# Patient Record
Sex: FEMALE | Race: BLACK OR AFRICAN AMERICAN | NOT HISPANIC OR LATINO | URBAN - METROPOLITAN AREA
[De-identification: names, ages, dates, MRNs, and addresses within clinical notes are randomized per-mention and may not be internally consistent; named-entity substitution may affect disease eponyms.]

---

## 2015-01-01 RX ORDER — METOPROLOL TARTRATE 50 MG
1 TABLET ORAL
Qty: 0 | Refills: 0 | DISCHARGE
Start: 2015-01-01

## 2015-01-06 RX ORDER — ASCORBIC ACID 60 MG
2 TABLET,CHEWABLE ORAL
Qty: 0 | Refills: 0 | COMMUNITY
Start: 2015-01-06

## 2015-04-05 RX ORDER — GABAPENTIN 400 MG/1
1 CAPSULE ORAL
Qty: 0 | Refills: 0 | COMMUNITY
Start: 2015-04-05

## 2015-04-05 RX ORDER — GABAPENTIN 400 MG/1
1 CAPSULE ORAL
Qty: 0 | Refills: 0 | DISCHARGE
Start: 2015-04-05

## 2017-01-14 ENCOUNTER — EMERGENCY (EMERGENCY)
Facility: HOSPITAL | Age: 67
LOS: 1 days | Discharge: HOME | End: 2017-01-14
Attending: EMERGENCY MEDICINE | Admitting: EMERGENCY MEDICINE
Payer: MEDICARE

## 2017-01-14 VITALS
OXYGEN SATURATION: 100 % | HEART RATE: 90 BPM | DIASTOLIC BLOOD PRESSURE: 56 MMHG | RESPIRATION RATE: 20 BRPM | SYSTOLIC BLOOD PRESSURE: 126 MMHG | TEMPERATURE: 99 F

## 2017-01-14 VITALS
RESPIRATION RATE: 16 BRPM | OXYGEN SATURATION: 100 % | SYSTOLIC BLOOD PRESSURE: 112 MMHG | HEART RATE: 87 BPM | TEMPERATURE: 99 F | DIASTOLIC BLOOD PRESSURE: 79 MMHG

## 2017-01-14 LAB
ALBUMIN SERPL ELPH-MCNC: 4.1 G/DL — SIGNIFICANT CHANGE UP (ref 3.3–5)
ALP SERPL-CCNC: 85 U/L — SIGNIFICANT CHANGE UP (ref 40–120)
ALT FLD-CCNC: 24 U/L — SIGNIFICANT CHANGE UP (ref 4–33)
APPEARANCE UR: SIGNIFICANT CHANGE UP
AST SERPL-CCNC: 27 U/L — SIGNIFICANT CHANGE UP (ref 4–32)
BASOPHILS # BLD AUTO: 0.02 K/UL — SIGNIFICANT CHANGE UP (ref 0–0.2)
BASOPHILS NFR BLD AUTO: 0.2 % — SIGNIFICANT CHANGE UP (ref 0–2)
BILIRUB SERPL-MCNC: 0.4 MG/DL — SIGNIFICANT CHANGE UP (ref 0.2–1.2)
BILIRUB UR-MCNC: NEGATIVE — SIGNIFICANT CHANGE UP
BLOOD UR QL VISUAL: NEGATIVE — SIGNIFICANT CHANGE UP
BUN SERPL-MCNC: 12 MG/DL — SIGNIFICANT CHANGE UP (ref 7–23)
CALCIUM SERPL-MCNC: 9.4 MG/DL — SIGNIFICANT CHANGE UP (ref 8.4–10.5)
CHLORIDE SERPL-SCNC: 101 MMOL/L — SIGNIFICANT CHANGE UP (ref 98–107)
CK MB BLD-MCNC: 1.4 — SIGNIFICANT CHANGE UP (ref 0–2.5)
CK MB BLD-MCNC: 2.17 NG/ML — SIGNIFICANT CHANGE UP (ref 1–4.7)
CK SERPL-CCNC: 150 U/L — SIGNIFICANT CHANGE UP (ref 25–170)
CO2 SERPL-SCNC: 24 MMOL/L — SIGNIFICANT CHANGE UP (ref 22–31)
COLOR SPEC: YELLOW — SIGNIFICANT CHANGE UP
CREAT SERPL-MCNC: 0.71 MG/DL — SIGNIFICANT CHANGE UP (ref 0.5–1.3)
EOSINOPHIL # BLD AUTO: 0.11 K/UL — SIGNIFICANT CHANGE UP (ref 0–0.5)
EOSINOPHIL NFR BLD AUTO: 1.2 % — SIGNIFICANT CHANGE UP (ref 0–6)
GLUCOSE SERPL-MCNC: 108 MG/DL — HIGH (ref 70–99)
GLUCOSE UR-MCNC: NEGATIVE — SIGNIFICANT CHANGE UP
HCT VFR BLD CALC: 37 % — SIGNIFICANT CHANGE UP (ref 34.5–45)
HGB BLD-MCNC: 12.2 G/DL — SIGNIFICANT CHANGE UP (ref 11.5–15.5)
IMM GRANULOCYTES NFR BLD AUTO: 0.3 % — SIGNIFICANT CHANGE UP (ref 0–1.5)
KETONES UR-MCNC: NEGATIVE — SIGNIFICANT CHANGE UP
LEUKOCYTE ESTERASE UR-ACNC: NEGATIVE — SIGNIFICANT CHANGE UP
LYMPHOCYTES # BLD AUTO: 2.19 K/UL — SIGNIFICANT CHANGE UP (ref 1–3.3)
LYMPHOCYTES # BLD AUTO: 24.9 % — SIGNIFICANT CHANGE UP (ref 13–44)
MCHC RBC-ENTMCNC: 28.2 PG — SIGNIFICANT CHANGE UP (ref 27–34)
MCHC RBC-ENTMCNC: 33 % — SIGNIFICANT CHANGE UP (ref 32–36)
MCV RBC AUTO: 85.6 FL — SIGNIFICANT CHANGE UP (ref 80–100)
MONOCYTES # BLD AUTO: 0.77 K/UL — SIGNIFICANT CHANGE UP (ref 0–0.9)
MONOCYTES NFR BLD AUTO: 8.7 % — SIGNIFICANT CHANGE UP (ref 2–14)
MUCOUS THREADS # UR AUTO: SIGNIFICANT CHANGE UP
NEUTROPHILS # BLD AUTO: 5.69 K/UL — SIGNIFICANT CHANGE UP (ref 1.8–7.4)
NEUTROPHILS NFR BLD AUTO: 64.7 % — SIGNIFICANT CHANGE UP (ref 43–77)
NITRITE UR-MCNC: NEGATIVE — SIGNIFICANT CHANGE UP
PH UR: 7 — SIGNIFICANT CHANGE UP (ref 4.6–8)
PLATELET # BLD AUTO: 321 K/UL — SIGNIFICANT CHANGE UP (ref 150–400)
PMV BLD: 10.9 FL — SIGNIFICANT CHANGE UP (ref 7–13)
POTASSIUM SERPL-MCNC: 3.8 MMOL/L — SIGNIFICANT CHANGE UP (ref 3.5–5.3)
POTASSIUM SERPL-SCNC: 3.8 MMOL/L — SIGNIFICANT CHANGE UP (ref 3.5–5.3)
PROT SERPL-MCNC: 7.5 G/DL — SIGNIFICANT CHANGE UP (ref 6–8.3)
PROT UR-MCNC: 10 — SIGNIFICANT CHANGE UP
RBC # BLD: 4.32 M/UL — SIGNIFICANT CHANGE UP (ref 3.8–5.2)
RBC # FLD: 15 % — HIGH (ref 10.3–14.5)
RBC CASTS # UR COMP ASSIST: SIGNIFICANT CHANGE UP (ref 0–?)
SODIUM SERPL-SCNC: 140 MMOL/L — SIGNIFICANT CHANGE UP (ref 135–145)
SP GR SPEC: 1.02 — SIGNIFICANT CHANGE UP (ref 1–1.03)
SQUAMOUS # UR AUTO: SIGNIFICANT CHANGE UP
TROPONIN T SERPL-MCNC: < 0.06 NG/ML — SIGNIFICANT CHANGE UP (ref 0–0.06)
UROBILINOGEN FLD QL: NORMAL E.U. — SIGNIFICANT CHANGE UP (ref 0.1–0.2)
WBC # BLD: 8.81 K/UL — SIGNIFICANT CHANGE UP (ref 3.8–10.5)
WBC # FLD AUTO: 8.81 K/UL — SIGNIFICANT CHANGE UP (ref 3.8–10.5)
WBC UR QL: SIGNIFICANT CHANGE UP (ref 0–?)

## 2017-01-14 PROCEDURE — 71020: CPT | Mod: 26

## 2017-01-14 PROCEDURE — 99285 EMERGENCY DEPT VISIT HI MDM: CPT | Mod: 25,GC

## 2017-01-14 PROCEDURE — 93010 ELECTROCARDIOGRAM REPORT: CPT

## 2017-01-14 RX ORDER — HYDROMORPHONE HYDROCHLORIDE 2 MG/ML
1 INJECTION INTRAMUSCULAR; INTRAVENOUS; SUBCUTANEOUS
Qty: 10 | Refills: 0 | OUTPATIENT
Start: 2017-01-14

## 2017-01-14 RX ORDER — HYDROMORPHONE HYDROCHLORIDE 2 MG/ML
1 INJECTION INTRAMUSCULAR; INTRAVENOUS; SUBCUTANEOUS ONCE
Qty: 0 | Refills: 0 | Status: DISCONTINUED | OUTPATIENT
Start: 2017-01-14 | End: 2017-01-14

## 2017-01-14 RX ORDER — KETOROLAC TROMETHAMINE 30 MG/ML
15 SYRINGE (ML) INJECTION ONCE
Qty: 0 | Refills: 0 | Status: DISCONTINUED | OUTPATIENT
Start: 2017-01-14 | End: 2017-01-14

## 2017-01-14 RX ORDER — HYDROMORPHONE HYDROCHLORIDE 2 MG/ML
2 INJECTION INTRAMUSCULAR; INTRAVENOUS; SUBCUTANEOUS ONCE
Qty: 0 | Refills: 0 | Status: DISCONTINUED | OUTPATIENT
Start: 2017-01-14 | End: 2017-01-14

## 2017-01-14 RX ADMIN — Medication 15 MILLIGRAM(S): at 17:27

## 2017-01-14 RX ADMIN — HYDROMORPHONE HYDROCHLORIDE 2 MILLIGRAM(S): 2 INJECTION INTRAMUSCULAR; INTRAVENOUS; SUBCUTANEOUS at 18:59

## 2017-01-14 RX ADMIN — Medication 15 MILLIGRAM(S): at 17:41

## 2017-01-14 RX ADMIN — HYDROMORPHONE HYDROCHLORIDE 1 MILLIGRAM(S): 2 INJECTION INTRAMUSCULAR; INTRAVENOUS; SUBCUTANEOUS at 17:41

## 2017-01-14 RX ADMIN — HYDROMORPHONE HYDROCHLORIDE 1 MILLIGRAM(S): 2 INJECTION INTRAMUSCULAR; INTRAVENOUS; SUBCUTANEOUS at 17:26

## 2017-01-14 NOTE — ED ADULT NURSE NOTE - CHIEF COMPLAINT QUOTE
Patient with a history of spinal stenosis, c/o lower back pain radiating into b/l LE. States "my doctor mailed me a prescription but I haven't gotten it yet." Usually takes percocet for pain but ran out one week ago.    Addendum 1545: Patient now reports chest pain, SOB worsening upon exertion to [facility] MONSIVAIS since Wednesday. Has a history of angina. Charge nurse aware. Taken to RM 26.
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
## 2017-01-14 NOTE — ED PROVIDER NOTE - SHIFT CHANGE DETAILS
pt was transferred to [facility] for further work up of her cp and sob pt was transferred to [facility] for further work up of her cp and sob. pt is in no acute distress and also complaining of left back pain radiating to her left lower ext

## 2017-01-14 NOTE — ED ADULT TRIAGE NOTE - CHIEF COMPLAINT QUOTE
Patient with a history of spinal stenosis, c/o lower back pain radiating into b/l LE. States "my doctor mailed me a prescription but I havent gotten it yet." Usually takes percocet for pain but ran out one week ago. Patient with a history of spinal stenosis, c/o lower back pain radiating into b/l LE. States "my doctor mailed me a prescription but I haven't gotten it yet." Usually takes percocet for pain but ran out one week ago.    Addendum 1545: Patient now reports chest pain, SOB worsening upon exertion to [facility] MONSIVAIS since Wednesday. Has a history of angina. Charge nurse aware. Taken to RM 26.

## 2017-01-14 NOTE — ED PROVIDER NOTE - PROGRESS NOTE DETAILS
Diego: pt sent to main by Annabelle for CP.  on d/w w/ pt notes 2 min of chest pain on night PTA, typical, unchanged from prior "angina".  no events today.  Notes JOLLY to baseline.  No cough, fever, AP.  Pt in ED 2/2 to chronic back pain, ran out of rx for percocet this AM.  Unchanged back pain. Diego: pt sent to [facility] by Annabelle for CP.  on d/w w/ pt notes 2 min of chest pain on night PTA, typical, unchanged from prior "angina".  no events today.  Notes JOLLY to baseline.  No cough, fever, AP.  Pt in ED 2/2 to chronic back pain, ran out of rx for percocet this AM.  Unchanged back pain.  NML stress 2 months PTA (cards aAron Blackwell) Lorrie: sent to [facility] by Dr. Wynn due to chest pain. history retaken. pt presented for worsening chronic low back pain. normally takes percocet but hasn't been working for her. maggie had a recent stress by her cardiologist for surgery clearance and was cleared. was initially scheduled for surgery in 4 days but insurance has not approved it yet. she called her surgeon due to back pain and was sent to ED. sent to the main because she reported c/p and sob. last yesterday. she says she has chronic intermittent c/p and that episode was no different. she also has chronic soboe that was no different. she says she is not concerned over her chest pain or breathing and if ot for her back would not have come to ED. her back pain is chronic in nature and per pt due to spinal stenosis. always radiates down both legs and is otherwise unchanged. able to ambulate. exam, vs wnl, nad. hs and lungs normal, abdo benign, legs normal. good strength in legs. ecg neg. will get single trop, but given chronic unchanged symptoms and recent neg stress test, no need for further ED w/u. will give IV meds for her back pain. will give her f/u with pain mgmt for possible injections as she has not tried that. will change percs to dilaudid when she goes home. Pt pain improved, unable to access pharmacy tonight, will give 1 tab to go home and pain management phone number.  Pt stable for d/c.

## 2017-01-14 NOTE — ED PROVIDER NOTE - PMH
Angina    Asthma    Brain cyst    Dizziness  secondary to brain cyst  Fibroid, uterine    H/O sciatica    HTN (hypertension)    RA (rheumatoid arthritis)

## 2017-01-14 NOTE — ED PROVIDER NOTE - NS ED MD SCRIBE ATTENDING SCRIBE SECTIONS
PAST MEDICAL/SURGICAL/SOCIAL HISTORY/VITAL SIGNS( Pullset)/HIV/HISTORY OF PRESENT ILLNESS/REVIEW OF SYSTEMS/DISPOSITION/PHYSICAL EXAM

## 2017-01-18 LAB
BACTERIA UR CULT: SIGNIFICANT CHANGE UP
SPECIMEN SOURCE: SIGNIFICANT CHANGE UP

## 2017-01-19 NOTE — ED POST DISCHARGE NOTE - RESULT SUMMARY
UCX: Staph sp. coag neg 10,000 - 49,000.  UA negative.  Likely contaminant.  Appropriate care in ED.  No call back necessary.

## 2017-02-17 ENCOUNTER — EMERGENCY (EMERGENCY)
Facility: HOSPITAL | Age: 67
LOS: 1 days | Discharge: HOME | End: 2017-02-17
Attending: EMERGENCY MEDICINE | Admitting: EMERGENCY MEDICINE
Payer: MEDICARE

## 2017-02-17 VITALS
RESPIRATION RATE: 16 BRPM | TEMPERATURE: 99 F | SYSTOLIC BLOOD PRESSURE: 143 MMHG | DIASTOLIC BLOOD PRESSURE: 64 MMHG | OXYGEN SATURATION: 98 % | HEART RATE: 76 BPM

## 2017-02-17 VITALS
DIASTOLIC BLOOD PRESSURE: 88 MMHG | TEMPERATURE: 98 F | SYSTOLIC BLOOD PRESSURE: 149 MMHG | OXYGEN SATURATION: 98 % | RESPIRATION RATE: 18 BRPM | HEART RATE: 82 BPM

## 2017-02-17 DIAGNOSIS — Z98.890 OTHER SPECIFIED POSTPROCEDURAL STATES: Chronic | ICD-10-CM

## 2017-02-17 LAB
ALBUMIN SERPL ELPH-MCNC: 3.2 G/DL — LOW (ref 3.3–5)
ALP SERPL-CCNC: 84 U/L — SIGNIFICANT CHANGE UP (ref 40–120)
ALT FLD-CCNC: 11 U/L — SIGNIFICANT CHANGE UP (ref 4–33)
APPEARANCE UR: CLEAR — SIGNIFICANT CHANGE UP
AST SERPL-CCNC: 26 U/L — SIGNIFICANT CHANGE UP (ref 4–32)
BACTERIA # UR AUTO: SIGNIFICANT CHANGE UP
BASOPHILS # BLD AUTO: 0.02 K/UL — SIGNIFICANT CHANGE UP (ref 0–0.2)
BASOPHILS NFR BLD AUTO: 0.2 % — SIGNIFICANT CHANGE UP (ref 0–2)
BILIRUB SERPL-MCNC: 0.2 MG/DL — SIGNIFICANT CHANGE UP (ref 0.2–1.2)
BILIRUB UR-MCNC: NEGATIVE — SIGNIFICANT CHANGE UP
BLOOD UR QL VISUAL: NEGATIVE — SIGNIFICANT CHANGE UP
BUN SERPL-MCNC: 7 MG/DL — SIGNIFICANT CHANGE UP (ref 7–23)
CALCIUM SERPL-MCNC: 9 MG/DL — SIGNIFICANT CHANGE UP (ref 8.4–10.5)
CHLORIDE SERPL-SCNC: 96 MMOL/L — LOW (ref 98–107)
CO2 SERPL-SCNC: 25 MMOL/L — SIGNIFICANT CHANGE UP (ref 22–31)
COLOR SPEC: SIGNIFICANT CHANGE UP
CREAT SERPL-MCNC: 0.63 MG/DL — SIGNIFICANT CHANGE UP (ref 0.5–1.3)
EOSINOPHIL # BLD AUTO: 0.16 K/UL — SIGNIFICANT CHANGE UP (ref 0–0.5)
EOSINOPHIL NFR BLD AUTO: 1.9 % — SIGNIFICANT CHANGE UP (ref 0–6)
GLUCOSE SERPL-MCNC: 100 MG/DL — HIGH (ref 70–99)
GLUCOSE UR-MCNC: NEGATIVE — SIGNIFICANT CHANGE UP
HCT VFR BLD CALC: 30 % — LOW (ref 34.5–45)
HGB BLD-MCNC: 9.3 G/DL — LOW (ref 11.5–15.5)
IMM GRANULOCYTES NFR BLD AUTO: 0.4 % — SIGNIFICANT CHANGE UP (ref 0–1.5)
KETONES UR-MCNC: NEGATIVE — SIGNIFICANT CHANGE UP
LEUKOCYTE ESTERASE UR-ACNC: NEGATIVE — SIGNIFICANT CHANGE UP
LIDOCAIN IGE QN: 13.9 U/L — SIGNIFICANT CHANGE UP (ref 7–60)
LYMPHOCYTES # BLD AUTO: 1.6 K/UL — SIGNIFICANT CHANGE UP (ref 1–3.3)
LYMPHOCYTES # BLD AUTO: 18.8 % — SIGNIFICANT CHANGE UP (ref 13–44)
MCHC RBC-ENTMCNC: 26.6 PG — LOW (ref 27–34)
MCHC RBC-ENTMCNC: 31 % — LOW (ref 32–36)
MCV RBC AUTO: 85.7 FL — SIGNIFICANT CHANGE UP (ref 80–100)
MONOCYTES # BLD AUTO: 0.74 K/UL — SIGNIFICANT CHANGE UP (ref 0–0.9)
MONOCYTES NFR BLD AUTO: 8.7 % — SIGNIFICANT CHANGE UP (ref 2–14)
MUCOUS THREADS # UR AUTO: SIGNIFICANT CHANGE UP
NEUTROPHILS # BLD AUTO: 5.95 K/UL — SIGNIFICANT CHANGE UP (ref 1.8–7.4)
NEUTROPHILS NFR BLD AUTO: 70 % — SIGNIFICANT CHANGE UP (ref 43–77)
NITRITE UR-MCNC: NEGATIVE — SIGNIFICANT CHANGE UP
PH UR: 7 — SIGNIFICANT CHANGE UP (ref 4.6–8)
PLATELET # BLD AUTO: 541 K/UL — HIGH (ref 150–400)
PMV BLD: 8.7 FL — SIGNIFICANT CHANGE UP (ref 7–13)
POTASSIUM SERPL-MCNC: 4.2 MMOL/L — SIGNIFICANT CHANGE UP (ref 3.5–5.3)
POTASSIUM SERPL-SCNC: 4.2 MMOL/L — SIGNIFICANT CHANGE UP (ref 3.5–5.3)
PROT SERPL-MCNC: 7.2 G/DL — SIGNIFICANT CHANGE UP (ref 6–8.3)
PROT UR-MCNC: NEGATIVE — SIGNIFICANT CHANGE UP
RBC # BLD: 3.5 M/UL — LOW (ref 3.8–5.2)
RBC # FLD: 15.2 % — HIGH (ref 10.3–14.5)
RBC CASTS # UR COMP ASSIST: SIGNIFICANT CHANGE UP (ref 0–?)
SODIUM SERPL-SCNC: 136 MMOL/L — SIGNIFICANT CHANGE UP (ref 135–145)
SP GR SPEC: 1.02 — SIGNIFICANT CHANGE UP (ref 1–1.03)
SQUAMOUS # UR AUTO: SIGNIFICANT CHANGE UP
UROBILINOGEN FLD QL: NORMAL E.U. — SIGNIFICANT CHANGE UP (ref 0.1–0.2)
WBC # BLD: 8.5 K/UL — SIGNIFICANT CHANGE UP (ref 3.8–10.5)
WBC # FLD AUTO: 8.5 K/UL — SIGNIFICANT CHANGE UP (ref 3.8–10.5)
WBC UR QL: SIGNIFICANT CHANGE UP (ref 0–?)

## 2017-02-17 PROCEDURE — 76705 ECHO EXAM OF ABDOMEN: CPT | Mod: 26

## 2017-02-17 PROCEDURE — 74177 CT ABD & PELVIS W/CONTRAST: CPT | Mod: 26

## 2017-02-17 PROCEDURE — 71010: CPT | Mod: 26

## 2017-02-17 PROCEDURE — 99284 EMERGENCY DEPT VISIT MOD MDM: CPT

## 2017-02-17 RX ORDER — SODIUM CHLORIDE 9 MG/ML
3 INJECTION INTRAMUSCULAR; INTRAVENOUS; SUBCUTANEOUS ONCE
Qty: 0 | Refills: 0 | Status: COMPLETED | OUTPATIENT
Start: 2017-02-17 | End: 2017-02-17

## 2017-02-17 RX ADMIN — SODIUM CHLORIDE 3 MILLILITER(S): 9 INJECTION INTRAMUSCULAR; INTRAVENOUS; SUBCUTANEOUS at 13:15

## 2017-02-17 NOTE — ED PROVIDER NOTE - NS ED MD SCRIBE ATTENDING SCRIBE SECTIONS
REVIEW OF SYSTEMS/DISPOSITION/PHYSICAL EXAM/HISTORY OF PRESENT ILLNESS/PAST MEDICAL/SURGICAL/SOCIAL HISTORY/VITAL SIGNS( Pullset)

## 2017-02-17 NOTE — ED PROVIDER NOTE - PROGRESS NOTE DETAILS
still w pain, still doesn't want pain meds labs reviewed, US nad, still w rt sided abd tender, ct abd/pelvis abd soft, nd, minimal tender, pt tolerates po, nad, explained labs, ua ct/US w pt , competent, understands rba of abd pain, will return to ER if worse abd soft, nd, minimal tender, pt tolerates po, nad, explained labs, ua ct/US w pt , competent, understands rba of abd pain, will return to ER if worse  [facility] w social work [facility]

## 2017-02-17 NOTE — ED PROVIDER NOTE - DETAILS:
The scribe's documentation has been prepared under my direction and personally reviewed by me in its entirety. I confirm that the note above accurately reflects all work, treatment, procedures, and medical decision making performed by Turner Beasley MD

## 2017-02-17 NOTE — ED PROVIDER NOTE - ABDOMINAL EXAM
tender.../nondistended/soft/morbid obesity, mild guarding tender.../soft/nondistended/morbid obesity, mild guarding, no masses

## 2017-02-17 NOTE — ED PROVIDER NOTE - OBJECTIVE STATEMENT
67y F with PMHx of HTN presents to the ED with constant right abdominal pain x 1 week. Pt states she had back surgery and notes constipation since surgery. now improved Last BM was 3 days ago, states she has BMs every 3 days. Denies vomiting, diarrhea, blood in stool, fever, SOB, or any other complaints. 67y F with PMHx of HTN presents to the ED with constant right abdominal pain x 1 week. Pt states she had back surgery mid january at [facility] and notes when asked -constipation since surgery but now improved Last BM was 3 days ago, states she has BMs every 3 days. Denies feeling of constipation at this time vomiting, diarrhea, blood in stool, fever, SOB, or any other complaints.  no change in chronic chest back pain.  no incontinence, no change in LE strength /sensation
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
## 2017-02-17 NOTE — ED PROVIDER NOTE - MEDICAL DECISION MAKING DETAILS
67y F presents with abd pain x 1 week. Check labs, chemistry, amylase, lipase. Pt declining pain control at this time. Check US and re-evaluate.

## 2017-08-05 ENCOUNTER — EMERGENCY (EMERGENCY)
Facility: HOSPITAL | Age: 67
LOS: 1 days | Discharge: HOME | End: 2017-08-05
Attending: EMERGENCY MEDICINE | Admitting: EMERGENCY MEDICINE
Payer: MEDICARE

## 2017-08-05 VITALS
TEMPERATURE: 98 F | HEART RATE: 84 BPM | OXYGEN SATURATION: 100 % | DIASTOLIC BLOOD PRESSURE: 65 MMHG | SYSTOLIC BLOOD PRESSURE: 129 MMHG | RESPIRATION RATE: 16 BRPM

## 2017-08-05 DIAGNOSIS — Z98.890 OTHER SPECIFIED POSTPROCEDURAL STATES: Chronic | ICD-10-CM

## 2017-08-05 LAB
ALBUMIN SERPL ELPH-MCNC: 4.3 G/DL — SIGNIFICANT CHANGE UP (ref 3.3–5)
ALP SERPL-CCNC: 91 U/L — SIGNIFICANT CHANGE UP (ref 40–120)
ALT FLD-CCNC: 15 U/L — SIGNIFICANT CHANGE UP (ref 4–33)
AMYLASE P1 CFR SERPL: 57 U/L — SIGNIFICANT CHANGE UP (ref 25–125)
APPEARANCE UR: CLEAR — SIGNIFICANT CHANGE UP
APTT BLD: 31 SEC — SIGNIFICANT CHANGE UP (ref 27.5–37.4)
AST SERPL-CCNC: 25 U/L — SIGNIFICANT CHANGE UP (ref 4–32)
BASE EXCESS BLDV CALC-SCNC: 5.7 MMOL/L — SIGNIFICANT CHANGE UP
BASOPHILS # BLD AUTO: 0.04 K/UL — SIGNIFICANT CHANGE UP (ref 0–0.2)
BASOPHILS NFR BLD AUTO: 0.5 % — SIGNIFICANT CHANGE UP (ref 0–2)
BILIRUB SERPL-MCNC: 0.4 MG/DL — SIGNIFICANT CHANGE UP (ref 0.2–1.2)
BILIRUB UR-MCNC: NEGATIVE — SIGNIFICANT CHANGE UP
BLOOD GAS VENOUS - CREATININE: 0.67 MG/DL — SIGNIFICANT CHANGE UP (ref 0.5–1.3)
BLOOD UR QL VISUAL: NEGATIVE — SIGNIFICANT CHANGE UP
BUN SERPL-MCNC: 6 MG/DL — LOW (ref 7–23)
CALCIUM SERPL-MCNC: 9.6 MG/DL — SIGNIFICANT CHANGE UP (ref 8.4–10.5)
CHLORIDE BLDV-SCNC: 105 MMOL/L — SIGNIFICANT CHANGE UP (ref 96–108)
CHLORIDE SERPL-SCNC: 103 MMOL/L — SIGNIFICANT CHANGE UP (ref 98–107)
CO2 SERPL-SCNC: 26 MMOL/L — SIGNIFICANT CHANGE UP (ref 22–31)
COLOR SPEC: YELLOW — SIGNIFICANT CHANGE UP
CREAT SERPL-MCNC: 0.77 MG/DL — SIGNIFICANT CHANGE UP (ref 0.5–1.3)
EOSINOPHIL # BLD AUTO: 0.08 K/UL — SIGNIFICANT CHANGE UP (ref 0–0.5)
EOSINOPHIL NFR BLD AUTO: 1 % — SIGNIFICANT CHANGE UP (ref 0–6)
GAS PNL BLDV: 143 MMOL/L — SIGNIFICANT CHANGE UP (ref 136–146)
GLUCOSE BLDV-MCNC: 105 — HIGH (ref 70–99)
GLUCOSE SERPL-MCNC: 94 MG/DL — SIGNIFICANT CHANGE UP (ref 70–99)
GLUCOSE UR-MCNC: NEGATIVE — SIGNIFICANT CHANGE UP
HCO3 BLDV-SCNC: 28 MMOL/L — HIGH (ref 20–27)
HCT VFR BLD CALC: 40.1 % — SIGNIFICANT CHANGE UP (ref 34.5–45)
HCT VFR BLDV CALC: 40.4 % — SIGNIFICANT CHANGE UP (ref 34.5–45)
HGB BLD-MCNC: 13 G/DL — SIGNIFICANT CHANGE UP (ref 11.5–15.5)
HGB BLDV-MCNC: 13.1 G/DL — SIGNIFICANT CHANGE UP (ref 11.5–15.5)
IMM GRANULOCYTES # BLD AUTO: 0.02 # — SIGNIFICANT CHANGE UP
IMM GRANULOCYTES NFR BLD AUTO: 0.3 % — SIGNIFICANT CHANGE UP (ref 0–1.5)
INR BLD: 1.1 — SIGNIFICANT CHANGE UP (ref 0.88–1.17)
KETONES UR-MCNC: NEGATIVE — SIGNIFICANT CHANGE UP
LACTATE BLDV-MCNC: 1.3 MMOL/L — SIGNIFICANT CHANGE UP (ref 0.5–2)
LEUKOCYTE ESTERASE UR-ACNC: NEGATIVE — SIGNIFICANT CHANGE UP
LIDOCAIN IGE QN: 13.7 U/L — SIGNIFICANT CHANGE UP (ref 7–60)
LYMPHOCYTES # BLD AUTO: 2.22 K/UL — SIGNIFICANT CHANGE UP (ref 1–3.3)
LYMPHOCYTES # BLD AUTO: 28.8 % — SIGNIFICANT CHANGE UP (ref 13–44)
MCHC RBC-ENTMCNC: 27.3 PG — SIGNIFICANT CHANGE UP (ref 27–34)
MCHC RBC-ENTMCNC: 32.4 % — SIGNIFICANT CHANGE UP (ref 32–36)
MCV RBC AUTO: 84.2 FL — SIGNIFICANT CHANGE UP (ref 80–100)
MONOCYTES # BLD AUTO: 0.55 K/UL — SIGNIFICANT CHANGE UP (ref 0–0.9)
MONOCYTES NFR BLD AUTO: 7.1 % — SIGNIFICANT CHANGE UP (ref 2–14)
MUCOUS THREADS # UR AUTO: SIGNIFICANT CHANGE UP
NEUTROPHILS # BLD AUTO: 4.81 K/UL — SIGNIFICANT CHANGE UP (ref 1.8–7.4)
NEUTROPHILS NFR BLD AUTO: 62.3 % — SIGNIFICANT CHANGE UP (ref 43–77)
NITRITE UR-MCNC: NEGATIVE — SIGNIFICANT CHANGE UP
NRBC # FLD: 0 — SIGNIFICANT CHANGE UP
PCO2 BLDV: 53 MMHG — HIGH (ref 41–51)
PH BLDV: 7.38 PH — SIGNIFICANT CHANGE UP (ref 7.32–7.43)
PH UR: 7 — SIGNIFICANT CHANGE UP (ref 4.6–8)
PLATELET # BLD AUTO: 339 K/UL — SIGNIFICANT CHANGE UP (ref 150–400)
PMV BLD: 10.7 FL — SIGNIFICANT CHANGE UP (ref 7–13)
PO2 BLDV: 31 MMHG — LOW (ref 35–40)
POTASSIUM BLDV-SCNC: 3 MMOL/L — LOW (ref 3.4–4.5)
POTASSIUM SERPL-MCNC: 3.3 MMOL/L — LOW (ref 3.5–5.3)
POTASSIUM SERPL-SCNC: 3.3 MMOL/L — LOW (ref 3.5–5.3)
PROT SERPL-MCNC: 8.2 G/DL — SIGNIFICANT CHANGE UP (ref 6–8.3)
PROT UR-MCNC: 10 — SIGNIFICANT CHANGE UP
PROTHROM AB SERPL-ACNC: 12.3 SEC — SIGNIFICANT CHANGE UP (ref 9.8–13.1)
RBC # BLD: 4.76 M/UL — SIGNIFICANT CHANGE UP (ref 3.8–5.2)
RBC # FLD: 15.9 % — HIGH (ref 10.3–14.5)
RBC CASTS # UR COMP ASSIST: SIGNIFICANT CHANGE UP (ref 0–?)
SAO2 % BLDV: 52.5 % — LOW (ref 60–85)
SODIUM SERPL-SCNC: 144 MMOL/L — SIGNIFICANT CHANGE UP (ref 135–145)
SP GR SPEC: 1.02 — SIGNIFICANT CHANGE UP (ref 1–1.03)
SQUAMOUS # UR AUTO: SIGNIFICANT CHANGE UP
UROBILINOGEN FLD QL: NORMAL E.U. — SIGNIFICANT CHANGE UP (ref 0.1–0.2)
WBC # BLD: 7.72 K/UL — SIGNIFICANT CHANGE UP (ref 3.8–10.5)
WBC # FLD AUTO: 7.72 K/UL — SIGNIFICANT CHANGE UP (ref 3.8–10.5)
WBC UR QL: SIGNIFICANT CHANGE UP (ref 0–?)

## 2017-08-05 PROCEDURE — 99218: CPT | Mod: GC

## 2017-08-05 PROCEDURE — 74177 CT ABD & PELVIS W/CONTRAST: CPT | Mod: 26

## 2017-08-05 RX ORDER — LOPERAMIDE HCL 2 MG
2 TABLET ORAL ONCE
Qty: 0 | Refills: 0 | Status: COMPLETED | OUTPATIENT
Start: 2017-08-05 | End: 2017-08-05

## 2017-08-05 RX ORDER — SODIUM CHLORIDE 9 MG/ML
1000 INJECTION INTRAMUSCULAR; INTRAVENOUS; SUBCUTANEOUS ONCE
Qty: 0 | Refills: 0 | Status: COMPLETED | OUTPATIENT
Start: 2017-08-05 | End: 2017-08-05

## 2017-08-05 RX ORDER — ACETAMINOPHEN 500 MG
650 TABLET ORAL ONCE
Qty: 0 | Refills: 0 | Status: COMPLETED | OUTPATIENT
Start: 2017-08-05 | End: 2017-08-05

## 2017-08-05 RX ORDER — POTASSIUM CHLORIDE 20 MEQ
40 PACKET (EA) ORAL ONCE
Qty: 0 | Refills: 0 | Status: COMPLETED | OUTPATIENT
Start: 2017-08-05 | End: 2017-08-05

## 2017-08-05 RX ADMIN — Medication 2 MILLIGRAM(S): at 20:48

## 2017-08-05 RX ADMIN — Medication 40 MILLIEQUIVALENT(S): at 22:26

## 2017-08-05 RX ADMIN — SODIUM CHLORIDE 2000 MILLILITER(S): 9 INJECTION INTRAMUSCULAR; INTRAVENOUS; SUBCUTANEOUS at 15:55

## 2017-08-05 RX ADMIN — Medication 2 MILLIGRAM(S): at 23:46

## 2017-08-05 RX ADMIN — Medication 2 MILLIGRAM(S): at 22:20

## 2017-08-05 RX ADMIN — Medication 650 MILLIGRAM(S): at 23:46

## 2017-08-05 NOTE — ED ADULT NURSE NOTE - OBJECTIVE STATEMENT
Diarrea--whenever she eats or drinks Diarrea--whenever she eats or drinks x9 days--pt also c/o diffuse abdominal pain--pt skin warm and dry--pt skin intact--pt alert and orientated x3--  pt had back surgery in January and is still c/o pain in lower back

## 2017-08-05 NOTE — ED PROVIDER NOTE - SHIFT CHANGE DETAILS
I have signed over this patient to the above attending physician. Pertinent history, physical exam findings and workup thus far in the ED have been discussed. The pending tests and plan, including labs, ct were signed over.  All questions from the above attending physician have been answered.

## 2017-08-05 NOTE — ED PROVIDER NOTE - ATTENDING CONTRIBUTION TO CARE
67m, pmhx htn, RA. p/w diarrhea only when she eats a/w right sided abdominal pain x 9 days. if she doesn't eat she doesn't get diarrhea. no vomiting, urinary symptoms or f/c. exam, vs wnl, nad. hs and lungs normal, abdo +right sided tenderness, no g/r. unlikely emergent, but given tenderness, will give do CT, labs. if neg, can d/c with GI f/u.

## 2017-08-05 NOTE — ED PROVIDER NOTE - ENMT, MLM
Airway patent, Nasal mucosa clear. Mouth with normal mucosa. Throat has no vesicles, no oropharyngeal exudates and uvula is midline. Airway patent,

## 2017-08-05 NOTE — ED PROVIDER NOTE - PROGRESS NOTE DETAILS
pt feels better, tolerated po, will f/u with primary care; loperamide as needed for diarrhea control; stay hydrated; avoid fried/fatty/spicy foods if aggravates diarrhea; [facility] on call doc okay with cdu observation

## 2017-08-05 NOTE — ED ADULT NURSE REASSESSMENT NOTE - NS ED NURSE REASSESS COMMENT FT1
Received pt from NILESH Segundo, AO x3, ambulatory with walker, c/o abdominal pain with nausea for 9 days. Pending CT. VSS on her baseline. Will continue to monitor.

## 2017-08-05 NOTE — ED PROVIDER NOTE - OBJECTIVE STATEMENT
68 y/o female hx HTN asthma presents to ED c/o diarrhea and abdominal pain x 9 days. Pt. states over past 9 days she has been experiencing diarrhea that she state smultiple episodes of nonbloody diarrhea a day w/ asscociated right sided abdominal pain. Pt. states whenever she eats, soon after develops watery diarrhea. she states when she doesn't eat her bms become more formed but still very soft. denies fever chills blood in stool nausea vomit weakness dizziness. 68 y/o female hx HTN asthma presents to ED c/o diarrhea and abdominal pain x 9 days. Pt. states over past 9 days she has been experiencing diarrhea that she state multiple episodes of nonbloody diarrhea a day w/ associated right sided abdominal pain. Pt. states whenever she eats, soon after develops watery diarrhea. she states when she doesn't eat her bms become more formed but still very soft. denies fever chills blood in stool nausea vomit weakness dizziness.

## 2017-08-06 VITALS
TEMPERATURE: 98 F | RESPIRATION RATE: 16 BRPM | OXYGEN SATURATION: 100 % | HEART RATE: 68 BPM | SYSTOLIC BLOOD PRESSURE: 136 MMHG | DIASTOLIC BLOOD PRESSURE: 69 MMHG

## 2017-08-06 LAB
BASOPHILS # BLD AUTO: 0.05 K/UL — SIGNIFICANT CHANGE UP (ref 0–0.2)
BASOPHILS NFR BLD AUTO: 0.7 % — SIGNIFICANT CHANGE UP (ref 0–2)
BUN SERPL-MCNC: 11 MG/DL — SIGNIFICANT CHANGE UP (ref 7–23)
CALCIUM SERPL-MCNC: 9.2 MG/DL — SIGNIFICANT CHANGE UP (ref 8.4–10.5)
CHLORIDE SERPL-SCNC: 106 MMOL/L — SIGNIFICANT CHANGE UP (ref 98–107)
CO2 SERPL-SCNC: 26 MMOL/L — SIGNIFICANT CHANGE UP (ref 22–31)
CREAT SERPL-MCNC: 0.72 MG/DL — SIGNIFICANT CHANGE UP (ref 0.5–1.3)
EOSINOPHIL # BLD AUTO: 0.14 K/UL — SIGNIFICANT CHANGE UP (ref 0–0.5)
EOSINOPHIL NFR BLD AUTO: 2 % — SIGNIFICANT CHANGE UP (ref 0–6)
GLUCOSE SERPL-MCNC: 110 MG/DL — HIGH (ref 70–99)
HBA1C BLD-MCNC: 6 % — HIGH (ref 4–5.6)
HCT VFR BLD CALC: 34.8 % — SIGNIFICANT CHANGE UP (ref 34.5–45)
HGB BLD-MCNC: 11.3 G/DL — LOW (ref 11.5–15.5)
IMM GRANULOCYTES # BLD AUTO: 0.01 # — SIGNIFICANT CHANGE UP
IMM GRANULOCYTES NFR BLD AUTO: 0.1 % — SIGNIFICANT CHANGE UP (ref 0–1.5)
LYMPHOCYTES # BLD AUTO: 2.42 K/UL — SIGNIFICANT CHANGE UP (ref 1–3.3)
LYMPHOCYTES # BLD AUTO: 34.9 % — SIGNIFICANT CHANGE UP (ref 13–44)
MCHC RBC-ENTMCNC: 27 PG — SIGNIFICANT CHANGE UP (ref 27–34)
MCHC RBC-ENTMCNC: 32.5 % — SIGNIFICANT CHANGE UP (ref 32–36)
MCV RBC AUTO: 83.3 FL — SIGNIFICANT CHANGE UP (ref 80–100)
MONOCYTES # BLD AUTO: 0.75 K/UL — SIGNIFICANT CHANGE UP (ref 0–0.9)
MONOCYTES NFR BLD AUTO: 10.8 % — SIGNIFICANT CHANGE UP (ref 2–14)
NEUTROPHILS # BLD AUTO: 3.56 K/UL — SIGNIFICANT CHANGE UP (ref 1.8–7.4)
NEUTROPHILS NFR BLD AUTO: 51.5 % — SIGNIFICANT CHANGE UP (ref 43–77)
NRBC # FLD: 0 — SIGNIFICANT CHANGE UP
PLATELET # BLD AUTO: 301 K/UL — SIGNIFICANT CHANGE UP (ref 150–400)
PMV BLD: 10.6 FL — SIGNIFICANT CHANGE UP (ref 7–13)
POTASSIUM SERPL-MCNC: 3.3 MMOL/L — LOW (ref 3.5–5.3)
POTASSIUM SERPL-SCNC: 3.3 MMOL/L — LOW (ref 3.5–5.3)
RBC # BLD: 4.18 M/UL — SIGNIFICANT CHANGE UP (ref 3.8–5.2)
RBC # FLD: 15.8 % — HIGH (ref 10.3–14.5)
SODIUM SERPL-SCNC: 144 MMOL/L — SIGNIFICANT CHANGE UP (ref 135–145)
WBC # BLD: 6.93 K/UL — SIGNIFICANT CHANGE UP (ref 3.8–10.5)
WBC # FLD AUTO: 6.93 K/UL — SIGNIFICANT CHANGE UP (ref 3.8–10.5)

## 2017-08-06 PROCEDURE — 99217: CPT | Mod: GC

## 2017-08-06 RX ORDER — HYDROCHLOROTHIAZIDE 25 MG
25 TABLET ORAL DAILY
Qty: 0 | Refills: 0 | Status: DISCONTINUED | OUTPATIENT
Start: 2017-08-06 | End: 2017-08-09

## 2017-08-06 RX ORDER — ISOSORBIDE MONONITRATE 60 MG/1
30 TABLET, EXTENDED RELEASE ORAL DAILY
Qty: 0 | Refills: 0 | Status: DISCONTINUED | OUTPATIENT
Start: 2017-08-06 | End: 2017-08-09

## 2017-08-06 RX ORDER — METOPROLOL TARTRATE 50 MG
50 TABLET ORAL DAILY
Qty: 0 | Refills: 0 | Status: DISCONTINUED | OUTPATIENT
Start: 2017-08-06 | End: 2017-08-09

## 2017-08-06 RX ORDER — SODIUM CHLORIDE 9 MG/ML
1000 INJECTION INTRAMUSCULAR; INTRAVENOUS; SUBCUTANEOUS
Qty: 0 | Refills: 0 | Status: DISCONTINUED | OUTPATIENT
Start: 2017-08-06 | End: 2017-08-09

## 2017-08-06 RX ORDER — GABAPENTIN 400 MG/1
300 CAPSULE ORAL DAILY
Qty: 0 | Refills: 0 | Status: DISCONTINUED | OUTPATIENT
Start: 2017-08-06 | End: 2017-08-09

## 2017-08-06 RX ORDER — POTASSIUM CHLORIDE 20 MEQ
40 PACKET (EA) ORAL ONCE
Qty: 0 | Refills: 0 | Status: COMPLETED | OUTPATIENT
Start: 2017-08-06 | End: 2017-08-06

## 2017-08-06 RX ADMIN — Medication 40 MILLIEQUIVALENT(S): at 10:31

## 2017-08-06 RX ADMIN — GABAPENTIN 300 MILLIGRAM(S): 400 CAPSULE ORAL at 10:34

## 2017-08-06 RX ADMIN — SODIUM CHLORIDE 125 MILLILITER(S): 9 INJECTION INTRAMUSCULAR; INTRAVENOUS; SUBCUTANEOUS at 01:43

## 2017-08-06 RX ADMIN — Medication 25 MILLIGRAM(S): at 06:05

## 2017-08-06 RX ADMIN — Medication 50 MILLIGRAM(S): at 06:05

## 2017-08-06 RX ADMIN — ISOSORBIDE MONONITRATE 30 MILLIGRAM(S): 60 TABLET, EXTENDED RELEASE ORAL at 10:34

## 2017-08-06 NOTE — ED CDU PROVIDER NOTE - PMH
Angina    Asthma    Brain cyst    Dizziness  secondary to brain cyst  Fibroid, uterine    H/O sciatica    HTN (hypertension)    RA (rheumatoid arthritis) Angina    Asthma    Brain cyst    Chronic back pain  (with hx/o back surgery in 7/2015 and 1/2017)  Dizziness  secondary to brain cyst  Fibroid, uterine    H/O sciatica  (left leg)  HTN (hypertension)    RA (rheumatoid arthritis)

## 2017-08-06 NOTE — ED CDU PROVIDER NOTE - OBJECTIVE STATEMENT
68 y/o female hx HTN asthma presents to ED c/o diarrhea and abdominal pain x 9 days. Pt. states over past 9 days she has been experiencing diarrhea that she state multiple episodes of nonbloody diarrhea a day w/ associated right sided abdominal pain. Pt. states whenever she eats, soon after develops watery diarrhea. she states when she doesn't eat her bms become more formed but still very soft. denies fever chills blood in stool nausea vomit weakness dizziness.    CDU LYLE Weinberg Note-----  ED Provider Note reviewed per above; pt is a 66 yo female with PMH of angina, asthma, HTN, brain cyst, intermittent dizziness secondary to hx/o brain cyst (takes meclizine prn), uterine fibroids, arthritis, chronic back pain (with hx/o back surgery 7/2015 and 1/2017), and sciatica, who presented to the ED as noted above.  Pt. was evaluated in the ED; CT abdomen/pelvis showed no acute gastrointestinal abnormality; pt was sent to CDU for IV hydration, po challenge, observation and reassessment.

## 2017-08-06 NOTE — ED CDU PROVIDER NOTE - GASTROINTESTINAL, MLM
Abdomen soft, flat, mild objective generalized TTP to lower abdomen in general; otherwise non-tender, no rebound, no guarding.

## 2017-08-06 NOTE — ED CDU PROVIDER NOTE - PROGRESS NOTE DETAILS
MD Mehta:  66 yo F, sent to CDU for dehydration, after 9d diarrheal illness.  Patient is having < 1 loose stool /day over last 48 hrs.  Has been unable to provide stool sample.  Worked up extensively in the ED:  normal labs, normal CT, normal creatinine.  Patient feels fine this AM.  No stool this AM.  Patient has no LLQ pain, and does not merit empiric metronidazole at this time.  In meantime, patient may be dc'd home, f/u PCP, return precautions. LYLE laguna: 66 yo F, sent to CDU for dehydration, after 9days of diarrhea.  Large work up in  ED:  normal labs, normal CT, normal creatinine.  Patient feels fine and is resting comfrotably.  No stool this AM.  Patient has no LLQ pain  DC home with follow up and return to ER precautions.

## 2017-08-06 NOTE — ED CDU PROVIDER NOTE - CONSTITUTIONAL, MLM
normal... Well appearing, well nourished, awake, alert, oriented to person, place, time/situation and in no apparent distress.  Pt. objectively appears comfortable; is verbalizing in full, clear, effortless sentences.

## 2017-08-06 NOTE — ED CDU PROVIDER NOTE - FAMILY HISTORY
Mother  Still living? Unknown  Family history of lung cancer, Age at diagnosis: Age Unknown     Sibling  Still living? Unknown  Family history of heart failure, Age at diagnosis: Age Unknown     Grandparent  Still living? Unknown  Family history of heart failure, Age at diagnosis: Age Unknown     Father  Still living? Unknown  Family history of renal disease, Age at diagnosis: Age Unknown

## 2017-10-30 ENCOUNTER — INPATIENT (INPATIENT)
Facility: HOSPITAL | Age: 67
LOS: 2 days | Discharge: HOME | End: 2017-11-02
Attending: HOSPITALIST | Admitting: HOSPITALIST
Payer: MEDICARE

## 2017-10-30 VITALS
DIASTOLIC BLOOD PRESSURE: 90 MMHG | TEMPERATURE: 98 F | HEART RATE: 76 BPM | RESPIRATION RATE: 18 BRPM | SYSTOLIC BLOOD PRESSURE: 158 MMHG | OXYGEN SATURATION: 99 %

## 2017-10-30 DIAGNOSIS — Z98.890 OTHER SPECIFIED POSTPROCEDURAL STATES: Chronic | ICD-10-CM

## 2017-10-30 DIAGNOSIS — I24.9 ACUTE ISCHEMIC HEART DISEASE, UNSPECIFIED: ICD-10-CM

## 2017-10-30 LAB
ALBUMIN SERPL ELPH-MCNC: 4.7 G/DL — SIGNIFICANT CHANGE UP (ref 3.3–5)
ALP SERPL-CCNC: 89 U/L — SIGNIFICANT CHANGE UP (ref 40–120)
ALT FLD-CCNC: 13 U/L — SIGNIFICANT CHANGE UP (ref 4–33)
AST SERPL-CCNC: 12 U/L — SIGNIFICANT CHANGE UP (ref 4–32)
BASOPHILS # BLD AUTO: 0.05 K/UL — SIGNIFICANT CHANGE UP (ref 0–0.2)
BASOPHILS NFR BLD AUTO: 0.6 % — SIGNIFICANT CHANGE UP (ref 0–2)
BILIRUB SERPL-MCNC: 0.4 MG/DL — SIGNIFICANT CHANGE UP (ref 0.2–1.2)
BUN SERPL-MCNC: 9 MG/DL — SIGNIFICANT CHANGE UP (ref 7–23)
CALCIUM SERPL-MCNC: 9.7 MG/DL — SIGNIFICANT CHANGE UP (ref 8.4–10.5)
CHLORIDE SERPL-SCNC: 101 MMOL/L — SIGNIFICANT CHANGE UP (ref 98–107)
CK MB BLD-MCNC: 1.31 NG/ML — SIGNIFICANT CHANGE UP (ref 1–4.7)
CK MB BLD-MCNC: 1.65 NG/ML — SIGNIFICANT CHANGE UP (ref 1–4.7)
CK MB BLD-MCNC: SIGNIFICANT CHANGE UP (ref 0–2.5)
CK SERPL-CCNC: 100 U/L — SIGNIFICANT CHANGE UP (ref 25–170)
CK SERPL-CCNC: 109 U/L — SIGNIFICANT CHANGE UP (ref 25–170)
CO2 SERPL-SCNC: 25 MMOL/L — SIGNIFICANT CHANGE UP (ref 22–31)
CREAT SERPL-MCNC: 0.73 MG/DL — SIGNIFICANT CHANGE UP (ref 0.5–1.3)
EOSINOPHIL # BLD AUTO: 0.04 K/UL — SIGNIFICANT CHANGE UP (ref 0–0.5)
EOSINOPHIL NFR BLD AUTO: 0.5 % — SIGNIFICANT CHANGE UP (ref 0–6)
GLUCOSE SERPL-MCNC: 113 MG/DL — HIGH (ref 70–99)
HCT VFR BLD CALC: 42.4 % — SIGNIFICANT CHANGE UP (ref 34.5–45)
HGB BLD-MCNC: 13.5 G/DL — SIGNIFICANT CHANGE UP (ref 11.5–15.5)
IMM GRANULOCYTES # BLD AUTO: 0.03 # — SIGNIFICANT CHANGE UP
IMM GRANULOCYTES NFR BLD AUTO: 0.3 % — SIGNIFICANT CHANGE UP (ref 0–1.5)
LYMPHOCYTES # BLD AUTO: 1.81 K/UL — SIGNIFICANT CHANGE UP (ref 1–3.3)
LYMPHOCYTES # BLD AUTO: 21 % — SIGNIFICANT CHANGE UP (ref 13–44)
MCHC RBC-ENTMCNC: 27.5 PG — SIGNIFICANT CHANGE UP (ref 27–34)
MCHC RBC-ENTMCNC: 31.8 % — LOW (ref 32–36)
MCV RBC AUTO: 86.4 FL — SIGNIFICANT CHANGE UP (ref 80–100)
MONOCYTES # BLD AUTO: 0.66 K/UL — SIGNIFICANT CHANGE UP (ref 0–0.9)
MONOCYTES NFR BLD AUTO: 7.7 % — SIGNIFICANT CHANGE UP (ref 2–14)
NEUTROPHILS # BLD AUTO: 6.01 K/UL — SIGNIFICANT CHANGE UP (ref 1.8–7.4)
NEUTROPHILS NFR BLD AUTO: 69.9 % — SIGNIFICANT CHANGE UP (ref 43–77)
NRBC # FLD: 0 — SIGNIFICANT CHANGE UP
OB PNL STL: NEGATIVE — SIGNIFICANT CHANGE UP
PLATELET # BLD AUTO: 361 K/UL — SIGNIFICANT CHANGE UP (ref 150–400)
PMV BLD: 10.6 FL — SIGNIFICANT CHANGE UP (ref 7–13)
POTASSIUM SERPL-MCNC: 3.7 MMOL/L — SIGNIFICANT CHANGE UP (ref 3.5–5.3)
POTASSIUM SERPL-SCNC: 3.7 MMOL/L — SIGNIFICANT CHANGE UP (ref 3.5–5.3)
PROT SERPL-MCNC: 8.1 G/DL — SIGNIFICANT CHANGE UP (ref 6–8.3)
RBC # BLD: 4.91 M/UL — SIGNIFICANT CHANGE UP (ref 3.8–5.2)
RBC # FLD: 14.6 % — HIGH (ref 10.3–14.5)
SODIUM SERPL-SCNC: 142 MMOL/L — SIGNIFICANT CHANGE UP (ref 135–145)
TROPONIN T SERPL-MCNC: < 0.06 NG/ML — SIGNIFICANT CHANGE UP (ref 0–0.06)
TROPONIN T SERPL-MCNC: < 0.06 NG/ML — SIGNIFICANT CHANGE UP (ref 0–0.06)
WBC # BLD: 8.6 K/UL — SIGNIFICANT CHANGE UP (ref 3.8–10.5)
WBC # FLD AUTO: 8.6 K/UL — SIGNIFICANT CHANGE UP (ref 3.8–10.5)

## 2017-10-30 PROCEDURE — 71020: CPT | Mod: 26

## 2017-10-30 RX ORDER — INFLUENZA VIRUS VACCINE 15; 15; 15; 15 UG/.5ML; UG/.5ML; UG/.5ML; UG/.5ML
0.5 SUSPENSION INTRAMUSCULAR ONCE
Qty: 0 | Refills: 0 | Status: DISCONTINUED | OUTPATIENT
Start: 2017-10-30 | End: 2017-11-02

## 2017-10-30 RX ORDER — ASPIRIN/CALCIUM CARB/MAGNESIUM 324 MG
324 TABLET ORAL ONCE
Qty: 0 | Refills: 0 | Status: COMPLETED | OUTPATIENT
Start: 2017-10-30 | End: 2017-10-30

## 2017-10-30 RX ADMIN — Medication 324 MILLIGRAM(S): at 13:18

## 2017-10-30 NOTE — ED PROVIDER NOTE - NS ED ROS FT
Merary Roca, DO: ROS: denies HA, weakness, dizziness, abdominal pain, diarrhea, joint pain, dysuria, rash, otherwise as HPI.

## 2017-10-30 NOTE — ED ADULT TRIAGE NOTE - CHIEF COMPLAINT QUOTE
pt with co left side chest pain and palpitations x 4 days with sob.  Pt states has HX of angina Pt describes chest discomfort as an ache.

## 2017-10-30 NOTE — ED PROVIDER NOTE - PLAN OF CARE
1. Please follow up with your PMD in 24-48 hours.  2. Please use 600mg Motrin (also called Advil or ibuprofen) every 6 hours as needed for pain/discomfort/swelling.  You can take this together with Tylenol 650mg  (also called acetaminophen) every 6 hours.  Both of these medications can be obtained over the counter.  Don't use more than 3500mg of Tylenol in any 24-hour period. Make sure your other prescription/over-the-counter medications don't contain any Tylenol so you don't take too much. If you have any stomach discomfort while taking Motrin, you can use TUMS or Pepcid or Zantac (these can all be bought without a prescription).   3. Take Imdur if you're having chest pain symptoms as recommend by Dr. Blackwell.  4. Please return to the ED should you have any new or worsening symptoms or have any new concerns.   5. Read all attached.

## 2017-10-30 NOTE — ED ADULT NURSE REASSESSMENT NOTE - NS ED NURSE REASSESS COMMENT FT1
rec'd pt. a&ox3, in NAD, breathes with ease, NSR on cardiac monitor. with g20 saline lock on rt ac with no ss of infiltration. states tightness on left side of chest is less and better than before. denies any n/v nor SOB. pt. admitted, awaits bed. will continue to monitor

## 2017-10-30 NOTE — ED PROVIDER NOTE - PROGRESS NOTE DETAILS
Merary Roca DO: Attempted to reach PMD ~2 hours ago with no response. Results reviewed with pt at bedside & provided a copy. Pt to f/u with PMD. Merary Roca, DO: Attempted to reach PMD ~2 hours ago with no response. Results reviewed with pt at bedside & provided a copy. Pt now stating that CP not resolved with Imdur which is different from previous hx of angina. Pt TBA for ACS. Merary Roca DO: Received call back from Dr. Blackwell, on board with plan. Merary Roca DO: [facility] hospitalist paged.

## 2017-10-30 NOTE — ED PROVIDER NOTE - PMH
Angina    Asthma    Brain cyst    Chronic back pain  (with hx/o back surgery in 7/2015 and 1/2017)  Dizziness  secondary to brain cyst  Fibroid, uterine    H/O sciatica  (left leg)  HTN (hypertension)    RA (rheumatoid arthritis)

## 2017-10-30 NOTE — ED PROVIDER NOTE - CARE PLAN
Instructions for follow-up, activity and diet:	1. Please follow up with your PMD in 24-48 hours.  2. Please use 600mg Motrin (also called Advil or ibuprofen) every 6 hours as needed for pain/discomfort/swelling.  You can take this together with Tylenol 650mg  (also called acetaminophen) every 6 hours.  Both of these medications can be obtained over the counter.  Don't use more than 3500mg of Tylenol in any 24-hour period. Make sure your other prescription/over-the-counter medications don't contain any Tylenol so you don't take too much. If you have any stomach discomfort while taking Motrin, you can use TUMS or Pepcid or Zantac (these can all be bought without a prescription).   3. Take Imdur if you're having chest pain symptoms as recommend by Dr. Blackwell.  4. Please return to the ED should you have any new or worsening symptoms or have any new concerns.   5. Read all attached. Principal Discharge DX:	ACS (acute coronary syndrome)  Instructions for follow-up, activity and diet:	1. Please follow up with your PMD in 24-48 hours.  2. Please use 600mg Motrin (also called Advil or ibuprofen) every 6 hours as needed for pain/discomfort/swelling.  You can take this together with Tylenol 650mg  (also called acetaminophen) every 6 hours.  Both of these medications can be obtained over the counter.  Don't use more than 3500mg of Tylenol in any 24-hour period. Make sure your other prescription/over-the-counter medications don't contain any Tylenol so you don't take too much. If you have any stomach discomfort while taking Motrin, you can use TUMS or Pepcid or Zantac (these can all be bought without a prescription).   3. Take Imdur if you're having chest pain symptoms as recommend by Dr. Blackwell.  4. Please return to the ED should you have any new or worsening symptoms or have any new concerns.   5. Read all attached.

## 2017-10-30 NOTE — ED ADULT NURSE NOTE - OBJECTIVE STATEMENT
Pt rec'd in 28, A&Ox4, c/o 4 days of left sided chest pain radiating to left arm and left jaw, with associated SOB. Pt reports prior history of this pain, was admitted for it in the past, placed on halter monitor.

## 2017-10-30 NOTE — ED PROVIDER NOTE - PHYSICAL EXAMINATION
Merary Roca, DO:   Gen: Well appearing, obese, NAD  Head: NCAT  HEENT: PERRL, MMM, normal conjunctiva, anicteric, neck supple  Lung: CTAB, no adventitious sounds  CV: RRR, no murmurs, rubs or gallops  Abd: soft, NTND, no rebound or guarding,   MSK: No edema, no visible deformities  Neuro: No focal neurologic deficits. CN II-XII grossly intact. 5/5 strength and normal sensation in all extremities.  Skin: Warm and dry, no evidence of rash  Psych: normal mood and affect

## 2017-10-30 NOTE — ED PROVIDER NOTE - OBJECTIVE STATEMENT
Merary Chanelle, DO: 67F with hx of HTN, angina on Imdur here for L sided breast pain described as achy, radiating to L arm, jaw & ear x 4 days, constant & exertional. No fevers/chills, nausea/vomiting, diaphoresis. +nightsweats x last for 4 days. +weight loss x 20 Merary Roca, DO: 67F with hx of fibroids, HTN, angina on Imdur here for L sided breast pain described as achy, radiating to L arm, jaw & ear x 4 days, constant & exertional. Dark stools since back surgery Jan 2017 with no hx of colonoscopy/endoscopy. No fevers/chills, nausea/vomiting, diaphoresis. +nightsweats x last for 4 days. +weight loss (3 dress sizes) x 2 months. No abdominal pain, neuro deficits. Normal stress Dec 2016.     PMD: Aaron Blackwell ([phone]).

## 2017-10-30 NOTE — ED PROVIDER NOTE - MEDICAL DECISION MAKING DETAILS
Merary Roca, DO: 67F with hx of angina here for 4 days of CP, non-pleuritic. In setting of unintentional weight loss, will evaluate for blood dyscrasias and symptomatic anemia with fecal occult blood. Will require o/p colonoscopy if w/u negative. Will treat for ACS with ASA & eval with cardiac enzymes. Will eval for other sources of CP including PTX, PNA, pneumomediastinum via CXR. If negative w/u, likely angina.

## 2017-10-30 NOTE — ED PROVIDER NOTE - ATTENDING CONTRIBUTION TO CARE
68 yo F with hx of fibroids, HTN, angina on Imdur here for L sided exertional chest pain radiating to L arm, jaw & ear x 4 days. Dark stools for the past few months (guaic neg)+weight loss (3 dress sizes) x 2 months. Normal stress Dec 2016.   -History is concerning for ACS, therefore, will obtain ecg, labs (delta trop), chest xray, will consider CDU for deltra trop and ACS work-up   I performed a history and physical exam of the patient and discussed their management with the resident. I reviewed the resident's note and agree with the documented findings and plan of care. My medical decision making and observations are found above.

## 2017-10-31 ENCOUNTER — TRANSCRIPTION ENCOUNTER (OUTPATIENT)
Age: 67
End: 2017-10-31

## 2017-10-31 DIAGNOSIS — J45.909 UNSPECIFIED ASTHMA, UNCOMPLICATED: ICD-10-CM

## 2017-10-31 DIAGNOSIS — Z29.9 ENCOUNTER FOR PROPHYLACTIC MEASURES, UNSPECIFIED: ICD-10-CM

## 2017-10-31 DIAGNOSIS — M06.9 RHEUMATOID ARTHRITIS, UNSPECIFIED: ICD-10-CM

## 2017-10-31 DIAGNOSIS — I20.9 ANGINA PECTORIS, UNSPECIFIED: ICD-10-CM

## 2017-10-31 DIAGNOSIS — M54.9 DORSALGIA, UNSPECIFIED: ICD-10-CM

## 2017-10-31 DIAGNOSIS — I10 ESSENTIAL (PRIMARY) HYPERTENSION: ICD-10-CM

## 2017-10-31 LAB
BUN SERPL-MCNC: 14 MG/DL — SIGNIFICANT CHANGE UP (ref 7–23)
CALCIUM SERPL-MCNC: 9.1 MG/DL — SIGNIFICANT CHANGE UP (ref 8.4–10.5)
CHLORIDE SERPL-SCNC: 100 MMOL/L — SIGNIFICANT CHANGE UP (ref 98–107)
CHOLEST SERPL-MCNC: 157 MG/DL — SIGNIFICANT CHANGE UP (ref 120–199)
CO2 SERPL-SCNC: 26 MMOL/L — SIGNIFICANT CHANGE UP (ref 22–31)
CREAT SERPL-MCNC: 0.76 MG/DL — SIGNIFICANT CHANGE UP (ref 0.5–1.3)
GLUCOSE SERPL-MCNC: 112 MG/DL — HIGH (ref 70–99)
HCT VFR BLD CALC: 37.1 % — SIGNIFICANT CHANGE UP (ref 34.5–45)
HDLC SERPL-MCNC: 53 MG/DL — SIGNIFICANT CHANGE UP (ref 45–65)
HGB BLD-MCNC: 11.9 G/DL — SIGNIFICANT CHANGE UP (ref 11.5–15.5)
LIPID PNL WITH DIRECT LDL SERPL: 96 MG/DL — SIGNIFICANT CHANGE UP
MCHC RBC-ENTMCNC: 27.2 PG — SIGNIFICANT CHANGE UP (ref 27–34)
MCHC RBC-ENTMCNC: 32.1 % — SIGNIFICANT CHANGE UP (ref 32–36)
MCV RBC AUTO: 84.9 FL — SIGNIFICANT CHANGE UP (ref 80–100)
NRBC # FLD: 0 — SIGNIFICANT CHANGE UP
PLATELET # BLD AUTO: 332 K/UL — SIGNIFICANT CHANGE UP (ref 150–400)
PMV BLD: 10.7 FL — SIGNIFICANT CHANGE UP (ref 7–13)
POTASSIUM SERPL-MCNC: 3.5 MMOL/L — SIGNIFICANT CHANGE UP (ref 3.5–5.3)
POTASSIUM SERPL-SCNC: 3.5 MMOL/L — SIGNIFICANT CHANGE UP (ref 3.5–5.3)
RBC # BLD: 4.37 M/UL — SIGNIFICANT CHANGE UP (ref 3.8–5.2)
RBC # FLD: 14.7 % — HIGH (ref 10.3–14.5)
SODIUM SERPL-SCNC: 139 MMOL/L — SIGNIFICANT CHANGE UP (ref 135–145)
TRIGL SERPL-MCNC: 120 MG/DL — SIGNIFICANT CHANGE UP (ref 10–149)
TSH SERPL-MCNC: 0.86 UIU/ML — SIGNIFICANT CHANGE UP (ref 0.27–4.2)
WBC # BLD: 7.63 K/UL — SIGNIFICANT CHANGE UP (ref 3.8–10.5)
WBC # FLD AUTO: 7.63 K/UL — SIGNIFICANT CHANGE UP (ref 3.8–10.5)

## 2017-10-31 RX ORDER — ASCORBIC ACID 60 MG
1 TABLET,CHEWABLE ORAL
Qty: 0 | Refills: 0 | DISCHARGE
Start: 2017-10-31

## 2017-10-31 RX ORDER — MECLIZINE HCL 12.5 MG
25 TABLET ORAL EVERY 8 HOURS
Qty: 0 | Refills: 0 | Status: DISCONTINUED | OUTPATIENT
Start: 2017-10-31 | End: 2017-11-02

## 2017-10-31 RX ORDER — ACETAMINOPHEN 500 MG
650 TABLET ORAL EVERY 6 HOURS
Qty: 0 | Refills: 0 | Status: DISCONTINUED | OUTPATIENT
Start: 2017-10-31 | End: 2017-11-02

## 2017-10-31 RX ORDER — HYDROCHLOROTHIAZIDE 25 MG
25 TABLET ORAL DAILY
Qty: 0 | Refills: 0 | Status: DISCONTINUED | OUTPATIENT
Start: 2017-10-31 | End: 2017-11-02

## 2017-10-31 RX ORDER — VITAMIN E 100 UNIT
400 CAPSULE ORAL DAILY
Qty: 0 | Refills: 0 | Status: DISCONTINUED | OUTPATIENT
Start: 2017-10-31 | End: 2017-11-02

## 2017-10-31 RX ORDER — ISOSORBIDE MONONITRATE 60 MG/1
30 TABLET, EXTENDED RELEASE ORAL DAILY
Qty: 0 | Refills: 0 | Status: DISCONTINUED | OUTPATIENT
Start: 2017-10-31 | End: 2017-11-02

## 2017-10-31 RX ORDER — METOPROLOL TARTRATE 50 MG
50 TABLET ORAL DAILY
Qty: 0 | Refills: 0 | Status: DISCONTINUED | OUTPATIENT
Start: 2017-10-31 | End: 2017-11-02

## 2017-10-31 RX ORDER — ALBUTEROL 90 UG/1
2 AEROSOL, METERED ORAL EVERY 6 HOURS
Qty: 0 | Refills: 0 | Status: DISCONTINUED | OUTPATIENT
Start: 2017-10-31 | End: 2017-11-02

## 2017-10-31 RX ORDER — CHOLECALCIFEROL (VITAMIN D3) 125 MCG
400 CAPSULE ORAL DAILY
Qty: 0 | Refills: 0 | Status: DISCONTINUED | OUTPATIENT
Start: 2017-10-31 | End: 2017-11-02

## 2017-10-31 RX ORDER — GABAPENTIN 400 MG/1
300 CAPSULE ORAL THREE TIMES A DAY
Qty: 0 | Refills: 0 | Status: DISCONTINUED | OUTPATIENT
Start: 2017-10-31 | End: 2017-11-02

## 2017-10-31 RX ORDER — ENOXAPARIN SODIUM 100 MG/ML
40 INJECTION SUBCUTANEOUS EVERY 24 HOURS
Qty: 0 | Refills: 0 | Status: DISCONTINUED | OUTPATIENT
Start: 2017-10-31 | End: 2017-11-02

## 2017-10-31 RX ORDER — ASPIRIN/CALCIUM CARB/MAGNESIUM 324 MG
81 TABLET ORAL DAILY
Qty: 0 | Refills: 0 | Status: DISCONTINUED | OUTPATIENT
Start: 2017-10-31 | End: 2017-11-02

## 2017-10-31 RX ORDER — ASCORBIC ACID 60 MG
1000 TABLET,CHEWABLE ORAL DAILY
Qty: 0 | Refills: 0 | Status: DISCONTINUED | OUTPATIENT
Start: 2017-10-31 | End: 2017-11-02

## 2017-10-31 RX ADMIN — ISOSORBIDE MONONITRATE 30 MILLIGRAM(S): 60 TABLET, EXTENDED RELEASE ORAL at 13:29

## 2017-10-31 RX ADMIN — Medication 1 TABLET(S): at 13:29

## 2017-10-31 RX ADMIN — Medication 400 INTERNATIONAL UNIT(S): at 13:28

## 2017-10-31 RX ADMIN — Medication 50 MILLIGRAM(S): at 13:29

## 2017-10-31 RX ADMIN — GABAPENTIN 300 MILLIGRAM(S): 400 CAPSULE ORAL at 13:32

## 2017-10-31 RX ADMIN — Medication 400 UNIT(S): at 13:29

## 2017-10-31 RX ADMIN — Medication 81 MILLIGRAM(S): at 13:29

## 2017-10-31 RX ADMIN — Medication 25 MILLIGRAM(S): at 13:28

## 2017-10-31 RX ADMIN — Medication 1000 MILLIGRAM(S): at 13:28

## 2017-10-31 RX ADMIN — GABAPENTIN 300 MILLIGRAM(S): 400 CAPSULE ORAL at 21:11

## 2017-10-31 RX ADMIN — Medication 650 MILLIGRAM(S): at 21:10

## 2017-10-31 RX ADMIN — ENOXAPARIN SODIUM 40 MILLIGRAM(S): 100 INJECTION SUBCUTANEOUS at 13:43

## 2017-10-31 RX ADMIN — Medication 650 MILLIGRAM(S): at 21:40

## 2017-10-31 NOTE — H&P ADULT - NSHPLABSRESULTS_GEN_ALL_CORE
EKG: Sinus Rhythm with PACs @ 74 bpm with unchanged TWI in III, AVF with Poor R-wave progression in V1-V6.  Reyna x2: neg  Guiac: neg  CXR: Slightly underinflated but otherwise clear lungs. No pleural effusions or pneumothorax. Stable cardiac and mediastinal silhouettes.  Trachea midline. Stable previously seen partially visualized thoracolumbar posterior spinal fusion hardware. Spine and bilateral shoulder degenerative changes again noted.

## 2017-10-31 NOTE — DISCHARGE NOTE ADULT - PATIENT PORTAL LINK FT
“You can access the [facility] Patient Portal, offered by [facility], by registering with the following website: [email or web address]”

## 2017-10-31 NOTE — CONSULT NOTE ADULT - SUBJECTIVE AND OBJECTIVE BOX
CHIEF COMPLAINT  left sided cp   HISTORY OF PRESENT ILLNESS:  HPI:  68 yo male PMHx of Angina, HTN, RA, Vertigo, Asthma and Chronic Back pain p/w chest pain x 5 days. Chest pain described as left side ache, 5/10, radiating to left arm and jaw, pleuritic in nature, accompanied by JOLLY with ADLs and palpitations. Chest pain worse on exertion and improved with rest. Pt denies fever, chills, diaphoresis, nausea, vomiting, abdominal pain, orthopnea, pnd or leg edema. Pt admits to dark brown stools for past 8 months, attributes to her daily vegetable juice she makes for herself. Pt reports last nuclear stress test was neg in 12/2016.     Negative nuclear stress test in 200[address]  normal cath 2009  Pain is somwhat pleuritic constant and associated with tenderness    PAST MEDICAL & SURGICAL HISTORY:  Chronic back pain: (with hx/o back surgery in 7/2015 and 1/2017)  Dizziness: secondary to brain cyst  Brain cyst  Fibroid, uterine  HTN (hypertension)  RA (rheumatoid arthritis)  H/O sciatica: (left leg)  Asthma  Angina  History of back surgery: (7/2015 and 1/2017)          MEDICATIONS:  aspirin enteric coated 81 milliGRAM(s) Oral daily  enoxaparin Injectable 40 milliGRAM(s) SubCutaneous every 24 hours  hydrochlorothiazide 25 milliGRAM(s) Oral daily  isosorbide   mononitrate ER Tablet (IMDUR) 30 milliGRAM(s) Oral daily  metoprolol succinate ER 50 milliGRAM(s) Oral daily      ALBUTerol    90 MICROgram(s) HFA Inhaler 2 Puff(s) Inhalation every 6 hours PRN    acetaminophen   Tablet. 650 milliGRAM(s) Oral every 6 hours PRN  gabapentin 300 milliGRAM(s) Oral three times a day  meclizine 25 milliGRAM(s) Oral every 8 hours PRN        ascorbic acid 1000 milliGRAM(s) Oral daily  cholecalciferol 400 Unit(s) Oral daily  influenza   Vaccine 0.5 milliLiter(s) IntraMuscular once  multivitamin 1 Tablet(s) Oral daily  vitamin E 400 International Unit(s) Oral daily      FAMILY HISTORY:  Family history of renal disease  Family history of heart failure  Family history of lung cancer        Allergies    Biaxin (Hives; Rash)    Intolerances    	    PHYSICAL EXAM:  T(C): 36.7 (10-31-17 @ 02:06), Max: 37.3 (10-30-17 @ 14:44)  HR: 63 (10-31-17 @ 02:06) (63 - 81)  BP: 132/76 (10-31-17 @ 02:06) (128/84 - 158/90)  RR: 18 (10-31-17 @ 02:06) (16 - 18)  SpO2: 100% (10-31-17 @ 02:06) (99% - 100%)  Wt(kg): --  I&O's Summary      Appearance: Normal anxious NAD	  HEENT:   Normal oral mucosa, PERRL, EOMI	  Cardiovascular: Normal S1 S2, No JVD, No murmurs  Respiratory: Lungs clear to auscultation left sided chest wall tenderness	  Psychiatry: A & O x 3, Mood & affect appropriate  Gastrointestinal:  Soft, Non-tender, + BS	  Neurologic: Non-focal  Extremities: No clubbing, cyanosis or edema  Vascular: Peripheral pulses palpable 2+ bilaterally    TELEMETRY: 	    ECG:  	< from: 12 Lead ECG (10.30.17 @ 11:11) >  Diagnosis Line Sinus rhythm with Premature atrial complexes  Left axis deviation  Minimal voltage criteria for LVH, may be normal variant  Cannot rule out Anterior infarct , age undetermined  Abnormal ECG    echo 2012  < from: Transthoracic Echocardiogram w/Doppler (08.13.12 @ 08:08) >  ONCLUSIONS:  1. Mildly thickened mitral valve.  2. Normal left atrium.  LA volume index = 20 cc/m2.  3. Normal left ventricular internal dimensions and wall  thicknesses.  4. Normal left ventricular systolic function. No segmental  wall motion abnormalities.  ------------------------------------------------------------------------  Confirmed on  8/13/2012 - 18:09:06 by Michelle Gu M.D.        	  LABS:	 	    CARDIAC MARKERS:  Troponin T, Serum: < 0.06 ng/mL (10-30 @ 17:15)  Troponin T, Serum: < 0.06 ng/mL (10-30 @ 11:35)      CKMB: 1.31 ng/mL (10-30 @ 17:15)  CKMB: 1.65 ng/mL (10-30 @ 11:35)    CKMB Relative Index: Test not performed (10-30 @ 17:15)                            13.5   8.60  )-----------( 361      ( 30 Oct 2017 11:35 )             42.4     10-30    142  |  101  |  9   ----------------------------<  113<H>  3.7   |  25  |  0.73    Ca    9.7      30 Oct 2017 11:35    TPro  8.1  /  Alb  4.7  /  TBili  0.4  /  DBili  x   /  AST  12  /  ALT  13  /  AlkPhos  89  10-30    proBNP:   Lipid Profile:   HgA1c:   TSH:

## 2017-10-31 NOTE — CONSULT NOTE ADULT - ASSESSMENT
1. atypical cp not osichemic  2. musculoskektal and pleurtic component  3. enzymes normal    Recommend  no further cardiac workup  continue RX for hypertension  dsicahrge planning

## 2017-10-31 NOTE — DISCHARGE NOTE ADULT - CARE PROVIDER_API CALL
Aaron Blackwell), Cardiovascular Disease; Internal Medicine  [address] [place] [address]  Phone: [phone]  Fax: [phone]

## 2017-10-31 NOTE — H&P ADULT - ATTENDING COMMENTS
Chart reviewed. Vitals and Labs noted.   Pt seen and examined at bedside. Plan formulated with the resident/PA/NP. Detail H&P as above.    Admitted for left sided chest pain under the breast, nonreproducible.   R/o ACS. Neg enzymes x 2.   Will get CTA chest to r/o PE. Pt had recent back surgery.   Cardiology eval: Dr. Nascimento.    - Dr. [facility] ([facility])  - [phone] Chart reviewed. Vitals and Labs noted.   Pt seen and examined at bedside. Plan formulated with the resident/PA/NP. Detail H&P as above.    Admitted for left sided chest pain under the breast, nonreproducible.   R/o ACS. Neg enzymes x 2.   Will get CTA chest to r/o PE. Pt had recent back surgery.   Cardiology eval: Dr. Nascimento.    Dr. Gotti will be covering me starting 11/1/17. Please paged her at  if needed.     - Dr. ARTIE Whittaker ([facility])  - [phone]

## 2017-10-31 NOTE — DISCHARGE NOTE ADULT - CARE PLAN
Principal Discharge DX:	Chest pain, unspecified type  Secondary Diagnosis:	Asthma  Secondary Diagnosis:	HTN (hypertension)  Secondary Diagnosis:	RA (rheumatoid arthritis) Principal Discharge DX:	Chest pain, unspecified type  Goal:	Remain pain free  Instructions for follow-up, activity and diet:	Please follow up with your doctors in 1-2 weeks. Your pain was not caused by your heart and not caused by any blood clots in the long. Continue your medications as prescribed.  Secondary Diagnosis:	Asthma  Instructions for follow-up, activity and diet:	Continue your medications as prescribed.  Secondary Diagnosis:	HTN (hypertension)  Instructions for follow-up, activity and diet:	Continue medications as currently prescribed. Follow up with your PMD for further management of disease. Dash diet.  Secondary Diagnosis:	RA (rheumatoid arthritis)  Instructions for follow-up, activity and diet:	Continue follow up with your doctor. Principal Discharge DX:	Chest pain, unspecified type  Goal:	Your pain is likely not cardiac but you should follow up with a cardiologist for ongoing management.  Instructions for follow-up, activity and diet:	Follow up with cardiologist within one week of discharge. Call for appointment. Return to ED for any concerning symptoms. Continue medications as prescribed. Low salt, low fat, low cholesterol diet.  Secondary Diagnosis:	Asthma  Goal:	Prevent exacerbations.  Instructions for follow-up, activity and diet:	Continue your medications as prescribed.  Secondary Diagnosis:	HTN (hypertension)  Goal:	Maintain adequate control of your blood pressure. Goal BP < 130/80. Continue low sodium diet.  Instructions for follow-up, activity and diet:	Follow up with PCP and/or cardiologist for ongoing medical management of your hypertension. Continue medications as prescribed. Low salt diet.  Secondary Diagnosis:	RA (rheumatoid arthritis)  Goal:	Continue to take your medications.  Instructions for follow-up, activity and diet:	Continue your medications as prescribed.

## 2017-10-31 NOTE — DISCHARGE NOTE ADULT - MEDICATION SUMMARY - MEDICATIONS TO TAKE
I will START or STAY ON the medications listed below when I get home from the hospital:    aspirin 81 mg oral delayed release tablet  -- 1 tab(s) by mouth once a day  -- Indication: For Chest pain    isosorbide mononitrate 30 mg oral tablet, extended release  -- 1 tab(s) by mouth once a day  -- Indication: For Angina pectoris    gabapentin 300 mg oral capsule  -- 1 cap(s) by mouth 3 times a day  -- Indication: For Neuropathy    meclizine 25 mg oral tablet  -- 1 tab(s) by mouth every 8 hours, As needed, Dizziness  -- Indication: For Dizziness    metoprolol succinate 50 mg oral tablet, extended release  -- 1 tab(s) by mouth once a day  -- Indication: For HTN (hypertension)    Proventil HFA CFC free 90 mcg/inh inhalation aerosol  -- 2 puff(s) inhaled 2 times a day, As Needed  -- Indication: For Asthma    hydrochlorothiazide 25 mg oral tablet  -- 1 tab(s) by mouth once a day  -- Indication: For HTN (hypertension)    Fish Oil oral capsule  --  by mouth once a day  -- Indication: For Supplementation    Co Q-10 100 mg oral capsule  -- 1 cap(s) by mouth once a day  -- Indication: For Supplementation    Multiple Vitamins oral tablet  -- 1 tab(s) by mouth once a day  -- Indication: For Supplementation    Vitamin D3 400 intl units oral tablet  -- 1 tab(s) by mouth once a day  -- Indication: For Supplementation    vitamin E 400 intl units oral capsule  -- 1 cap(s) by mouth once a day  -- Indication: For Supplementation    ascorbic acid 1000 mg oral tablet  -- 1 tab(s) by mouth once a day  -- Indication: For Supplementation

## 2017-10-31 NOTE — H&P ADULT - PROBLEM SELECTOR PLAN 1
tele monitor   cardiac enzymes x 2  Serial EKGs  DASH diet  continue Ecotrin 81mg po daily  CTPA to r/o PE ordered  Cardiology c/s Dr. Nascimento to be called  Will order N[facility] Pharm if CTPA neg for PE  FLP

## 2017-10-31 NOTE — H&P ADULT - ASSESSMENT
66 yo male PMHx of Angina, HTN, RA, Vertigo, Asthma and Chronic Back pain p/w pleuritic chest pain, palpitations and JOLLY x5 days, admitted to tele for Angina Pectoris, r/o ACS.

## 2017-10-31 NOTE — DISCHARGE NOTE ADULT - PLAN OF CARE
Remain pain free Please follow up with your doctors in 1-2 weeks. Your pain was not caused by your heart and not caused by any blood clots in the long. Continue your medications as prescribed. Continue your medications as prescribed. Continue medications as currently prescribed. Follow up with your PMD for further management of disease. Dash diet. Continue follow up with your doctor. Your pain is likely not cardiac but you should follow up with a cardiologist for ongoing management. Follow up with cardiologist within one week of discharge. Call for appointment. Return to ED for any concerning symptoms. Continue medications as prescribed. Low salt, low fat, low cholesterol diet. Prevent exacerbations. Maintain adequate control of your blood pressure. Goal BP < 130/80. Continue low sodium diet. Follow up with PCP and/or cardiologist for ongoing medical management of your hypertension. Continue medications as prescribed. Low salt diet. Continue to take your medications.

## 2017-10-31 NOTE — H&P ADULT - RS GEN PE MLT RESP DETAILS PC
no rales/no chest wall tenderness/diminished breath sounds, L/no rhonchi/good air movement/airway patent/no wheezes/diminished breath sounds, R/respirations non-labored

## 2017-10-31 NOTE — H&P ADULT - HISTORY OF PRESENT ILLNESS
68 yo male PMHx of Angina, HTN, RA, Vertigo, Asthma and Chronic Back pain p/w chest pain x 5 days. Chest pain described as left side ache, 5/10, radiating to left arm and jaw, pleuritic in nature, accompanied by JOLLY with ADLs and palpitations. Chest pain worse on exertion and improved with rest. Pt denies fever, chills, diaphoresis, nausea, vomiting, abdominal pain, orthopnea, pnd or leg edema. Pt admits to dark brown stools for past 8 months, attributes to her daily vegetable juice she makes for herself. Pt reports last nuclear stress test was neg in 12/2016. 66 yo male PMHx of Angina, HTN, RA, Vertigo, Asthma and Chronic Back pain p/w chest pain x 5 days. Chest pain described as left side ache, 5/10, radiating to left arm and jaw, pleuritic in nature, accompanied by JOLLY with ADLs and palpitations. Chest pain worse on exertion and improved with rest. Pt denies fever, chills, diaphoresis, nausea, vomiting, abdominal pain, orthopnea, pnd or leg edema. Pt admits to dark brown stools for past 8 months, attributes to her daily vegetable juice she makes for herself. Pt reports last nuclear stress test was neg in 12/2016.

## 2017-10-31 NOTE — DISCHARGE NOTE ADULT - HOSPITAL COURSE
66 yo female admitted for left sided chest pain under the breast, nonreproducible.   R/o ACS. Neg enzymes x 2.   Will get CTA chest to r/o PE. Pt had recent back surgery.   Cardiology eval: Dr. Nascimento: atypical cp not osichemic 2. musculoskektal and pleurtic component 3. enzymes normal  Recommend:  no further cardiac workup  continue RX for hypertension  dsicahrge planning if CTPA is negative 68 yo female admitted for left sided chest pain under the breast, nonreproducible.   R/o ACS. Neg enzymes x 2.   Will get CTA chest to r/o PE. Pt had recent back surgery.   Cardiology eval: Dr. Nascimento: atypical cp not osichemic 2. musculoskektal and pleurtic component 3. enzymes normal  Recommend:  no further cardiac workup  continue RX for hypertension  dsicahrge planning if CTPA is negative  CTPA  was ... 68 yo female admitted for left sided chest pain under the breast, nonreproducible.   R/o ACS. Neg enzymes x 2.   Will get CTA chest to r/o PE. Pt had recent back surgery.   Cardiology eval: Dr. Nascimento: atypical cp not osichemic 2. musculoskektal and pleurtic component 3. enzymes normal  Recommend:  no further cardiac workup  continue RX for hypertension  dsicahrge planning if CTPA is negative  CTPA  was negative 66 y/o female with a PMHx of HTN, RA, asthma and chronic back pain presented to ED complaining of reproducible left sided chest pain. EKG was non-ischemic. Pt ruled out for ACS with two sets of negative cardiac enzymes. CXR showed clear lungs. Pt was admitted to telemetry. Pt was seen by Dr. Nascimento from cardiology who deemed patient's chest pain atypical (musculoskeletal) with a pleuritic component. Pt underwent CT angio chest, which revealed, "No pulmonary arterial emboli. Coronary artery atherosclerotic disease." No further workup per cards. Case discussed with Dr. Gotti on 11/2. Pt now medically stable for discharge home.

## 2017-10-31 NOTE — DISCHARGE NOTE ADULT - NS AS ACTIVITY OBS
Do not drive or operate machinery/Walking-Outdoors allowed/Bathing allowed/Showering allowed/No Heavy lifting/straining/Walking-Indoors allowed

## 2017-11-01 LAB
BUN SERPL-MCNC: 19 MG/DL — SIGNIFICANT CHANGE UP (ref 7–23)
CALCIUM SERPL-MCNC: 9.4 MG/DL — SIGNIFICANT CHANGE UP (ref 8.4–10.5)
CHLORIDE SERPL-SCNC: 101 MMOL/L — SIGNIFICANT CHANGE UP (ref 98–107)
CO2 SERPL-SCNC: 27 MMOL/L — SIGNIFICANT CHANGE UP (ref 22–31)
CREAT SERPL-MCNC: 0.83 MG/DL — SIGNIFICANT CHANGE UP (ref 0.5–1.3)
GLUCOSE SERPL-MCNC: 86 MG/DL — SIGNIFICANT CHANGE UP (ref 70–99)
HCT VFR BLD CALC: 37.4 % — SIGNIFICANT CHANGE UP (ref 34.5–45)
HGB BLD-MCNC: 12.2 G/DL — SIGNIFICANT CHANGE UP (ref 11.5–15.5)
MCHC RBC-ENTMCNC: 28 PG — SIGNIFICANT CHANGE UP (ref 27–34)
MCHC RBC-ENTMCNC: 32.6 % — SIGNIFICANT CHANGE UP (ref 32–36)
MCV RBC AUTO: 86 FL — SIGNIFICANT CHANGE UP (ref 80–100)
NRBC # FLD: 0 — SIGNIFICANT CHANGE UP
PLATELET # BLD AUTO: 321 K/UL — SIGNIFICANT CHANGE UP (ref 150–400)
PMV BLD: 10.8 FL — SIGNIFICANT CHANGE UP (ref 7–13)
POTASSIUM SERPL-MCNC: 3.7 MMOL/L — SIGNIFICANT CHANGE UP (ref 3.5–5.3)
POTASSIUM SERPL-SCNC: 3.7 MMOL/L — SIGNIFICANT CHANGE UP (ref 3.5–5.3)
RBC # BLD: 4.35 M/UL — SIGNIFICANT CHANGE UP (ref 3.8–5.2)
RBC # FLD: 14.7 % — HIGH (ref 10.3–14.5)
SODIUM SERPL-SCNC: 141 MMOL/L — SIGNIFICANT CHANGE UP (ref 135–145)
WBC # BLD: 7.17 K/UL — SIGNIFICANT CHANGE UP (ref 3.8–10.5)
WBC # FLD AUTO: 7.17 K/UL — SIGNIFICANT CHANGE UP (ref 3.8–10.5)

## 2017-11-01 PROCEDURE — 71275 CT ANGIOGRAPHY CHEST: CPT | Mod: 26

## 2017-11-01 RX ORDER — POTASSIUM CHLORIDE 20 MEQ
40 PACKET (EA) ORAL ONCE
Qty: 0 | Refills: 0 | Status: COMPLETED | OUTPATIENT
Start: 2017-11-01 | End: 2017-11-02

## 2017-11-01 RX ADMIN — GABAPENTIN 300 MILLIGRAM(S): 400 CAPSULE ORAL at 21:21

## 2017-11-01 RX ADMIN — Medication 650 MILLIGRAM(S): at 06:28

## 2017-11-01 RX ADMIN — Medication 50 MILLIGRAM(S): at 09:27

## 2017-11-01 RX ADMIN — Medication 81 MILLIGRAM(S): at 11:58

## 2017-11-01 RX ADMIN — Medication 1000 MILLIGRAM(S): at 11:58

## 2017-11-01 RX ADMIN — Medication 400 UNIT(S): at 11:58

## 2017-11-01 RX ADMIN — GABAPENTIN 300 MILLIGRAM(S): 400 CAPSULE ORAL at 11:58

## 2017-11-01 RX ADMIN — Medication 650 MILLIGRAM(S): at 05:58

## 2017-11-01 RX ADMIN — Medication 400 INTERNATIONAL UNIT(S): at 11:58

## 2017-11-01 RX ADMIN — ISOSORBIDE MONONITRATE 30 MILLIGRAM(S): 60 TABLET, EXTENDED RELEASE ORAL at 11:58

## 2017-11-01 RX ADMIN — ENOXAPARIN SODIUM 40 MILLIGRAM(S): 100 INJECTION SUBCUTANEOUS at 11:57

## 2017-11-01 RX ADMIN — Medication 1 TABLET(S): at 11:58

## 2017-11-01 RX ADMIN — Medication 25 MILLIGRAM(S): at 09:27

## 2017-11-01 NOTE — PROGRESS NOTE ADULT - PROBLEM SELECTOR PLAN 1
ruled out acs  continue Ecotrin 81mg po daily  CTPA to r/o PE f/u results  Cardiology c/s Dr. Nascimento c/s appreciated,   cp less likely cardiac etiology

## 2017-11-01 NOTE — PROGRESS NOTE ADULT - SUBJECTIVE AND OBJECTIVE BOX
Patient is a 67y old  Female who presents with a chief complaint of chest pain (31 Oct 2017 15:36)  pt seen and examined at bedside   overnight events noted: none, complaints: lt sided cp, non radiating asso with sob    Vital Signs Last 24 Hrs  T(C): 36.3 (01 Nov 2017 05:54), Max: 37.2 (31 Oct 2017 19:51)  T(F): 97.4 (01 Nov 2017 05:54), Max: 98.9 (31 Oct 2017 19:51)  HR: 69 (01 Nov 2017 11:46) (55 - 72)  BP: 119/78 (01 Nov 2017 11:46) (108/63 - 149/85)  BP(mean): --  RR: 18 (01 Nov 2017 05:54) (17 - 18)  SpO2: 100% (01 Nov 2017 05:54) (100% - 100%)  CAPILLARY BLOOD GLUCOSE        Medications  MEDICATIONS  (STANDING):  ascorbic acid 1000 milliGRAM(s) Oral daily  aspirin enteric coated 81 milliGRAM(s) Oral daily  cholecalciferol 400 Unit(s) Oral daily  enoxaparin Injectable 40 milliGRAM(s) SubCutaneous every 24 hours  gabapentin 300 milliGRAM(s) Oral three times a day  hydrochlorothiazide 25 milliGRAM(s) Oral daily  influenza   Vaccine 0.5 milliLiter(s) IntraMuscular once  isosorbide   mononitrate ER Tablet (IMDUR) 30 milliGRAM(s) Oral daily  metoprolol succinate ER 50 milliGRAM(s) Oral daily  multivitamin 1 Tablet(s) Oral daily  vitamin E 400 International Unit(s) Oral daily    MEDICATIONS  (PRN):  acetaminophen   Tablet. 650 milliGRAM(s) Oral every 6 hours PRN mild and moderate pain  ALBUTerol    90 MICROgram(s) HFA Inhaler 2 Puff(s) Inhalation every 6 hours PRN Shortness of Breath and/or Wheezing  meclizine 25 milliGRAM(s) Oral every 8 hours PRN Dizziness    Physical exam  General : comfortable, not in any acute distress  Neck : supple, no LAD, no JVD  Eyes : EOMI, PERRLA, conjunctiva : no icterus, no pallor  MM moist, no pharyngeal erythema/exudates  Pulmonary: clear to auscultation bilateral lung fields, no crackles/rhonchi/wheezing,   CVS : S1 S2,rate normal-,rhythm-regular, no audible murmurs, no abnormal sounds  Gastrointestinal: soft, non tender, non distended, no organomegaly , bowel sounds audible  Extremities: no edema  Neuro: AAOx3, no focal deficits  Musculoskeletal: no obvious limitations, FROM of all ext  Skin: no rash    Lab panel                        12.2   7.17  )-----------( 321      ( 01 Nov 2017 06:05 )             37.4     11-01    141  |  101  |  19  ----------------------------<  86  3.7   |  27  |  0.83    Ca    9.4      01 Nov 2017 06:05        Labs and imaging personally reviewed:  yes  Consultant notes reviewed : yes  Care discussed with Consultants/other providers :

## 2017-11-02 VITALS
OXYGEN SATURATION: 100 % | HEART RATE: 73 BPM | RESPIRATION RATE: 19 BRPM | TEMPERATURE: 99 F | DIASTOLIC BLOOD PRESSURE: 65 MMHG | SYSTOLIC BLOOD PRESSURE: 116 MMHG

## 2017-11-02 LAB
BUN SERPL-MCNC: 17 MG/DL — SIGNIFICANT CHANGE UP (ref 7–23)
CALCIUM SERPL-MCNC: 9 MG/DL — SIGNIFICANT CHANGE UP (ref 8.4–10.5)
CHLORIDE SERPL-SCNC: 103 MMOL/L — SIGNIFICANT CHANGE UP (ref 98–107)
CO2 SERPL-SCNC: 26 MMOL/L — SIGNIFICANT CHANGE UP (ref 22–31)
CREAT SERPL-MCNC: 0.68 MG/DL — SIGNIFICANT CHANGE UP (ref 0.5–1.3)
GLUCOSE SERPL-MCNC: 97 MG/DL — SIGNIFICANT CHANGE UP (ref 70–99)
HCT VFR BLD CALC: 36.6 % — SIGNIFICANT CHANGE UP (ref 34.5–45)
HGB BLD-MCNC: 11.8 G/DL — SIGNIFICANT CHANGE UP (ref 11.5–15.5)
MAGNESIUM SERPL-MCNC: 1.9 MG/DL — SIGNIFICANT CHANGE UP (ref 1.6–2.6)
MCHC RBC-ENTMCNC: 27.3 PG — SIGNIFICANT CHANGE UP (ref 27–34)
MCHC RBC-ENTMCNC: 32.2 % — SIGNIFICANT CHANGE UP (ref 32–36)
MCV RBC AUTO: 84.5 FL — SIGNIFICANT CHANGE UP (ref 80–100)
NRBC # FLD: 0 — SIGNIFICANT CHANGE UP
PHOSPHATE SERPL-MCNC: 3.5 MG/DL — SIGNIFICANT CHANGE UP (ref 2.5–4.5)
PLATELET # BLD AUTO: 308 K/UL — SIGNIFICANT CHANGE UP (ref 150–400)
PMV BLD: 11 FL — SIGNIFICANT CHANGE UP (ref 7–13)
POTASSIUM SERPL-MCNC: 3.7 MMOL/L — SIGNIFICANT CHANGE UP (ref 3.5–5.3)
POTASSIUM SERPL-SCNC: 3.7 MMOL/L — SIGNIFICANT CHANGE UP (ref 3.5–5.3)
RBC # BLD: 4.33 M/UL — SIGNIFICANT CHANGE UP (ref 3.8–5.2)
RBC # FLD: 14.6 % — HIGH (ref 10.3–14.5)
SODIUM SERPL-SCNC: 141 MMOL/L — SIGNIFICANT CHANGE UP (ref 135–145)
WBC # BLD: 6.39 K/UL — SIGNIFICANT CHANGE UP (ref 3.8–10.5)
WBC # FLD AUTO: 6.39 K/UL — SIGNIFICANT CHANGE UP (ref 3.8–10.5)

## 2017-11-02 RX ORDER — ASPIRIN/CALCIUM CARB/MAGNESIUM 324 MG
1 TABLET ORAL
Qty: 0 | Refills: 0 | DISCHARGE
Start: 2017-11-02

## 2017-11-02 RX ADMIN — Medication 650 MILLIGRAM(S): at 10:50

## 2017-11-02 RX ADMIN — GABAPENTIN 300 MILLIGRAM(S): 400 CAPSULE ORAL at 06:13

## 2017-11-02 RX ADMIN — Medication 400 UNIT(S): at 11:23

## 2017-11-02 RX ADMIN — Medication 650 MILLIGRAM(S): at 11:44

## 2017-11-02 RX ADMIN — Medication 81 MILLIGRAM(S): at 11:21

## 2017-11-02 RX ADMIN — ISOSORBIDE MONONITRATE 30 MILLIGRAM(S): 60 TABLET, EXTENDED RELEASE ORAL at 11:23

## 2017-11-02 RX ADMIN — Medication 40 MILLIEQUIVALENT(S): at 00:25

## 2017-11-02 RX ADMIN — Medication 1 TABLET(S): at 11:21

## 2017-11-02 RX ADMIN — Medication 1000 MILLIGRAM(S): at 11:23

## 2017-11-02 RX ADMIN — Medication 50 MILLIGRAM(S): at 06:13

## 2017-11-02 RX ADMIN — Medication 400 INTERNATIONAL UNIT(S): at 11:23

## 2017-11-02 RX ADMIN — Medication 25 MILLIGRAM(S): at 06:13

## 2020-01-07 ENCOUNTER — INPATIENT (INPATIENT)
Facility: HOSPITAL | Age: 70
LOS: 7 days | Discharge: HOME | End: 2020-01-15
Attending: INTERNAL MEDICINE | Admitting: INTERNAL MEDICINE
Payer: MEDICARE

## 2020-01-07 VITALS
TEMPERATURE: 98 F | RESPIRATION RATE: 18 BRPM | SYSTOLIC BLOOD PRESSURE: 178 MMHG | DIASTOLIC BLOOD PRESSURE: 93 MMHG | OXYGEN SATURATION: 100 % | HEART RATE: 94 BPM

## 2020-01-07 DIAGNOSIS — Z98.890 OTHER SPECIFIED POSTPROCEDURAL STATES: Chronic | ICD-10-CM

## 2020-01-07 RX ORDER — ASPIRIN/CALCIUM CARB/MAGNESIUM 324 MG
324 TABLET ORAL ONCE
Refills: 0 | Status: COMPLETED | OUTPATIENT
Start: 2020-01-07 | End: 2020-01-07

## 2020-01-07 NOTE — ED PROVIDER NOTE - PROGRESS NOTE DETAILS
Williams PGY3: called 4 different numbers to reach Dr LEILANI Sen unable to reach him admitted to Dr Koroma

## 2020-01-07 NOTE — ED PROVIDER NOTE - OBJECTIVE STATEMENT
70 yo F c PMH of HTN 68 yo F c PMH of HTN p/w 1 week h/o intermittent waxing and waning L sided CP (per triage note radiating to L arm however pt denies radiation to L arm to me) non-exertional, non-pleuritic, non-positional with associated SOB. no JOLLY or orthopnea. states she has a positive h/o similar sx, had an abnormal nuclear stress test 1-2 y/a, echo with unknown results, and angiogram where no stent was placed. states her doctor wanted her to have a repeat angiogram but hasn't had one yet. does not take ASA. no recent travel/surgery/hospitalizations/ill contacts. no uri sx. no personal/FH of DVT/PE.    cards/PMD: Franck Sen MD

## 2020-01-07 NOTE — ED PROVIDER NOTE - ATTENDING CONTRIBUTION TO CARE
I performed a face-to-face evaluation of the patient and performed a history and physical examination. I agree with the history and physical examination.    Steffanie: Had mild CAD (no stents), c/o L chest/arm/neck pain, SOB, sweating. Not likely PNA: no infectious Sx (eg, purulent cough). Not likely PE or other VTE: PERC or Wells low score, EKG no e/o R-heart strain. Likely ACS. Labs, EKG, CXR, ASA, admit.

## 2020-01-07 NOTE — ED PROVIDER NOTE - CLINICAL SUMMARY MEDICAL DECISION MAKING FREE TEXT BOX
Steffanie: Had mild CAD (no stents), c/o L chest/arm/neck pain, SOB, sweating. Not likely PNA: no infectious Sx (eg, purulent cough). Not likely PE or other VTE: PERC or Wells low score, EKG no e/o R-heart strain. Likely ACS. Labs, EKG, CXR, ASA, admit.

## 2020-01-07 NOTE — ED PROVIDER NOTE - FAMILY HISTORY
Family history of lung cancer     Family history of heart failure     Family history of renal disease     Sibling  Still living? Unknown  Family history of early CAD, Age at diagnosis: Age Unknown

## 2020-01-08 DIAGNOSIS — R07.89 OTHER CHEST PAIN: ICD-10-CM

## 2020-01-08 DIAGNOSIS — R00.2 PALPITATIONS: ICD-10-CM

## 2020-01-08 DIAGNOSIS — R07.9 CHEST PAIN, UNSPECIFIED: ICD-10-CM

## 2020-01-08 DIAGNOSIS — M06.9 RHEUMATOID ARTHRITIS, UNSPECIFIED: ICD-10-CM

## 2020-01-08 DIAGNOSIS — Z02.9 ENCOUNTER FOR ADMINISTRATIVE EXAMINATIONS, UNSPECIFIED: ICD-10-CM

## 2020-01-08 DIAGNOSIS — Z29.9 ENCOUNTER FOR PROPHYLACTIC MEASURES, UNSPECIFIED: ICD-10-CM

## 2020-01-08 DIAGNOSIS — J45.20 MILD INTERMITTENT ASTHMA, UNCOMPLICATED: ICD-10-CM

## 2020-01-08 DIAGNOSIS — I10 ESSENTIAL (PRIMARY) HYPERTENSION: ICD-10-CM

## 2020-01-08 PROBLEM — M54.9 DORSALGIA, UNSPECIFIED: Chronic | Status: ACTIVE | Noted: 2017-08-06

## 2020-01-08 LAB
ALBUMIN SERPL ELPH-MCNC: 4.2 G/DL — SIGNIFICANT CHANGE UP (ref 3.3–5)
ALP SERPL-CCNC: 94 U/L — SIGNIFICANT CHANGE UP (ref 40–120)
ALT FLD-CCNC: 18 U/L — SIGNIFICANT CHANGE UP (ref 4–33)
ANION GAP SERPL CALC-SCNC: 13 MMO/L — SIGNIFICANT CHANGE UP (ref 7–14)
APTT BLD: 31.1 SEC — SIGNIFICANT CHANGE UP (ref 27.5–36.3)
AST SERPL-CCNC: 20 U/L — SIGNIFICANT CHANGE UP (ref 4–32)
BASOPHILS # BLD AUTO: 0.04 K/UL — SIGNIFICANT CHANGE UP (ref 0–0.2)
BASOPHILS NFR BLD AUTO: 0.6 % — SIGNIFICANT CHANGE UP (ref 0–2)
BILIRUB SERPL-MCNC: 0.2 MG/DL — SIGNIFICANT CHANGE UP (ref 0.2–1.2)
BUN SERPL-MCNC: 16 MG/DL — SIGNIFICANT CHANGE UP (ref 7–23)
CALCIUM SERPL-MCNC: 9.5 MG/DL — SIGNIFICANT CHANGE UP (ref 8.4–10.5)
CHLORIDE SERPL-SCNC: 107 MMOL/L — SIGNIFICANT CHANGE UP (ref 98–107)
CK MB BLD-MCNC: 1.29 NG/ML — SIGNIFICANT CHANGE UP (ref 1–4.7)
CK SERPL-CCNC: 102 U/L — SIGNIFICANT CHANGE UP (ref 25–170)
CO2 SERPL-SCNC: 23 MMOL/L — SIGNIFICANT CHANGE UP (ref 22–31)
CREAT SERPL-MCNC: 0.69 MG/DL — SIGNIFICANT CHANGE UP (ref 0.5–1.3)
EOSINOPHIL # BLD AUTO: 0.1 K/UL — SIGNIFICANT CHANGE UP (ref 0–0.5)
EOSINOPHIL NFR BLD AUTO: 1.4 % — SIGNIFICANT CHANGE UP (ref 0–6)
GLUCOSE SERPL-MCNC: 185 MG/DL — HIGH (ref 70–99)
HCT VFR BLD CALC: 37.3 % — SIGNIFICANT CHANGE UP (ref 34.5–45)
HGB BLD-MCNC: 12.1 G/DL — SIGNIFICANT CHANGE UP (ref 11.5–15.5)
IMM GRANULOCYTES NFR BLD AUTO: 0.3 % — SIGNIFICANT CHANGE UP (ref 0–1.5)
INR BLD: 1.1 — SIGNIFICANT CHANGE UP (ref 0.88–1.17)
LYMPHOCYTES # BLD AUTO: 1.55 K/UL — SIGNIFICANT CHANGE UP (ref 1–3.3)
LYMPHOCYTES # BLD AUTO: 21.6 % — SIGNIFICANT CHANGE UP (ref 13–44)
MCHC RBC-ENTMCNC: 28.7 PG — SIGNIFICANT CHANGE UP (ref 27–34)
MCHC RBC-ENTMCNC: 32.4 % — SIGNIFICANT CHANGE UP (ref 32–36)
MCV RBC AUTO: 88.6 FL — SIGNIFICANT CHANGE UP (ref 80–100)
MONOCYTES # BLD AUTO: 0.77 K/UL — SIGNIFICANT CHANGE UP (ref 0–0.9)
MONOCYTES NFR BLD AUTO: 10.7 % — SIGNIFICANT CHANGE UP (ref 2–14)
NEUTROPHILS # BLD AUTO: 4.69 K/UL — SIGNIFICANT CHANGE UP (ref 1.8–7.4)
NEUTROPHILS NFR BLD AUTO: 65.4 % — SIGNIFICANT CHANGE UP (ref 43–77)
NRBC # FLD: 0 K/UL — SIGNIFICANT CHANGE UP (ref 0–0)
NT-PROBNP SERPL-SCNC: 147.8 PG/ML — SIGNIFICANT CHANGE UP
PLATELET # BLD AUTO: 303 K/UL — SIGNIFICANT CHANGE UP (ref 150–400)
PMV BLD: 11.4 FL — SIGNIFICANT CHANGE UP (ref 7–13)
POTASSIUM SERPL-MCNC: 4.1 MMOL/L — SIGNIFICANT CHANGE UP (ref 3.5–5.3)
POTASSIUM SERPL-SCNC: 4.1 MMOL/L — SIGNIFICANT CHANGE UP (ref 3.5–5.3)
PROT SERPL-MCNC: 7.1 G/DL — SIGNIFICANT CHANGE UP (ref 6–8.3)
PROTHROM AB SERPL-ACNC: 12.6 SEC — SIGNIFICANT CHANGE UP (ref 9.8–13.1)
RBC # BLD: 4.21 M/UL — SIGNIFICANT CHANGE UP (ref 3.8–5.2)
RBC # FLD: 14.9 % — HIGH (ref 10.3–14.5)
SODIUM SERPL-SCNC: 143 MMOL/L — SIGNIFICANT CHANGE UP (ref 135–145)
TROPONIN T, HIGH SENSITIVITY: 9 NG/L — SIGNIFICANT CHANGE UP (ref ?–14)
TROPONIN T, HIGH SENSITIVITY: 9 NG/L — SIGNIFICANT CHANGE UP (ref ?–14)
WBC # BLD: 7.17 K/UL — SIGNIFICANT CHANGE UP (ref 3.8–10.5)
WBC # FLD AUTO: 7.17 K/UL — SIGNIFICANT CHANGE UP (ref 3.8–10.5)

## 2020-01-08 PROCEDURE — 71046 X-RAY EXAM CHEST 2 VIEWS: CPT | Mod: 26

## 2020-01-08 PROCEDURE — 99223 1ST HOSP IP/OBS HIGH 75: CPT

## 2020-01-08 RX ORDER — VITAMIN E 100 UNIT
1 CAPSULE ORAL
Qty: 0 | Refills: 0 | DISCHARGE

## 2020-01-08 RX ORDER — UBIDECARENONE 100 MG
1 CAPSULE ORAL
Qty: 0 | Refills: 0 | DISCHARGE

## 2020-01-08 RX ORDER — CHOLECALCIFEROL (VITAMIN D3) 125 MCG
1 CAPSULE ORAL
Qty: 0 | Refills: 0 | DISCHARGE

## 2020-01-08 RX ORDER — ACETAMINOPHEN 500 MG
650 TABLET ORAL ONCE
Refills: 0 | Status: COMPLETED | OUTPATIENT
Start: 2020-01-08 | End: 2020-01-08

## 2020-01-08 RX ORDER — ISOSORBIDE MONONITRATE 60 MG/1
30 TABLET, EXTENDED RELEASE ORAL DAILY
Refills: 0 | Status: DISCONTINUED | OUTPATIENT
Start: 2020-01-08 | End: 2020-01-15

## 2020-01-08 RX ORDER — HYDROCHLOROTHIAZIDE 25 MG
25 TABLET ORAL DAILY
Refills: 0 | Status: DISCONTINUED | OUTPATIENT
Start: 2020-01-08 | End: 2020-01-15

## 2020-01-08 RX ORDER — OMEGA-3 ACID ETHYL ESTERS 1 G
0 CAPSULE ORAL
Qty: 0 | Refills: 0 | DISCHARGE

## 2020-01-08 RX ORDER — LIDOCAINE 4 G/100G
1 CREAM TOPICAL ONCE
Refills: 0 | Status: COMPLETED | OUTPATIENT
Start: 2020-01-08 | End: 2020-01-08

## 2020-01-08 RX ORDER — ALBUTEROL 90 UG/1
2 AEROSOL, METERED ORAL EVERY 6 HOURS
Refills: 0 | Status: DISCONTINUED | OUTPATIENT
Start: 2020-01-08 | End: 2020-01-15

## 2020-01-08 RX ORDER — ASPIRIN/CALCIUM CARB/MAGNESIUM 324 MG
81 TABLET ORAL DAILY
Refills: 0 | Status: DISCONTINUED | OUTPATIENT
Start: 2020-01-09 | End: 2020-01-15

## 2020-01-08 RX ORDER — ATORVASTATIN CALCIUM 80 MG/1
80 TABLET, FILM COATED ORAL AT BEDTIME
Refills: 0 | Status: DISCONTINUED | OUTPATIENT
Start: 2020-01-08 | End: 2020-01-15

## 2020-01-08 RX ORDER — METOPROLOL TARTRATE 50 MG
50 TABLET ORAL DAILY
Refills: 0 | Status: DISCONTINUED | OUTPATIENT
Start: 2020-01-08 | End: 2020-01-15

## 2020-01-08 RX ORDER — ACETAMINOPHEN 500 MG
975 TABLET ORAL ONCE
Refills: 0 | Status: COMPLETED | OUTPATIENT
Start: 2020-01-08 | End: 2020-01-08

## 2020-01-08 RX ORDER — GABAPENTIN 400 MG/1
300 CAPSULE ORAL THREE TIMES A DAY
Refills: 0 | Status: DISCONTINUED | OUTPATIENT
Start: 2020-01-08 | End: 2020-01-15

## 2020-01-08 RX ADMIN — Medication 975 MILLIGRAM(S): at 03:06

## 2020-01-08 RX ADMIN — ISOSORBIDE MONONITRATE 30 MILLIGRAM(S): 60 TABLET, EXTENDED RELEASE ORAL at 12:22

## 2020-01-08 RX ADMIN — GABAPENTIN 300 MILLIGRAM(S): 400 CAPSULE ORAL at 14:37

## 2020-01-08 RX ADMIN — Medication 975 MILLIGRAM(S): at 10:10

## 2020-01-08 RX ADMIN — GABAPENTIN 300 MILLIGRAM(S): 400 CAPSULE ORAL at 22:04

## 2020-01-08 RX ADMIN — LIDOCAINE 1 PATCH: 4 CREAM TOPICAL at 08:30

## 2020-01-08 RX ADMIN — LIDOCAINE 1 PATCH: 4 CREAM TOPICAL at 14:39

## 2020-01-08 RX ADMIN — LIDOCAINE 1 PATCH: 4 CREAM TOPICAL at 03:05

## 2020-01-08 RX ADMIN — Medication 324 MILLIGRAM(S): at 00:22

## 2020-01-08 RX ADMIN — GABAPENTIN 300 MILLIGRAM(S): 400 CAPSULE ORAL at 06:27

## 2020-01-08 RX ADMIN — ATORVASTATIN CALCIUM 80 MILLIGRAM(S): 80 TABLET, FILM COATED ORAL at 22:04

## 2020-01-08 RX ADMIN — Medication 650 MILLIGRAM(S): at 16:11

## 2020-01-08 RX ADMIN — Medication 650 MILLIGRAM(S): at 14:40

## 2020-01-08 RX ADMIN — Medication 50 MILLIGRAM(S): at 06:27

## 2020-01-08 RX ADMIN — Medication 25 MILLIGRAM(S): at 06:27

## 2020-01-08 RX ADMIN — ALBUTEROL 2 PUFF(S): 90 AEROSOL, METERED ORAL at 12:11

## 2020-01-08 NOTE — CONSULT NOTE ADULT - SUBJECTIVE AND OBJECTIVE BOX
CHIEF COMPLAINT:  Palpitations and CP     HISTORY OF PRESENT ILLNESS:  69 year old woman with a history of HTN, RA, Asthma, chronic back pain who presents for chest pain.    Over the past 1 week, she has been having intermittent chest pain that she describes as throbbing left sided chest pain that typically lasts several hours.  Her most recent episode started yesterday night.  She describes it as a throbbing chest pain that radiates to her left arm, neck, and left ear.  Associated with sweats and shortness of breath.  Rates as 9/10.  Does not worsen with position, breathing, or exertional.  Inconsistently improves with rest.  She was concerned about a heart attack, so she presented to [facility] ED for evaluation.    In the ED, she was afebrile with elevated BP to 155-182/86-93 but otherwise normal vital signs.  Diagnostics revealed a troponin of 9 x2 and EKG with LAD similar to prior.  She was given ASA, apap, and lido patch and admitted for further evaluation.        PAST MEDICAL & SURGICAL HISTORY:  Chronic back pain: (with hx/o back surgery in 7/2015 and 1/2017)  Dizziness: secondary to brain cyst  Brain cyst  Fibroid, uterine  HTN (hypertension)  RA (rheumatoid arthritis)  H/O sciatica: (left leg)  Asthma  Angina  History of back surgery: (7/2015 and 1/2017)          MEDICATIONS:  hydrochlorothiazide 25 milliGRAM(s) Oral daily  isosorbide   mononitrate ER Tablet (IMDUR) 30 milliGRAM(s) Oral daily  metoprolol succinate ER 50 milliGRAM(s) Oral daily      ALBUTerol    90 MICROgram(s) HFA Inhaler 2 Puff(s) Inhalation every 6 hours PRN    gabapentin 300 milliGRAM(s) Oral three times a day      atorvastatin 80 milliGRAM(s) Oral at bedtime        FAMILY HISTORY:  Family history of early CAD (Sibling)  Family history of renal disease  Family history of heart failure  Family history of lung cancer      SOCIAL HISTORY:    [ ] Non-smoker  [ ] Smoker  [ ] Alcohol    Allergies    Biaxin (Hives; Rash)    Intolerances    	    REVIEW OF SYSTEMS:  CONSTITUTIONAL: No fever, weight loss, or fatigue  EYES: No eye pain, visual disturbances, or discharge  ENMT:  No difficulty hearing, tinnitus, vertigo; No sinus or throat pain  NECK: No pain or stiffness  RESPIRATORY: No cough, wheezing, chills or hemoptysis; No Shortness of Breath  CARDIOVASCULAR:+ chest pain, palpitations, passing out, dizziness, or leg swelling  GASTROINTESTINAL: No abdominal or epigastric pain. No nausea, vomiting, or hematemesis; No diarrhea or constipation. No melena or hematochezia.  GENITOURINARY: No dysuria, frequency, hematuria, or incontinence  NEUROLOGICAL: No headaches, memory loss, loss of strength, numbness, or tremors  SKIN: No itching, burning, rashes, or lesions   LYMPH Nodes: No enlarged glands  ENDOCRINE: No heat or cold intolerance; No hair loss  MUSCULOSKELETAL: No joint pain or swelling; No muscle, back, or extremity pain  PSYCHIATRIC: No depression, anxiety, mood swings, or difficulty sleeping  HEME/LYMPH: No easy bruising, or bleeding gums  ALLERY AND IMMUNOLOGIC: No hives or eczema	    [ ] All others negative	  [ ] Unable to obtain    PHYSICAL EXAM:  T(C): 36.7 (01-08-20 @ 10:07), Max: 36.9 (01-07-20 @ 22:37)  HR: 74 (01-08-20 @ 10:07) (68 - 94)  BP: 145/72 (01-08-20 @ 10:07) (125/67 - 182/93)  RR: 16 (01-08-20 @ 10:07) (15 - 19)  SpO2: 100% (01-08-20 @ 10:07) (98% - 100%)  Wt(kg): --  I&O's Summary      Appearance: Normal	  HEENT:   Normal oral mucosa, PERRL, EOMI	  Lymphatic: No lymphadenopathy  Cardiovascular: Normal S1 S2, No JVD, No murmurs, No edema  Respiratory: Lungs clear to auscultation	  Psychiatry: A & O x 3, Mood & affect appropriate  Gastrointestinal:  Soft, Non-tender, + BS	  Skin: No rashes, No ecchymoses, No cyanosis	  Neurologic: Non-focal  Extremities: Normal range of motion, No clubbing, cyanosis or edema  Vascular: Peripheral pulses palpable 2+ bilaterally    TELEMETRY: SR	    ECG:  	  RADIOLOGY:  < from: Xray Chest 2 Views PA/Lat (01.08.20 @ 01:33) >    ******PRELIMINARY REPORT******    ******PRELIMINARY REPORT******            EXAM:  XR CHEST PA LAT 2V        PROCEDURE DATE:  Jan 8 2020     ******PRELIMINARY REPORT******    ******PRELIMINARY REPORT******            INTERPRETATION:  clear lungs            ******PRELIMINARY REPORT******    ******PRELIMINARY REPORT******          TIFFANY WOODSON M.D., RADIOLOGY RESIDENT                        < end of copied text >    OTHER: 	  	  LABS:	 	    CARDIAC MARKERS:      CKMB: 1.29 ng/mL (01-08 @ 00:13)    Troponin T, High Sensitivity (01.08.20 @ 02:28)    Troponin T, High Sensitivity: 9: ---------------------***PLEASE NOTE***----------------------  Rapid changes upward or downward in high-sensitivity  troponin levels strongly suggest acute myocardial injury.  Hemolysis may falsely lower results. Renal impairment may  increase results.    Normal: <6 - 14 ng/L  Indeterminate: 15 - 51 ng/L  Elevated: >51 ng/L    Please see "[email or web address]" on the [employer]  intranet for more information. ng/L                              12.1   7.17  )-----------( 303      ( 08 Jan 2020 00:13 )             37.3     01-08    143  |  107  |  16  ----------------------------<  185<H>  4.1   |  23  |  0.69    Ca    9.5      08 Jan 2020 00:13    TPro  7.1  /  Alb  4.2  /  TBili  0.2  /  DBili  x   /  AST  20  /  ALT  18  /  AlkPhos  94  01-08    proBNP: Serum Pro-Brain Natriuretic Peptide: 147.8 pg/mL (01-08 @ 00:13)    Lipid Profile:   HgA1c:   TSH:

## 2020-01-08 NOTE — H&P ADULT - NSHPLABSRESULTS_GEN_ALL_CORE
Diagnostics reviewed and remarkable for:  CBC, coags, bmp, lft wnl  ; MB 1.29  Trop 9 x2  .8  CXR personally reviewed and clear lungs, normal appearing heart size, ortho spine hardware present  EKG personally reviewed and NSR w/ LAD.  Similar to 2017 EKG

## 2020-01-08 NOTE — H&P ADULT - NSICDXFAMILYHX_GEN_ALL_CORE_FT
FAMILY HISTORY:  Family history of heart failure  Family history of lung cancer  Family history of renal disease    Sibling  Still living? Unknown  Family history of early CAD, Age at diagnosis: Age Unknown

## 2020-01-08 NOTE — H&P ADULT - PROBLEM SELECTOR PLAN 1
-Atypical chest pain.  Differential includes ACS (unlikely given nonexertional, normal trop, stable EKG), PE (low likelihood with no risk factors, desats, tachycardia), dissection (unlikely with no risk factors, and inconsistent description), PTX/PNA (not seen on CXR)  -Will further evaluate with TTE in the daytime  -Monitor on telemetry  -Continue metoprolol.  Start ASA/atorvastatin.  HEART score 3 for age and 1 risk factor.  -Defer to cardiology regarding stress and/or angiogram

## 2020-01-08 NOTE — H&P ADULT - NSHPPHYSICALEXAM_GEN_ALL_CORE
Physical exam:  Constitutional-Vitals signs reviewed and afebrile, hr 84-94, bp 155-182/86-93, rr 17-18, satting % RA, awake, alert, not in acute distress  Neuro-awake, alert, appropriately interactive, moving all extremities, face symmetric  Eyes-pupils equally round and reactive to light, non icteric, no discharge noted  Ears, nose, mouth, throat-no abnormal discharge, moist mucous membranes, oropharynx clear without noted exudate  CV-regular rate and rhythm, no rubs, murmurs, gallops appreciated, no lower extremity edema, palpable distal pulses (radial, dorsalis pedis, posterior tibial)  Resp-clear to auscultation bilaterally, normal respiratory effort,   GI-abdomen soft and nontender, no rebound or guarding present  -not examined  MSK-normal range of motion of bilateral elbows and knees, no obvious deformities   Skin-warm, dry, no rash appreciated  Psych-calm, cooperative, normal affect, not attending to internal stimuli

## 2020-01-08 NOTE — H&P ADULT - HISTORY OF PRESENT ILLNESS
Karely Mcdaniels is a 69 year old woman with a history of HTN, RA, Asthma, chronic back pain who presents for chest pain.    Over the past 1 week, she has been having intermittent chest pain that she describes as throbbing left sided chest pain that typically lasts several hours.  Her most recent episode started yesterday night.  She describes it as a throbbing chest pain that radiates to her left arm, neck, and left ear.  Associated with sweats and shortness of breath.  Rates as 9/10.  Does not worsen with position, breathing, or exertional.  Inconsistently improves with rest.  She was concerned about a heart attack, so she presented to [facility] ED for evaluation.    In the ED, she was afebrile with elevated BP to 155-182/86-93 but otherwise normal vital signs.  Diagnostics revealed a troponin of 9 x2 and EKG with LAD similar to prior.  She was given ASA, apap, and lido patch and admitted for further evaluation.    On evaluation, she gave the above history and reported that her chest pain had resolved spontaneously.

## 2020-01-08 NOTE — ED ADULT NURSE NOTE - OBJECTIVE STATEMENT
Pt presents to  4 [facility] complaining of chest pain under L breast x1week. Pt states the pain woke her up out of sleep last night with pins and needles to whole neck area and L ear. Pt denied any limitation to motor function. Pt states that 2 nights ago had pins and needles in arms and hands, both L and R. Pt currently endorses pain of 8/10 "ache" under L breast. Pt states "it feels like a bad tooth ache." Pt states this happened about 2 years ago, came to hospital and was Dx with angina and arrhythmia. Pt placed on cardiac monitor, 20g IV inserted to R hand labs drawn and sent, medicated as per EMR. Pt in NSR, in no obvious distress.

## 2020-01-08 NOTE — H&P ADULT - NSICDXPASTMEDICALHX_GEN_ALL_CORE_FT
PAST MEDICAL HISTORY:  Angina     Asthma     Brain cyst     Chronic back pain (with hx/o back surgery in 7/2015 and 1/2017)    Dizziness secondary to brain cyst    Fibroid, uterine     H/O sciatica (left leg)    HTN (hypertension)     RA (rheumatoid arthritis)

## 2020-01-08 NOTE — H&P ADULT - NSHPREVIEWOFSYSTEMS_GEN_ALL_CORE
ROS:  Constitutional:  (+) chills, sweats, (-) fevers, unintentional weight loss, fatigue, sick contacts, recent travel, trauma  Ears/Nose/Mouth/Throat: (+) none; (-) throat pain, rhinorrhea, dysphagia/odynophagia  CV: (+) chest pain, (-) palpitations, lower extremity edema, orthopnea, PND  Resp: (+) shortness of breath, (-) cough  GI: (+) none; (-) abdominal pain, nausea, vomitting, diarrhea, constipation, melana, hematochezia  : (+) none; (-) dysuria, hematuria  MSK: (+) none; (-) joint pain, joint swelling  Skin: (+) none; (-) rash, new yellowing/darkening of skin  Neuro: (+) lightheadedness/dizziness(-) HA, vision changes, weakness, confusion,   Endo: (+) none; (-) heat/cold intolerance, recent skin/hair/nail changes  Heme/Lymph: (+) none; (-) swollen lymph nodes, night sweats

## 2020-01-08 NOTE — H&P ADULT - PROBLEM SELECTOR PLAN 6
1.  Name of PCP: Dr. Franck Sen  2.  PCP Contacted on Admission: [ ] Y    [x] N    3.  PCP contacted at Discharge: [ ] Y    [ ] N    [ ] N/A  4.  Post-Discharge Appointment Date and Location:  5.  Summary of Handoff given to PCP:    Patient assigned to my care for initial evaluation and management.  Dr. Koroma to assume care in the morning.

## 2020-01-09 LAB
ANION GAP SERPL CALC-SCNC: 12 MMO/L — SIGNIFICANT CHANGE UP (ref 7–14)
BUN SERPL-MCNC: 18 MG/DL — SIGNIFICANT CHANGE UP (ref 7–23)
CALCIUM SERPL-MCNC: 9.1 MG/DL — SIGNIFICANT CHANGE UP (ref 8.4–10.5)
CHLORIDE SERPL-SCNC: 108 MMOL/L — HIGH (ref 98–107)
CO2 SERPL-SCNC: 21 MMOL/L — LOW (ref 22–31)
CREAT SERPL-MCNC: 0.8 MG/DL — SIGNIFICANT CHANGE UP (ref 0.5–1.3)
GLUCOSE SERPL-MCNC: 123 MG/DL — HIGH (ref 70–99)
HCT VFR BLD CALC: 37 % — SIGNIFICANT CHANGE UP (ref 34.5–45)
HCV AB S/CO SERPL IA: 0.1 S/CO — SIGNIFICANT CHANGE UP (ref 0–0.99)
HCV AB SERPL-IMP: SIGNIFICANT CHANGE UP
HGB BLD-MCNC: 11.2 G/DL — LOW (ref 11.5–15.5)
MAGNESIUM SERPL-MCNC: 1.8 MG/DL — SIGNIFICANT CHANGE UP (ref 1.6–2.6)
MCHC RBC-ENTMCNC: 27.7 PG — SIGNIFICANT CHANGE UP (ref 27–34)
MCHC RBC-ENTMCNC: 30.3 % — LOW (ref 32–36)
MCV RBC AUTO: 91.4 FL — SIGNIFICANT CHANGE UP (ref 80–100)
NRBC # FLD: 0 K/UL — SIGNIFICANT CHANGE UP (ref 0–0)
PHOSPHATE SERPL-MCNC: 3.6 MG/DL — SIGNIFICANT CHANGE UP (ref 2.5–4.5)
PLATELET # BLD AUTO: 289 K/UL — SIGNIFICANT CHANGE UP (ref 150–400)
PMV BLD: 11 FL — SIGNIFICANT CHANGE UP (ref 7–13)
POTASSIUM SERPL-MCNC: 3.9 MMOL/L — SIGNIFICANT CHANGE UP (ref 3.5–5.3)
POTASSIUM SERPL-SCNC: 3.9 MMOL/L — SIGNIFICANT CHANGE UP (ref 3.5–5.3)
RBC # BLD: 4.05 M/UL — SIGNIFICANT CHANGE UP (ref 3.8–5.2)
RBC # FLD: 15.1 % — HIGH (ref 10.3–14.5)
SODIUM SERPL-SCNC: 141 MMOL/L — SIGNIFICANT CHANGE UP (ref 135–145)
T3 SERPL-MCNC: 95.4 NG/DL — SIGNIFICANT CHANGE UP (ref 80–200)
T4 AB SER-ACNC: 8.08 UG/DL — SIGNIFICANT CHANGE UP (ref 5.1–13)
TSH SERPL-MCNC: 1.85 UIU/ML — SIGNIFICANT CHANGE UP (ref 0.27–4.2)
WBC # BLD: 6.44 K/UL — SIGNIFICANT CHANGE UP (ref 3.8–10.5)
WBC # FLD AUTO: 6.44 K/UL — SIGNIFICANT CHANGE UP (ref 3.8–10.5)

## 2020-01-09 RX ORDER — INFLUENZA VIRUS VACCINE 15; 15; 15; 15 UG/.5ML; UG/.5ML; UG/.5ML; UG/.5ML
0.5 SUSPENSION INTRAMUSCULAR ONCE
Refills: 0 | Status: DISCONTINUED | OUTPATIENT
Start: 2020-01-09 | End: 2020-01-15

## 2020-01-09 RX ORDER — ACETAMINOPHEN 500 MG
650 TABLET ORAL EVERY 6 HOURS
Refills: 0 | Status: DISCONTINUED | OUTPATIENT
Start: 2020-01-09 | End: 2020-01-15

## 2020-01-09 RX ADMIN — Medication 650 MILLIGRAM(S): at 06:14

## 2020-01-09 RX ADMIN — Medication 650 MILLIGRAM(S): at 22:43

## 2020-01-09 RX ADMIN — ATORVASTATIN CALCIUM 80 MILLIGRAM(S): 80 TABLET, FILM COATED ORAL at 22:43

## 2020-01-09 RX ADMIN — Medication 81 MILLIGRAM(S): at 13:03

## 2020-01-09 RX ADMIN — Medication 650 MILLIGRAM(S): at 23:43

## 2020-01-09 RX ADMIN — Medication 50 MILLIGRAM(S): at 05:20

## 2020-01-09 RX ADMIN — GABAPENTIN 300 MILLIGRAM(S): 400 CAPSULE ORAL at 22:43

## 2020-01-09 RX ADMIN — GABAPENTIN 300 MILLIGRAM(S): 400 CAPSULE ORAL at 13:03

## 2020-01-09 RX ADMIN — Medication 25 MILLIGRAM(S): at 05:20

## 2020-01-09 RX ADMIN — ISOSORBIDE MONONITRATE 30 MILLIGRAM(S): 60 TABLET, EXTENDED RELEASE ORAL at 13:03

## 2020-01-09 RX ADMIN — Medication 650 MILLIGRAM(S): at 13:11

## 2020-01-09 RX ADMIN — Medication 650 MILLIGRAM(S): at 14:10

## 2020-01-09 RX ADMIN — GABAPENTIN 300 MILLIGRAM(S): 400 CAPSULE ORAL at 05:20

## 2020-01-09 RX ADMIN — Medication 650 MILLIGRAM(S): at 05:20

## 2020-01-09 NOTE — PROGRESS NOTE ADULT - SUBJECTIVE AND OBJECTIVE BOX
Subjective: Patient seen and examined. No new events except as noted.   Feels weak and tired     REVIEW OF SYSTEMS:    CONSTITUTIONAL: +weakness, fevers or chills  EYES/ENT: No visual changes;  No vertigo or throat pain   NECK: No pain or stiffness  RESPIRATORY: No cough, wheezing, hemoptysis; No shortness of breath  CARDIOVASCULAR: No chest pain or palpitations  GASTROINTESTINAL: No abdominal or epigastric pain. No nausea, vomiting, or hematemesis; No diarrhea or constipation. No melena or hematochezia.  GENITOURINARY: No dysuria, frequency or hematuria  NEUROLOGICAL: No numbness or weakness  SKIN: No itching, burning, rashes, or lesions   All other review of systems is negative unless indicated above.    MEDICATIONS:  MEDICATIONS  (STANDING):  aspirin enteric coated 81 milliGRAM(s) Oral daily  atorvastatin 80 milliGRAM(s) Oral at bedtime  gabapentin 300 milliGRAM(s) Oral three times a day  hydrochlorothiazide 25 milliGRAM(s) Oral daily  influenza   Vaccine 0.5 milliLiter(s) IntraMuscular once  isosorbide   mononitrate ER Tablet (IMDUR) 30 milliGRAM(s) Oral daily  metoprolol succinate ER 50 milliGRAM(s) Oral daily      PHYSICAL EXAM:  T(C): 36.6 (01-09-20 @ 04:56), Max: 36.8 (01-08-20 @ 21:47)  HR: 64 (01-09-20 @ 04:56) (64 - 72)  BP: 124/74 (01-09-20 @ 04:56) (122/76 - 148/74)  RR: 20 (01-09-20 @ 04:56) (15 - 20)  SpO2: 100% (01-09-20 @ 04:56) (100% - 100%)  Wt(kg): --  I&O's Summary        Appearance: Normal	  HEENT:   Normal oral mucosa, PERRL, EOMI	  Lymphatic: No lymphadenopathy , no edema  Cardiovascular: Normal S1 S2, No JVD, No murmurs , Peripheral pulses palpable 2+ bilaterally  Respiratory: Lungs clear to auscultation, normal effort 	  Gastrointestinal:  Soft, Non-tender, + BS	  Skin: No rashes, No ecchymoses, No cyanosis, warm to touch  Musculoskeletal: Normal range of motion, normal strength  Psychiatry:  Mood & affect appropriate  Ext: No edema      LABS:    CARDIAC MARKERS:  CARDIAC MARKERS ( 08 Jan 2020 00:13 )  x     / x     / 102 u/L / 1.29 ng/mL / x                                    11.2   6.44  )-----------( 289      ( 09 Jan 2020 06:20 )             37.0     01-09    141  |  108<H>  |  18  ----------------------------<  123<H>  3.9   |  21<L>  |  0.80    Ca    9.1      09 Jan 2020 06:20  Phos  3.6     01-09  Mg     1.8     01-09    TPro  7.1  /  Alb  4.2  /  TBili  0.2  /  DBili  x   /  AST  20  /  ALT  18  /  AlkPhos  94  01-08    proBNP:   Lipid Profile:   HgA1c:   TSH: Thyroid Stimulating Hormone, Serum: 1.85 uIU/mL (01-09 @ 06:20)              TELEMETRY: 	SR    ECG:  	  RADIOLOGY:   DIAGNOSTIC TESTING:  [ ] Echocardiogram:  < from: Transthoracic Echocardiogram (01.09.20 @ 08:06) >    Patient name: NINA DENNEY  YOB: 1950   Age: 69 (F)   MR#: [id number]  Study Date: 1/9/2020  [facility] StressSonographer: Estella Mclean RDCS  Study quality: Technically good  Referring Physician: Kranthi Koroma MD  Blood Pressure: 127/82 mmHg  Height: 160 cm  Weight: 68 kg  BSA: 1.7 m2  ------------------------------------------------------------------------  PROCEDURE: Transthoracic echocardiogram with 2-D, M-Mode  and complete spectral and color flow Doppler.  INDICATION: Angina pectoris, unspecified (I20.9)  ------------------------------------------------------------------------  DIMENSIONS:  Dimensions:     Normal Values:  LA:     3.3 cm    2.0 - 4.0 cm  Ao:     2.8 cm    2.0 - 3.8 cm  SEPTUM: 0.7 cm    0.6 - 1.2 cm  PWT:    0.7 cm    0.6 - 1.1 cm  LVIDd:  4.9 cm    3.0 - 5.6 cm  LVIDs:  3.0 cm    1.8 - 4.0 cm  Derived Variables:  LVMI: 65 g/m2  RWT: 0.28  Fractional short: 39 %  Ejection Fraction (Teicholtz): 69 %  ------------------------------------------------------------------------  OBSERVATIONS:  Mitral Valve: Mitral annular calcification, otherwise  normal mitral valve. Minimal mitral regurgitation.  Aortic Root: Normal aortic root.  Aortic Valve: Calcified trileaflet aortic valve with normal  opening.  Left Atrium: Normal left atrium.  LA volume index = 13  cc/m2.  Left Ventricle: Normal left ventricular systolic function.  No segmental wall motion abnormalities. Normal left  ventricular internal dimensions and wall thicknesses. Mild  diastolic dysfunction (Stage I).  Right Heart: Normal right atrium. Normal right ventricular  size and function. Normal tricuspid valve. Minimal  tricuspid regurgitation. Normal pulmonic valve.  Pericardium/PleuraNormal pericardium with no pericardial  effusion.  ------------------------------------------------------------------------  CONCLUSIONS:  1. Mitral annular calcification, otherwise normal mitral  valve. Minimal mitral regurgitation.  2. Normal left ventricular internal dimensions and wall  thicknesses.  3. Normal left ventricular systolic function. No segmental  wall motion abnormalities.  4. Mild diastolic dysfunction (Stage I).  5. Normal right ventricular size and function.  ------------------------------------------------------------------------  Confirmed on  1/9/2020 - 09:14:27 by Brian Sanchez M.D.  ------------------------------------------------------------------------    < end of copied text >    [ ]  Catheterization:  [ ] Stress Test:    OTHER:

## 2020-01-10 ENCOUNTER — TRANSCRIPTION ENCOUNTER (OUTPATIENT)
Age: 70
End: 2020-01-10

## 2020-01-10 LAB
ALBUMIN SERPL ELPH-MCNC: 3.8 G/DL — SIGNIFICANT CHANGE UP (ref 3.3–5)
ALP SERPL-CCNC: 82 U/L — SIGNIFICANT CHANGE UP (ref 40–120)
ALT FLD-CCNC: 11 U/L — SIGNIFICANT CHANGE UP (ref 4–33)
ANION GAP SERPL CALC-SCNC: 12 MMO/L — SIGNIFICANT CHANGE UP (ref 7–14)
AST SERPL-CCNC: 15 U/L — SIGNIFICANT CHANGE UP (ref 4–32)
BASOPHILS # BLD AUTO: 0.06 K/UL — SIGNIFICANT CHANGE UP (ref 0–0.2)
BASOPHILS NFR BLD AUTO: 0.9 % — SIGNIFICANT CHANGE UP (ref 0–2)
BILIRUB SERPL-MCNC: 0.2 MG/DL — SIGNIFICANT CHANGE UP (ref 0.2–1.2)
BUN SERPL-MCNC: 15 MG/DL — SIGNIFICANT CHANGE UP (ref 7–23)
CALCIUM SERPL-MCNC: 9.5 MG/DL — SIGNIFICANT CHANGE UP (ref 8.4–10.5)
CHLORIDE SERPL-SCNC: 104 MMOL/L — SIGNIFICANT CHANGE UP (ref 98–107)
CO2 SERPL-SCNC: 24 MMOL/L — SIGNIFICANT CHANGE UP (ref 22–31)
CREAT SERPL-MCNC: 0.62 MG/DL — SIGNIFICANT CHANGE UP (ref 0.5–1.3)
EOSINOPHIL # BLD AUTO: 0.19 K/UL — SIGNIFICANT CHANGE UP (ref 0–0.5)
EOSINOPHIL NFR BLD AUTO: 2.7 % — SIGNIFICANT CHANGE UP (ref 0–6)
GLUCOSE SERPL-MCNC: 85 MG/DL — SIGNIFICANT CHANGE UP (ref 70–99)
HCT VFR BLD CALC: 37 % — SIGNIFICANT CHANGE UP (ref 34.5–45)
HGB BLD-MCNC: 11.6 G/DL — SIGNIFICANT CHANGE UP (ref 11.5–15.5)
IMM GRANULOCYTES NFR BLD AUTO: 0.4 % — SIGNIFICANT CHANGE UP (ref 0–1.5)
LYMPHOCYTES # BLD AUTO: 2.82 K/UL — SIGNIFICANT CHANGE UP (ref 1–3.3)
LYMPHOCYTES # BLD AUTO: 40.6 % — SIGNIFICANT CHANGE UP (ref 13–44)
MAGNESIUM SERPL-MCNC: 1.8 MG/DL — SIGNIFICANT CHANGE UP (ref 1.6–2.6)
MCHC RBC-ENTMCNC: 28.8 PG — SIGNIFICANT CHANGE UP (ref 27–34)
MCHC RBC-ENTMCNC: 31.4 % — LOW (ref 32–36)
MCV RBC AUTO: 91.8 FL — SIGNIFICANT CHANGE UP (ref 80–100)
MONOCYTES # BLD AUTO: 0.66 K/UL — SIGNIFICANT CHANGE UP (ref 0–0.9)
MONOCYTES NFR BLD AUTO: 9.5 % — SIGNIFICANT CHANGE UP (ref 2–14)
NEUTROPHILS # BLD AUTO: 3.18 K/UL — SIGNIFICANT CHANGE UP (ref 1.8–7.4)
NEUTROPHILS NFR BLD AUTO: 45.9 % — SIGNIFICANT CHANGE UP (ref 43–77)
NRBC # FLD: 0 K/UL — SIGNIFICANT CHANGE UP (ref 0–0)
PHOSPHATE SERPL-MCNC: 3.8 MG/DL — SIGNIFICANT CHANGE UP (ref 2.5–4.5)
PLATELET # BLD AUTO: 295 K/UL — SIGNIFICANT CHANGE UP (ref 150–400)
PMV BLD: 11.6 FL — SIGNIFICANT CHANGE UP (ref 7–13)
POTASSIUM SERPL-MCNC: 3.9 MMOL/L — SIGNIFICANT CHANGE UP (ref 3.5–5.3)
POTASSIUM SERPL-SCNC: 3.9 MMOL/L — SIGNIFICANT CHANGE UP (ref 3.5–5.3)
PROT SERPL-MCNC: 6.5 G/DL — SIGNIFICANT CHANGE UP (ref 6–8.3)
RBC # BLD: 4.03 M/UL — SIGNIFICANT CHANGE UP (ref 3.8–5.2)
RBC # FLD: 15.1 % — HIGH (ref 10.3–14.5)
SODIUM SERPL-SCNC: 140 MMOL/L — SIGNIFICANT CHANGE UP (ref 135–145)
WBC # BLD: 6.94 K/UL — SIGNIFICANT CHANGE UP (ref 3.8–10.5)
WBC # FLD AUTO: 6.94 K/UL — SIGNIFICANT CHANGE UP (ref 3.8–10.5)

## 2020-01-10 RX ORDER — IBUPROFEN 200 MG
600 TABLET ORAL ONCE
Refills: 0 | Status: COMPLETED | OUTPATIENT
Start: 2020-01-10 | End: 2020-01-10

## 2020-01-10 RX ADMIN — Medication 600 MILLIGRAM(S): at 17:14

## 2020-01-10 RX ADMIN — Medication 25 MILLIGRAM(S): at 05:35

## 2020-01-10 RX ADMIN — Medication 650 MILLIGRAM(S): at 14:02

## 2020-01-10 RX ADMIN — ATORVASTATIN CALCIUM 80 MILLIGRAM(S): 80 TABLET, FILM COATED ORAL at 21:24

## 2020-01-10 RX ADMIN — ISOSORBIDE MONONITRATE 30 MILLIGRAM(S): 60 TABLET, EXTENDED RELEASE ORAL at 14:00

## 2020-01-10 RX ADMIN — Medication 650 MILLIGRAM(S): at 15:00

## 2020-01-10 RX ADMIN — Medication 81 MILLIGRAM(S): at 14:00

## 2020-01-10 RX ADMIN — GABAPENTIN 300 MILLIGRAM(S): 400 CAPSULE ORAL at 05:35

## 2020-01-10 RX ADMIN — GABAPENTIN 300 MILLIGRAM(S): 400 CAPSULE ORAL at 14:01

## 2020-01-10 RX ADMIN — Medication 600 MILLIGRAM(S): at 16:39

## 2020-01-10 RX ADMIN — GABAPENTIN 300 MILLIGRAM(S): 400 CAPSULE ORAL at 21:24

## 2020-01-10 NOTE — PROGRESS NOTE ADULT - SUBJECTIVE AND OBJECTIVE BOX
Subjective: Patient seen and examined. No new events except as noted.   feels well   wants to go home tomorrow Am  REVIEW OF SYSTEMS:    CONSTITUTIONAL: No weakness, fevers or chills  EYES/ENT: No visual changes;  No vertigo or throat pain   NECK: No pain or stiffness  RESPIRATORY: No cough, wheezing, hemoptysis; No shortness of breath  CARDIOVASCULAR: No chest pain or palpitations  GASTROINTESTINAL: No abdominal or epigastric pain. No nausea, vomiting, or hematemesis; No diarrhea or constipation. No melena or hematochezia.  GENITOURINARY: No dysuria, frequency or hematuria  NEUROLOGICAL: No numbness or weakness  SKIN: No itching, burning, rashes, or lesions   All other review of systems is negative unless indicated above.    MEDICATIONS:  MEDICATIONS  (STANDING):  aspirin enteric coated 81 milliGRAM(s) Oral daily  atorvastatin 80 milliGRAM(s) Oral at bedtime  gabapentin 300 milliGRAM(s) Oral three times a day  hydrochlorothiazide 25 milliGRAM(s) Oral daily  influenza   Vaccine 0.5 milliLiter(s) IntraMuscular once  isosorbide   mononitrate ER Tablet (IMDUR) 30 milliGRAM(s) Oral daily  metoprolol succinate ER 50 milliGRAM(s) Oral daily      PHYSICAL EXAM:  T(C): 37.2 (01-10-20 @ 12:27), Max: 37.2 (01-10-20 @ 12:27)  HR: 73 (01-10-20 @ 12:27) (59 - 76)  BP: 128/74 (01-10-20 @ 12:27) (125/74 - 133/77)  RR: 18 (01-10-20 @ 12:27) (17 - 18)  SpO2: 99% (01-10-20 @ 12:27) (99% - 100%)  Wt(kg): --  I&O's Summary    Height (cm): 160 (01-09 @ 16:28)  Weight (kg): 88 (01-09 @ 16:28)  BMI (kg/m2): 34.4 (01-09 @ 16:28)  BSA (m2): 1.91 (01-09 @ 16:28)    Appearance: Normal	  HEENT:   Normal oral mucosa, PERRL, EOMI	  Lymphatic: No lymphadenopathy , no edema  Cardiovascular: Normal S1 S2, No JVD, No murmurs , Peripheral pulses palpable 2+ bilaterally  Respiratory: Lungs clear to auscultation, normal effort 	  Gastrointestinal:  Soft, Non-tender, + BS	  Skin: No rashes, No ecchymoses, No cyanosis, warm to touch  Musculoskeletal: Normal range of motion, normal strength  Psychiatry:  Mood & affect appropriate  Ext: No edema      LABS:    CARDIAC MARKERS:  CARDIAC MARKERS ( 08 Jan 2020 00:13 )  x     / x     / 102 u/L / 1.29 ng/mL / x                                    11.6   6.94  )-----------( 295      ( 10 Bruno 2020 06:00 )             37.0     01-10    140  |  104  |  15  ----------------------------<  85  3.9   |  24  |  0.62    Ca    9.5      10 Bruno 2020 06:00  Phos  3.8     01-10  Mg     1.8     01-10    TPro  6.5  /  Alb  3.8  /  TBili  0.2  /  DBili  x   /  AST  15  /  ALT  11  /  AlkPhos  82  01-10    proBNP:   Lipid Profile:   HgA1c:   TSH:             TELEMETRY: 	SR    ECG:  	  RADIOLOGY:   DIAGNOSTIC TESTING:  [ ] Echocardiogram:  [ ]  Catheterization:  [ ] Stress Test:    OTHER:

## 2020-01-10 NOTE — DISCHARGE NOTE PROVIDER - NSDCCPCAREPLAN_GEN_ALL_CORE_FT
PRINCIPAL DISCHARGE DIAGNOSIS  Diagnosis: Chest pain of uncertain etiology  Assessment and Plan of Treatment: No evidence of cardiac etiology for chest pain found. You are advised to continue taking your medications as prescribed and follow up with your Cardiologist in 2-3 weeks. No need for further inpatient workup.      SECONDARY DISCHARGE DIAGNOSES  Diagnosis: Essential hypertension  Assessment and Plan of Treatment: Maintain adequate control of your blood pressure. Follow up with PCP and/or cardiologist for ongoing medical management of your hypertension. Continue medications as prescribed. Low salt diet.

## 2020-01-10 NOTE — DISCHARGE NOTE PROVIDER - NSDCMRMEDTOKEN_GEN_ALL_CORE_FT
gabapentin 300 mg oral capsule: 1 cap(s) orally 3 times a day  hydrochlorothiazide 25 mg oral tablet: 1 tab(s) orally once a day  isosorbide mononitrate 30 mg oral tablet, extended release: 1 tab(s) orally once a day  metoprolol succinate 50 mg oral tablet, extended release: 1 tab(s) orally once a day  Proventil HFA CFC free 90 mcg/inh inhalation aerosol: 2 puff(s) inhaled 2 times a day, As Needed aspirin 81 mg oral delayed release tablet: 1 tab(s) orally once a day  atorvastatin 80 mg oral tablet: 1 tab(s) orally once a day (at bedtime)  gabapentin 300 mg oral capsule: 1 cap(s) orally 3 times a day  hydrochlorothiazide 25 mg oral tablet: 1 tab(s) orally once a day  isosorbide mononitrate 30 mg oral tablet, extended release: 1 tab(s) orally once a day  metoprolol succinate 50 mg oral tablet, extended release: 1 tab(s) orally once a day  Proventil HFA CFC free 90 mcg/inh inhalation aerosol: 2 puff(s) inhaled 2 times a day, As Needed  Rollator: Rollator    SOLIS x99 Days

## 2020-01-10 NOTE — DISCHARGE NOTE NURSING/CASE MANAGEMENT/SOCIAL WORK - PATIENT PORTAL LINK FT
You can access the FollowMyHealth Patient Portal offered by [facility] by registering at the following website: [email or web address]. By joining [facility]’s FollowMyHealth portal, you will also be able to view your health information using other applications (apps) compatible with our system.

## 2020-01-10 NOTE — DISCHARGE NOTE PROVIDER - HOSPITAL COURSE
Karely Mcdaniels is a 69 year old woman with a history of HTN, RA, Asthma, chronic back pain who presents for chest pain.    Over the past 1 week, she has been having intermittent chest pain that she describes as throbbing left sided chest pain that typically lasts several hours.  Her most recent episode started yesterday night.  She describes it as a throbbing chest pain that radiates to her left arm, neck, and left ear.  Associated with sweats and shortness of breath.  Rates as 9/10.  Does not worsen with position, breathing, or exertional.  Inconsistently improves with rest.  She was concerned about a heart attack, so she presented to [facility] ED for evaluation.        Diagnostics revealed a troponin of 9 x2 and EKG with LAD similar to prior. She was given ASA, apap, and lido patch and admitted for further evaluation.        Problem/Plan -1:    Problem: Chest pain, atypical.  Plan: -Atypical chest pain.  Differential includes ACS (unlikely given nonexertional, normal trop, stable EKG), PE (low likelihood with no risk factors, desats, tachycardia), dissection (unlikely with no risk factors, and inconsistent description), PTX/PNA (not seen on CXR)    -TTE-->Minimal MR; Mild diastolic dysfunction; Normal left ventricular systolic function. No segmental wall motion abnormalities.    -NST ordered-->The left ventricle was normal in size. There was a moderate inferolateral defect that appears predominantly fixed, suggestive of infarction.  No clear evidence of ischemia.    Cardiology evaluated and cleared patient to return home.        Problem/Plan - 2:    ·  Problem: Essential hypertension.  Plan: Continue home toprol, imdur, HCTZ with hold parameters. Karely Mcdaniels is a 69 year old woman with a history of HTN, RA, Asthma, chronic back pain who presents for chest pain.    Over the past 1 week, she has been having intermittent chest pain that she describes as throbbing left sided chest pain that typically lasts several hours.  Her most recent episode started yesterday night.  She describes it as a throbbing chest pain that radiates to her left arm, neck, and left ear.  Associated with sweats and shortness of breath.  Rates as 9/10.  Does not worsen with position, breathing, or exertional.  Inconsistently improves with rest.  She was concerned about a heart attack, so she presented to [facility] ED for evaluation.        Diagnostics revealed a troponin of 9 x2 and EKG with LAD similar to prior. She was given ASA, apap, and lido patch and admitted for further evaluation.        Problem/Plan -1:    Problem: Chest pain, atypical.  Plan: -Atypical chest pain.  Differential includes ACS (unlikely given nonexertional, normal trop, stable EKG), PE (low likelihood with no risk factors, desats, tachycardia), dissection (unlikely with no risk factors, and inconsistent description), PTX/PNA (not seen on CXR)    -TTE-->Minimal MR; Mild diastolic dysfunction; Normal left ventricular systolic function. No segmental wall motion abnormalities.    -NST ordered-->The left ventricle was normal in size. There was a moderate inferolateral defect that appears predominantly fixed, suggestive of infarction.  No clear evidence of ischemia.    Cardiology evaluated and cleared patient to return home.        Problem/Plan - 2:    ·  Problem: Essential hypertension.  Plan: Continue home toprol, imdur, HCTZ with hold parameters.         Attending cleared for discharge. Plan discussed with patient who consented and agreed with plan of care.

## 2020-01-10 NOTE — DISCHARGE NOTE PROVIDER - CARE PROVIDER_API CALL
Kranthi Koroma (DO)  Cardiology; Internal Medicine  [address] [place] [address], [address]  [place] [address]  Phone: [phone]  Fax: [phone]  Follow Up Time: 2 weeks

## 2020-01-11 LAB
ANION GAP SERPL CALC-SCNC: 13 MMO/L — SIGNIFICANT CHANGE UP (ref 7–14)
BUN SERPL-MCNC: 12 MG/DL — SIGNIFICANT CHANGE UP (ref 7–23)
CALCIUM SERPL-MCNC: 9 MG/DL — SIGNIFICANT CHANGE UP (ref 8.4–10.5)
CHLORIDE SERPL-SCNC: 104 MMOL/L — SIGNIFICANT CHANGE UP (ref 98–107)
CK MB BLD-MCNC: SIGNIFICANT CHANGE UP NG/ML (ref 1–4.7)
CK SERPL-CCNC: 56 U/L — SIGNIFICANT CHANGE UP (ref 25–170)
CK SERPL-CCNC: 57 U/L — SIGNIFICANT CHANGE UP (ref 25–170)
CO2 SERPL-SCNC: 25 MMOL/L — SIGNIFICANT CHANGE UP (ref 22–31)
CREAT SERPL-MCNC: 0.62 MG/DL — SIGNIFICANT CHANGE UP (ref 0.5–1.3)
D DIMER BLD IA.RAPID-MCNC: < 150 NG/ML — SIGNIFICANT CHANGE UP
GLUCOSE SERPL-MCNC: 112 MG/DL — HIGH (ref 70–99)
HCT VFR BLD CALC: 34.1 % — LOW (ref 34.5–45)
HGB BLD-MCNC: 10.8 G/DL — LOW (ref 11.5–15.5)
MAGNESIUM SERPL-MCNC: 1.8 MG/DL — SIGNIFICANT CHANGE UP (ref 1.6–2.6)
MCHC RBC-ENTMCNC: 28.2 PG — SIGNIFICANT CHANGE UP (ref 27–34)
MCHC RBC-ENTMCNC: 31.7 % — LOW (ref 32–36)
MCV RBC AUTO: 89 FL — SIGNIFICANT CHANGE UP (ref 80–100)
NRBC # FLD: 0 K/UL — SIGNIFICANT CHANGE UP (ref 0–0)
PHOSPHATE SERPL-MCNC: 3.6 MG/DL — SIGNIFICANT CHANGE UP (ref 2.5–4.5)
PLATELET # BLD AUTO: 279 K/UL — SIGNIFICANT CHANGE UP (ref 150–400)
PMV BLD: 10.6 FL — SIGNIFICANT CHANGE UP (ref 7–13)
POTASSIUM SERPL-MCNC: 3.3 MMOL/L — LOW (ref 3.5–5.3)
POTASSIUM SERPL-SCNC: 3.3 MMOL/L — LOW (ref 3.5–5.3)
RBC # BLD: 3.83 M/UL — SIGNIFICANT CHANGE UP (ref 3.8–5.2)
RBC # FLD: 15.3 % — HIGH (ref 10.3–14.5)
SODIUM SERPL-SCNC: 142 MMOL/L — SIGNIFICANT CHANGE UP (ref 135–145)
TROPONIN T, HIGH SENSITIVITY: 8 NG/L — SIGNIFICANT CHANGE UP (ref ?–14)
TROPONIN T, HIGH SENSITIVITY: 8 NG/L — SIGNIFICANT CHANGE UP (ref ?–14)
WBC # BLD: 5.74 K/UL — SIGNIFICANT CHANGE UP (ref 3.8–10.5)
WBC # FLD AUTO: 5.74 K/UL — SIGNIFICANT CHANGE UP (ref 3.8–10.5)

## 2020-01-11 PROCEDURE — 93010 ELECTROCARDIOGRAM REPORT: CPT

## 2020-01-11 RX ORDER — KETOROLAC TROMETHAMINE 30 MG/ML
15 SYRINGE (ML) INJECTION ONCE
Refills: 0 | Status: DISCONTINUED | OUTPATIENT
Start: 2020-01-11 | End: 2020-01-11

## 2020-01-11 RX ORDER — POTASSIUM CHLORIDE 20 MEQ
40 PACKET (EA) ORAL ONCE
Refills: 0 | Status: COMPLETED | OUTPATIENT
Start: 2020-01-11 | End: 2020-01-11

## 2020-01-11 RX ORDER — PANTOPRAZOLE SODIUM 20 MG/1
40 TABLET, DELAYED RELEASE ORAL
Refills: 0 | Status: DISCONTINUED | OUTPATIENT
Start: 2020-01-11 | End: 2020-01-15

## 2020-01-11 RX ADMIN — GABAPENTIN 300 MILLIGRAM(S): 400 CAPSULE ORAL at 05:27

## 2020-01-11 RX ADMIN — Medication 25 MILLIGRAM(S): at 05:27

## 2020-01-11 RX ADMIN — ISOSORBIDE MONONITRATE 30 MILLIGRAM(S): 60 TABLET, EXTENDED RELEASE ORAL at 11:45

## 2020-01-11 RX ADMIN — Medication 15 MILLIGRAM(S): at 11:44

## 2020-01-11 RX ADMIN — GABAPENTIN 300 MILLIGRAM(S): 400 CAPSULE ORAL at 14:48

## 2020-01-11 RX ADMIN — Medication 650 MILLIGRAM(S): at 06:19

## 2020-01-11 RX ADMIN — ATORVASTATIN CALCIUM 80 MILLIGRAM(S): 80 TABLET, FILM COATED ORAL at 21:10

## 2020-01-11 RX ADMIN — Medication 15 MILLIGRAM(S): at 22:00

## 2020-01-11 RX ADMIN — Medication 650 MILLIGRAM(S): at 05:26

## 2020-01-11 RX ADMIN — Medication 81 MILLIGRAM(S): at 11:44

## 2020-01-11 RX ADMIN — Medication 15 MILLIGRAM(S): at 21:09

## 2020-01-11 RX ADMIN — Medication 40 MILLIEQUIVALENT(S): at 12:01

## 2020-01-11 RX ADMIN — Medication 50 MILLIGRAM(S): at 05:27

## 2020-01-11 RX ADMIN — GABAPENTIN 300 MILLIGRAM(S): 400 CAPSULE ORAL at 21:10

## 2020-01-11 RX ADMIN — Medication 30 MILLILITER(S): at 09:00

## 2020-01-11 RX ADMIN — Medication 15 MILLIGRAM(S): at 12:20

## 2020-01-11 NOTE — PROGRESS NOTE ADULT - SUBJECTIVE AND OBJECTIVE BOX
Subjective: Patient seen and examined. No new events except as noted.   Chest pain this am   now stable         REVIEW OF SYSTEMS:    +weakness, fevers or chills  EYES/ENT: No visual changes;  No vertigo or throat pain   NECK: No pain or stiffness  RESPIRATORY: No cough, wheezing, hemoptysis; No shortness of breath  CARDIOVASCULAR: No chest pain or palpitations  GASTROINTESTINAL: No abdominal or epigastric pain. No nausea, vomiting, or hematemesis; No diarrhea or constipation. No melena or hematochezia.  GENITOURINARY: No dysuria, frequency or hematuria  NEUROLOGICAL: No numbness or weakness  SKIN: No itching, burning, rashes, or lesions   All other review of systems is negative unless indicated above.    MEDICATIONS:  MEDICATIONS  (STANDING):  aspirin enteric coated 81 milliGRAM(s) Oral daily  atorvastatin 80 milliGRAM(s) Oral at bedtime  gabapentin 300 milliGRAM(s) Oral three times a day  hydrochlorothiazide 25 milliGRAM(s) Oral daily  influenza   Vaccine 0.5 milliLiter(s) IntraMuscular once  isosorbide   mononitrate ER Tablet (IMDUR) 30 milliGRAM(s) Oral daily  ketorolac   Injectable 15 milliGRAM(s) IV Push once  metoprolol succinate ER 50 milliGRAM(s) Oral daily  pantoprazole    Tablet 40 milliGRAM(s) Oral before breakfast      PHYSICAL EXAM:  T(C): 36.9 (20 @ 12:15), Max: 36.9 (01-10-20 @ 21:20)  HR: 68 (20 @ 12:15) (67 - 75)  BP: 126/63 (20 @ 12:15) (119/69 - 152/98)  RR: 17 (20 @ 12:15) (17 - 18)  SpO2: 100% (20 @ 12:15) (99% - 100%)  Wt(kg): --  I&O's Summary        Appearance: Normal	  HEENT:   Normal oral mucosa, PERRL, EOMI	  Lymphatic: No lymphadenopathy , no edema  Cardiovascular: Normal S1 S2, No JVD, No murmurs , Peripheral pulses palpable 2+ bilaterally  Respiratory: Lungs clear to auscultation, normal effort 	  Gastrointestinal:  Soft, Non-tender, + BS	  Skin: No rashes, No ecchymoses, No cyanosis, warm to touch  Musculoskeletal: Normal range of motion, normal strength  Psychiatry:  Mood & affect appropriate  Ext: No edema      LABS:    CARDIAC MARKERS:  CARDIAC MARKERS ( 2020 09:09 )  x     / x     / 57 u/L / x     / x      CARDIAC MARKERS ( 2020 04:52 )  x     / x     / 56 u/L / Test not performed ng/mL / x                                    10.8   5.74  )-----------( 279      ( 2020 09:09 )             34.1     11    142  |  104  |  12  ----------------------------<  112<H>  3.3<L>   |  25  |  0.62    Ca    9.0      2020 09:09  Phos  3.6       Mg     1.8         TPro  6.5  /  Alb  3.8  /  TBili  0.2  /  DBili  x   /  AST  15  /  ALT  11  /  AlkPhos  82  01-10    proBNP:   Lipid Profile:   HgA1c:   TSH:             TELEMETRY: 	    ECG:  	  RADIOLOGY:   DIAGNOSTIC TESTING:  [ ] Echocardiogram:  [ ]  Catheterization:  [ ] Stress Test:   < from: Nuclear Stress Test-Pharmacologic (20 @ 16:29) >    PATIENT: NINA DENNEY  : 1950   AGE: 69 (F)   MR#: [id number]  STUDY DATE: 2020  LOCATION: [facility]  REF. PHYSICIAN(S): Kranthi Koroma MD  FELLOW:  ------------------------------------------------------------------------  TYPE OF TEST: Stress Pharmacologic  INDICATION: Encounter for preprocedural cardiovascular  examination (Z01.810)  ------------------------------------------------------------------------  HISTORY:  CARDIAC HISTORY: 68 yo woman w/family hx of CAD,HTN, HLD  RA, and chronic back pain complaining of chest pain  referred for ischemic evaluation prior to surgery  MEDICATIONS: ASA, Lipitor, HCTZ, Toprol, Imdur, Albuterol  ------------------------------------------------------------------------  BASELINE ELECTROCARDIOGRAM:  Rhythm: Normal Sinus Rhythm - 69 BPM  Conduction Defect: qr' pattern in lead V1  ST: No significant ST abnormalities.  ------------------------------------------------------------------------  HEMODYNAMIC PARAMETERS:               HR      BP  Baseline  Pre-Injection             68  136/92  00:00     Inject Regadenoson         0  00:30     Post Injection             0  01:00     Post Injection            65  02:00     Post Injection            83  145/72  03:00Post Injection            80  127/74  04:00     Post Injection            76  124/75  05:00     Recovery                  76  139/75  06:00     Recovery                  77  132/70  ------------------------------------------------------------------------  Agent: Regadenoson 0.4 mg/5 ml NS. injected over 10 sec.  HR: Baseline HR: 67 bpm   Peak HR: 83 bpm (55% of MPHR)  MPHR: 152 bpm   85% of MPHR: 129 bpm  BP: Baseline BP: 136/92 mmHg   Peak BP: 145/72 mmHg   Peak  RPP: 29013 (Rate Pressure Product)  Last Caffeine intake: 12 hrs  Terminated: Completion of protocol  ------------------------------------------------------------------------  SYMPTOMS/FINDINGS:  Symptoms: No Symptom  Chest pain: No chest pain with administration of  Regadenoson  ------------------------------------------------------------------------  ECG ABNORMALITIES DURING/AFTER STRESS:   Abnormalities: None  ------------------------------------------------------------------------  NEW ARRHYTHMIAS DEVELOPED DURING/AFTER STRESS:  At 50 sec into recovery, there were frequent PACs in  pattern of bigeminy  ------------------------------------------------------------------------  STRESS TEST IMPRESSIONS:  Chest Pain: No chest pain with administration of  Regadenoson.  Symptom: No Symptom.  HR Response: Appropriate.  BP Response: Appropriate decrease, then increase.  Heart Rhythm: Normal Sinus Rhythm - 69 BPM.  Baseline ECG: No significant ST abnormalities.  ECG Abnormalities: None.  Arrhythmia: At 50 sec into recovery, there were frequent  PACs in pattern of bigeminy.  ------------------------------------------------------------------------  PROCEDURE:  6.8 mCi of Tc 99m Tetrofosmin were injected during stress  protocol. Approximately 45 minutes later, tomographic  images were obtained in a 180 degree arc from right  anterior oblique to left anterior oblique with 64 stops.  The tomographic slices were reconstructed in 3 orthogonal  planes (short axis, horizontal long axis and vertical long  axis).  Interpretation was performed both by visual and  quantitative analysis.  Stress images were acquired using CZT-based system with  pinhole collimation ([employer]),  and reconstructed using MLEM algorithm. Images were  re-acquired with the patient in a prone position.  ------------------------------------------------------------------------  NUCLEAR FINDINGS:  Review of raw data shows: The study is of adequate  technical quality  The left ventricle was normal in size. There was a  moderate inferolateral defect that appears predominantly  fixed, suggestive of infarction.  No clear evidence of  ischemia.  ------------------------------------------------------------------------  GATED ANALYSIS:  Post-stress gated wall motion analysis was performed (LVEF  = 62 %;LVEDV = 86 ml.), revealing normal LV function.  There was no segmental wall motion abnormality. The RV  appeared enlarged and mildly hypocontractile  ------------------------------------------------------------------------  IMPRESSIONS:Abnormal Study  * Chest Pain: No chest pain with administration of  Regadenoson.  * Symptom: No Symptom.  * HR Response: Appropriate.  * BP Response: Appropriate decrease, then increase.  * Heart Rhythm: Normal Sinus Rhythm - 69 BPM.  * Baseline ECG: No significant ST abnormalities.  * ECG Abnormalities: None.  * Arrhythmia: At 50 sec into recovery, there were frequent  PACs in pattern of bigeminy.  * Review of raw data shows: The study is of adequate  technical quality  * The left ventricle was normal in size. There was a  moderate inferolateral defect that appears predominantly  fixed, suggestive of infarction.  No clear evidence of  ischemia.  * Post-stress gated wall motion analysis was performed  (LVEF = 62 %;LVEDV = 86 ml.), revealing normal LV  function. There was no segmental wall motion abnormality.  The RV appeared enlarged and mildly hypocontractile  Conclusion:  The left ventricle was normal in size. There was a  moderate inferolateral defect that appears predominantly  fixed, suggestive of infarction.  No clear evidence of  ischemia.  ------------------------------------------------------------------------  Confirmed on  1/10/2020 - 15:25:46 by Gene Lima MD,  [facility], M[place]JANE, [place]  ------------------------------------------------------------------------    < end of copied text >    OTHER:

## 2020-01-11 NOTE — CHART NOTE - NSCHARTNOTEFT_GEN_A_CORE
Notified by RN this AM that patient complaining of 6/10 left sided chest pain.  She states that she has had this pain persistently for 2 weeks now.  She states that the pain is located inferior to her left breast.  The pain is non-radiating, non-pleuritic, non-reproducible.  She denies SOB, diaphoresis, dizziness, palpitations, NVDC, abdominal pain.  Pain is not made worse after eating or drinking.  Patient was given ibuprofen overnight which she states improved her pain.     Vital Signs Last 24 Hrs  T(C): 36.7 (11 Jan 2020 12:00), Max: 36.9 (10 Bruno 2020 21:20)  T(F): 98 (11 Jan 2020 12:00), Max: 98.5 (10 Bruno 2020 21:20)  HR: 67 (11 Jan 2020 12:00) (67 - 75)  BP: 134/69 (11 Jan 2020 12:00) (119/69 - 152/98)  BP(mean): --  RR: 18 (11 Jan 2020 12:00) (17 - 18)  SpO2: 100% (11 Jan 2020 12:00) (99% - 100%)    Exam:   General: A&Ox3, in NAD  Cardiac: S1, S2, RRR, no chest wall tenderness to palpation  Lungs: CTA B/L  Abdomen: soft, NT, ND, positive bowel sounds throughout  Extremities: pulses 2+ b/l, no edema    hsTroponin: 9-->9-->8-->8  CK: 102-->56-->57    D-dimer: <150    1/9/20 Echocardiogram: 1. Mitral annular calcification, otherwise normal mitral valve. Minimal mitral regurgitation. 2. Normal left ventricular internal dimensions and wall thicknesses. 3. Normal left ventricular systolic function. No [id number] wall motion abnormalities. 4. Mild diastolic dysfunction (Stage I). 5. Normal right ventricular size and function.    1/9/20 NST: Conclusion: The left ventricle was normal in size. There was a moderate inferolateral defect that appears predominantly fixed, suggestive of infarction.  No clear evidence of ischemia.    A/P: 68 y/o F PMH HTN, RA, asthma, chronic back pain admitted for chest pain for 2 weeks.  Ruled out for ACS with serial EKG's & cardiac enzymes.  NST revealed fixed defect without evidence of ischemia.  D-dimer negative.    -Treated pain with toradol x 1 dose which provided some relief in pain.  -Discussed with cardiology Dr. Koroma.  Plan is for cardiac catheterization on Monday 1/13/20.  -Continue cardiac medications including HCTZ, toprol, aspirin, imdur, lipitor.   -Continue to monitor on telemetry.   -Will continue to monitor closely.

## 2020-01-11 NOTE — CHART NOTE - NSCHARTNOTEFT_GEN_A_CORE
Notified by RN patient complaining of chest pain  Patient described the left sided chest pain, non radiating , non  exertional, intermittent,   6/10 at worst  Pain was associated with mild SOB. Pt states that chest pain feels like the same type that she has been experiencing past 2 weeks. Cardio is following     T(C): 36.9 (01-10-20 @ 21:20), Max: 37.2 (01-10-20 @ 12:27)  HR: 75 (01-10-20 @ 21:20) (59 - 75)  BP: 152/98 (01-10-20 @ 21:20) (124/78 - 152/98)  RR: 17 (01-10-20 @ 21:20) (17 - 18)  SpO2: 100% (01-10-20 @ 21:20) (99% - 100%)    EKG: no acute ischemic changes     Physical Exam:  Gen: NAD  CV: RRR, normal S1 + S2, no m/r/g  Lungs: CTAB      ECHO:  EF%69   CONCLUSIONS:  1. Mitral annular calcification, otherwise normal mitral  valve. Minimal mitral regurgitation.  2. Normal left ventricular internal dimensions and wall  thicknesses.  3. Normal left ventricular systolic function. No segmental  wall motion abnormalities.  4. Mild diastolic dysfunction (Stage I).  5. Normal right ventricular size and function.      Stress Test:IMPRESSIONS:Abnormal Study  * Chest Pain: No chest pain with administration of  Regadenoson.  * Symptom: No Symptom.  * HR Response: Appropriate.  * BP Response: Appropriate decrease, then increase.  * Heart Rhythm: Normal Sinus Rhythm - 69 BPM.  * Baseline ECG: No significant ST abnormalities.  * ECG Abnormalities: None.  * Arrhythmia: At 50 sec into recovery, there were frequent  PACs in pattern of bigeminy.  * Review of raw data shows: The study is of adequate  technical quality  * The left ventricle was normal in size. There was a  moderate inferolateral defect that appears predominantly  fixed, suggestive of infarction.  No clear evidence of  ischemia.  * Post-stress gated wall motion analysis was performed  (LVEF = 62 %;LVEDV = 86 ml.), revealing normal LV  function. There was no segmental wall motion abnormality.  The RV appeared enlarged and mildly hypocontractile  Conclusion:  The left ventricle was normal in size. There was a  moderate inferolateral defect that appears predominantly  fixed, suggestive of infarction.  No clear evidence of  ischemia.      A/P    1. Chest pain: s/p Tylenol continue tele, check CE, Previous CE has been negative x2, As per Cards, D/C home and follow up outpatient

## 2020-01-12 LAB
ANION GAP SERPL CALC-SCNC: 11 MMO/L — SIGNIFICANT CHANGE UP (ref 7–14)
BUN SERPL-MCNC: 25 MG/DL — HIGH (ref 7–23)
CALCIUM SERPL-MCNC: 9.1 MG/DL — SIGNIFICANT CHANGE UP (ref 8.4–10.5)
CHLORIDE SERPL-SCNC: 106 MMOL/L — SIGNIFICANT CHANGE UP (ref 98–107)
CO2 SERPL-SCNC: 25 MMOL/L — SIGNIFICANT CHANGE UP (ref 22–31)
CREAT SERPL-MCNC: 0.89 MG/DL — SIGNIFICANT CHANGE UP (ref 0.5–1.3)
GLUCOSE SERPL-MCNC: 100 MG/DL — HIGH (ref 70–99)
HCT VFR BLD CALC: 33.3 % — LOW (ref 34.5–45)
HGB BLD-MCNC: 10.6 G/DL — LOW (ref 11.5–15.5)
MAGNESIUM SERPL-MCNC: 1.8 MG/DL — SIGNIFICANT CHANGE UP (ref 1.6–2.6)
MCHC RBC-ENTMCNC: 28.3 PG — SIGNIFICANT CHANGE UP (ref 27–34)
MCHC RBC-ENTMCNC: 31.8 % — LOW (ref 32–36)
MCV RBC AUTO: 89 FL — SIGNIFICANT CHANGE UP (ref 80–100)
NRBC # FLD: 0 K/UL — SIGNIFICANT CHANGE UP (ref 0–0)
PLATELET # BLD AUTO: 289 K/UL — SIGNIFICANT CHANGE UP (ref 150–400)
PMV BLD: 10.9 FL — SIGNIFICANT CHANGE UP (ref 7–13)
POTASSIUM SERPL-MCNC: 4 MMOL/L — SIGNIFICANT CHANGE UP (ref 3.5–5.3)
POTASSIUM SERPL-SCNC: 4 MMOL/L — SIGNIFICANT CHANGE UP (ref 3.5–5.3)
RBC # BLD: 3.74 M/UL — LOW (ref 3.8–5.2)
RBC # FLD: 15.1 % — HIGH (ref 10.3–14.5)
SODIUM SERPL-SCNC: 142 MMOL/L — SIGNIFICANT CHANGE UP (ref 135–145)
WBC # BLD: 7.15 K/UL — SIGNIFICANT CHANGE UP (ref 3.8–10.5)
WBC # FLD AUTO: 7.15 K/UL — SIGNIFICANT CHANGE UP (ref 3.8–10.5)

## 2020-01-12 RX ADMIN — PANTOPRAZOLE SODIUM 40 MILLIGRAM(S): 20 TABLET, DELAYED RELEASE ORAL at 05:31

## 2020-01-12 RX ADMIN — Medication 650 MILLIGRAM(S): at 21:09

## 2020-01-12 RX ADMIN — Medication 650 MILLIGRAM(S): at 13:13

## 2020-01-12 RX ADMIN — Medication 81 MILLIGRAM(S): at 13:13

## 2020-01-12 RX ADMIN — ATORVASTATIN CALCIUM 80 MILLIGRAM(S): 80 TABLET, FILM COATED ORAL at 21:10

## 2020-01-12 RX ADMIN — Medication 650 MILLIGRAM(S): at 14:10

## 2020-01-12 RX ADMIN — GABAPENTIN 300 MILLIGRAM(S): 400 CAPSULE ORAL at 05:31

## 2020-01-12 RX ADMIN — GABAPENTIN 300 MILLIGRAM(S): 400 CAPSULE ORAL at 21:10

## 2020-01-12 RX ADMIN — Medication 650 MILLIGRAM(S): at 22:00

## 2020-01-12 RX ADMIN — Medication 25 MILLIGRAM(S): at 05:31

## 2020-01-12 RX ADMIN — ISOSORBIDE MONONITRATE 30 MILLIGRAM(S): 60 TABLET, EXTENDED RELEASE ORAL at 13:13

## 2020-01-12 RX ADMIN — GABAPENTIN 300 MILLIGRAM(S): 400 CAPSULE ORAL at 13:13

## 2020-01-12 RX ADMIN — Medication 50 MILLIGRAM(S): at 05:31

## 2020-01-12 NOTE — PHYSICAL THERAPY INITIAL EVALUATION ADULT - LIVES WITH, PROFILE
patient lives alone in private house. son visits often, no steps to enter via backdoor. 7 steps down to basement without handrails

## 2020-01-12 NOTE — PROGRESS NOTE ADULT - SUBJECTIVE AND OBJECTIVE BOX
Subjective: Patient seen and examined. No new events except as noted.   intermittent cp     REVIEW OF SYSTEMS:    CONSTITUTIONAL: No weakness, fevers or chills  EYES/ENT: No visual changes;  No vertigo or throat pain   NECK: No pain or stiffness  RESPIRATORY: No cough, wheezing, hemoptysis; No shortness of breath  CARDIOVASCULAR: + chest pain or palpitations  GASTROINTESTINAL: No abdominal or epigastric pain. No nausea, vomiting, or hematemesis; No diarrhea or constipation. No melena or hematochezia.  GENITOURINARY: No dysuria, frequency or hematuria  NEUROLOGICAL: No numbness or weakness  SKIN: No itching, burning, rashes, or lesions   All other review of systems is negative unless indicated above.    MEDICATIONS:  MEDICATIONS  (STANDING):  aspirin enteric coated 81 milliGRAM(s) Oral daily  atorvastatin 80 milliGRAM(s) Oral at bedtime  gabapentin 300 milliGRAM(s) Oral three times a day  hydrochlorothiazide 25 milliGRAM(s) Oral daily  influenza   Vaccine 0.5 milliLiter(s) IntraMuscular once  isosorbide   mononitrate ER Tablet (IMDUR) 30 milliGRAM(s) Oral daily  metoprolol succinate ER 50 milliGRAM(s) Oral daily  pantoprazole    Tablet 40 milliGRAM(s) Oral before breakfast      PHYSICAL EXAM:  T(C): 36.7 (01-12-20 @ 05:29), Max: 36.9 (01-11-20 @ 12:15)  HR: 73 (01-12-20 @ 05:29) (67 - 75)  BP: 125/65 (01-12-20 @ 05:29) (115/87 - 134/69)  RR: 18 (01-12-20 @ 05:29) (17 - 18)  SpO2: 99% (01-12-20 @ 05:29) (99% - 100%)  Wt(kg): --  I&O's Summary        Appearance: Normal	  HEENT:   Normal oral mucosa, PERRL, EOMI	  Lymphatic: No lymphadenopathy , no edema  Cardiovascular: Normal S1 S2, No JVD, No murmurs , Peripheral pulses palpable 2+ bilaterally  Respiratory: Lungs clear to auscultation, normal effort 	  Gastrointestinal:  Soft, Non-tender, + BS	  Skin: No rashes, No ecchymoses, No cyanosis, warm to touch  Musculoskeletal: Normal range of motion, normal strength  Psychiatry:  Mood & affect appropriate  Ext: No edema      LABS:    CARDIAC MARKERS:  CARDIAC MARKERS ( 11 Jan 2020 09:09 )  x     / x     / 57 u/L / x     / x      CARDIAC MARKERS ( 11 Jan 2020 04:52 )  x     / x     / 56 u/L / Test not performed ng/mL / x                                    10.6   7.15  )-----------( 289      ( 12 Jan 2020 05:09 )             33.3     01-12    142  |  106  |  25<H>  ----------------------------<  100<H>  4.0   |  25  |  0.89    Ca    9.1      12 Jan 2020 05:09  Phos  3.6     01-11  Mg     1.8     01-12      proBNP:   Lipid Profile:   HgA1c:   TSH:             TELEMETRY: SR	    ECG:  	  RADIOLOGY:   DIAGNOSTIC TESTING:  [ ] Echocardiogram:  [ ]  Catheterization:  [ ] Stress Test:    OTHER:

## 2020-01-13 LAB
ANION GAP SERPL CALC-SCNC: 10 MMO/L — SIGNIFICANT CHANGE UP (ref 7–14)
BUN SERPL-MCNC: 20 MG/DL — SIGNIFICANT CHANGE UP (ref 7–23)
CALCIUM SERPL-MCNC: 9 MG/DL — SIGNIFICANT CHANGE UP (ref 8.4–10.5)
CHLORIDE SERPL-SCNC: 104 MMOL/L — SIGNIFICANT CHANGE UP (ref 98–107)
CO2 SERPL-SCNC: 24 MMOL/L — SIGNIFICANT CHANGE UP (ref 22–31)
CREAT SERPL-MCNC: 0.73 MG/DL — SIGNIFICANT CHANGE UP (ref 0.5–1.3)
GLUCOSE SERPL-MCNC: 102 MG/DL — HIGH (ref 70–99)
HCT VFR BLD CALC: 37.9 % — SIGNIFICANT CHANGE UP (ref 34.5–45)
HGB BLD-MCNC: 11.7 G/DL — SIGNIFICANT CHANGE UP (ref 11.5–15.5)
MAGNESIUM SERPL-MCNC: 1.8 MG/DL — SIGNIFICANT CHANGE UP (ref 1.6–2.6)
MCHC RBC-ENTMCNC: 28.3 PG — SIGNIFICANT CHANGE UP (ref 27–34)
MCHC RBC-ENTMCNC: 30.9 % — LOW (ref 32–36)
MCV RBC AUTO: 91.8 FL — SIGNIFICANT CHANGE UP (ref 80–100)
NRBC # FLD: 0 K/UL — SIGNIFICANT CHANGE UP (ref 0–0)
PLATELET # BLD AUTO: 314 K/UL — SIGNIFICANT CHANGE UP (ref 150–400)
PMV BLD: 10.8 FL — SIGNIFICANT CHANGE UP (ref 7–13)
POTASSIUM SERPL-MCNC: 3.7 MMOL/L — SIGNIFICANT CHANGE UP (ref 3.5–5.3)
POTASSIUM SERPL-SCNC: 3.7 MMOL/L — SIGNIFICANT CHANGE UP (ref 3.5–5.3)
RBC # BLD: 4.13 M/UL — SIGNIFICANT CHANGE UP (ref 3.8–5.2)
RBC # FLD: 15.4 % — HIGH (ref 10.3–14.5)
SODIUM SERPL-SCNC: 138 MMOL/L — SIGNIFICANT CHANGE UP (ref 135–145)
WBC # BLD: 6.57 K/UL — SIGNIFICANT CHANGE UP (ref 3.8–10.5)
WBC # FLD AUTO: 6.57 K/UL — SIGNIFICANT CHANGE UP (ref 3.8–10.5)

## 2020-01-13 PROCEDURE — 99152 MOD SED SAME PHYS/QHP 5/>YRS: CPT

## 2020-01-13 PROCEDURE — 93454 CORONARY ARTERY ANGIO S&I: CPT | Mod: 26

## 2020-01-13 RX ORDER — POTASSIUM CHLORIDE 20 MEQ
20 PACKET (EA) ORAL ONCE
Refills: 0 | Status: COMPLETED | OUTPATIENT
Start: 2020-01-13 | End: 2020-01-13

## 2020-01-13 RX ADMIN — Medication 50 MILLIGRAM(S): at 05:32

## 2020-01-13 RX ADMIN — GABAPENTIN 300 MILLIGRAM(S): 400 CAPSULE ORAL at 21:16

## 2020-01-13 RX ADMIN — ISOSORBIDE MONONITRATE 30 MILLIGRAM(S): 60 TABLET, EXTENDED RELEASE ORAL at 21:16

## 2020-01-13 RX ADMIN — Medication 25 MILLIGRAM(S): at 05:32

## 2020-01-13 RX ADMIN — GABAPENTIN 300 MILLIGRAM(S): 400 CAPSULE ORAL at 05:32

## 2020-01-13 RX ADMIN — ATORVASTATIN CALCIUM 80 MILLIGRAM(S): 80 TABLET, FILM COATED ORAL at 21:16

## 2020-01-13 RX ADMIN — Medication 650 MILLIGRAM(S): at 16:25

## 2020-01-13 RX ADMIN — PANTOPRAZOLE SODIUM 40 MILLIGRAM(S): 20 TABLET, DELAYED RELEASE ORAL at 05:32

## 2020-01-13 RX ADMIN — Medication 20 MILLIEQUIVALENT(S): at 21:16

## 2020-01-13 RX ADMIN — Medication 650 MILLIGRAM(S): at 15:40

## 2020-01-13 RX ADMIN — Medication 81 MILLIGRAM(S): at 15:40

## 2020-01-13 NOTE — PROGRESS NOTE ADULT - SUBJECTIVE AND OBJECTIVE BOX
Subjective: Patient seen and examined. No new events except as noted.   s/p cath     REVIEW OF SYSTEMS:    CONSTITUTIONAL:+ weakness, fevers or chills  EYES/ENT: No visual changes;  No vertigo or throat pain   NECK: No pain or stiffness  RESPIRATORY: No cough, wheezing, hemoptysis; No shortness of breath  CARDIOVASCULAR: No chest pain or palpitations  GASTROINTESTINAL: No abdominal or epigastric pain. No nausea, vomiting, or hematemesis; No diarrhea or constipation. No melena or hematochezia.  GENITOURINARY: No dysuria, frequency or hematuria  NEUROLOGICAL: No numbness or weakness  SKIN: No itching, burning, rashes, or lesions   All other review of systems is negative unless indicated above.    MEDICATIONS:  MEDICATIONS  (STANDING):  aspirin enteric coated 81 milliGRAM(s) Oral daily  atorvastatin 80 milliGRAM(s) Oral at bedtime  gabapentin 300 milliGRAM(s) Oral three times a day  hydrochlorothiazide 25 milliGRAM(s) Oral daily  influenza   Vaccine 0.5 milliLiter(s) IntraMuscular once  isosorbide   mononitrate ER Tablet (IMDUR) 30 milliGRAM(s) Oral daily  metoprolol succinate ER 50 milliGRAM(s) Oral daily  pantoprazole    Tablet 40 milliGRAM(s) Oral before breakfast  potassium chloride    Tablet ER 20 milliEquivalent(s) Oral once      PHYSICAL EXAM:  T(C): 36.9 (01-13-20 @ 12:40), Max: 36.9 (01-13-20 @ 12:40)  HR: 64 (01-13-20 @ 12:40) (63 - 72)  BP: 124/75 (01-13-20 @ 12:40) (124/75 - 133/82)  RR: 17 (01-13-20 @ 12:40) (16 - 17)  SpO2: 100% (01-13-20 @ 12:40) (98% - 100%)  Wt(kg): --  I&O's Summary        Appearance: Normal	  HEENT:   Normal oral mucosa, PERRL, EOMI	  Lymphatic: No lymphadenopathy , no edema  Cardiovascular: Normal S1 S2, No JVD, No murmurs , Peripheral pulses palpable 2+ bilaterally  Respiratory: Lungs clear to auscultation, normal effort 	  Gastrointestinal:  Soft, Non-tender, + BS	  Skin: No rashes, No ecchymoses, No cyanosis, warm to touch  Musculoskeletal: Normal range of motion, normal strength  Psychiatry:  Mood & affect appropriate  Ext: No edema      LABS:    CARDIAC MARKERS:  CARDIAC MARKERS ( 11 Jan 2020 09:09 )  x     / x     / 57 u/L / x     / x      CARDIAC MARKERS ( 11 Jan 2020 04:52 )  x     / x     / 56 u/L / Test not performed ng/mL / x                                    11.7   6.57  )-----------( 314      ( 13 Jan 2020 06:14 )             37.9     01-13    138  |  104  |  20  ----------------------------<  102<H>  3.7   |  24  |  0.73    Ca    9.0      13 Jan 2020 06:14  Mg     1.8     01-13      proBNP:   Lipid Profile:   HgA1c:   TSH:             TELEMETRY: SR	    ECG:  	  RADIOLOGY:   DIAGNOSTIC TESTING:  [ ] Echocardiogram:  [ ]  Catheterization:  [ ] Stress Test:    OTHER:

## 2020-01-13 NOTE — PROGRESS NOTE ADULT - SUBJECTIVE AND OBJECTIVE BOX
ACP team PA Note    R radial site check s/p cardiac cath. Pt c/o mild soreness at the site.  -- site is C/D/I, no bleeding, no hematoma, no swelling, no tenderness to palpation,   -- pulses: R radial- 2 +, R ulnar- 2 +,   -- capillary refill < 3 sec   -- cont monitoring

## 2020-01-14 LAB
ANION GAP SERPL CALC-SCNC: 12 MMO/L — SIGNIFICANT CHANGE UP (ref 7–14)
BUN SERPL-MCNC: 18 MG/DL — SIGNIFICANT CHANGE UP (ref 7–23)
CALCIUM SERPL-MCNC: 9 MG/DL — SIGNIFICANT CHANGE UP (ref 8.4–10.5)
CHLORIDE SERPL-SCNC: 105 MMOL/L — SIGNIFICANT CHANGE UP (ref 98–107)
CO2 SERPL-SCNC: 24 MMOL/L — SIGNIFICANT CHANGE UP (ref 22–31)
CREAT SERPL-MCNC: 0.71 MG/DL — SIGNIFICANT CHANGE UP (ref 0.5–1.3)
GLUCOSE SERPL-MCNC: 108 MG/DL — HIGH (ref 70–99)
HCT VFR BLD CALC: 35.6 % — SIGNIFICANT CHANGE UP (ref 34.5–45)
HGB BLD-MCNC: 11.2 G/DL — LOW (ref 11.5–15.5)
MAGNESIUM SERPL-MCNC: 1.8 MG/DL — SIGNIFICANT CHANGE UP (ref 1.6–2.6)
MCHC RBC-ENTMCNC: 28.9 PG — SIGNIFICANT CHANGE UP (ref 27–34)
MCHC RBC-ENTMCNC: 31.5 % — LOW (ref 32–36)
MCV RBC AUTO: 91.8 FL — SIGNIFICANT CHANGE UP (ref 80–100)
NRBC # FLD: 0 K/UL — SIGNIFICANT CHANGE UP (ref 0–0)
PLATELET # BLD AUTO: 330 K/UL — SIGNIFICANT CHANGE UP (ref 150–400)
PMV BLD: 11.6 FL — SIGNIFICANT CHANGE UP (ref 7–13)
POTASSIUM SERPL-MCNC: 4 MMOL/L — SIGNIFICANT CHANGE UP (ref 3.5–5.3)
POTASSIUM SERPL-SCNC: 4 MMOL/L — SIGNIFICANT CHANGE UP (ref 3.5–5.3)
RBC # BLD: 3.88 M/UL — SIGNIFICANT CHANGE UP (ref 3.8–5.2)
RBC # FLD: 15.7 % — HIGH (ref 10.3–14.5)
SODIUM SERPL-SCNC: 141 MMOL/L — SIGNIFICANT CHANGE UP (ref 135–145)
WBC # BLD: 5.78 K/UL — SIGNIFICANT CHANGE UP (ref 3.8–10.5)
WBC # FLD AUTO: 5.78 K/UL — SIGNIFICANT CHANGE UP (ref 3.8–10.5)

## 2020-01-14 RX ADMIN — ISOSORBIDE MONONITRATE 30 MILLIGRAM(S): 60 TABLET, EXTENDED RELEASE ORAL at 13:28

## 2020-01-14 RX ADMIN — Medication 650 MILLIGRAM(S): at 14:21

## 2020-01-14 RX ADMIN — GABAPENTIN 300 MILLIGRAM(S): 400 CAPSULE ORAL at 13:28

## 2020-01-14 RX ADMIN — ATORVASTATIN CALCIUM 80 MILLIGRAM(S): 80 TABLET, FILM COATED ORAL at 22:12

## 2020-01-14 RX ADMIN — GABAPENTIN 300 MILLIGRAM(S): 400 CAPSULE ORAL at 05:03

## 2020-01-14 RX ADMIN — PANTOPRAZOLE SODIUM 40 MILLIGRAM(S): 20 TABLET, DELAYED RELEASE ORAL at 05:03

## 2020-01-14 RX ADMIN — Medication 25 MILLIGRAM(S): at 05:03

## 2020-01-14 RX ADMIN — GABAPENTIN 300 MILLIGRAM(S): 400 CAPSULE ORAL at 22:12

## 2020-01-14 RX ADMIN — Medication 81 MILLIGRAM(S): at 13:28

## 2020-01-14 RX ADMIN — Medication 50 MILLIGRAM(S): at 05:03

## 2020-01-14 RX ADMIN — Medication 650 MILLIGRAM(S): at 13:28

## 2020-01-14 NOTE — CHART NOTE - NSCHARTNOTEFT_GEN_A_CORE
Spoke with patient who stated that she did not have a ride home until tomorrow 1/15. Per patient her son will be coming from [place] tomorrow to pick her up in the morning. Patient was offered transportation home but denied it as she states that she would also need help up to her apartment. She is adamant about having her son take her home. Provider tried reaching son but was unsuccessful. Will try again to reach son to confirm  date.

## 2020-01-15 VITALS
TEMPERATURE: 98 F | OXYGEN SATURATION: 100 % | RESPIRATION RATE: 18 BRPM | SYSTOLIC BLOOD PRESSURE: 130 MMHG | DIASTOLIC BLOOD PRESSURE: 69 MMHG | HEART RATE: 71 BPM

## 2020-01-15 PROCEDURE — 99238 HOSP IP/OBS DSCHRG MGMT 30/<: CPT

## 2020-01-15 RX ORDER — ASPIRIN/CALCIUM CARB/MAGNESIUM 324 MG
1 TABLET ORAL
Qty: 30 | Refills: 0
Start: 2020-01-15 | End: 2020-02-13

## 2020-01-15 RX ORDER — LOPERAMIDE HCL 2 MG
2 TABLET ORAL ONCE
Refills: 0 | Status: COMPLETED | OUTPATIENT
Start: 2020-01-15 | End: 2020-01-15

## 2020-01-15 RX ORDER — ATORVASTATIN CALCIUM 80 MG/1
1 TABLET, FILM COATED ORAL
Qty: 30 | Refills: 0
Start: 2020-01-15 | End: 2020-02-13

## 2020-01-15 RX ADMIN — Medication 25 MILLIGRAM(S): at 05:13

## 2020-01-15 RX ADMIN — Medication 50 MILLIGRAM(S): at 05:13

## 2020-01-15 RX ADMIN — Medication 2 MILLIGRAM(S): at 12:26

## 2020-01-15 RX ADMIN — PANTOPRAZOLE SODIUM 40 MILLIGRAM(S): 20 TABLET, DELAYED RELEASE ORAL at 05:13

## 2020-01-15 RX ADMIN — Medication 81 MILLIGRAM(S): at 12:26

## 2020-01-15 RX ADMIN — GABAPENTIN 300 MILLIGRAM(S): 400 CAPSULE ORAL at 05:13

## 2020-01-15 RX ADMIN — ISOSORBIDE MONONITRATE 30 MILLIGRAM(S): 60 TABLET, EXTENDED RELEASE ORAL at 12:27

## 2020-01-15 NOTE — DIETITIAN INITIAL EVALUATION ADULT. - OTHER INFO
Pt 68 yo female with PMHx of HTN, Asthma, Chronic Back pain presented for Chest Pain; S/P Cardiac Cath (1/13) - per chart review. At time of visit Pt appears alert, oriented. Per Pt her appetite usually good. No report of chewing/swallowing difficulty; no report of nausea, vomiting @ this time. But Pt C/O diarrhea 3x today. Per Pt her height: ~63" & her weight: ~150#, now. Pt also stated she lost weight ~45#, intentionally in past ~7 months "by eating healthier food and beverages". Pt's diet rx includes DASH/TLC (cholesterol and sodium restricted) restriction. Prescribed diet discussed with Pt. No other food related concerns voiced @ present. RDN remains available, Pt made aware.

## 2020-01-15 NOTE — DIETITIAN INITIAL EVALUATION ADULT. - ADD RECOMMEND
1. Encourage & assist Pt with meals; Monitor PO diet tolerance; Honor food preferences;             2. Monitor labs, hydration status;

## 2020-10-06 ENCOUNTER — EMERGENCY (EMERGENCY)
Facility: HOSPITAL | Age: 70
LOS: 1 days | Discharge: HOME | End: 2020-10-06
Attending: HOSPITALIST | Admitting: HOSPITALIST
Payer: MEDICARE

## 2020-10-06 VITALS
DIASTOLIC BLOOD PRESSURE: 57 MMHG | RESPIRATION RATE: 18 BRPM | OXYGEN SATURATION: 100 % | SYSTOLIC BLOOD PRESSURE: 104 MMHG | HEART RATE: 75 BPM

## 2020-10-06 VITALS
HEART RATE: 79 BPM | TEMPERATURE: 97 F | OXYGEN SATURATION: 100 % | RESPIRATION RATE: 18 BRPM | DIASTOLIC BLOOD PRESSURE: 74 MMHG | SYSTOLIC BLOOD PRESSURE: 132 MMHG | HEIGHT: 63 IN

## 2020-10-06 DIAGNOSIS — R07.9 CHEST PAIN, UNSPECIFIED: ICD-10-CM

## 2020-10-06 DIAGNOSIS — Z98.890 OTHER SPECIFIED POSTPROCEDURAL STATES: Chronic | ICD-10-CM

## 2020-10-06 LAB
ALBUMIN SERPL ELPH-MCNC: 4.4 G/DL — SIGNIFICANT CHANGE UP (ref 3.3–5)
ALP SERPL-CCNC: 88 U/L — SIGNIFICANT CHANGE UP (ref 40–120)
ALT FLD-CCNC: 15 U/L — SIGNIFICANT CHANGE UP (ref 4–33)
ANION GAP SERPL CALC-SCNC: 10 MMO/L — SIGNIFICANT CHANGE UP (ref 7–14)
APPEARANCE UR: SIGNIFICANT CHANGE UP
AST SERPL-CCNC: 30 U/L — SIGNIFICANT CHANGE UP (ref 4–32)
BACTERIA # UR AUTO: HIGH
BASOPHILS # BLD AUTO: 0.04 K/UL — SIGNIFICANT CHANGE UP (ref 0–0.2)
BASOPHILS NFR BLD AUTO: 0.6 % — SIGNIFICANT CHANGE UP (ref 0–2)
BILIRUB SERPL-MCNC: 0.3 MG/DL — SIGNIFICANT CHANGE UP (ref 0.2–1.2)
BILIRUB UR-MCNC: NEGATIVE — SIGNIFICANT CHANGE UP
BLOOD UR QL VISUAL: NEGATIVE — SIGNIFICANT CHANGE UP
BUN SERPL-MCNC: 15 MG/DL — SIGNIFICANT CHANGE UP (ref 7–23)
CALCIUM SERPL-MCNC: 9.9 MG/DL — SIGNIFICANT CHANGE UP (ref 8.4–10.5)
CHLORIDE SERPL-SCNC: 105 MMOL/L — SIGNIFICANT CHANGE UP (ref 98–107)
CO2 SERPL-SCNC: 26 MMOL/L — SIGNIFICANT CHANGE UP (ref 22–31)
COLOR SPEC: YELLOW — SIGNIFICANT CHANGE UP
CREAT SERPL-MCNC: 0.72 MG/DL — SIGNIFICANT CHANGE UP (ref 0.5–1.3)
EOSINOPHIL # BLD AUTO: 0.1 K/UL — SIGNIFICANT CHANGE UP (ref 0–0.5)
EOSINOPHIL NFR BLD AUTO: 1.6 % — SIGNIFICANT CHANGE UP (ref 0–6)
GLUCOSE SERPL-MCNC: 158 MG/DL — HIGH (ref 70–99)
GLUCOSE UR-MCNC: NEGATIVE — SIGNIFICANT CHANGE UP
HCT VFR BLD CALC: 38.9 % — SIGNIFICANT CHANGE UP (ref 34.5–45)
HGB BLD-MCNC: 12.6 G/DL — SIGNIFICANT CHANGE UP (ref 11.5–15.5)
HYALINE CASTS # UR AUTO: SIGNIFICANT CHANGE UP
IMM GRANULOCYTES NFR BLD AUTO: 0.3 % — SIGNIFICANT CHANGE UP (ref 0–1.5)
KETONES UR-MCNC: NEGATIVE — SIGNIFICANT CHANGE UP
LEUKOCYTE ESTERASE UR-ACNC: SIGNIFICANT CHANGE UP
LYMPHOCYTES # BLD AUTO: 1.81 K/UL — SIGNIFICANT CHANGE UP (ref 1–3.3)
LYMPHOCYTES # BLD AUTO: 28.4 % — SIGNIFICANT CHANGE UP (ref 13–44)
MCHC RBC-ENTMCNC: 27.9 PG — SIGNIFICANT CHANGE UP (ref 27–34)
MCHC RBC-ENTMCNC: 32.4 % — SIGNIFICANT CHANGE UP (ref 32–36)
MCV RBC AUTO: 86.3 FL — SIGNIFICANT CHANGE UP (ref 80–100)
MONOCYTES # BLD AUTO: 0.55 K/UL — SIGNIFICANT CHANGE UP (ref 0–0.9)
MONOCYTES NFR BLD AUTO: 8.6 % — SIGNIFICANT CHANGE UP (ref 2–14)
NEUTROPHILS # BLD AUTO: 3.86 K/UL — SIGNIFICANT CHANGE UP (ref 1.8–7.4)
NEUTROPHILS NFR BLD AUTO: 60.5 % — SIGNIFICANT CHANGE UP (ref 43–77)
NITRITE UR-MCNC: NEGATIVE — SIGNIFICANT CHANGE UP
NRBC # FLD: 0 K/UL — SIGNIFICANT CHANGE UP (ref 0–0)
NT-PROBNP SERPL-SCNC: 127.8 PG/ML — SIGNIFICANT CHANGE UP
PH UR: 7.5 — SIGNIFICANT CHANGE UP (ref 5–8)
PLATELET # BLD AUTO: 299 K/UL — SIGNIFICANT CHANGE UP (ref 150–400)
PMV BLD: 11.3 FL — SIGNIFICANT CHANGE UP (ref 7–13)
POTASSIUM SERPL-MCNC: 4.5 MMOL/L — SIGNIFICANT CHANGE UP (ref 3.5–5.3)
POTASSIUM SERPL-SCNC: 4.5 MMOL/L — SIGNIFICANT CHANGE UP (ref 3.5–5.3)
PROT SERPL-MCNC: 7.3 G/DL — SIGNIFICANT CHANGE UP (ref 6–8.3)
PROT UR-MCNC: 30 — SIGNIFICANT CHANGE UP
RBC # BLD: 4.51 M/UL — SIGNIFICANT CHANGE UP (ref 3.8–5.2)
RBC # FLD: 13.9 % — SIGNIFICANT CHANGE UP (ref 10.3–14.5)
RBC CASTS # UR COMP ASSIST: SIGNIFICANT CHANGE UP (ref 0–?)
SODIUM SERPL-SCNC: 141 MMOL/L — SIGNIFICANT CHANGE UP (ref 135–145)
SP GR SPEC: 1.03 — SIGNIFICANT CHANGE UP (ref 1–1.04)
SQUAMOUS # UR AUTO: SIGNIFICANT CHANGE UP
TROPONIN T, HIGH SENSITIVITY: 8 NG/L — SIGNIFICANT CHANGE UP (ref ?–14)
TROPONIN T, HIGH SENSITIVITY: 9 NG/L — SIGNIFICANT CHANGE UP (ref ?–14)
UROBILINOGEN FLD QL: NORMAL — SIGNIFICANT CHANGE UP
WBC # BLD: 6.38 K/UL — SIGNIFICANT CHANGE UP (ref 3.8–10.5)
WBC # FLD AUTO: 6.38 K/UL — SIGNIFICANT CHANGE UP (ref 3.8–10.5)
WBC UR QL: >50 — HIGH (ref 0–?)

## 2020-10-06 PROCEDURE — 99285 EMERGENCY DEPT VISIT HI MDM: CPT | Mod: 25

## 2020-10-06 PROCEDURE — 71046 X-RAY EXAM CHEST 2 VIEWS: CPT | Mod: 26

## 2020-10-06 PROCEDURE — 93010 ELECTROCARDIOGRAM REPORT: CPT

## 2020-10-06 RX ORDER — CEPHALEXIN 500 MG
500 CAPSULE ORAL ONCE
Refills: 0 | Status: COMPLETED | OUTPATIENT
Start: 2020-10-06 | End: 2020-10-06

## 2020-10-06 RX ADMIN — Medication 500 MILLIGRAM(S): at 20:33

## 2020-10-06 NOTE — ED PROVIDER NOTE - OBJECTIVE STATEMENT
70F pmhx of HTN, DM presenting with CC of CP past 1 week. "Ache like" pain, occasionally pleuritic in nature. +Worse on exertion with associated SOB. +Mild cough. No Nausea, vomiting, F/c, diarrhea. States that she has a history of irregular rhythm, but does not want to be on AC so taking Vitamin E instead. Denies any leg pain/swelling, hemoptysis, history of DVT/PE, malignancy, hormone use, recent surgery, immobilization, or trauma. States that PCP diagnosed her with UTI today and started her on Augmentin.

## 2020-10-06 NOTE — ED PROVIDER NOTE - CLINICAL SUMMARY MEDICAL DECISION MAKING FREE TEXT BOX
70F w HTN/HLD presenting with exertional CP PE: VSS, ekg in triage w out ischemic changes. Ddx: Cannot rule out ACS given risk factors. Will assess for pulm path w CXR. Will recheck for UTI Plan: Labs, Urine, CXR, reassess

## 2020-10-06 NOTE — ED PROVIDER NOTE - NSFOLLOWUPINSTRUCTIONS_ED_ALL_ED_FT
Follow up with Dr. Koroma in office tomorrow morning.     Follow up with your primary care physician in 48-72 hours- bring copies of your results.  Return to the ER for worsening or persistent symptoms, including but not limited to worsening/persistent pain, shortness of breath, fevers, vomiting, lightheadedness, passing out and/or ANY NEW OR CONCERNING SYMPTOMS. If you have issues obtaining follow up, please call: [phone] to obtain a doctor or specialist who takes your insurance in your area.  You may call [phone] to make an appointment with the internal medicine clinic.

## 2020-10-06 NOTE — ED ADULT NURSE REASSESSMENT NOTE - NS ED NURSE REASSESS COMMENT FT1
Received report from NILESH Farris. Pt resting comfortably at this time, resp. even and unlabored. Denies any complaints at this time. VS as noted. NSR on the CM. NAD. Will continue to monitor.

## 2020-10-06 NOTE — ED PROVIDER NOTE - PHYSICAL EXAMINATION
Const: Well-nourished, Well-developed, appearing stated age.  Eyes: no conjunctival injection, and symmetrical lids.  HEENT: Head NCAT, no lesions. Atraumatic external nose and ears. Moist MM.  Neck: Symmetric, trachea midline.   CVS: +S1/S2, Peripheral pulses 2+ and equal in all extremities.  RESP: Unlabored respiratory effort. Clear to auscultation bilaterally.  GI: Nontender/Nondistended, No CVA tenderness b/l.   MSK: Normocephalic/Atraumatic, Lower Extremities w/o calf tenderness or edema b/l.   Skin: Warm, dry and intact.   Neuro: CNs II-XII grossly intact. Motor & Sensation grossly intact.  Psych: Awake, Alert, & Oriented (AAO) x3. Appropriate mood and affect.

## 2020-10-06 NOTE — ED PROVIDER NOTE - NS ED ROS FT
CONST: no fevers, no chills, no trauma  EYES: no pain, no blurry vision   ENT: no sore throat, no epistaxis, no rhinorrhea  CV: + chest pain, no palpitations, no orthopnea, no extremity pain or swelling  RESP: + shortness of breath, + cough, no sputum, + pleurisy, no wheezing  ABD: no abdominal pain, no nausea, no vomiting, no diarrhea, no black or bloody stool  : + dysuria, no hematuria, no frequency, no urgency  MSK: no back pain, no neck pain, no extremity pain  NEURO: no headache, no sensory disturbances, no focal weakness, no dizziness  HEME: no easy bleeding or bruising  SKIN: no diaphoresis, no rash

## 2020-10-06 NOTE — ED ADULT NURSE NOTE - OBJECTIVE STATEMENT
Pt A/Ox4 states she has had chest pain more under her LT breast for the last 2 days, states the pain awake her from sleep, states she has been feeling more JOLLY than usual, denies cough f/c. Pt states she went to see her PMD who sent her to the ED for eval. Pt appears well otherwise, breathing even and unlabored, skin warm and dry. Pt placed on Tele monitor. PIV inserted with aseptic technique 20G per LT AC, blood drawn, labeled at bedside with 2 pt identifiers and sent to lab. Pt aware of POC, NAD. Will continue to monitor.

## 2020-10-06 NOTE — ED PROVIDER NOTE - PATIENT PORTAL LINK FT
You can access the FollowMyHealth Patient Portal offered by [facility] by registering at the following website: [email or web address]. By joining [facility]’s FollowMyHealth portal, you will also be able to view your health information using other applications (apps) compatible with our system.

## 2020-10-06 NOTE — ED ADULT NURSE NOTE - NSIMPLEMENTINTERV_GEN_ALL_ED
Implemented All Fall Risk Interventions:  [place] to call system. Call bell, personal items and telephone within reach. Instruct patient to call for assistance. Room bathroom lighting operational. Non-slip footwear when patient is off stretcher. Physically safe environment: no spills, clutter or unnecessary equipment. Stretcher in lowest position, wheels locked, appropriate side rails in place. Provide visual cue, wrist band, yellow gown, etc. Monitor gait and stability. Monitor for mental status changes and reorient to person, place, and time. Review medications for side effects contributing to fall risk. Reinforce activity limits and safety measures with patient and family.

## 2020-10-06 NOTE — ED PROVIDER NOTE - CARE PROVIDER_API CALL
Kranthi Koroma  CARDIOLOGY  [address] [place] [address], [address]  [place] [address]  Phone: [phone]  Fax: [phone]  Follow Up Time: Urgent

## 2020-10-06 NOTE — ED PROVIDER NOTE - PROGRESS NOTE DETAILS
[place]: received pt on sign out. Pt well appearing. Trop neg x 1. Spoke with Dr. Koroma, states if work up negative pt can follow up in office tomorrow morning. VSS. Pt feeling better. Chest pain has resolved. Trop negative. EKG NSR. Labs unremarkable. CXR negative. Advised to follow up with Ga tomorrow in office. Instructed to return to Emergency Department if any new or worsening symptoms. Pt verbalizes agreement and understanding. VSS.

## 2020-10-06 NOTE — ED PROVIDER NOTE - ATTENDING CONTRIBUTION TO CARE
70F with hx of HTN and DM p/w left sided CP for the past week. pain is a dull ache and worse when taking a deep breath in. some dyspnea on exertion and mild dry cough. no fever, chills, palpitations, travel. last cardiac w/u Jan 2020 (cath) which was a negative w/u, no occlusive cad.     ***GEN - NAD; well appearing; A+O x3 ***HEAD - NC/AT ***EYES/NOSE - PERRL, EOMI, mucous membranes moist, no discharge ***THROAT: Oral cavity and pharynx normal. No inflammation, swelling, exudate, or lesions.  ***NECK: Neck supple, non-tender without lymphadenopathy, no masses, no thyromegaly.   ***PULMONARY - CTA b/l, symmetric breath sounds. ***CARDIAC -s1s2, RRR, no M,G,R  ***ABDOMEN - +BS, ND, NT, soft, no guarding, no rebound, no masses   ***BACK - no CVA tenderness, Normal  spine ***EXTREMITIES - symmetric pulses, 2+ dp, capillary refill < 2 seconds, no clubbing, no cyanosis, no edema ***SKIN - no rash or bruising   ***NEUROLOGIC - alert, CN 2-12 intact    MDM: 70F with chest pain. acs w/u. will call cardiologist Dr. Koroma to ensure f/u if ED w/u is wnl.

## 2020-10-23 ENCOUNTER — EMERGENCY (EMERGENCY)
Facility: HOSPITAL | Age: 70
LOS: 1 days | Discharge: HOME | End: 2020-10-23
Attending: PERSONAL EMERGENCY RESPONSE ATTENDANT | Admitting: EMERGENCY MEDICINE
Payer: MEDICARE

## 2020-10-23 VITALS
DIASTOLIC BLOOD PRESSURE: 72 MMHG | OXYGEN SATURATION: 100 % | HEIGHT: 63 IN | RESPIRATION RATE: 18 BRPM | TEMPERATURE: 98 F | HEART RATE: 72 BPM | SYSTOLIC BLOOD PRESSURE: 132 MMHG

## 2020-10-23 DIAGNOSIS — Z98.890 OTHER SPECIFIED POSTPROCEDURAL STATES: Chronic | ICD-10-CM

## 2020-10-23 LAB
ALBUMIN SERPL ELPH-MCNC: 4.3 G/DL — SIGNIFICANT CHANGE UP (ref 3.3–5)
ALP SERPL-CCNC: 64 U/L — SIGNIFICANT CHANGE UP (ref 40–120)
ALT FLD-CCNC: 12 U/L — SIGNIFICANT CHANGE UP (ref 4–33)
ANION GAP SERPL CALC-SCNC: 12 MMO/L — SIGNIFICANT CHANGE UP (ref 7–14)
APPEARANCE UR: CLEAR — SIGNIFICANT CHANGE UP
AST SERPL-CCNC: 27 U/L — SIGNIFICANT CHANGE UP (ref 4–32)
BACTERIA # UR AUTO: NEGATIVE — SIGNIFICANT CHANGE UP
BASOPHILS # BLD AUTO: 0.04 K/UL — SIGNIFICANT CHANGE UP (ref 0–0.2)
BASOPHILS NFR BLD AUTO: 0.6 % — SIGNIFICANT CHANGE UP (ref 0–2)
BILIRUB SERPL-MCNC: 0.4 MG/DL — SIGNIFICANT CHANGE UP (ref 0.2–1.2)
BILIRUB UR-MCNC: NEGATIVE — SIGNIFICANT CHANGE UP
BLOOD UR QL VISUAL: NEGATIVE — SIGNIFICANT CHANGE UP
BUN SERPL-MCNC: 13 MG/DL — SIGNIFICANT CHANGE UP (ref 7–23)
CALCIUM SERPL-MCNC: 9.9 MG/DL — SIGNIFICANT CHANGE UP (ref 8.4–10.5)
CHLORIDE SERPL-SCNC: 105 MMOL/L — SIGNIFICANT CHANGE UP (ref 98–107)
CO2 SERPL-SCNC: 23 MMOL/L — SIGNIFICANT CHANGE UP (ref 22–31)
COLOR SPEC: YELLOW — SIGNIFICANT CHANGE UP
CREAT SERPL-MCNC: 0.73 MG/DL — SIGNIFICANT CHANGE UP (ref 0.5–1.3)
EOSINOPHIL # BLD AUTO: 0.04 K/UL — SIGNIFICANT CHANGE UP (ref 0–0.5)
EOSINOPHIL NFR BLD AUTO: 0.6 % — SIGNIFICANT CHANGE UP (ref 0–6)
GLUCOSE SERPL-MCNC: 85 MG/DL — SIGNIFICANT CHANGE UP (ref 70–99)
GLUCOSE UR-MCNC: NEGATIVE — SIGNIFICANT CHANGE UP
HCT VFR BLD CALC: 39.9 % — SIGNIFICANT CHANGE UP (ref 34.5–45)
HGB BLD-MCNC: 12.2 G/DL — SIGNIFICANT CHANGE UP (ref 11.5–15.5)
HYALINE CASTS # UR AUTO: SIGNIFICANT CHANGE UP
IMM GRANULOCYTES NFR BLD AUTO: 0.1 % — SIGNIFICANT CHANGE UP (ref 0–1.5)
KETONES UR-MCNC: NEGATIVE — SIGNIFICANT CHANGE UP
LEUKOCYTE ESTERASE UR-ACNC: NEGATIVE — SIGNIFICANT CHANGE UP
LYMPHOCYTES # BLD AUTO: 1.36 K/UL — SIGNIFICANT CHANGE UP (ref 1–3.3)
LYMPHOCYTES # BLD AUTO: 20.4 % — SIGNIFICANT CHANGE UP (ref 13–44)
MCHC RBC-ENTMCNC: 27.4 PG — SIGNIFICANT CHANGE UP (ref 27–34)
MCHC RBC-ENTMCNC: 30.6 % — LOW (ref 32–36)
MCV RBC AUTO: 89.7 FL — SIGNIFICANT CHANGE UP (ref 80–100)
MONOCYTES # BLD AUTO: 0.51 K/UL — SIGNIFICANT CHANGE UP (ref 0–0.9)
MONOCYTES NFR BLD AUTO: 7.6 % — SIGNIFICANT CHANGE UP (ref 2–14)
NEUTROPHILS # BLD AUTO: 4.72 K/UL — SIGNIFICANT CHANGE UP (ref 1.8–7.4)
NEUTROPHILS NFR BLD AUTO: 70.7 % — SIGNIFICANT CHANGE UP (ref 43–77)
NITRITE UR-MCNC: NEGATIVE — SIGNIFICANT CHANGE UP
NRBC # FLD: 0 K/UL — SIGNIFICANT CHANGE UP (ref 0–0)
PH UR: 7 — SIGNIFICANT CHANGE UP (ref 5–8)
PLATELET # BLD AUTO: 293 K/UL — SIGNIFICANT CHANGE UP (ref 150–400)
PMV BLD: 11.4 FL — SIGNIFICANT CHANGE UP (ref 7–13)
POTASSIUM SERPL-MCNC: 4.1 MMOL/L — SIGNIFICANT CHANGE UP (ref 3.5–5.3)
POTASSIUM SERPL-SCNC: 4.1 MMOL/L — SIGNIFICANT CHANGE UP (ref 3.5–5.3)
PROT SERPL-MCNC: 7.2 G/DL — SIGNIFICANT CHANGE UP (ref 6–8.3)
PROT UR-MCNC: 20 — SIGNIFICANT CHANGE UP
RBC # BLD: 4.45 M/UL — SIGNIFICANT CHANGE UP (ref 3.8–5.2)
RBC # FLD: 14.2 % — SIGNIFICANT CHANGE UP (ref 10.3–14.5)
RBC CASTS # UR COMP ASSIST: SIGNIFICANT CHANGE UP (ref 0–?)
SODIUM SERPL-SCNC: 140 MMOL/L — SIGNIFICANT CHANGE UP (ref 135–145)
SP GR SPEC: 1.03 — SIGNIFICANT CHANGE UP (ref 1–1.04)
SQUAMOUS # UR AUTO: SIGNIFICANT CHANGE UP
UROBILINOGEN FLD QL: NORMAL — SIGNIFICANT CHANGE UP
WBC # BLD: 6.68 K/UL — SIGNIFICANT CHANGE UP (ref 3.8–10.5)
WBC # FLD AUTO: 6.68 K/UL — SIGNIFICANT CHANGE UP (ref 3.8–10.5)
WBC UR QL: SIGNIFICANT CHANGE UP (ref 0–?)

## 2020-10-23 PROCEDURE — 76770 US EXAM ABDO BACK WALL COMP: CPT | Mod: 26

## 2020-10-23 PROCEDURE — 99220: CPT

## 2020-10-23 RX ORDER — ONDANSETRON 8 MG/1
4 TABLET, FILM COATED ORAL ONCE
Refills: 0 | Status: COMPLETED | OUTPATIENT
Start: 2020-10-23 | End: 2020-10-23

## 2020-10-23 RX ORDER — CEFTRIAXONE 500 MG/1
1000 INJECTION, POWDER, FOR SOLUTION INTRAMUSCULAR; INTRAVENOUS ONCE
Refills: 0 | Status: COMPLETED | OUTPATIENT
Start: 2020-10-23 | End: 2020-10-23

## 2020-10-23 RX ADMIN — ONDANSETRON 4 MILLIGRAM(S): 8 TABLET, FILM COATED ORAL at 17:12

## 2020-10-23 RX ADMIN — CEFTRIAXONE 100 MILLIGRAM(S): 500 INJECTION, POWDER, FOR SOLUTION INTRAMUSCULAR; INTRAVENOUS at 19:55

## 2020-10-23 NOTE — ED PROVIDER NOTE - PROGRESS NOTE DETAILS
[place] PGY2- pelvic exam performed; white discharge seen in at cervical os. Bi manual exam revealed CMT and L adnexal tenderness. Cervical g/c sent.

## 2020-10-23 NOTE — ED ADULT NURSE NOTE - OBJECTIVE STATEMENT
Pt arrived to room 19 A&Ox4 ambulatory with a walker at baseline c/o urinary frequency and burning x 1 week. Pt states she vomited today and had fever this morning Tmax 100. Pt states she was sent by her PCP for IV antibiotics. PMHx angina, HTN, asthma. RR even and unlabored. Pallor/diaphoresis not noted. Pt NSR on cardiac monitor. Abdomen nontender, nondistended. Pt denies CP, SOB, HA, cough, blood in urine. IV established in right forearm with 22G. Labs drawn and sent. VSS and as noted. MD at bedside, will continue to monitor.

## 2020-10-23 NOTE — ED ADULT TRIAGE NOTE - CHIEF COMPLAINT QUOTE
sent by md for evaluation for iv antibiotics.  c/o continued urinary frequency,chills,temp 100 today.  seen in ed 2 wks ago uti.  denies cough

## 2020-10-23 NOTE — ED ADULT NURSE REASSESSMENT NOTE - NS ED NURSE REASSESS COMMENT FT1
pt received alert and oriented x4. respirations equal and unlabored. pt denies pain at the moment. Call bell in reach, warm blanket provided, bed in lowest position, side rails up x2,safety maintained. will continue to monitor.

## 2020-10-23 NOTE — ED PROVIDER NOTE - OBJECTIVE STATEMENT
71 yo F, nonsmoker with PMH of HTN, chronic lower back pain, pyelonephritis, sent to ER by PCP for dysuria and fever. Pt states she been having burning on urination and increased urinary frequency for the past month, admits treated with a course of antibiotic w/ temporary improvement, but symptoms never went away. Reports seen by PCP today, had fever of 100F, advised to go to the ER for IV antibiotic for bladder infection. Admits she vomited x2 today due to nausea. Admits suprapubic discomfort.  Admits normal bowel movement today. Denies any dizziness, headache, chest pain, sob, abdominal pain, diarrhea, constipation, weakness, numbness or any other complaints.

## 2020-10-23 NOTE — ED PROVIDER NOTE - ATTENDING CONTRIBUTION TO CARE
Attending MD Holden.  Agree with above.  Pt is a 69 yo non-smoker female with hx of HTN, chronic low back pain and pyelonephritis previously.  PT sent to Ed by PCP for complaint of dysuria, burning with urination, urinary frequency x 1 mo.  Given a course of abxs and completed sev'l wks ago but sent to ED today by PCP for recurrent sxs including fever to 100.  Pt has L CVA TTP.  Pt is afebrile in ED.  On 10/6 pt had growth of 100,000 e.coli.  Pt endorses nbnb emesis x 2 today. Attending MD Holden.  Agree with above.  Pt is a 71 yo non-smoker female with hx of HTN, chronic low back pain and pyelonephritis previously.  PT sent to Ed by PCP for complaint of dysuria, burning with urination, urinary frequency x 1 mo.  Given a course of abxs and completed sev'l wks ago but sent to ED today by PCP for recurrent sxs including fever to 100.  Pt has L CVA TTP.  Pt is afebrile in ED.  On 10/6 pt had growth of 100,000 e.coli.  Pt endorses nbnb emesis x 2 today.  PT renal sono non-actionable.  PT sent to CDU overnight for IV abxs and planned prob d/c with abxs course.

## 2020-10-23 NOTE — ED ADULT NURSE NOTE - INTERVENTIONS DEFINITIONS
Physically safe environment: no spills, clutter or unnecessary equipment/Stretcher in lowest position, wheels locked, appropriate side rails in place/Monitor gait and stability/[place] to call system/Non-slip footwear when patient is off stretcher/Call bell, personal items and telephone within reach/Instruct patient to call for assistance

## 2020-10-23 NOTE — ED PROVIDER NOTE - CLINICAL SUMMARY MEDICAL DECISION MAKING FREE TEXT BOX
71 yo F, nonsmoker with PMH of HTN, chronic lower back pain, pyelonephritis, sent to ER by PCP for dysuria and fever. well appearing female p/w dysuria, afebrile in ED, failed oral antibiotic for UTI, concern for likely UTI, possible pyelonephritis. previous Ua 10/6 with 100, 000 E. Coli. Plan: labs, Ua, Ucx, and reassess for IV antibiotic.

## 2020-10-24 LAB
BASOPHILS # BLD AUTO: 0.04 K/UL — SIGNIFICANT CHANGE UP (ref 0–0.2)
BASOPHILS NFR BLD AUTO: 0.6 % — SIGNIFICANT CHANGE UP (ref 0–2)
CRP SERPL-MCNC: < 4 MG/L — SIGNIFICANT CHANGE UP
CULTURE RESULTS: SIGNIFICANT CHANGE UP
EOSINOPHIL # BLD AUTO: 0.15 K/UL — SIGNIFICANT CHANGE UP (ref 0–0.5)
EOSINOPHIL NFR BLD AUTO: 2.2 % — SIGNIFICANT CHANGE UP (ref 0–6)
ERYTHROCYTE [SEDIMENTATION RATE] IN BLOOD: 2 MM/HR — LOW (ref 4–25)
HCT VFR BLD CALC: 36.5 % — SIGNIFICANT CHANGE UP (ref 34.5–45)
HGB BLD-MCNC: 11.5 G/DL — SIGNIFICANT CHANGE UP (ref 11.5–15.5)
IMM GRANULOCYTES NFR BLD AUTO: 0.1 % — SIGNIFICANT CHANGE UP (ref 0–1.5)
LIDOCAIN IGE QN: 20.8 U/L — SIGNIFICANT CHANGE UP (ref 7–60)
LYMPHOCYTES # BLD AUTO: 2.48 K/UL — SIGNIFICANT CHANGE UP (ref 1–3.3)
LYMPHOCYTES # BLD AUTO: 36.2 % — SIGNIFICANT CHANGE UP (ref 13–44)
MCHC RBC-ENTMCNC: 27.9 PG — SIGNIFICANT CHANGE UP (ref 27–34)
MCHC RBC-ENTMCNC: 31.5 % — LOW (ref 32–36)
MCV RBC AUTO: 88.6 FL — SIGNIFICANT CHANGE UP (ref 80–100)
MONOCYTES # BLD AUTO: 0.75 K/UL — SIGNIFICANT CHANGE UP (ref 0–0.9)
MONOCYTES NFR BLD AUTO: 10.9 % — SIGNIFICANT CHANGE UP (ref 2–14)
NEUTROPHILS # BLD AUTO: 3.43 K/UL — SIGNIFICANT CHANGE UP (ref 1.8–7.4)
NEUTROPHILS NFR BLD AUTO: 50 % — SIGNIFICANT CHANGE UP (ref 43–77)
NRBC # FLD: 0 K/UL — SIGNIFICANT CHANGE UP (ref 0–0)
PLATELET # BLD AUTO: 202 K/UL — SIGNIFICANT CHANGE UP (ref 150–400)
PMV BLD: 11.6 FL — SIGNIFICANT CHANGE UP (ref 7–13)
RBC # BLD: 4.12 M/UL — SIGNIFICANT CHANGE UP (ref 3.8–5.2)
RBC # FLD: 14.5 % — SIGNIFICANT CHANGE UP (ref 10.3–14.5)
SARS-COV-2 RNA SPEC QL NAA+PROBE: SIGNIFICANT CHANGE UP
SPECIMEN SOURCE: SIGNIFICANT CHANGE UP
WBC # BLD: 6.86 K/UL — SIGNIFICANT CHANGE UP (ref 3.8–10.5)
WBC # FLD AUTO: 6.86 K/UL — SIGNIFICANT CHANGE UP (ref 3.8–10.5)

## 2020-10-24 PROCEDURE — 76830 TRANSVAGINAL US NON-OB: CPT | Mod: 26

## 2020-10-24 PROCEDURE — 99226: CPT

## 2020-10-24 PROCEDURE — 74177 CT ABD & PELVIS W/CONTRAST: CPT | Mod: 26

## 2020-10-24 PROCEDURE — 72149 MRI LUMBAR SPINE W/DYE: CPT | Mod: 26

## 2020-10-24 PROCEDURE — 72147 MRI CHEST SPINE W/DYE: CPT | Mod: 26

## 2020-10-24 RX ORDER — HYDROCHLOROTHIAZIDE 25 MG
25 TABLET ORAL DAILY
Refills: 0 | Status: DISCONTINUED | OUTPATIENT
Start: 2020-10-24 | End: 2020-10-27

## 2020-10-24 RX ORDER — METOPROLOL TARTRATE 50 MG
50 TABLET ORAL DAILY
Refills: 0 | Status: DISCONTINUED | OUTPATIENT
Start: 2020-10-24 | End: 2020-10-27

## 2020-10-24 RX ORDER — CEFTRIAXONE 500 MG/1
1000 INJECTION, POWDER, FOR SOLUTION INTRAMUSCULAR; INTRAVENOUS EVERY 24 HOURS
Refills: 0 | Status: DISCONTINUED | OUTPATIENT
Start: 2020-10-24 | End: 2020-10-27

## 2020-10-24 RX ORDER — GABAPENTIN 400 MG/1
300 CAPSULE ORAL THREE TIMES A DAY
Refills: 0 | Status: DISCONTINUED | OUTPATIENT
Start: 2020-10-24 | End: 2020-10-27

## 2020-10-24 RX ORDER — METOCLOPRAMIDE HCL 10 MG
10 TABLET ORAL ONCE
Refills: 0 | Status: COMPLETED | OUTPATIENT
Start: 2020-10-24 | End: 2020-10-24

## 2020-10-24 RX ORDER — ISOSORBIDE MONONITRATE 60 MG/1
30 TABLET, EXTENDED RELEASE ORAL DAILY
Refills: 0 | Status: DISCONTINUED | OUTPATIENT
Start: 2020-10-24 | End: 2020-10-27

## 2020-10-24 RX ORDER — SODIUM CHLORIDE 9 MG/ML
1000 INJECTION INTRAMUSCULAR; INTRAVENOUS; SUBCUTANEOUS ONCE
Refills: 0 | Status: COMPLETED | OUTPATIENT
Start: 2020-10-24 | End: 2020-10-24

## 2020-10-24 RX ORDER — ONDANSETRON 8 MG/1
4 TABLET, FILM COATED ORAL ONCE
Refills: 0 | Status: COMPLETED | OUTPATIENT
Start: 2020-10-24 | End: 2020-10-24

## 2020-10-24 RX ADMIN — Medication 10 MILLIGRAM(S): at 10:18

## 2020-10-24 RX ADMIN — GABAPENTIN 300 MILLIGRAM(S): 400 CAPSULE ORAL at 12:48

## 2020-10-24 RX ADMIN — ONDANSETRON 4 MILLIGRAM(S): 8 TABLET, FILM COATED ORAL at 08:55

## 2020-10-24 RX ADMIN — CEFTRIAXONE 100 MILLIGRAM(S): 500 INJECTION, POWDER, FOR SOLUTION INTRAMUSCULAR; INTRAVENOUS at 18:06

## 2020-10-24 RX ADMIN — GABAPENTIN 300 MILLIGRAM(S): 400 CAPSULE ORAL at 05:25

## 2020-10-24 RX ADMIN — SODIUM CHLORIDE 500 MILLILITER(S): 9 INJECTION INTRAMUSCULAR; INTRAVENOUS; SUBCUTANEOUS at 12:47

## 2020-10-24 RX ADMIN — Medication 50 MILLIGRAM(S): at 05:25

## 2020-10-24 RX ADMIN — GABAPENTIN 300 MILLIGRAM(S): 400 CAPSULE ORAL at 21:06

## 2020-10-24 RX ADMIN — ISOSORBIDE MONONITRATE 30 MILLIGRAM(S): 60 TABLET, EXTENDED RELEASE ORAL at 12:48

## 2020-10-24 RX ADMIN — Medication 25 MILLIGRAM(S): at 05:25

## 2020-10-24 NOTE — ED CDU PROVIDER SUBSEQUENT DAY NOTE - PROGRESS NOTE DETAILS
Patient offers no other specific complaints, resting comfortably, NAD. VSS. Will continue to monitor and observe. Received sign out from Tristan Leonardo PA-C. Pt complaining of nausea - will give zofran. Exam significant for LLQ tenderness. Despite fibroid on US, will get CT abdomen to r/o abdominal pathology given episode of diarrhea/NBNB emesis before ED visit with tenderness on exam. Dispo pending CT MRI shows no new acute pathology. Will dc home with antiemetics/pain control and follow up. PT reassessed and advised of non-actionable MRI findings.  She continues to endorse mild nausea and states that her son cannot pick her up with her walker until morning and does not feel comfortable going home until that time.  2/2 pt lives alone and requires walker will house her in CDU overnight. Pt stable at time of signout to overnight team. Pt resting comfortably. No complaints. No events overnight

## 2020-10-24 NOTE — ED CDU PROVIDER DISPOSITION NOTE - NSFOLLOWUPINSTRUCTIONS_ED_ALL_ED_FT
Take your medications as prescribed.   Be sure to finish the complete course of Keflex.   Follow up with your primary doctor in the next few days for further evaluation.     SEEK IMMEDIATE MEDICAL CARE IF YOU HAVE ANY OF THE FOLLOWING SYMPTOMS: severe back or abdominal pain, fever, inability to keep fluids or medicine down, dizziness/lightheadedness, or a change in mental status.

## 2020-10-24 NOTE — ED CDU PROVIDER SUBSEQUENT DAY NOTE - ATTENDING CONTRIBUTION TO CARE
Attending MD Holden.  Pt seen by me in CDU obs unit on morning of Saturday 10/24/2020.  Pt is a 71 yo non-smoker female with hx of HTN, chronic low back pain and pyelonephritis previously.  PT sent to Ed by PCP for complaint of dysuria, burning with urination, urinary frequency x 1 mo.  Given a course of abxs and completed sev'l wks ago but sent to ED today by PCP for recurrent sxs including fever to 100.  Pt has L CVA TTP.  Pt is afebrile in ED.  On 10/6 pt had growth of 100,000 e.coli.  Pt endorses nbnb emesis x 2 today.  PT renal sono non-actionable.  PT sent to CDU overnight for IV abxs and planned prob d/c with abxs course.  Pt with non-actionable UA in ED (presumed sterilized urine without cleared UTI) and mild LLQ TTP on reassessment in CDU overnight.  CTAP added to work-up to confirm no atypical diverticulitis.  Pt's CTAP non-actionable as well.  PT feels well and tolerated abxs, tolerating PO.  Educated re: need to follow-up with PCP, return to ED for fevers, chills, n/v/d, dec urine output, inability to tolerate PO.  Complete full course abxs. Attending MD Holden.  Pt seen by me in CDU obs unit on morning of Saturday 10/24/2020.  Pt is a 69 yo non-smoker female with hx of HTN, chronic low back pain and pyelonephritis previously.  PT sent to Ed by PCP for complaint of dysuria, burning with urination, urinary frequency x 1 mo.  Given a course of abxs and completed sev'l wks ago but sent to ED today by PCP for recurrent sxs including fever to 100.  Pt has L CVA TTP.  Pt is afebrile in ED.  On 10/6 pt had growth of 100,000 e.coli.  Pt endorses nbnb emesis x 2 today.  PT renal sono non-actionable.  PT sent to CDU overnight for IV abxs and planned prob d/c with abxs course.  Pt with non-actionable UA in ED (presumed sterilized urine without cleared UTI) and mild LLQ TTP on reassessment in CDU overnight.  CTAP added to work-up to confirm no atypical diverticulitis.  Pt's CTAP non-actionable as well.  PT feels well and tolerated abxs, tolerating PO.  Educated re: need to follow-up with PCP, return to ED for fevers, chills, n/v/d, dec urine output, inability to tolerate PO.  Complete full course abxs.    11:12  Above planned however pt reassessed but states that she is feeling dizzy, light-headed when she stands and has persistent L flank pain radiating to groin.  She endorses pelvic exam performed by ED team last night prior to CDU dispo (denies sexual activity x 11 yrs) with swabs sent but ED indicated no planned tx presumptively given low risk pt and physiologic discharge.  Pt's hx taken again with further details and she endorses 40 lb weight loss x 3 mos with n/v and generalized malaise with acute on chronic back pain.  Pt had spinal fusion in 2013 with revision in 2017 and then was told in 2018 that hardware had loosened and she needed a 23.5 hr surgery but opted against this (seen at [facility] art).  Had MRI at that time (subsequent to all spinal hardware placed) and is confident her spinal hardware is MRI compatible.  In light of new information above, lack of conclusive evidence for UTI and persistent light-headedness, malaise pt amenable to hydration, further antiemetics, additional labs (ESR/CRP/blood cultures) and MRI spine for eval for poss hardware infection/epidural abscess.

## 2020-10-24 NOTE — ED CDU PROVIDER SUBSEQUENT DAY NOTE - PHYSICAL EXAMINATION
PE:   GEN: Awake, alert, interactive, NAD, non-toxic appearing.   HEAD AND NECK: NC/AT. Airway patent. Neck supple.   EYES: Clear b/l. PERRL  CARDIAC: RRR. S1, S2. No evident pedal edema.    RESP: Normal respiratory effort with no use of accessory muscles or retractions. Clear throughout on auscultation.  ABD: soft, non-distended, + LLQ tenderness. No rebound, no guarding.   NEURO: AOx3, CN II-XII grossly intact, no focal deficits.   MSK: Moving all extremities with no apparent deformities.   SKIN: Warm, dry, intact normal color

## 2020-10-24 NOTE — ED CDU PROVIDER SUBSEQUENT DAY NOTE - NS ED ROS FT
Constitutional: (-) Fever, (-) Anorexia, (-) Generalized Malaise  Eyes: (-)Discharge, (-) Irritation,  (-) Visual changes  EARS: (-) Ear Pain, (-) Apparent hearing changes  NOSE: (-) Congestion, (-) Bloody nose  MOUTH/THROAT: (-) Vocal Changes, (-) Drooling, (-) Sore throat  NECK: (-) Lumps, (-) Stiffness, (-) Pain  CV: (-) Chest Pain, (-) Palpitations, (-) Edema   RESP:  (-) Cough, (-) SOB, (-) JOLLY,  (-) Wheezing  GI: (+) Nausea, (+) Vomiting, (+) Abdominal Pain, (+) Diarrhea, (-) Constipation, (-) Bloody stools  : (+) Dysuria, (-) Frequency, (-) Hematuria, (-) Incontinence  MSK: (-) Joint Pain, (-) Back Pain, (-) Deformities  SKIN: (-) Wounds, (-) Color change, (-)Rash, (-) Swelling  NEURO:(-) Headache, (-) Dizziness, (-) Numbness/Tingling,  (-)LOC

## 2020-10-24 NOTE — ED CDU PROVIDER INITIAL DAY NOTE - MEDICAL DECISION MAKING DETAILS
Pt is a 69 y/o F PMHx HTN, chronic lower back pain fibroids p/w dysuria x 1 month and low grade fever yesterday -- possible pyelonephritis, uterine fibroids -- antibiotics, reassess

## 2020-10-24 NOTE — ED CDU PROVIDER DISPOSITION NOTE - ATTENDING CONTRIBUTION TO CARE
Attending MD Holden.  Pt seen by me in CDU obs unit on morning of Saturday 10/24/2020.  Pt is a 71 yo non-smoker female with hx of HTN, chronic low back pain and pyelonephritis previously.  PT sent to Ed by PCP for complaint of dysuria, burning with urination, urinary frequency x 1 mo.  Given a course of abxs and completed sev'l wks ago but sent to ED today by PCP for recurrent sxs including fever to 100.  Pt has L CVA TTP.  Pt is afebrile in ED.  On 10/6 pt had growth of 100,000 e.coli.  Pt endorses nbnb emesis x 2 today.  PT renal sono non-actionable.  PT sent to CDU overnight for IV abxs and planned prob d/c with abxs course.  Pt with non-actionable UA in ED (presumed sterilized urine without cleared UTI) and mild LLQ TTP on reassessment in CDU overnight.  CTAP added to work-up to confirm no atypical diverticulitis.  Pt's CTAP non-actionable as well.  PT feels well and tolerated abxs, tolerating PO.  Educated re: need to follow-up with PCP, return to ED for fevers, chills, n/v/d, dec urine output, inability to tolerate PO.  Complete full course abxs. Attending MD Holden.  Pt seen by me in CDU obs unit on morning of Saturday 10/24/2020.  Pt is a 71 yo non-smoker female with hx of HTN, chronic low back pain and pyelonephritis previously.  PT sent to Ed by PCP for complaint of dysuria, burning with urination, urinary frequency x 1 mo.  Given a course of abxs and completed sev'l wks ago but sent to ED today by PCP for recurrent sxs including fever to 100.  Pt has L CVA TTP.  Pt is afebrile in ED.  On 10/6 pt had growth of 100,000 e.coli.  Pt endorses nbnb emesis x 2 today.  PT renal sono non-actionable.  PT sent to CDU overnight for IV abxs and planned prob d/c with abxs course.  Pt with non-actionable UA in ED (presumed sterilized urine without cleared UTI) and mild LLQ TTP on reassessment in CDU overnight.  CTAP added to work-up to confirm no atypical diverticulitis.  Pt's CTAP non-actionable as well.  PT feels well and tolerated abxs, tolerating PO.  Educated re: need to follow-up with PCP, return to ED for fevers, chills, n/v/d, dec urine output, inability to tolerate PO.  Complete full course abxs.    11:12  Above planned however pt reassessed but states that she is feeling dizzy, light-headed when she stands and has persistent L flank pain radiating to groin.  She endorses pelvic exam performed by ED team last night prior to CDU dispo (denies sexual activity x 11 yrs) with swabs sent but ED indicated no planned tx presumptively given low risk pt and physiologic discharge.  Pt's hx taken again with further details and she endorses 40 lb weight loss x 3 mos with n/v and generalized malaise with acute on chronic back pain.  Pt had spinal fusion in 2013 with revision in 2017 and then was told in 2018 that hardware had loosened and she needed a 23.5 hr surgery but opted against this (seen at [facility] art).  Had MRI at that time (subsequent to all spinal hardware placed) and is confident her spinal hardware is MRI compatible.  In light of new information above, lack of conclusive evidence for UTI and persistent light-headedness, malaise pt amenable to hydration, further antiemetics, additional labs (ESR/CRP/blood cultures) and MRI spine for eval for poss hardware infection/epidural abscess.

## 2020-10-24 NOTE — ED CDU PROVIDER INITIAL DAY NOTE - OBJECTIVE STATEMENT
Pt is a 69 y/o F PMHx HTN, chronic lower back pain fibroids p/w dysuria x 1 month and low grade fever yesterday.  Pt states for past month pt has had burning dysuria and lower pelvic cramping, intermittently.  She was treated with antibiotics based on positive urine culture several weeks ago, which pt completed a 1 week regimen of.  Since then pt developed mild generalized abdominal cramping and nonbloody diarrhea, which resolved three days ago.  She followed up with her PMD yesterday for continued dysuria and was found to have a low grade fever of 100F.  Pt was then directed to ED for evaluation.  She notes she had nausea and vomiting yesterday, which has since resolved.  Pt denies any chills, chest pain, SOB, cloudy urine, hematuria, melena, brbpr, lightheadedness, vaginal bleeding.  Pt was placed in CDU for antibiotics for treatment of possible pyelonephritis.  Pt had transvaginal sono which showed uterine myamata.  Presently pt notes no active pain or nausea.

## 2020-10-24 NOTE — ED CDU PROVIDER SUBSEQUENT DAY NOTE - MEDICAL DECISION MAKING DETAILS
Attending MD Holden.  Pt seen by me in CDU obs unit on morning of Saturday 10/24/2020.  Pt is a 71 yo non-smoker female with hx of HTN, chronic low back pain and pyelonephritis previously.  PT sent to Ed by PCP for complaint of dysuria, burning with urination, urinary frequency x 1 mo.  Given a course of abxs and completed sev'l wks ago but sent to ED today by PCP for recurrent sxs including fever to 100.  Pt has L CVA TTP.  Pt is afebrile in ED.  On 10/6 pt had growth of 100,000 e.coli.  Pt endorses nbnb emesis x 2 today.  PT renal sono non-actionable.  PT sent to CDU overnight for IV abxs and planned prob d/c with abxs course.  Pt with non-actionable UA in ED (presumed sterilized urine without cleared UTI) and mild LLQ TTP on reassessment in CDU overnight.  CTAP added to work-up to confirm no atypical diverticulitis.  Pt's CTAP non-actionable as well.  PT feels well and tolerated abxs, tolerating PO.  Educated re: need to follow-up with PCP, return to ED for fevers, chills, n/v/d, dec urine output, inability to tolerate PO.  Complete full course abxs. See attending attestation below.

## 2020-10-24 NOTE — ED CDU PROVIDER INITIAL DAY NOTE - ATTENDING CONTRIBUTION TO CARE
Attending Statement: I have reviewed and agree with all pertinent clinical information, including history and physical exam and agree with treatment plan of the PA, except as noted.  69 yo non-smoker female with hx of HTN, chronic low back pain and pyelonephritis previously.  PT sent to Ed by PCP for complaint of dysuria, burning with urination, urinary frequency x 1 mo.  Given a course of abxs and completed sev'l wks ago but sent to ED today by PCP for recurrent sxs including fever to 100.  Pt has L CVA TTP.  Pt is afebrile in ED.  On 10/6 pt had growth of 100,000 e.coli.  Pt endorses nbnb emesis x 2 today.  PT renal sono non-actionable.  PT sent to CDU overnight for IV abxs and planned prob d/c with abxs course.  Pt with non-actionable UA in ED (presumed sterilized urine without cleared UTI) and mild LLQ TTP on reassessment in CDU overnight.  Vital signs noted. nontoxic female. mmm. normal S1-S2 no work of breathing. soft tender lower mid abdomen. no guarding no pedal edema. no calf tenderness. normal pulses bilateral feet.  plan serial exam, IV abx, pain med prn, re assess

## 2020-10-25 VITALS
HEART RATE: 71 BPM | TEMPERATURE: 98 F | OXYGEN SATURATION: 98 % | RESPIRATION RATE: 18 BRPM | SYSTOLIC BLOOD PRESSURE: 127 MMHG | DIASTOLIC BLOOD PRESSURE: 81 MMHG

## 2020-10-25 PROCEDURE — 99217: CPT

## 2020-10-25 RX ORDER — FLUCONAZOLE 150 MG/1
150 TABLET ORAL ONCE
Refills: 0 | Status: COMPLETED | OUTPATIENT
Start: 2020-10-25 | End: 2020-10-25

## 2020-10-25 RX ORDER — PHENAZOPYRIDINE HCL 100 MG
1 TABLET ORAL
Qty: 4 | Refills: 0
Start: 2020-10-25 | End: 2020-10-26

## 2020-10-25 RX ORDER — PHENAZOPYRIDINE HCL 100 MG
100 TABLET ORAL ONCE
Refills: 0 | Status: COMPLETED | OUTPATIENT
Start: 2020-10-25 | End: 2020-10-25

## 2020-10-25 RX ORDER — CEPHALEXIN 500 MG
1 CAPSULE ORAL
Qty: 14 | Refills: 0
Start: 2020-10-25 | End: 2020-10-31

## 2020-10-25 RX ADMIN — ISOSORBIDE MONONITRATE 30 MILLIGRAM(S): 60 TABLET, EXTENDED RELEASE ORAL at 13:09

## 2020-10-25 RX ADMIN — Medication 50 MILLIGRAM(S): at 06:27

## 2020-10-25 RX ADMIN — Medication 100 MILLIGRAM(S): at 13:09

## 2020-10-25 RX ADMIN — Medication 25 MILLIGRAM(S): at 06:27

## 2020-10-25 RX ADMIN — FLUCONAZOLE 150 MILLIGRAM(S): 150 TABLET ORAL at 13:38

## 2020-10-25 RX ADMIN — GABAPENTIN 300 MILLIGRAM(S): 400 CAPSULE ORAL at 13:09

## 2020-10-25 RX ADMIN — GABAPENTIN 300 MILLIGRAM(S): 400 CAPSULE ORAL at 06:27

## 2020-10-25 NOTE — ED CDU PROVIDER DISPOSITION NOTE - CLINICAL COURSE
Ed:  70F h/o HTN, chronic low back pain, dysuria x 1mo, presented to ED with lower pelvic cramping and dysuria.  Recently completed a course of abx for positive urine culture but states still symptomatic.  Also had MRI of low back while in CDU.  Discharge with Keflex and outpatient follow up.

## 2020-10-25 NOTE — ED CDU PROVIDER SUBSEQUENT DAY NOTE - HISTORY
71 y/o F PMHx HTN, chronic lower back pain in CDU for treatment of a UTI (ua/uc negative but sp abx, so ?false negative) 2/2 dysuria. PT also had an MRI of her T/L given chronic back pain showing a fluid collection is seen posterior to the laminectomy site from the L1-L5 levels without peripheral enhancement, which is mostly unchanged from prior, with degenerative changes. Pt has been ambulating with her walker here, feels better, would like to go home. Son is here to pick her up. Will send Keflex 1 capsule BID x 7 days w/ pyridium. Will also email discharge lounge to help make the pt an appt for spine.
Pt is a 71 y/o F PMHx HTN, chronic lower back pain fibroids p/w abdominal pain, dysuria x 1 month and low grade fever yesterday.  Pt states for past month pt has had burning dysuria and lower pelvic cramping, intermittently.  She was treated with antibiotics based on positive urine culture several weeks ago, which pt completed a 1 week regimen of.  Since then pt developed mild generalized abdominal cramping and nonbloody diarrhea with 1 episode NBNB vomiting before ED visit. She followed up with her PMD yesterday for continued dysuria and was found to have a low grade fever of 100F.  Pt was then directed to ED for evaluation.  Pt denies any chills, chest pain, SOB, cloudy urine, hematuria, melena, brbpr, lightheadedness, vaginal bleeding.  Pt was placed in CDU for antibiotics for treatment of possible pyelonephritis.  Pt had transvaginal sono which showed uterine myamata.

## 2020-10-25 NOTE — ED CDU PROVIDER DISPOSITION NOTE - ATTENDING CONTRIBUTION TO CARE
Dr. Ward:  I performed a face to face bedside interview with patient regarding history of present illness, review of symptoms and past medical history. I completed an independent physical exam.  I have discussed patient's plan of care with PA.   I agree with note as stated above, having amended the EMR as needed to reflect my findings.   This includes HISTORY OF PRESENT ILLNESS, HIV, PAST MEDICAL/SURGICAL/FAMILY/SOCIAL HISTORY, ALLERGIES AND HOME MEDICATIONS, REVIEW OF SYSTEMS, PHYSICAL EXAM, and any PROGRESS NOTES during the time I functioned as the attending physician for this patient.

## 2020-10-25 NOTE — ED CDU PROVIDER SUBSEQUENT DAY NOTE - MEDICAL DECISION MAKING DETAILS
uti, lower back pain 2/2 chronic DJD sp abx and MRI TL spine without acute pathology. Plan for dc home on oral abx for UTI and spine follow up for further management

## 2020-10-25 NOTE — ED CDU PROVIDER DISPOSITION NOTE - PATIENT PORTAL LINK FT
You can access the FollowMyHealth Patient Portal offered by [facility] by registering at the following website: [email or web address]. By joining [employer]’s FollowMyHealth portal, you will also be able to view your health information using other applications (apps) compatible with our system.

## 2020-10-25 NOTE — ED CDU PROVIDER DISPOSITION NOTE - NSFOLLOWUPINSTRUCTIONS_ED_ALL_ED_FT
Follow up with your PMD within 48-72 hours. Our discharge center will call you to schedule a follow up appointment with Spine for further evaluation. Take Keflex 500 mg 1 tab 2x/day for 7 days.  Increase fluids. Worsening pain, new fever, chills, nausea, vomiting return to ER Follow up with your PMD within 48-72 hours, you will need to arrange an ultrasound of your thyroid to further evaluate the thyroid nodule we saw on MRI. Our discharge center will call you to schedule a follow up appointment with Spine for further evaluation. Take Keflex 500 mg 1 tab 2x/day for 7 days.  Increase fluids. Worsening pain, new fever, chills, nausea, vomiting return to ER

## 2020-10-25 NOTE — ED CDU PROVIDER SUBSEQUENT DAY NOTE - ATTENDING CONTRIBUTION TO CARE
Dr. Ward:  I performed a face to face bedside interview with patient regarding history of present illness, review of symptoms and past medical history. I completed an independent physical exam.  I have discussed patient's plan of care with PA.   I agree with note as stated above, having amended the EMR as needed to reflect my findings.   This includes HISTORY OF PRESENT ILLNESS, HIV, PAST MEDICAL/SURGICAL/FAMILY/SOCIAL HISTORY, ALLERGIES AND HOME MEDICATIONS, REVIEW OF SYSTEMS, PHYSICAL EXAM, and any PROGRESS NOTES during the time I functioned as the attending physician for this patient.    70F h/o HTN, chronic low back pain, dysuria x 1mo, presented to ED with lower pelvic cramping and dysuria.  Recently completed a course of abx for positive urine culture but states still symptomatic.  Also had MRI of low back while in CDU.  This morning, states only mildly improved but still dysuria.    Exam;  - nad  - rrr  - ctab  - abd soft ntnd    A/P  - dysuria and low grade fever, in cdu for presumed pyelonephritis  - continue abx, reassess

## 2020-11-16 ENCOUNTER — APPOINTMENT (OUTPATIENT)
Dept: ORTHOPEDIC SURGERY | Facility: CLINIC | Age: 70
End: 2020-11-16
Payer: MEDICARE

## 2020-11-16 VITALS
WEIGHT: 156 LBS | BODY MASS INDEX: 27.64 KG/M2 | SYSTOLIC BLOOD PRESSURE: 146 MMHG | HEIGHT: 63 IN | HEART RATE: 78 BPM | OXYGEN SATURATION: 97 % | DIASTOLIC BLOOD PRESSURE: 79 MMHG

## 2020-11-16 PROCEDURE — 99204 OFFICE O/P NEW MOD 45 MIN: CPT

## 2020-11-16 PROCEDURE — 99072 ADDL SUPL MATRL&STAF TM PHE: CPT

## 2020-11-16 PROCEDURE — 72110 X-RAY EXAM L-2 SPINE 4/>VWS: CPT

## 2020-11-16 PROCEDURE — 72070 X-RAY EXAM THORAC SPINE 2VWS: CPT

## 2020-11-16 NOTE — ASSESSMENT
[FreeTextEntry1] : This is a 70-year-old female here today for evaluation of her low back pain, radicular type pain down her legs, neurogenic claudicatory symptoms all the setting of adult spinal deformity.  She is a [employer] and does not want any blood products during any potential operative intervention.  In my opinion she would likely need a T4 to pelvis revision instrumentation.  I am not sure this is able to be done safely without at least having sounds for blood products available.  I will have her get a CT of the lumbar and thoracic spine and will also communicate with my surgical colleagues to deem whether or not it is appropriate to proceed surgically in this context.

## 2020-11-16 NOTE — HISTORY OF PRESENT ILLNESS
[de-identified] : This is a 70-year-old female here today for evaluation of low back pain, flank pain, pain shooting down her bilateral legs.  Her pain goes down her posterior thigh posterior buttocks and anterior posterior calf.  Unfortunately symptoms have worsened over the past 2 years.  In terms of her spine she had her first surgery done in 2015 at [place].  This was for her back and leg complaints.  She was soon thereafter revised to a T10 to pelvis fusion at [place] in 2017.  Postoperatively she did not have improvement in her symptoms after either of these operations.  Currently she feels like she is pitched forward, has debilitating leg pain.  Of note the patient is a [employer] and does not want any sort of blood products for any potential treatment option.

## 2020-11-16 NOTE — PHYSICAL EXAM
[de-identified] : Lumbar Physical Exam\par \par Gait -antalgic, shuffling\par \par Station -forward pitched\par \par Sagittal balance -positive.  With effort she is able to get her pelvis and head in better alignment\par \par Compensatory mechanism? -Bent knees and bent hips\par \par Heel walk -unable to do\par \par Toe walk -unable to do\par \par Reflexes\par Patellar -normal\par Gastroc -normal\par Clonus -no\par \par Hip Exam -normal\par \par Straight leg raise -none\par \par Pulses -2+ DP/PT\par \par Range of motion -globally reduced\par \par Sensation \par Sensation intact to light touch in L1, L2, L3, L4, L5 and S1 dermatomes bilaterally\par \par Motor\par 	IP	Quad	HS	TA	Gastroc	EHL\par Right	5/5	5/5	5/5	5/5	5/5	5/5\par Left	5/5	5/5	5/5	5/5	5/5	5/5\par \par  [de-identified] : Lumbar radiographs\par T10 to pelvis instrumentation\par Larry of the left side is popped out of the tulip head of T10 on the left\par Decompression site noted on the AP film\par \par Thoracic radiographs\par Proximal junctional kyphosis noted a T9-T10\par Significant kyphosis at T3-T4\par Hyperkyphotic and thoracic spine

## 2020-12-11 ENCOUNTER — APPOINTMENT (OUTPATIENT)
Dept: ORTHOPEDIC SURGERY | Facility: CLINIC | Age: 70
End: 2020-12-11
Payer: MEDICARE

## 2020-12-11 VITALS — TEMPERATURE: 96.5 F

## 2020-12-11 PROCEDURE — 99214 OFFICE O/P EST MOD 30 MIN: CPT

## 2020-12-11 PROCEDURE — 99072 ADDL SUPL MATRL&STAF TM PHE: CPT

## 2020-12-11 NOTE — PHYSICAL EXAM
[de-identified] : Lumbar Physical Exam\par \par Gait -antalgic, shuffling\par \par Station -forward pitched\par \par Sagittal balance -positive.  With effort she is able to get her pelvis and head in better alignment\par \par Compensatory mechanism? -Bent knees and bent hips\par \par Heel walk -unable to do\par \par Toe walk -unable to do\par \par Reflexes\par Patellar -normal\par Gastroc -normal\par Clonus -no\par \par Hip Exam -normal\par \par Straight leg raise -none\par \par Pulses -2+ DP/PT\par \par Range of motion -globally reduced\par \par Sensation \par Sensation intact to light touch in L1, L2, L3, L4, L5 and S1 dermatomes bilaterally\par \par Motor\par 	IP	Quad	HS	TA	Gastroc	EHL\par Right	5/5	5/5	5/5	5/5	5/5	5/5\par Left	5/5	5/5	5/5	5/5	5/5	5/5\par \par Exam unchanged today\par

## 2020-12-11 NOTE — ASSESSMENT
[FreeTextEntry1] : This is a 70-year-old female that is approximately 5 years out from  T10 to pelvis spinal fusion.  She has had sravani pullout and proximal junctional kyphosis which she is here today to discuss treatment for.  As mentioned above she does have an operative problem but due to the fact that she is a [employer]que and does not want any blood products is not necessarily safe for us to proceed with any operative intervention at this time.  This is explained to the patient in great detail.  At this point she would like to try conservative treatment.  I think this is absolutely reasonable we have given her a prescription for physical therapy.  I have also prescribed her a TLSO brace.  She can follow-up in 3 months or on an as-needed basis.  She knows to call me in the interim if her symptoms worsen or change in any way.

## 2020-12-11 NOTE — HISTORY OF PRESENT ILLNESS
[de-identified] : This is a 70-year-old female here today for evaluation of low back pain, flank pain, pain shooting down her bilateral legs.  Her pain goes down her posterior thigh posterior buttocks and anterior posterior calf.  Unfortunately symptoms have worsened over the past 2 years.  In terms of her spine she had her first surgery done in 2015 at [place].  This was for her back and leg complaints.  She was soon thereafter revised to a T10 to pelvis fusion at [place] in 2017.  Postoperatively she did not have improvement in her symptoms after either of these operations.  Currently she feels like she is pitched forward, has debilitating leg pain.  Of note the patient is a [employer] and does not want any sort of blood products for any potential treatment option.\par \par I did speak to the patient over the phone about her clinical situation.  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  Unfortunately due to the fact that she is a [employer] is not necessarily safe for her to have surgery when we can give her blood products.  She has several questions in regards to her diagnosis and treatment plan.

## 2020-12-13 ENCOUNTER — EMERGENCY (EMERGENCY)
Facility: HOSPITAL | Age: 70
LOS: 1 days | Discharge: HOME | End: 2020-12-13
Attending: EMERGENCY MEDICINE | Admitting: EMERGENCY MEDICINE
Payer: MEDICARE

## 2020-12-13 VITALS
SYSTOLIC BLOOD PRESSURE: 129 MMHG | DIASTOLIC BLOOD PRESSURE: 63 MMHG | HEIGHT: 63 IN | OXYGEN SATURATION: 100 % | RESPIRATION RATE: 16 BRPM | TEMPERATURE: 99 F | HEART RATE: 77 BPM

## 2020-12-13 VITALS
RESPIRATION RATE: 18 BRPM | OXYGEN SATURATION: 100 % | TEMPERATURE: 99 F | SYSTOLIC BLOOD PRESSURE: 126 MMHG | HEART RATE: 77 BPM | DIASTOLIC BLOOD PRESSURE: 69 MMHG

## 2020-12-13 DIAGNOSIS — Z98.890 OTHER SPECIFIED POSTPROCEDURAL STATES: Chronic | ICD-10-CM

## 2020-12-13 LAB
ALBUMIN SERPL ELPH-MCNC: 4 G/DL — SIGNIFICANT CHANGE UP (ref 3.3–5)
ALP SERPL-CCNC: 67 U/L — SIGNIFICANT CHANGE UP (ref 40–120)
ALT FLD-CCNC: 17 U/L — SIGNIFICANT CHANGE UP (ref 4–33)
ANION GAP SERPL CALC-SCNC: 9 MMOL/L — SIGNIFICANT CHANGE UP (ref 7–14)
APTT BLD: 31.1 SEC — SIGNIFICANT CHANGE UP (ref 27–36.3)
AST SERPL-CCNC: 20 U/L — SIGNIFICANT CHANGE UP (ref 4–32)
BASOPHILS # BLD AUTO: 0.03 K/UL — SIGNIFICANT CHANGE UP (ref 0–0.2)
BASOPHILS NFR BLD AUTO: 0.6 % — SIGNIFICANT CHANGE UP (ref 0–2)
BILIRUB SERPL-MCNC: 0.3 MG/DL — SIGNIFICANT CHANGE UP (ref 0.2–1.2)
BUN SERPL-MCNC: 10 MG/DL — SIGNIFICANT CHANGE UP (ref 7–23)
CALCIUM SERPL-MCNC: 9.5 MG/DL — SIGNIFICANT CHANGE UP (ref 8.4–10.5)
CHLORIDE SERPL-SCNC: 106 MMOL/L — SIGNIFICANT CHANGE UP (ref 98–107)
CO2 SERPL-SCNC: 26 MMOL/L — SIGNIFICANT CHANGE UP (ref 22–31)
CREAT SERPL-MCNC: 0.66 MG/DL — SIGNIFICANT CHANGE UP (ref 0.5–1.3)
EOSINOPHIL # BLD AUTO: 0.05 K/UL — SIGNIFICANT CHANGE UP (ref 0–0.5)
EOSINOPHIL NFR BLD AUTO: 1 % — SIGNIFICANT CHANGE UP (ref 0–6)
GLUCOSE SERPL-MCNC: 120 MG/DL — HIGH (ref 70–99)
HCT VFR BLD CALC: 37 % — SIGNIFICANT CHANGE UP (ref 34.5–45)
HGB BLD-MCNC: 11.4 G/DL — LOW (ref 11.5–15.5)
IANC: 2.73 K/UL — SIGNIFICANT CHANGE UP (ref 1.5–8.5)
IMM GRANULOCYTES NFR BLD AUTO: 0.2 % — SIGNIFICANT CHANGE UP (ref 0–1.5)
INR BLD: 1.19 RATIO — HIGH (ref 0.88–1.17)
LYMPHOCYTES # BLD AUTO: 1.83 K/UL — SIGNIFICANT CHANGE UP (ref 1–3.3)
LYMPHOCYTES # BLD AUTO: 36.1 % — SIGNIFICANT CHANGE UP (ref 13–44)
MCHC RBC-ENTMCNC: 28 PG — SIGNIFICANT CHANGE UP (ref 27–34)
MCHC RBC-ENTMCNC: 30.8 GM/DL — LOW (ref 32–36)
MCV RBC AUTO: 90.9 FL — SIGNIFICANT CHANGE UP (ref 80–100)
MONOCYTES # BLD AUTO: 0.42 K/UL — SIGNIFICANT CHANGE UP (ref 0–0.9)
MONOCYTES NFR BLD AUTO: 8.3 % — SIGNIFICANT CHANGE UP (ref 2–14)
NEUTROPHILS # BLD AUTO: 2.73 K/UL — SIGNIFICANT CHANGE UP (ref 1.8–7.4)
NEUTROPHILS NFR BLD AUTO: 53.8 % — SIGNIFICANT CHANGE UP (ref 43–77)
NRBC # BLD: 0 /100 WBCS — SIGNIFICANT CHANGE UP
NRBC # FLD: 0 K/UL — SIGNIFICANT CHANGE UP
PLATELET # BLD AUTO: 235 K/UL — SIGNIFICANT CHANGE UP (ref 150–400)
POTASSIUM SERPL-MCNC: 3.9 MMOL/L — SIGNIFICANT CHANGE UP (ref 3.5–5.3)
POTASSIUM SERPL-SCNC: 3.9 MMOL/L — SIGNIFICANT CHANGE UP (ref 3.5–5.3)
PROT SERPL-MCNC: 6.6 G/DL — SIGNIFICANT CHANGE UP (ref 6–8.3)
PROTHROM AB SERPL-ACNC: 13.6 SEC — HIGH (ref 9.8–13.1)
RBC # BLD: 4.07 M/UL — SIGNIFICANT CHANGE UP (ref 3.8–5.2)
RBC # FLD: 14.4 % — SIGNIFICANT CHANGE UP (ref 10.3–14.5)
SODIUM SERPL-SCNC: 141 MMOL/L — SIGNIFICANT CHANGE UP (ref 135–145)
TROPONIN T, HIGH SENSITIVITY RESULT: 10 NG/L — SIGNIFICANT CHANGE UP
TROPONIN T, HIGH SENSITIVITY RESULT: 12 NG/L — SIGNIFICANT CHANGE UP
WBC # BLD: 5.07 K/UL — SIGNIFICANT CHANGE UP (ref 3.8–10.5)
WBC # FLD AUTO: 5.07 K/UL — SIGNIFICANT CHANGE UP (ref 3.8–10.5)

## 2020-12-13 PROCEDURE — 71275 CT ANGIOGRAPHY CHEST: CPT | Mod: 26

## 2020-12-13 PROCEDURE — 93010 ELECTROCARDIOGRAM REPORT: CPT

## 2020-12-13 PROCEDURE — 99284 EMERGENCY DEPT VISIT MOD MDM: CPT | Mod: 25,GC

## 2020-12-13 RX ORDER — KETOROLAC TROMETHAMINE 30 MG/ML
15 SYRINGE (ML) INJECTION ONCE
Refills: 0 | Status: DISCONTINUED | OUTPATIENT
Start: 2020-12-13 | End: 2020-12-13

## 2020-12-13 RX ORDER — ACETAMINOPHEN 500 MG
975 TABLET ORAL ONCE
Refills: 0 | Status: COMPLETED | OUTPATIENT
Start: 2020-12-13 | End: 2020-12-13

## 2020-12-13 RX ADMIN — Medication 15 MILLIGRAM(S): at 17:39

## 2020-12-13 RX ADMIN — Medication 15 MILLIGRAM(S): at 17:04

## 2020-12-13 RX ADMIN — Medication 975 MILLIGRAM(S): at 16:27

## 2020-12-13 RX ADMIN — Medication 975 MILLIGRAM(S): at 15:41

## 2020-12-13 NOTE — ED PROVIDER NOTE - PATIENT PORTAL LINK FT
You can access the FollowMyHealth Patient Portal offered by [facility] by registering at the following website: [email or web address]. By joining [employer]’s FollowMyHealth portal, you will also be able to view your health information using other applications (apps) compatible with our system.

## 2020-12-13 NOTE — ED PROVIDER NOTE - CLINICAL SUMMARY MEDICAL DECISION MAKING FREE TEXT BOX
pt with left CP, Will check troponin, ekg, monitor on telemetry, rule out acute coronary syndrome, will check CTA for PA, covid swab, reassess.

## 2020-12-13 NOTE — ED PROVIDER NOTE - PROGRESS NOTE DETAILS
Ge-PGY2: discussed with Dr. Montemayor (cardiologist). Discussed negative CTA chest, non-ischemic EKG, and presentation. Agrees with outpatient follow up at this time. Rpt troponin pending.

## 2020-12-13 NOTE — ED PROVIDER NOTE - NS ED ROS FT
Gen: No fever, normal appetite  Eyes: No eye irritation or discharge  ENT: No ear pain, congestion, sore throat  Resp: sob  Cardiovascular: CP.   Gastroenteric: No nausea/vomiting, diarrhea, constipation  :  No change in urine output; no dysuria  MS: No joint or muscle pain  Skin: No rashes  Neuro: No headache; no abnormal movements  Remainder negative, except as per the HPI

## 2020-12-13 NOTE — ED ADULT TRIAGE NOTE - CHIEF COMPLAINT QUOTE
pt c/o L sided chest pain since last night, worse w movement and inspiration. pt reports chronic nausea. Hx of arrythmia, HTN, Angina, RA.

## 2020-12-13 NOTE — ED ADULT NURSE NOTE - OBJECTIVE STATEMENT
69 y/o female presents to ED with c/o chest pain.  Pt states that the pain woke her from sleep last night and has been a constant "ache" that worsens with inspiration and movement.  Pt denies sick contacts, states that she lives alone and does not go out.  Pt denies fever chills, no cough, no SOB, denies n/v/d.  IV established, labs drawn and sent, pt placed on CM, providers evaluating.

## 2020-12-13 NOTE — ED ADULT NURSE REASSESSMENT NOTE - NS ED NURSE REASSESS COMMENT FT1
Patient received at change of shift from previous RN Breanne. Pt A&OX4, resting in stretcher. Respirations even & unlabored. NSR on cardiac monitor. Pt continues to c/o chest pressure, notified MD. Medicated as per EMAR. Pending CTA at this time. Warm blankets and pillow provided. Will continue to monitor.

## 2020-12-13 NOTE — ED PROVIDER NOTE - OBJECTIVE STATEMENT
70 year old female with PMH     TO BE COMPLETED 70 year old female with PMH asthma, HTN, back pain, recent pyelo, p/w chest pain since last night, pain under left breast, worse w/ breathing and movement. general body aches, no specific calf tenderness. had negative cath in january, Dr. Montemayor for cardiology. No personal or family hx of clots. no known covid exposures.

## 2020-12-13 NOTE — ED ADULT NURSE NOTE - CHPI ED NUR SYMPTOMS NEG
no diaphoresis/no dizziness/no chills/no nausea/no congestion/no shortness of breath/no vomiting/no fever/no syncope

## 2021-02-16 ENCOUNTER — APPOINTMENT (OUTPATIENT)
Dept: SURGERY | Facility: CLINIC | Age: 71
End: 2021-02-16

## 2021-03-08 NOTE — H&P ADULT - PROBLEM/PLAN-3
Spiritual Care Assessment/Progress Note  [facility]      NAME: Javier Acosta      MRN: [id number]  AGE: 72 y.o. SEX: female  Confucianist Affiliation: No Faith   Language: English     3/8/2021     Total Time (in minutes): 10     Spiritual Assessment begun in [address] through conversation with:         [x]Patient        [x] Family    [] Friend(s)        Reason for Consult: Initial/Spiritual assessment, patient floor     Spiritual beliefs: (Please include comment if needed)     [x] Identifies with a sole tradition:         [x] Supported by a sole community:            [] Claims no spiritual orientation:           [] Seeking spiritual identity:                [] Adheres to an individual form of spirituality:           [] Not able to assess:                           Identified resources for coping:      [x] Prayer                               [] Music                  [] Guided Imagery     [] Family/friends                 [] Pet visits     [] Devotional reading                         [] Unknown     [] Other:                                             Interventions offered during this visit: (See comments for more details)    Patient Interventions: Affirmation of emotions/emotional suffering, Coping skills reviewed/reinforced, Bridging, Initial/Spiritual assessment, patient floor, Normalization of emotional/spiritual concerns, Prayer (actual)     Family/Friend(s):  Affirmation of emotions/emotional suffering, Affirmation of sole, Bridging, Catharsis/review of pertinent events in supportive environment, Coping skills reviewed/reinforced, Iconic (affirming the presence of God/Higher Power), Initial Assessment, Prayer (actual)     Plan of Care:     [] Support spiritual and/or cultural needs    [] Support AMD and/or advance care planning process      [] Support grieving process   [] Coordinate Rites and/or Rituals    [] Coordination with community clergy   [] No spiritual needs identified at this time   [] Detailed Plan of Care below (See Comments)  [] Make referral to Music Therapy  [] Make referral to Pet Therapy     [] Make referral to Addiction services  [] Make referral to [facility]  [] Make referral to Spiritual Care Partner  [] No future visits requested        [x] Follow up upon further referrals     Comments:  Visited patient in the [facility] cardiac telemetry unit during rounds. Patient was initially alone and was joined by her  during visit. Mrs. Billey Goodpasture stated she is feeling better and getting ready to be discharged today. Mr. Billey Goodpasture stated that his wife's recovery was miraculous and seemed to utilize his emotional and spiritual resources. He also shared about his L[employer] which supports them. I listened with empathy and reflection while facilitating storytelling, at times with humor appropriately. I explored their needs and resources while normalizing their mutual experiences of her illness. I provided prayer per their request and advised of  availability. Chaplains will follow up if further referrals are requested. Chaplain Jarvis Colunga M.Div.    can be reached by calling the  at [facility]  [phone] DISPLAY PLAN FREE TEXT

## 2021-03-09 ENCOUNTER — APPOINTMENT (OUTPATIENT)
Dept: SURGERY | Facility: CLINIC | Age: 71
End: 2021-03-09
Payer: MEDICARE

## 2021-03-09 VITALS
SYSTOLIC BLOOD PRESSURE: 155 MMHG | DIASTOLIC BLOOD PRESSURE: 82 MMHG | WEIGHT: 165 LBS | HEIGHT: 63 IN | BODY MASS INDEX: 29.23 KG/M2 | HEART RATE: 83 BPM

## 2021-03-09 DIAGNOSIS — Z86.79 PERSONAL HISTORY OF OTHER DISEASES OF THE CIRCULATORY SYSTEM: ICD-10-CM

## 2021-03-09 DIAGNOSIS — Z80.1 FAMILY HISTORY OF MALIGNANT NEOPLASM OF TRACHEA, BRONCHUS AND LUNG: ICD-10-CM

## 2021-03-09 DIAGNOSIS — Z80.0 FAMILY HISTORY OF MALIGNANT NEOPLASM OF DIGESTIVE ORGANS: ICD-10-CM

## 2021-03-09 PROCEDURE — 36415 COLL VENOUS BLD VENIPUNCTURE: CPT

## 2021-03-09 PROCEDURE — 99072 ADDL SUPL MATRL&STAF TM PHE: CPT

## 2021-03-09 PROCEDURE — 99204 OFFICE O/P NEW MOD 45 MIN: CPT

## 2021-03-09 RX ORDER — HYDROCHLOROTHIAZIDE 25 MG/1
25 TABLET ORAL
Refills: 0 | Status: ACTIVE | COMMUNITY

## 2021-03-09 RX ORDER — METOPROLOL TARTRATE 50 MG/1
50 TABLET, FILM COATED ORAL
Refills: 0 | Status: ACTIVE | COMMUNITY

## 2021-03-09 RX ORDER — GABAPENTIN 400 MG/1
CAPSULE ORAL
Refills: 0 | Status: ACTIVE | COMMUNITY

## 2021-03-10 NOTE — PHYSICAL EXAM
[de-identified] : 3 cm left thyroid nodule, well circumscribed and mobile [Laryngoscopy Performed] : laryngoscopy was performed, see procedure section for findings [Midline] : located in midline position [Normal] : orientation to person, place, and time: normal [de-identified] : indirect  laryngoscopy shows normal vocal cord mobility bilaterally with no lesions noted

## 2021-03-10 NOTE — ASSESSMENT
[FreeTextEntry1] : bloods drawn.  requested sonogram guided FNA with cyto tech present to confirm adequacy of specimen. to call next week for results. patient has been given the opportunity to ask questions, and all of the patient's questions have been answered to their satisfaction\par

## 2021-03-10 NOTE — HISTORY OF PRESENT ILLNESS
[de-identified] : Pt c/o thyroid nodule noted on routine examination.  notes occasional  dysphagia and SOB.   denies hoarseness or RT exposure\par sonogram:   Right 6 mm , 1.6 cm and 1.8 cm, Left 3.8 cm and 2.5 cm nodules. \par I have reviewed all old and new data and available images.\par

## 2021-03-10 NOTE — CONSULT LETTER
[Dear  ___] : Dear  [unfilled], [Consult Letter:] : I had the pleasure of evaluating your patient, [unfilled]. [Please see my note below.] : Please see my note below. [Consult Closing:] : Thank you very much for allowing me to participate in the care of this patient.  If you have any questions, please do not hesitate to contact me. [Sincerely,] : Sincerely, [FreeTextEntry2] : Dr.Jeffrey Montemayor, Dr. Kranthi Blackwell [FreeTextEntry3] : Pierce Magdaleno MD, FACS\par System Director, Endocrine Surgery\par [facility]\par Assistant Clinical Professor of Surgery\par [facility] [employer] [facility]  [DrLeyla  ___] : Dr. SMITH

## 2021-03-11 LAB
24R-OH-CALCIDIOL SERPL-MCNC: 58.5 PG/ML
CALCIUM SERPL-MCNC: 10.1 MG/DL
CALCIUM SERPL-MCNC: 10.1 MG/DL
PARATHYROID HORMONE INTACT: 29 PG/ML
T3 SERPL-MCNC: 106 NG/DL
T4 FREE SERPL-MCNC: 1.3 NG/DL
THYROGLOB AB SERPL-ACNC: <20 IU/ML
THYROPEROXIDASE AB SERPL IA-ACNC: 12.6 IU/ML
TSH SERPL-ACNC: 0.69 UIU/ML

## 2021-03-12 ENCOUNTER — NON-APPOINTMENT (OUTPATIENT)
Age: 71
End: 2021-03-12

## 2021-03-24 ENCOUNTER — APPOINTMENT (OUTPATIENT)
Dept: ULTRASOUND IMAGING | Facility: IMAGING CENTER | Age: 71
End: 2021-03-24
Payer: MEDICARE

## 2021-03-24 ENCOUNTER — RESULT REVIEW (OUTPATIENT)
Age: 71
End: 2021-03-24

## 2021-03-24 ENCOUNTER — OUTPATIENT (OUTPATIENT)
Dept: OUTPATIENT SERVICES | Facility: HOSPITAL | Age: 71
LOS: 1 days | End: 2021-03-24
Payer: COMMERCIAL

## 2021-03-24 DIAGNOSIS — Z00.00 ENCOUNTER FOR GENERAL ADULT MEDICAL EXAMINATION WITHOUT ABNORMAL FINDINGS: ICD-10-CM

## 2021-03-24 DIAGNOSIS — Z98.890 OTHER SPECIFIED POSTPROCEDURAL STATES: Chronic | ICD-10-CM

## 2021-03-24 DIAGNOSIS — Z00.8 ENCOUNTER FOR OTHER GENERAL EXAMINATION: ICD-10-CM

## 2021-03-24 PROCEDURE — 10005 FNA BX W/US GDN 1ST LES: CPT

## 2021-03-24 PROCEDURE — 88173 CYTOPATH EVAL FNA REPORT: CPT | Mod: 26

## 2021-03-24 PROCEDURE — 88173 CYTOPATH EVAL FNA REPORT: CPT

## 2021-03-24 PROCEDURE — 88172 CYTP DX EVAL FNA 1ST EA SITE: CPT

## 2021-03-25 LAB — NON-GYNECOLOGICAL CYTOLOGY STUDY: SIGNIFICANT CHANGE UP

## 2021-03-27 ENCOUNTER — APPOINTMENT (OUTPATIENT)
Dept: ORTHOPEDIC SURGERY | Facility: CLINIC | Age: 71
End: 2021-03-27

## 2021-04-01 ENCOUNTER — NON-APPOINTMENT (OUTPATIENT)
Age: 71
End: 2021-04-01

## 2021-04-02 ENCOUNTER — NON-APPOINTMENT (OUTPATIENT)
Age: 71
End: 2021-04-02

## 2021-04-04 NOTE — PHYSICAL EXAM
[de-identified] : Lumbar Physical Exam\par \par Gait -antalgic, shuffling\par \par Station -forward pitched\par \par Sagittal balance -positive.  With effort she is able to get her pelvis and head in better alignment\par \par Compensatory mechanism? -Bent knees and bent hips\par \par Heel walk -unable to do\par \par Toe walk -unable to do\par \par Reflexes\par Patellar -normal\par Gastroc -normal\par Clonus -no\par \par Hip Exam -normal\par \par Straight leg raise -none\par \par Pulses -2+ DP/PT\par \par Range of motion -globally reduced\par \par Sensation \par Sensation intact to light touch in L1, L2, L3, L4, L5 and S1 dermatomes bilaterally\par \par Motor\par 	IP	Quad	HS	TA	Gastroc	EHL\par Right	5/5	5/5	5/5	5/5	5/5	5/5\par Left	5/5	5/5	5/5	5/5	5/5	5/5\par \par Exam unchanged today\par  [de-identified] : Lumbar radiographs\par T10 to pelvis instrumentation\par Larry of the left side is popped out of the tulip head of T10 on the left\par Decompression site noted on the AP film\par \par Thoracic radiographs\par Proximal junctional kyphosis noted a T9-T10\par Significant kyphosis at T3-T4\par Hyperkyphotic and thoracic spine

## 2021-04-04 NOTE — HISTORY OF PRESENT ILLNESS
[de-identified] : This is a 70-year-old female here today for evaluation of low back pain, flank pain, pain shooting down her bilateral legs.  Her pain goes down her posterior thigh posterior buttocks and anterior posterior calf.  Unfortunately symptoms have worsened over the past 2 years.  In terms of her spine she had her first surgery done in 2015 at [place].  This was for her back and leg complaints.  She was soon thereafter revised to a T10 to pelvis fusion at [place] in 2017.  Postoperatively she did not have improvement in her symptoms after either of these operations.  Currently she feels like she is pitched forward, has debilitating leg pain.  Of note the patient is a [employer] and does not want any sort of blood products for any potential treatment option.\par \par I did speak to the patient over the phone about her clinical situation.  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  Unfortunately due to the fact that she is a [employer] is not necessarily safe for her to have surgery when we can give her blood products.  She has several questions in regards to her diagnosis and treatment plan.

## 2021-04-05 ENCOUNTER — APPOINTMENT (OUTPATIENT)
Dept: ORTHOPEDIC SURGERY | Facility: CLINIC | Age: 71
End: 2021-04-05
Payer: MEDICARE

## 2021-04-23 ENCOUNTER — APPOINTMENT (OUTPATIENT)
Dept: ORTHOPEDIC SURGERY | Facility: CLINIC | Age: 71
End: 2021-04-23
Payer: MEDICARE

## 2021-04-23 DIAGNOSIS — M54.5 LOW BACK PAIN: ICD-10-CM

## 2021-04-23 PROCEDURE — 99214 OFFICE O/P EST MOD 30 MIN: CPT

## 2021-04-23 PROCEDURE — 99072 ADDL SUPL MATRL&STAF TM PHE: CPT

## 2021-04-23 PROCEDURE — 72082 X-RAY EXAM ENTIRE SPI 2/3 VW: CPT

## 2021-04-25 NOTE — HISTORY OF PRESENT ILLNESS
[de-identified] : This is a 70-year-old female here today for evaluation of low back pain, flank pain, pain shooting down her bilateral legs. Her pain goes down her posterior thigh posterior buttocks and anterior posterior calf. Unfortunately symptoms have worsened over the past 2 years. In terms of her spine she had her first surgery done in 2015 at [place]. This was for her back and leg complaints. She was soon thereafter revised to a T10 to pelvis fusion at [place] in 2017. Postoperatively she did not have improvement in her symptoms after either of these operations. Currently she feels like she is pitched forward, has debilitating leg pain. Of note the patient is a [employer]ah[employer]ism and does not want any sort of blood products for any potential treatment option.\par \par I did speak to the patient over the phone about her clinical situation. 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 Unfortunately due to the fact that she is a Hoahaoism is not necessarily safe for her to have surgery when we can give her blood products. She has several questions in regards to her diagnosis and treatment plan.\par \par The above history is from the patient's previous visit.\par \par The patient is complaining of the same symptoms that she had had previously.  She does have a previous history of a T10 to pelvis.  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  She denies any bowel bladder issues.  She does endorse issues with balance.  She does not endorse any issues with saddle anesthesia.  We did discuss today the fact that she needs help obtaining a stair lift for her home.  She also would like to discuss possible use of compression stockings.

## 2021-04-25 NOTE — PHYSICAL EXAM
[de-identified] : Scoliosis radiographs reviewed\par There is sravani pull out at the top of her T10 to  pelvis construct\par She does have some proximal junctional disease\par SVA still above 5 cm [de-identified] : Lumbar Physical Exam\par \par Gait -antalgic, shuffling\par \par Station -forward pitched\par \par Sagittal balance -positive.  With effort she is able to get her pelvis and head in better alignment\par \par Compensatory mechanism? -Bent knees and bent hips\par \par Heel walk -unable to do\par \par Toe walk -unable to do\par \par Reflexes\par Patellar -normal\par Gastroc -normal\par Clonus -no\par \par Hip Exam -normal\par \par Straight leg raise -none\par \par Pulses -2+ DP/PT\par \par Range of motion -globally reduced\par \par Sensation \par Sensation intact to light touch in L1, L2, L3, L4, L5 and S1 dermatomes bilaterally\par \par Motor\par 	IP	Quad	HS	TA	Gastroc	EHL\par Right	5/5	5/5	5/5	5/5	5/5	5/5\par Left	5/5	5/5	5/5	5/5	5/5	5/5\par \par Exam unchanged today\par

## 2021-04-25 NOTE — ASSESSMENT
[FreeTextEntry1] : I had a lengthy discussion with the patient in regards to her treatment plan and diagnosis.  She does have new symptoms of balance problems as well as back pain.  I want to ensure that there is no spinal cord impingement at the top of her construct or any other location.  Her last MRI was over 6 months ago.  Therefore I would like to get a thoracic and cervical MRI.  These orders have been placed today.  I have also prescribed to her a stair lift to help her navigate the stairs in her home.  She can also use compression stockings.  We once again discussed the fact that any type of revision surgery would be very risky given the fact that she would not be able to receive blood due to the fact that she is a Bahai.  She understood this clinical situation.  I will have her follow-up in 3 to 4 weeks to review her MRI findings.  I encouraged her to follow-up sooner if she has any new or worsening symptoms.  Please note that over 30 minutes of time was spent in care of this patient which includes previsit preparation, in person visit, post visit documentation.

## 2021-06-30 NOTE — ED ADULT TRIAGE NOTE - INTERNATIONAL TRAVEL
No apparent complications or complaints regarding anesthesia care at this time/All questions were answered No

## 2021-07-27 ENCOUNTER — APPOINTMENT (OUTPATIENT)
Dept: MRI IMAGING | Facility: CLINIC | Age: 71
End: 2021-07-27

## 2021-10-04 NOTE — ED PROVIDER NOTE - CROS ED CARDIOVAS ALL NEG
Update History & Physical    The patient's History and Physical of September 22, 2021 was reviewed with the patient and I examined the patient. There was no change. The surgical site was confirmed by the patient and me. Plan: The risks, benefits, expected outcome, and alternative to the recommended procedure have been discussed with the patient. Patient understands and wants to proceed with the procedure.      Electronically signed by Greg Jordan DO on 57/6/5173 at 9:59 AM
- - -

## 2022-01-19 ENCOUNTER — APPOINTMENT (OUTPATIENT)
Dept: ULTRASOUND IMAGING | Facility: CLINIC | Age: 72
End: 2022-01-19

## 2022-02-23 ENCOUNTER — INPATIENT (INPATIENT)
Facility: HOSPITAL | Age: 72
LOS: 11 days | Discharge: NURSING, REHAB OR LONG-TERM CARE | End: 2022-03-07
Attending: INTERNAL MEDICINE | Admitting: INTERNAL MEDICINE
Payer: MEDICARE

## 2022-02-23 VITALS
OXYGEN SATURATION: 100 % | RESPIRATION RATE: 16 BRPM | HEART RATE: 86 BPM | DIASTOLIC BLOOD PRESSURE: 77 MMHG | TEMPERATURE: 98 F | SYSTOLIC BLOOD PRESSURE: 173 MMHG | HEIGHT: 63 IN

## 2022-02-23 DIAGNOSIS — Z98.890 OTHER SPECIFIED POSTPROCEDURAL STATES: Chronic | ICD-10-CM

## 2022-02-23 PROCEDURE — 93010 ELECTROCARDIOGRAM REPORT: CPT

## 2022-02-23 PROCEDURE — 99285 EMERGENCY DEPT VISIT HI MDM: CPT | Mod: 25

## 2022-02-23 NOTE — ED ADULT TRIAGE NOTE - CHIEF COMPLAINT QUOTE
C/o b/l lower extremity swelling, intermittent pain and decrease sensation for 2 wks, associated with JOLLY. Denies chest pain or dizziness. PMH HTN, sciatica, asthma

## 2022-02-24 DIAGNOSIS — E04.1 NONTOXIC SINGLE THYROID NODULE: ICD-10-CM

## 2022-02-24 DIAGNOSIS — G62.9 POLYNEUROPATHY, UNSPECIFIED: ICD-10-CM

## 2022-02-24 DIAGNOSIS — M79.671 PAIN IN RIGHT FOOT: ICD-10-CM

## 2022-02-24 DIAGNOSIS — I10 ESSENTIAL (PRIMARY) HYPERTENSION: ICD-10-CM

## 2022-02-24 DIAGNOSIS — Z29.9 ENCOUNTER FOR PROPHYLACTIC MEASURES, UNSPECIFIED: ICD-10-CM

## 2022-02-24 DIAGNOSIS — R29.898 OTHER SYMPTOMS AND SIGNS INVOLVING THE MUSCULOSKELETAL SYSTEM: ICD-10-CM

## 2022-02-24 LAB
ALBUMIN SERPL ELPH-MCNC: 3.9 G/DL — SIGNIFICANT CHANGE UP (ref 3.3–5)
ALP SERPL-CCNC: 94 U/L — SIGNIFICANT CHANGE UP (ref 40–120)
ALT FLD-CCNC: 23 U/L — SIGNIFICANT CHANGE UP (ref 4–33)
ANION GAP SERPL CALC-SCNC: 13 MMOL/L — SIGNIFICANT CHANGE UP (ref 7–14)
AST SERPL-CCNC: 31 U/L — SIGNIFICANT CHANGE UP (ref 4–32)
BASE EXCESS BLDV CALC-SCNC: 4.4 MMOL/L — HIGH (ref -2–3)
BASOPHILS # BLD AUTO: 0.02 K/UL — SIGNIFICANT CHANGE UP (ref 0–0.2)
BASOPHILS NFR BLD AUTO: 0.3 % — SIGNIFICANT CHANGE UP (ref 0–2)
BILIRUB SERPL-MCNC: 0.2 MG/DL — SIGNIFICANT CHANGE UP (ref 0.2–1.2)
BLOOD GAS VENOUS COMPREHENSIVE RESULT: SIGNIFICANT CHANGE UP
BUN SERPL-MCNC: 15 MG/DL — SIGNIFICANT CHANGE UP (ref 7–23)
CALCIUM SERPL-MCNC: 9.5 MG/DL — SIGNIFICANT CHANGE UP (ref 8.4–10.5)
CHLORIDE BLDV-SCNC: 104 MMOL/L — SIGNIFICANT CHANGE UP (ref 96–108)
CHLORIDE SERPL-SCNC: 104 MMOL/L — SIGNIFICANT CHANGE UP (ref 98–107)
CO2 BLDV-SCNC: 32.8 MMOL/L — HIGH (ref 22–26)
CO2 SERPL-SCNC: 25 MMOL/L — SIGNIFICANT CHANGE UP (ref 22–31)
CREAT SERPL-MCNC: 0.71 MG/DL — SIGNIFICANT CHANGE UP (ref 0.5–1.3)
EOSINOPHIL # BLD AUTO: 0.14 K/UL — SIGNIFICANT CHANGE UP (ref 0–0.5)
EOSINOPHIL NFR BLD AUTO: 2.2 % — SIGNIFICANT CHANGE UP (ref 0–6)
FLUAV AG NPH QL: SIGNIFICANT CHANGE UP
FLUBV AG NPH QL: SIGNIFICANT CHANGE UP
GAS PNL BLDV: 140 MMOL/L — SIGNIFICANT CHANGE UP (ref 136–145)
GLUCOSE BLDV-MCNC: 98 MG/DL — SIGNIFICANT CHANGE UP (ref 70–99)
GLUCOSE SERPL-MCNC: 103 MG/DL — HIGH (ref 70–99)
HCO3 BLDV-SCNC: 31 MMOL/L — HIGH (ref 22–29)
HCT VFR BLD CALC: 36.6 % — SIGNIFICANT CHANGE UP (ref 34.5–45)
HCT VFR BLDA CALC: 36 % — SIGNIFICANT CHANGE UP (ref 34.5–46.5)
HGB BLD CALC-MCNC: 12.1 G/DL — SIGNIFICANT CHANGE UP (ref 11.5–15.5)
HGB BLD-MCNC: 12 G/DL — SIGNIFICANT CHANGE UP (ref 11.5–15.5)
IANC: 3.9 K/UL — SIGNIFICANT CHANGE UP (ref 1.5–8.5)
IMM GRANULOCYTES NFR BLD AUTO: 0.2 % — SIGNIFICANT CHANGE UP (ref 0–1.5)
LACTATE BLDV-MCNC: 1.7 MMOL/L — SIGNIFICANT CHANGE UP (ref 0.5–2)
LYMPHOCYTES # BLD AUTO: 1.25 K/UL — SIGNIFICANT CHANGE UP (ref 1–3.3)
LYMPHOCYTES # BLD AUTO: 19.9 % — SIGNIFICANT CHANGE UP (ref 13–44)
MAGNESIUM SERPL-MCNC: 2 MG/DL — SIGNIFICANT CHANGE UP (ref 1.6–2.6)
MCHC RBC-ENTMCNC: 28.8 PG — SIGNIFICANT CHANGE UP (ref 27–34)
MCHC RBC-ENTMCNC: 32.8 GM/DL — SIGNIFICANT CHANGE UP (ref 32–36)
MCV RBC AUTO: 88 FL — SIGNIFICANT CHANGE UP (ref 80–100)
MONOCYTES # BLD AUTO: 0.96 K/UL — HIGH (ref 0–0.9)
MONOCYTES NFR BLD AUTO: 15.3 % — HIGH (ref 2–14)
NEUTROPHILS # BLD AUTO: 3.9 K/UL — SIGNIFICANT CHANGE UP (ref 1.8–7.4)
NEUTROPHILS NFR BLD AUTO: 62.1 % — SIGNIFICANT CHANGE UP (ref 43–77)
NRBC # BLD: 0 /100 WBCS — SIGNIFICANT CHANGE UP
NRBC # FLD: 0 K/UL — SIGNIFICANT CHANGE UP
PCO2 BLDV: 55 MMHG — HIGH (ref 39–42)
PH BLDV: 7.36 — SIGNIFICANT CHANGE UP (ref 7.32–7.43)
PHOSPHATE SERPL-MCNC: 3.4 MG/DL — SIGNIFICANT CHANGE UP (ref 2.5–4.5)
PLATELET # BLD AUTO: 285 K/UL — SIGNIFICANT CHANGE UP (ref 150–400)
PO2 BLDV: 29 MMHG — SIGNIFICANT CHANGE UP
POTASSIUM BLDV-SCNC: 3.6 MMOL/L — SIGNIFICANT CHANGE UP (ref 3.5–5.1)
POTASSIUM SERPL-MCNC: 3.8 MMOL/L — SIGNIFICANT CHANGE UP (ref 3.5–5.3)
POTASSIUM SERPL-SCNC: 3.8 MMOL/L — SIGNIFICANT CHANGE UP (ref 3.5–5.3)
PROT SERPL-MCNC: 7.1 G/DL — SIGNIFICANT CHANGE UP (ref 6–8.3)
RBC # BLD: 4.16 M/UL — SIGNIFICANT CHANGE UP (ref 3.8–5.2)
RBC # FLD: 14.7 % — HIGH (ref 10.3–14.5)
RSV RNA NPH QL NAA+NON-PROBE: SIGNIFICANT CHANGE UP
SAO2 % BLDV: 54.8 % — SIGNIFICANT CHANGE UP
SARS-COV-2 RNA SPEC QL NAA+PROBE: SIGNIFICANT CHANGE UP
SODIUM SERPL-SCNC: 142 MMOL/L — SIGNIFICANT CHANGE UP (ref 135–145)
WBC # BLD: 6.28 K/UL — SIGNIFICANT CHANGE UP (ref 3.8–10.5)
WBC # FLD AUTO: 6.28 K/UL — SIGNIFICANT CHANGE UP (ref 3.8–10.5)

## 2022-02-24 PROCEDURE — 93970 EXTREMITY STUDY: CPT | Mod: 26

## 2022-02-24 PROCEDURE — 72192 CT PELVIS W/O DYE: CPT | Mod: 26

## 2022-02-24 PROCEDURE — 99233 SBSQ HOSP IP/OBS HIGH 50: CPT

## 2022-02-24 PROCEDURE — 73630 X-RAY EXAM OF FOOT: CPT | Mod: 26,50

## 2022-02-24 PROCEDURE — 70450 CT HEAD/BRAIN W/O DYE: CPT | Mod: 26

## 2022-02-24 PROCEDURE — 72131 CT LUMBAR SPINE W/O DYE: CPT | Mod: 26

## 2022-02-24 PROCEDURE — 72128 CT CHEST SPINE W/O DYE: CPT | Mod: 26

## 2022-02-24 PROCEDURE — 99222 1ST HOSP IP/OBS MODERATE 55: CPT

## 2022-02-24 PROCEDURE — 72100 X-RAY EXAM L-S SPINE 2/3 VWS: CPT | Mod: 26

## 2022-02-24 PROCEDURE — 72040 X-RAY EXAM NECK SPINE 2-3 VW: CPT | Mod: 26

## 2022-02-24 PROCEDURE — 99223 1ST HOSP IP/OBS HIGH 75: CPT

## 2022-02-24 PROCEDURE — 72020 X-RAY EXAM OF SPINE 1 VIEW: CPT | Mod: 26

## 2022-02-24 RX ORDER — KETOROLAC TROMETHAMINE 30 MG/ML
15 SYRINGE (ML) INJECTION ONCE
Refills: 0 | Status: DISCONTINUED | OUTPATIENT
Start: 2022-02-24 | End: 2022-02-24

## 2022-02-24 RX ORDER — POLYETHYLENE GLYCOL 3350 17 G/17G
17 POWDER, FOR SOLUTION ORAL AT BEDTIME
Refills: 0 | Status: DISCONTINUED | OUTPATIENT
Start: 2022-02-24 | End: 2022-03-07

## 2022-02-24 RX ORDER — DEXAMETHASONE 0.5 MG/5ML
10 ELIXIR ORAL EVERY 6 HOURS
Refills: 0 | Status: DISCONTINUED | OUTPATIENT
Start: 2022-02-24 | End: 2022-02-24

## 2022-02-24 RX ORDER — ISOSORBIDE MONONITRATE 60 MG/1
30 TABLET, EXTENDED RELEASE ORAL DAILY
Refills: 0 | Status: DISCONTINUED | OUTPATIENT
Start: 2022-02-24 | End: 2022-02-27

## 2022-02-24 RX ORDER — GABAPENTIN 400 MG/1
600 CAPSULE ORAL THREE TIMES A DAY
Refills: 0 | Status: DISCONTINUED | OUTPATIENT
Start: 2022-02-24 | End: 2022-03-07

## 2022-02-24 RX ORDER — ALBUTEROL 90 UG/1
2 AEROSOL, METERED ORAL EVERY 6 HOURS
Refills: 0 | Status: DISCONTINUED | OUTPATIENT
Start: 2022-02-24 | End: 2022-03-07

## 2022-02-24 RX ORDER — HYDROCHLOROTHIAZIDE 25 MG
25 TABLET ORAL DAILY
Refills: 0 | Status: DISCONTINUED | OUTPATIENT
Start: 2022-02-24 | End: 2022-03-07

## 2022-02-24 RX ORDER — ACETAMINOPHEN 500 MG
975 TABLET ORAL EVERY 8 HOURS
Refills: 0 | Status: COMPLETED | OUTPATIENT
Start: 2022-02-24 | End: 2022-02-27

## 2022-02-24 RX ORDER — OXYCODONE HYDROCHLORIDE 5 MG/1
5 TABLET ORAL EVERY 6 HOURS
Refills: 0 | Status: DISCONTINUED | OUTPATIENT
Start: 2022-02-24 | End: 2022-02-26

## 2022-02-24 RX ORDER — METOPROLOL TARTRATE 50 MG
50 TABLET ORAL
Refills: 0 | Status: DISCONTINUED | OUTPATIENT
Start: 2022-02-24 | End: 2022-03-07

## 2022-02-24 RX ORDER — OXYCODONE HYDROCHLORIDE 5 MG/1
5 TABLET ORAL ONCE
Refills: 0 | Status: DISCONTINUED | OUTPATIENT
Start: 2022-02-24 | End: 2022-02-24

## 2022-02-24 RX ORDER — LANOLIN ALCOHOL/MO/W.PET/CERES
3 CREAM (GRAM) TOPICAL AT BEDTIME
Refills: 0 | Status: DISCONTINUED | OUTPATIENT
Start: 2022-02-24 | End: 2022-03-07

## 2022-02-24 RX ORDER — DEXAMETHASONE 0.5 MG/5ML
10 ELIXIR ORAL EVERY 6 HOURS
Refills: 0 | Status: DISCONTINUED | OUTPATIENT
Start: 2022-02-24 | End: 2022-02-25

## 2022-02-24 RX ORDER — ENOXAPARIN SODIUM 100 MG/ML
40 INJECTION SUBCUTANEOUS DAILY
Refills: 0 | Status: DISCONTINUED | OUTPATIENT
Start: 2022-02-24 | End: 2022-03-07

## 2022-02-24 RX ADMIN — GABAPENTIN 600 MILLIGRAM(S): 400 CAPSULE ORAL at 21:19

## 2022-02-24 RX ADMIN — Medication 975 MILLIGRAM(S): at 21:22

## 2022-02-24 RX ADMIN — ENOXAPARIN SODIUM 40 MILLIGRAM(S): 100 INJECTION SUBCUTANEOUS at 14:15

## 2022-02-24 RX ADMIN — ALBUTEROL 2 PUFF(S): 90 AEROSOL, METERED ORAL at 12:56

## 2022-02-24 RX ADMIN — ISOSORBIDE MONONITRATE 30 MILLIGRAM(S): 60 TABLET, EXTENDED RELEASE ORAL at 12:37

## 2022-02-24 RX ADMIN — OXYCODONE HYDROCHLORIDE 5 MILLIGRAM(S): 5 TABLET ORAL at 23:45

## 2022-02-24 RX ADMIN — Medication 25 MILLIGRAM(S): at 12:37

## 2022-02-24 RX ADMIN — POLYETHYLENE GLYCOL 3350 17 GRAM(S): 17 POWDER, FOR SOLUTION ORAL at 21:18

## 2022-02-24 RX ADMIN — Medication 15 MILLIGRAM(S): at 05:45

## 2022-02-24 RX ADMIN — OXYCODONE HYDROCHLORIDE 5 MILLIGRAM(S): 5 TABLET ORAL at 23:44

## 2022-02-24 RX ADMIN — OXYCODONE HYDROCHLORIDE 5 MILLIGRAM(S): 5 TABLET ORAL at 14:15

## 2022-02-24 RX ADMIN — GABAPENTIN 600 MILLIGRAM(S): 400 CAPSULE ORAL at 12:46

## 2022-02-24 RX ADMIN — Medication 975 MILLIGRAM(S): at 12:38

## 2022-02-24 RX ADMIN — Medication 15 MILLIGRAM(S): at 05:27

## 2022-02-24 RX ADMIN — Medication 975 MILLIGRAM(S): at 21:19

## 2022-02-24 RX ADMIN — OXYCODONE HYDROCHLORIDE 5 MILLIGRAM(S): 5 TABLET ORAL at 14:31

## 2022-02-24 RX ADMIN — Medication 975 MILLIGRAM(S): at 12:46

## 2022-02-24 NOTE — H&P ADULT - PROBLEM SELECTOR PLAN 2
on exam RLE and RUE strength appears slightly more diminished compared to LUE and LLE  pt denies hx of CVA  obtain CT head to r/o chronic CVA  PT consult

## 2022-02-24 NOTE — SWALLOW BEDSIDE ASSESSMENT ADULT - SWALLOW EVAL: DIAGNOSIS
1) Functional oral stage of swallow for regular solids, puree, moderately thick fluids, mildly thick fluids, and thin fluids marked by adequate acceptance, bolus manipulation, mastication, and coordination. Adequate bolus collection and timely anterior to posterior oral transit/transfer noted. 2) Moderate to Severe pharyngeal dysphagia for regular solids, puree, moderately thick liquids, mildly thick liquids, and thin liquids marked by a suspected delayed pharyngeal swallow trigger with hyolaryngeal elevation noted upon digital palpation. Patient with multiple swallows noted for regular solid and puree consistencies possibly suggestive of pharyngeal stasis. Patient with inconsistent delayed coughing following all consistencies, suggestive of possible airway penetration/aspiration. Patient with complaints of odynophagia when swallowing, and "stuck" sensation in the throat.

## 2022-02-24 NOTE — ED PROVIDER NOTE - OBJECTIVE STATEMENT
73 yo F hx of thyroid tumor (currently working up outpt), asthma, angina, arrhythmia, RA p/w b/l LE pain. Pt reports that pain has been occurring for the past few weeks. Describes it as being like an "achy tooth." Progressively getting worse and limiting ambulation. Pt states she came in to ED today after being advised to w/u pain after PMD appointment. Denies fevers/chills, SOB/CP, abd pain, n/v/d. 71 yo F hx of thyroid tumor (currently working up outpt), asthma, angina, ?arrhythmia, RA p/w b/l LE pain. Pt reports that pain has been occurring for the past few weeks. Describes it as being like an "achy tooth." Progressively getting worse and limiting ambulation. Pt states she came in to ED today after being advised to w/u pain after PMD appointment. Denies fevers/chills, SOB/CP, abd pain, n/v/d.

## 2022-02-24 NOTE — ED PROVIDER NOTE - CLINICAL SUMMARY MEDICAL DECISION MAKING FREE TEXT BOX
73 yo F hx of thyroid tumor (currently working up outpt), asthma, angina, arrhythmia, RA p/w b/l LE pain. Plan for basic labs, xray feet, outpt podiatry f/u. 73 yo F hx of thyroid tumor (currently working up outpt), asthma, angina, ?arrhythmia, RA p/w b/l LE pain. Plan for basic labs, xray feet, outpt podiatry f/u.

## 2022-02-24 NOTE — SWALLOW BEDSIDE ASSESSMENT ADULT - SWALLOW EVAL: RECOMMENDED DIET
1) Oral nutrition is contraindicated at this time given aforementioned findings. 2) Consider short-term non-oral means of nutrition. 3) Cinesophagram

## 2022-02-24 NOTE — H&P ADULT - PROBLEM SELECTOR PLAN 3
patient reports undergoing OP w/u w/ Dr. Magdaleno w/ scheduled bx next week  endorses vague swallowing difficulties due to nodule   start bite size diet, SLP marry  f/u TSH

## 2022-02-24 NOTE — H&P ADULT - PROBLEM SELECTOR PLAN 4
resume home dose HCTZ, metop, imdur resume home dose HCTZ, metop, imdur  please obtain collateral from PCP Dr. Frederick regarding stress test results  EKG non ischemic

## 2022-02-24 NOTE — CONSULT NOTE ADULT - TIME BILLING
Please note that over 50 minutes of time was spent in care of this patient including  - review of imaging  - previsit preparation  - in person visit  - post visit documentation  - discussion with colleagues

## 2022-02-24 NOTE — CONSULT NOTE ADULT - ATTENDING COMMENTS
Agree with above. The patient states to me that she has struggled since last seeing me in the office. She is able to walk with a walker. She feels significant back pain. Understands the nature of her current condition. We will proceed with MRI of her neural axis to ensure no new areas of spinal stenosis which may be contributing to her current symptoms.

## 2022-02-24 NOTE — SWALLOW BEDSIDE ASSESSMENT ADULT - COMMENTS
As per HPI, pt is a "72F with pmh of HTN, asthma, RA, chronic low back pain, R. thyroid nodule pain presents to ED for b/l LE pain. Patient reports b/l LE sharp, shooting pain started ~1.5 months ago, associated with burning sensation, numbness and weakness. She states at baseline ambulates with a walker but since worsening of LE pain has become non ambulatory. In addition states undergoing R. thyroid nodule w.u as OP w/ Dr. Magdaleno, scheduled for bx next week. She states because of thyroid nodule has some difficulty with swallowing. She has not seen a physician in a long time and due to multiple medical complaints followed up with a PCP Dr. Frederick yesterday who performed stress test and other w/u. Patient states results are not back yet."    WBC is WFL. Patient seen at bedside in the EDU, awake/alert and oriented, during clinical swallow assessment this PM. Patient able to follow directions and answer questions. Patient with complaints of coughing when eating, odynophagia, and "stuck" sensation at the level of the throat. Patient reported "It hurts when I swallow because of my thyroid nodule". Patient was cooperative and accepted all PO trials.

## 2022-02-24 NOTE — ED PROVIDER NOTE - ATTENDING CONTRIBUTION TO CARE
71 yo F hx of thyroid tumor (currently working up outpt), asthma, angina, ?arrhythmia, RA p/w b/l LE pain. Pt reports that pain has been occurring for the past few weeks. Describes it as being like an "achy tooth." Progressively getting worse and limiting ambulation. Pt states she came in to ED today after being advised to w/u pain after PMD appointment. Denies fevers/chills, SOB/CP, abd pain, n/v/d.  pt unable to ambulate secondary to worsening acute on chronic foot pain. pt denies trauma, swelling, electrical type pain. pt with no overlying rash or skin changes.

## 2022-02-24 NOTE — SWALLOW BEDSIDE ASSESSMENT ADULT - ASR SWALLOW RECOMMEND DIAG
Recommend Cinesophagram to objectively to assess if inconsistent cough is dysphagia related and given patient with complaints of globus sensation and odynophagia./VFSS/MBS

## 2022-02-24 NOTE — ED PROVIDER NOTE - PHYSICAL EXAMINATION
CONSTITUTIONAL: alert and active in no apparent distress  HEENT: head atraumatic; normal cephalic shape; no conjunctivitis or scleral icterus; EOMI; neck supple w/ FROM  CARDIAC: regular rate & rhythm; normal S1, S2; no murmurs, rubs or gallops.  RESPIRATORY: breath sounds clear to auscultation bilaterally; no distress present, no crackles, wheezes, rales, rhonchi, retractions, or tachypnea; normal rate and effort.  GASTROINTESTINAL: abdomen soft, non-tender, & non-distended; no organomegaly or masses; no HSM appreciated; normoactive bowel sounds.  SKIN: cap refill brisk; skin warm, dry and intact; no evidence of rash.  BACK: no vertebral or paraspinal tenderness along entire spine; no CVAT.  MSK: feet appear dry, swollen, erythematous; 2+ peripheral pulses.  NEURO: alert; interactive; no focal deficits.

## 2022-02-24 NOTE — ED ADULT NURSE NOTE - OBJECTIVE STATEMENT
pt presents to room 9, complaining of bilateral lower extremity pain worsening this last week. Patient states pain starts in the groin and radiates to her groin. Bilateral LE extremity swelling +1 edema noted. PMH angina, fibroids, asthma. A&04, ambulatory at [facility], Respirations even and unlabored. Lung sounds clear with equal chest rise bilaterally. ABD is soft, non tender, non distended with normal active bowel sounds. No complaints of chest pain, headache, nausea, dizziness, vomiting  SOB, fever, chills verbalized. awaiting further orders. pt presents to room 9, complaining of bilateral lower extremity pain worsening this last week. Patient states pain starts in the foot and radiates to her groin. Bilateral LE extremity swelling +1 edema noted. PMH angina, fibroids, asthma. A&04, ambulatory at [facility], Respirations even and unlabored. Lung sounds clear with equal chest rise bilaterally. ABD is soft, non tender, non distended with normal active bowel sounds. No complaints of chest pain, headache, nausea, dizziness, vomiting  SOB, fever, chills verbalized. 20G RAC, medicated as ordered, awaiting further orders.

## 2022-02-24 NOTE — ED PROVIDER NOTE - NSFOLLOWUPINSTRUCTIONS_ED_ALL_ED_FT
You were seen in the emergency department for pain in your feet.    There were no fractures seen on XRay. Please take tylenol and motrin as needed for pain.    Please follow up with a podiatrist outpatient within a week of discharge.    Please return to the ED if you develop worsening foot pain uncontrolled with tylenol/motrin,

## 2022-02-24 NOTE — ED PROVIDER NOTE - NS ED ROS FT
GENERAL: no fever, chills  HEENT: no throat pain, cough, congestion, dysphagia  CARDIAC: no chest pain, palpitations  PULM: no shortness of breath, cough, wheezing   GI: no abdominal pain, nausea, vomiting, diarrhea, constipation  : no dysuria, frequency  NEURO: no headache, lightheadedness  MSK: b/l foot pain  SKIN: no rashes, no ulcers  HEME: no active bleeding, no supraclavicular LAD

## 2022-02-24 NOTE — ED ADULT NURSE REASSESSMENT NOTE - NS ED NURSE REASSESS COMMENT FT1
pt received A&ox4, complaining of BL lower extremities numbness. Pt denies chest pain and SOB.  BL pedal pulses palpable. breathing is spontaneous and unlabored. pt awaiting bed placement. will continue to monitor.

## 2022-02-24 NOTE — H&P ADULT - PROBLEM SELECTOR PLAN 1
may have developed tolerance to current dose of gabapentin contributing to worsening LE neuropathic pain  increase gabapentin to 600 mg TID  check LE doppler   Xray negative for b/l foot fracture  PO tylenol 975 mg TID x 3days  check A1c, TSH, Vit D, Vit B6/B12

## 2022-02-24 NOTE — H&P ADULT - NSHPPHYSICALEXAM_GEN_ALL_CORE
Vital Signs Last 24 Hrs  T(C): 36.8 (24 Feb 2022 10:19), Max: 36.9 (24 Feb 2022 06:56)  T(F): 98.3 (24 Feb 2022 10:19), Max: 98.5 (24 Feb 2022 06:56)  HR: 84 (24 Feb 2022 10:19) (76 - 88)  BP: 146/81 (24 Feb 2022 10:19) (121/64 - 173/77)  BP(mean): --  RR: 20 (24 Feb 2022 10:19) (15 - 20)  SpO2: 100% (24 Feb 2022 10:19) (100% - 100%)    General: NAD  Neurology: 3/5 RLE and RUE strength, reduced sensation to b/l LE, cn 2-12 intact  Eyes: PERRL, sclera clear  ENT/Neck: Neck supple, trachea midline, No JVD, Gross hearing intact  Respiratory: CTA B/L, No wheezing, rales, rhonchi  CV: RRR, S1S2, no murmurs, rubs or gallops  Abdominal: Soft, NT, ND +BS,   Extremities:  No edema, +b/l calf TTP, no erythema or ulcers  Skin: dry skin b/l foot

## 2022-02-24 NOTE — H&P ADULT - ASSESSMENT
72F with pmh of HTN, asthma, RA, chronic low back pain, R. thyroid nodule admitted for LE pain r/o DVT vs worsening neuropathic pain

## 2022-02-24 NOTE — SWALLOW BEDSIDE ASSESSMENT ADULT - ASR SWALLOW REFERRAL
MD to consider ENT consult given patient reporting odynophagia, globus sensation, and hx of thyroid nodule/ENT

## 2022-02-24 NOTE — H&P ADULT - HISTORY OF PRESENT ILLNESS
72F with pmh of HTN, asthma, RA, chronic low back pain, R. thyroid nodule pain presents to ED for b/l LE pain. Patient reports b/l LE sharp, shooting pain started ~1.5 months ago, associated with burning sensation, numbness and weakness. She states at baseline ambulates with a walker but since worsening of LE pain has become non ambulatory. In addition states undergoing R. thyroid nodule w.u as OP w/ Dr. Magdaleno, scheduled for bx next week. She states because of thyroid nodule has some difficulty with swallowing. She has not seen a physician in a long time and due to multiple medical complaints followed up with a PCP Dr. Frederick yesterday who performed stress test and other w/u. Patient states results are not back yet.    Denies cp, sob, N/V/D, abd pain,  sxs, LE edema.

## 2022-02-24 NOTE — CONSULT NOTE ADULT - SUBJECTIVE AND OBJECTIVE BOX
Orthopedic Spine Surgery Note    72y Female PMH HTN, asthma, RA, chronic low back pain s/p T10-pelvis fusion in 2017 ([facility]) who presents w/ acute on chronic low back pain/radiculopathy. Patient reports exacerbation of BL LE pain, sharp and shooting at times down the LEs bilaterally. Reports numbness/tingling in the feet. Having more difficulty getting around due to pain. Denies bowel/bladder incontinence and saddle anesthesia. Denies fevers/chills. No other complaints at this time. Patient ambulates at baseline with walker, but has been unable to complete ADLs due to pain so she came to ED for further evaluation.   Patient has seen Dr. Brooks in office in the past, who has recommended surgery however since patient is [employer] and will not accept blood products, does not recommend such an extensive surgery due to patient being high risk. Patient last saw Dr. Brooks in 4/2021 who recommended outpatient MRI and spine injections which was not completed.     REVIEW OF SYSTEMS:    CONSTITUTIONAL: No weakness, fevers or chills  EYES/ENT: No visual changes;  No vertigo or throat pain   NECK: No pain or stiffness  RESPIRATORY: No cough, wheezing, hemoptysis; No shortness of breath  CARDIOVASCULAR: No chest pain or palpitations  GASTROINTESTINAL: No abdominal or epigastric pain. No nausea, vomiting, or hematemesis; No diarrhea or constipation. No melena or hematochezia.  GENITOURINARY: No dysuria, frequency or hematuria  NEUROLOGICAL: No numbness or weakness  SKIN: No itching, rashes      HEALTH ISSUES - PROBLEM Dx:  Neuropathy    Thyroid nodule    Hypertension    Preventive measure    Focal motor deficit            MEDICATIONS  (STANDING):  acetaminophen     Tablet .. 975 milliGRAM(s) Oral every 8 hours  enoxaparin Injectable 40 milliGRAM(s) SubCutaneous daily  gabapentin 600 milliGRAM(s) Oral three times a day  hydrochlorothiazide 25 milliGRAM(s) Oral daily  isosorbide   mononitrate ER Tablet (IMDUR) 30 milliGRAM(s) Oral daily  metoprolol tartrate 50 milliGRAM(s) Oral two times a day  polyethylene glycol 3350 17 Gram(s) Oral at bedtime      Allergies    Biaxin (Hives; Rash)    Intolerances        Gen: NAD  Resp: no increase work of breathing  Neck/Back: skin intact, no deformity  midline TTP lumbar spine,  no palpable step offs    Neuro:    Motor:                   C5                C6              C7               C8           T1   R            5/5                5/5            5/5             5/5          5/5  L             5/5               5/5             5/5             5/5          5/5                L2             L3             L4               L5            S1  R         5/5           5/5          5/5             5/5           5/5  L          5/5          5/5           5/5             5/5           5/5    Sensory:            C5         C6         C7      C8       T1        (0=absent, 1=impaired, 2=normal, NT=not testable)  R         2            2           2        2         2  L          2            2           2        2         2               L2          L3         L4      L5       S1         (0=absent, 1=impaired, 2=normal, NT=not testable)  R         2            2            2        2        2  L          2            2           2        2         2      No saddle anesthesia    Imaging:   < from: Xray Lumbar Spine AP + Lateral (02.24.22 @ 12:30) >    IMPRESSION:  Redemonstrated lower thoracic lumbosacroiliac posterior fusion hardware   including displaced and migrated left T9 separate screw with disengaged   upper end of the left interconnecting sravani. T11 separate screw also   appears partially unscrewed. Intact remaining hardware components.    Multilevel lumbar laminectomy defects again noted as well.  Vertebral body heights and alignment maintained.  Redemonstrated multilevel degenerative changes.    Generalized osteopenia otherwise no discrete lytic or blastic lesions.    Redemonstrated uterine fibroid related stippled and lobular calcified   right hemipelvic mass densities.    < end of copied text >        Orthopedic Spine Surgery Note    72y Female PMH HTN, asthma, RA, chronic low back pain s/p T10-pelvis fusion in 2017 ([facility]) who presents w/ acute on chronic low back pain/radiculopathy. Patient reports exacerbation of BL LE pain, sharp and shooting at times down the LEs bilaterally. Reports numbness/tingling in the feet. Having more difficulty getting around due to pain. Denies bowel/bladder incontinence and saddle anesthesia. Denies fevers/chills. No other complaints at this time. Patient ambulates at baseline with walker, but has been unable to complete ADLs due to pain so she came to ED for further evaluation.   Patient has seen Dr. Brooks in office in the past, who has recommended surgery however since patient is [employer] and will not accept blood products, does not recommend such an extensive surgery due to patient being high risk. Patient last saw Dr. Brooks in 4/2021 who recommended outpatient MRI and spine injections which was not completed.     REVIEW OF SYSTEMS:    CONSTITUTIONAL: No weakness, fevers or chills  EYES/ENT: No visual changes;  No vertigo or throat pain   NECK: No pain or stiffness  RESPIRATORY: No cough, wheezing, hemoptysis; No shortness of breath  CARDIOVASCULAR: No chest pain or palpitations  GASTROINTESTINAL: No abdominal or epigastric pain. No nausea, vomiting, or hematemesis; No diarrhea or constipation. No melena or hematochezia.  GENITOURINARY: No dysuria, frequency or hematuria  NEUROLOGICAL: No numbness or weakness  SKIN: No itching, rashes      HEALTH ISSUES - PROBLEM Dx:  Neuropathy    Thyroid nodule    Hypertension    Preventive measure    Focal motor deficit            MEDICATIONS  (STANDING):  acetaminophen     Tablet .. 975 milliGRAM(s) Oral every 8 hours  enoxaparin Injectable 40 milliGRAM(s) SubCutaneous daily  gabapentin 600 milliGRAM(s) Oral three times a day  hydrochlorothiazide 25 milliGRAM(s) Oral daily  isosorbide   mononitrate ER Tablet (IMDUR) 30 milliGRAM(s) Oral daily  metoprolol tartrate 50 milliGRAM(s) Oral two times a day  polyethylene glycol 3350 17 Gram(s) Oral at bedtime      Allergies    Biaxin (Hives; Rash)    Intolerances        Gen: NAD  Resp: no increase work of breathing  Neck/Back: skin intact, no deformity  midline TTP lumbar spine,  no palpable step offs    Neuro:    Motor:                   C5                C6              C7               C8           T1   R            5/5                5/5            5/5             5/5          5/5  L             5/5               5/5             5/5             5/5          5/5                L2             L3             L4               L5            S1  R         4/5           4/5          4/5             4/5           4/5  L          4/5          4/5           4/5             4/5           4/5    Sensory:            C5         C6         C7      C8       T1        (0=absent, 1=impaired, 2=normal, NT=not testable)  R         2            2           2        2         2  L          2            2           2        2         2               L2          L3         L4      L5       S1         (0=absent, 1=impaired, 2=normal, NT=not testable)  R         2            2            2        2        2  L          2            2           2        2         2      No saddle anesthesia    Imaging:   < from: Xray Lumbar Spine AP + Lateral (02.24.22 @ 12:30) >    IMPRESSION:  Redemonstrated lower thoracic lumbosacroiliac posterior fusion hardware   including displaced and migrated left T9 separate screw with disengaged   upper end of the left interconnecting sravani. T11 separate screw also   appears partially unscrewed. Intact remaining hardware components.    Multilevel lumbar laminectomy defects again noted as well.  Vertebral body heights and alignment maintained.  Redemonstrated multilevel degenerative changes.    Generalized osteopenia otherwise no discrete lytic or blastic lesions.    Redemonstrated uterine fibroid related stippled and lobular calcified   right hemipelvic mass densities.    < end of copied text >        Orthopedic Spine Surgery Note    72y Female PMH HTN, asthma, RA, chronic low back pain s/p unknown spinal surgery ([facility], 2015), T10-pelvis fusion ([facility], 2017) who presents w/ acute on chronic low back pain/radiculopathy. Patient reports exacerbation of BL LE pain, sharp and shooting at times down the LEs bilaterally. Reports numbness/tingling in the feet. Having more difficulty getting around due to pain. Denies bowel/bladder incontinence and saddle anesthesia. Denies fevers/chills. No other complaints at this time. Patient ambulates at baseline with walker, but has been unable to complete ADLs due to pain so she came to ED for further evaluation. She states she fell yesterday when her "legs gave out." No head strike or LOC, no increase in pain after the fall.   Patient has seen Dr. 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 Patient last saw Dr. Brooks in 4/2021 who recommended outpatient MRI and spine injections which was not completed.     REVIEW OF SYSTEMS:    CONSTITUTIONAL: No weakness, fevers or chills  EYES/ENT: No visual changes;  No vertigo or throat pain   NECK: No pain or stiffness  RESPIRATORY: No cough, wheezing, hemoptysis; No shortness of breath  CARDIOVASCULAR: No chest pain or palpitations  GASTROINTESTINAL: No abdominal or epigastric pain. No nausea, vomiting, or hematemesis; No diarrhea or constipation. No melena or hematochezia.  GENITOURINARY: No dysuria, frequency or hematuria  NEUROLOGICAL: No numbness or weakness  SKIN: No itching, rashes      HEALTH ISSUES - PROBLEM Dx:  Neuropathy    Thyroid nodule    Hypertension    Focal motor deficit            MEDICATIONS  (STANDING):  acetaminophen     Tablet .. 975 milliGRAM(s) Oral every 8 hours  enoxaparin Injectable 40 milliGRAM(s) SubCutaneous daily  gabapentin 600 milliGRAM(s) Oral three times a day  hydrochlorothiazide 25 milliGRAM(s) Oral daily  isosorbide   mononitrate ER Tablet (IMDUR) 30 milliGRAM(s) Oral daily  metoprolol tartrate 50 milliGRAM(s) Oral two times a day  polyethylene glycol 3350 17 Gram(s) Oral at bedtime      Allergies    Biaxin (Hives; Rash)    Intolerances        Gen: NAD  Resp: no increase work of breathing  Neck/Back: skin intact, no deformity, well-healed surgical scars  midline TTP lumbar spine,  no palpable step offs    Neuro:    Motor:                   C5                C6              C7               C8           T1   R            5/5                5/5            5/5             5/5          5/5  L             5/5               5/5             5/5             5/5          5/5                L2             L3             L4               L5            S1  R         5/5           4/5          4/5             4/5           4/5  L          5/5          4/5           4/5             4/5           5/5    Sensory:            C5         C6         C7      C8       T1        (0=absent, 1=impaired, 2=normal, NT=not testable)  R         2            2           2        2         2  L          2            2           2        2         2               L2          L3         L4      L5       S1         (0=absent, 1=impaired, 2=normal, NT=not testable)  R         1            1           1        1        1  L          1            1           1        1        1      No saddle anesthesia  +rectal tone    Imaging:   < from: Xray Lumbar Spine AP + Lateral (02.24.22 @ 12:30) >    IMPRESSION:  Redemonstrated lower thoracic lumbosacroiliac posterior fusion hardware   including displaced and migrated left T9 separate screw with disengaged   upper end of the left interconnecting sravani. T11 separate screw also   appears partially unscrewed. Intact remaining hardware components.    Multilevel lumbar laminectomy defects again noted as well.  Vertebral body heights and alignment maintained.  Redemonstrated multilevel degenerative changes.    Generalized osteopenia otherwise no discrete lytic or blastic lesions.    Redemonstrated uterine fibroid related stippled and lobular calcified   right hemipelvic mass densities.    < end of copied text >        Orthopedic Spine Surgery Note    72y Female PMH HTN, asthma, RA, chronic low back pain s/p unknown spinal surgery ([facility], 2015), T10-pelvis fusion ([facility], 2017) who presents w/ acute on chronic low back pain/radiculopathy. Patient reports exacerbation of BL LE pain, sharp and shooting at times down the LEs bilaterally. Reports numbness/tingling in the feet. Having more difficulty getting around due to pain. Denies bowel/bladder incontinence and saddle anesthesia. Denies fevers/chills. No other complaints at this time. Patient ambulates at baseline with walker, but has been unable to complete ADLs due to pain so she came to ED for further evaluation. She states she fell yesterday when her "legs gave out." No head strike or LOC, no increase in pain after the fall.   Patient has seen Dr. Brooks in office in the past, who has recommended surgery however since patient is [employer] and will not accept blood products, does not recommend such an extensive surgery due to patient being high risk. Patient last saw Dr. Brooks in 4/2021 who recommended outpatient MRI and spine injections which was not completed.     REVIEW OF SYSTEMS:    CONSTITUTIONAL: No weakness, fevers or chills  EYES/ENT: No visual changes;  No vertigo or throat pain   NECK: No pain or stiffness  RESPIRATORY: No cough, wheezing, hemoptysis; No shortness of breath  CARDIOVASCULAR: No chest pain or palpitations  GASTROINTESTINAL: No abdominal or epigastric pain. No nausea, vomiting, or hematemesis; No diarrhea or constipation. No melena or hematochezia.  GENITOURINARY: No dysuria, frequency or hematuria  NEUROLOGICAL: No numbness or weakness  SKIN: No itching, rashes      HEALTH ISSUES - PROBLEM Dx:  Neuropathy    Thyroid nodule    Hypertension    Focal motor deficit            MEDICATIONS  (STANDING):  acetaminophen     Tablet .. 975 milliGRAM(s) Oral every 8 hours  enoxaparin Injectable 40 milliGRAM(s) SubCutaneous daily  gabapentin 600 milliGRAM(s) Oral three times a day  hydrochlorothiazide 25 milliGRAM(s) Oral daily  isosorbide   mononitrate ER Tablet (IMDUR) 30 milliGRAM(s) Oral daily  metoprolol tartrate 50 milliGRAM(s) Oral two times a day  polyethylene glycol 3350 17 Gram(s) Oral at bedtime      Allergies    Biaxin (Hives; Rash)    Intolerances        Gen: NAD  Resp: no increase work of breathing  Neck/Back: skin intact, no deformity, well-healed surgical scars  midline TTP lumbar spine,  no palpable step offs    Neuro:    Motor:                   C5                C6              C7               C8           T1   R            5/5                5/5            5/5             5/5          5/5  L             5/5               5/5             5/5             5/5          5/5                L2             L3             L4               L5            S1  R         5/5           4/5          4/5             5/5           5/5  L          5/5          4/5           4/5             5/5           5/5    Sensory:            C5         C6         C7      C8       T1        (0=absent, 1=impaired, 2=normal, NT=not testable)  R         2            2           2        2         2  L          2            2           2        2         2               L2          L3         L4      L5       S1         (0=absent, 1=impaired, 2=normal, NT=not testable)  R         1            1           1        1        1  L          1            1           1        1        1      No saddle anesthesia  +rectal tone    Imaging:   < from: Xray Lumbar Spine AP + Lateral (02.24.22 @ 12:30) >    IMPRESSION:  Redemonstrated lower thoracic lumbosacroiliac posterior fusion hardware   including displaced and migrated left T9 separate screw with disengaged   upper end of the left interconnecting sravani. T11 separate screw also   appears partially unscrewed. Intact remaining hardware components.    Multilevel lumbar laminectomy defects again noted as well.  Vertebral body heights and alignment maintained.  Redemonstrated multilevel degenerative changes.    Generalized osteopenia otherwise no discrete lytic or blastic lesions.    Redemonstrated uterine fibroid related stippled and lobular calcified   right hemipelvic mass densities.    < end of copied text >

## 2022-02-24 NOTE — CONSULT NOTE ADULT - ASSESSMENT
AP: 72yFemale with acute on chronic low back pain/lumbar radiculopathy    1. Pain control  2. WBAT with assistive devices as needed  3. Dvt ppx  4. PT/OT as tolerated  5. Above to be discussed with orthopedic surgery attending Dr. Brooks.   6. Will update with further recs. Please notify ortho with any questions.    AP: 72yFemale with acute on chronic low back pain/lumbar radiculopathy and known stable hardware failure, no concern for acute cord compression    - MRI CTL spine while in house  - Pain control  - WBAT with assistive devices as needed  - Recommend steroid taper:    --10mg decadron IV q6h x24h (if patient is being discharged, may go directly to PO)    --6mg decadron PO q4h x24h    --4mg decadron PO q4h x24h    --3mg decadron PO q4h x24h    --2mg decadron PO q4h x24h    --1mg decadron PO q4h x24h   - PT/OT as tolerated, OOB    Above discussed with orthopedic surgery attending Dr. Brooks who is in agreement with plan. Please notify ortho with any questions.    AP: 72yFemale with acute on chronic low back pain/lumbar radiculopathy and known stable hardware failure, no concern for acute cord compression    - MRI CTL spine while in house, w and wo contrast  - Pain control  - WBAT with assistive devices as needed  - Recommend steroid taper:    --10mg decadron IV q6h x24h (if patient is being discharged, may go directly to PO)    --6mg decadron PO q4h x24h    --4mg decadron PO q4h x24h    --3mg decadron PO q4h x24h    --2mg decadron PO q4h x24h    --1mg decadron PO q4h x24h   - PT/OT as tolerated, OOB    Above discussed with orthopedic surgery attending Dr. Brooks who is in agreement with plan. Please notify ortho with any questions.

## 2022-02-24 NOTE — H&P ADULT - NSHPREVIEWOFSYSTEMS_GEN_ALL_CORE
ROS:    Constitutional: [ ] fevers [ ] chills [ ] weight loss [ ] weight gain  HEENT: [ ] dry eyes [ ] eye irritation [ ] postnasal drip [ ] nasal congestion  CV: [ ] chest pain [ ] orthopnea [ ] palpitations [ ] murmur  Resp: [ ] cough [ ] shortness of breath [ ] dyspnea [ ] wheezing [ ] sputum [ ] hemoptysis  GI: [ ] nausea [ ] vomiting [ ] diarrhea [ ] constipation [ ] abd pain [ ] dysphagia   : [ ] dysuria [ ] nocturia [ ] hematuria [ ] increased urinary frequency  Musculoskeletal: [X ] back pain [ ] myalgias [ ] arthralgias [ ] fracture  Skin: [ ] rash [ ] itch  Neurological: [ ] headache [ ] dizziness [ ] syncope [X ] weakness [ ] numbness  Psychiatric: [ ] anxiety [ ] depression  Endocrine: [ ] diabetes [ X] thyroid problem  Hematologic/Lymphatic: [ ] anemia [ ] bleeding problem  Allergic/Immunologic: [ ] itchy eyes [ ] nasal discharge [ ] hives [ ] angioedema  [ ] All other systems negative  [ ] Unable to assess ROS because ________

## 2022-02-24 NOTE — PHARMACOTHERAPY INTERVENTION NOTE - COMMENTS
Medication list in OMR verified with outpatient pharmacy.  Of Note:  - Pharmacy filled Metoprolol ER 50mg tabs BID  - Lipitor 10mg QD filled in Jan/2022.

## 2022-02-25 LAB
A1C WITH ESTIMATED AVERAGE GLUCOSE RESULT: 5.8 % — HIGH (ref 4–5.6)
ANION GAP SERPL CALC-SCNC: 7 MMOL/L — SIGNIFICANT CHANGE UP (ref 7–14)
BUN SERPL-MCNC: 19 MG/DL — SIGNIFICANT CHANGE UP (ref 7–23)
CALCIUM SERPL-MCNC: 9.3 MG/DL — SIGNIFICANT CHANGE UP (ref 8.4–10.5)
CHLORIDE SERPL-SCNC: 106 MMOL/L — SIGNIFICANT CHANGE UP (ref 98–107)
CHOLEST SERPL-MCNC: 150 MG/DL — SIGNIFICANT CHANGE UP
CO2 SERPL-SCNC: 25 MMOL/L — SIGNIFICANT CHANGE UP (ref 22–31)
CREAT SERPL-MCNC: 0.69 MG/DL — SIGNIFICANT CHANGE UP (ref 0.5–1.3)
ESTIMATED AVERAGE GLUCOSE: 120 — SIGNIFICANT CHANGE UP
GLUCOSE BLDC GLUCOMTR-MCNC: 131 MG/DL — HIGH (ref 70–99)
GLUCOSE BLDC GLUCOMTR-MCNC: 97 MG/DL — SIGNIFICANT CHANGE UP (ref 70–99)
GLUCOSE SERPL-MCNC: 102 MG/DL — HIGH (ref 70–99)
HCT VFR BLD CALC: 36.9 % — SIGNIFICANT CHANGE UP (ref 34.5–45)
HDLC SERPL-MCNC: 70 MG/DL — SIGNIFICANT CHANGE UP
HGB BLD-MCNC: 11.8 G/DL — SIGNIFICANT CHANGE UP (ref 11.5–15.5)
LIPID PNL WITH DIRECT LDL SERPL: 69 MG/DL — SIGNIFICANT CHANGE UP
MAGNESIUM SERPL-MCNC: 2 MG/DL — SIGNIFICANT CHANGE UP (ref 1.6–2.6)
MCHC RBC-ENTMCNC: 28.4 PG — SIGNIFICANT CHANGE UP (ref 27–34)
MCHC RBC-ENTMCNC: 32 GM/DL — SIGNIFICANT CHANGE UP (ref 32–36)
MCV RBC AUTO: 88.7 FL — SIGNIFICANT CHANGE UP (ref 80–100)
NON HDL CHOLESTEROL: 80 MG/DL — SIGNIFICANT CHANGE UP
NRBC # BLD: 0 /100 WBCS — SIGNIFICANT CHANGE UP
NRBC # FLD: 0 K/UL — SIGNIFICANT CHANGE UP
PHOSPHATE SERPL-MCNC: 2.9 MG/DL — SIGNIFICANT CHANGE UP (ref 2.5–4.5)
PLATELET # BLD AUTO: 268 K/UL — SIGNIFICANT CHANGE UP (ref 150–400)
POTASSIUM SERPL-MCNC: 4 MMOL/L — SIGNIFICANT CHANGE UP (ref 3.5–5.3)
POTASSIUM SERPL-SCNC: 4 MMOL/L — SIGNIFICANT CHANGE UP (ref 3.5–5.3)
RBC # BLD: 4.16 M/UL — SIGNIFICANT CHANGE UP (ref 3.8–5.2)
RBC # FLD: 14.9 % — HIGH (ref 10.3–14.5)
RHEUMATOID FACT SERPL-ACNC: <10 IU/ML — SIGNIFICANT CHANGE UP (ref 0–13)
SODIUM SERPL-SCNC: 138 MMOL/L — SIGNIFICANT CHANGE UP (ref 135–145)
TRIGL SERPL-MCNC: 55 MG/DL — SIGNIFICANT CHANGE UP
TSH SERPL-MCNC: 1.65 UIU/ML — SIGNIFICANT CHANGE UP (ref 0.27–4.2)
VIT B12 SERPL-MCNC: 1764 PG/ML — HIGH (ref 200–900)
WBC # BLD: 3.89 K/UL — SIGNIFICANT CHANGE UP (ref 3.8–10.5)
WBC # FLD AUTO: 3.89 K/UL — SIGNIFICANT CHANGE UP (ref 3.8–10.5)

## 2022-02-25 PROCEDURE — 74230 X-RAY XM SWLNG FUNCJ C+: CPT | Mod: 26

## 2022-02-25 RX ORDER — DEXAMETHASONE 0.5 MG/5ML
4 ELIXIR ORAL EVERY 4 HOURS
Refills: 0 | Status: DISCONTINUED | OUTPATIENT
Start: 2022-02-26 | End: 2022-02-26

## 2022-02-25 RX ORDER — DEXAMETHASONE 0.5 MG/5ML
6 ELIXIR ORAL EVERY 4 HOURS
Refills: 0 | Status: COMPLETED | OUTPATIENT
Start: 2022-02-25 | End: 2022-02-26

## 2022-02-25 RX ADMIN — ENOXAPARIN SODIUM 40 MILLIGRAM(S): 100 INJECTION SUBCUTANEOUS at 12:07

## 2022-02-25 RX ADMIN — Medication 102 MILLIGRAM(S): at 02:55

## 2022-02-25 RX ADMIN — Medication 50 MILLIGRAM(S): at 07:01

## 2022-02-25 RX ADMIN — Medication 102 MILLIGRAM(S): at 11:24

## 2022-02-25 RX ADMIN — Medication 50 MILLIGRAM(S): at 18:40

## 2022-02-25 RX ADMIN — ALBUTEROL 2 PUFF(S): 90 AEROSOL, METERED ORAL at 12:06

## 2022-02-25 RX ADMIN — GABAPENTIN 600 MILLIGRAM(S): 400 CAPSULE ORAL at 07:01

## 2022-02-25 RX ADMIN — OXYCODONE HYDROCHLORIDE 5 MILLIGRAM(S): 5 TABLET ORAL at 16:55

## 2022-02-25 RX ADMIN — Medication 25 MILLIGRAM(S): at 07:00

## 2022-02-25 RX ADMIN — GABAPENTIN 600 MILLIGRAM(S): 400 CAPSULE ORAL at 21:03

## 2022-02-25 RX ADMIN — ISOSORBIDE MONONITRATE 30 MILLIGRAM(S): 60 TABLET, EXTENDED RELEASE ORAL at 12:07

## 2022-02-25 RX ADMIN — Medication 6 MILLIGRAM(S): at 16:45

## 2022-02-25 RX ADMIN — POLYETHYLENE GLYCOL 3350 17 GRAM(S): 17 POWDER, FOR SOLUTION ORAL at 21:03

## 2022-02-25 RX ADMIN — Medication 6 MILLIGRAM(S): at 21:03

## 2022-02-25 RX ADMIN — Medication 975 MILLIGRAM(S): at 21:03

## 2022-02-25 RX ADMIN — OXYCODONE HYDROCHLORIDE 5 MILLIGRAM(S): 5 TABLET ORAL at 07:15

## 2022-02-25 RX ADMIN — GABAPENTIN 600 MILLIGRAM(S): 400 CAPSULE ORAL at 16:44

## 2022-02-25 NOTE — PHYSICAL THERAPY INITIAL EVALUATION ADULT - TRANSFER SAFETY CONCERNS NOTED: SIT/STAND, REHAB EVAL
forward flexed posture/decreased balance during turns/decreased step length/decreased weight-shifting ability

## 2022-02-25 NOTE — PHYSICAL THERAPY INITIAL EVALUATION ADULT - PERTINENT HX OF CURRENT PROBLEM, REHAB EVAL
Patient is a 78 year old female presenting to [facility] with bilateral LE weakness and pain. At baseline patient ambulates with a walker but since worsening of LE pain has become non ambulatory. PMHx: HTN, asthma, RA, chronic low back pain, R. thyroid nodule. Patient is a 78 year old female presenting to [facility] with bilateral LE weakness and pain. At baseline patient ambulates with a walker but since worsening of LE pain has become non ambulatory. PMHx: HTN, asthma, RA, chronic low back pain, Right thyroid nodule.

## 2022-02-25 NOTE — SWALLOW VFSS/MBS ASSESSMENT ADULT - COMMENTS
Per H&P 2/24/22: "72F with pmh of HTN, asthma, RA, chronic low back pain, R. thyroid nodule pain presents to ED for b/l LE pain. Patient reports b/l LE sharp, shooting pain started ~1.5 months ago, associated with burning sensation, numbness and weakness. She states at baseline ambulates with a walker but since worsening of LE pain has become non ambulatory. In addition states undergoing R. thyroid nodule w.u as OP w/ Dr. Magdaleno, scheduled for bx next week. She states because of thyroid nodule has some difficulty with swallowing. She has not seen a physician in a long time and due to multiple medical complaints followed up with a PCP Dr. Frederick yesterday who performed stress test and other w/u. Patient states results are not back yet."     Patient is familiar to this department as the patient was seen yesterday for a bedside swallow evaluation with recommendation of NPO and consideration for non-oral means of nutrition and a cinesophagram. Refer to full report for further details.     Patient received in Radiology Suite awake, alert and communicative. Patient denies symptoms of dysphagia, though reports occasional coughing at baseline and occasionally during meals. Patient noted with throat clearing/coughing prior to start of evaluation. Patient transferred to specialized seating unit for lateral view projection.

## 2022-02-25 NOTE — PHYSICAL THERAPY INITIAL EVALUATION ADULT - IMPAIRMENTS CONTRIBUTING TO GAIT DEVIATIONS, PT EVAL
pain/impaired postural control/decreased strength impaired balance/pain/impaired postural control/decreased strength

## 2022-02-25 NOTE — SWALLOW VFSS/MBS ASSESSMENT ADULT - DIAGNOSTIC IMPRESSIONS
1. Mild oral stage deficits given thin liquids, mildly thick liquids, moderately thick liquids, puree and regular solids marked by adequate bolus containment, adequate bolus manipulation, timely mastication with adequate ability to break down solid texture and adequate anterior-posterior transport. There was trace/mild premature loss to the pyriform sinuses given thin liquids secondary to reduced tongue to palate seal. There was adequate oral clearance noted post primary swallow.   2. Functional pharyngeal stage given thin liquids, mildly thick liquids, moderately thick liquids, puree and regular solid marked by adequate tongue base retraction, timely initiation of the pharyngeal swallow trigger, adequate laryngeal elevation, 1. Mild oral stage deficits given thin liquids, mildly thick liquids, moderately thick liquids, puree and regular solids marked by adequate bolus containment, adequate bolus manipulation, timely mastication with adequate ability to break down solid texture and adequate anterior-posterior transport. There was trace/mild premature loss to the pyriform sinuses given thin liquids secondary to reduced tongue to palate seal. There was adequate oral clearance noted post primary swallow.   2. Functional pharyngeal stage given thin liquids, mildly thick liquids, moderately thick liquids, puree and regular solid marked by adequate tongue base retraction, timely initiation of the pharyngeal swallow trigger, adequate laryngeal elevation, adequate pharyngeal constriction, and adequate laryngeal vestibule closure. There was adequate pharyngeal clearance across consistencies. There was Laryngeal Penetration during the swallow given thin liquids and mildly thick liquids with retrieval and adequate airway protection maintained. There was No Aspiration before, during or after the swallow given thin liquids, mildly thick liquids, moderately thick liquids, puree or regular solids. 1. Mild oral stage deficits given thin liquids, mildly thick liquids, moderately thick liquids, puree and regular solids marked by adequate bolus containment, adequate bolus manipulation, timely mastication with adequate ability to break down solid texture and adequate anterior-posterior transport. There was trace/mild premature loss to the pyriform sinuses given thin liquids secondary to reduced tongue to palate seal. There was adequate oral clearance noted post primary swallow.   2. Functional pharyngeal stage given thin liquids, mildly thick liquids, moderately thick liquids, puree and regular solid marked by adequate tongue base retraction, timely initiation of the pharyngeal swallow trigger, adequate laryngeal elevation, adequate pharyngeal constriction, and adequate laryngeal vestibule closure. There was adequate pharyngeal clearance across consistencies. There was Laryngeal Penetration during the swallow given thin liquids and mildly thick liquids with retrieval and adequate airway protection maintained. There was No Aspiration before, during or after the swallow given thin liquids, mildly thick liquids, moderately thick liquids, puree or regular solids.    Of note: Patient noted to throat clear/cough in absence of contrast in the upper airway/trachea.

## 2022-02-25 NOTE — PHYSICAL THERAPY INITIAL EVALUATION ADULT - GAIT DISTANCE, PT EVAL
50 feet total, required two standing rest breaks secondary to shortness of breath after ambulating 25 feet./50 feet

## 2022-02-25 NOTE — PHYSICAL THERAPY INITIAL EVALUATION ADULT - GAIT DEVIATIONS NOTED, PT EVAL
forward flexed posture/decreased moustapha/decreased step length/decreased stride length/decreased weight-shifting ability

## 2022-02-25 NOTE — PHYSICAL THERAPY INITIAL EVALUATION ADULT - RANGE OF MOTION EXAMINATION, REHAB EVAL
bilateral UEs limited secondary to chronic low back pain./bilateral lower extremity ROM was WFL (within functional limits)

## 2022-02-25 NOTE — PHYSICAL THERAPY INITIAL EVALUATION ADULT - ADDITIONAL COMMENTS
Patient lives in a private house with son with no steps to enter home, 7 stairs to get to laundry room and 14 stairs to get to bedroom, prior to independent with ADLs and used a rollator to ambulate around home. Able to negotiate stairs prior to with assistance.     Patient left sitting at edge of bed in NAD with call bell within reach, and NILESH Vernon aware. Patient lives in a private house alone with no steps to enter home, 7 stairs to get to laundry room and 14 stairs to get to bedroom, prior to independent with ADLs and used a rollator to ambulate around home. Able to negotiate stairs prior to with assistance.     Patient left sitting at edge of bed in NAD with call bell within reach, and NILESH Espinoza aware.

## 2022-02-25 NOTE — SWALLOW VFSS/MBS ASSESSMENT ADULT - RECOMMENDED CONSISTENCY
1. Regular Solids with Thin Liquids  2. Aspiration/Reflux Precautions  3. Feeding Guidelines: Small sips/small bites, slow pace, upright for all meals

## 2022-02-25 NOTE — SWALLOW VFSS/MBS ASSESSMENT ADULT - ORAL PHASE COMMENTS
Adequate bolus containment, adequate bolus manipulation, timely mastication with adequate ability to break down solid texture adequate anterior-posterior transport. There was adequate oral clearance noted post primary swallow. Adequate bolus containment, adequate bolus manipulation and adequate anterior-posterior transport. There was adequate oral clearance noted post primary swallow. Adequate bolus containment, adequate bolus manipulation and adequate anterior-posterior transport. There was trace/mild premature loss to the pyriforms given thin liquids secondary to reduced tongue to palate seal. There was adequate oral clearance noted post primary swallow.

## 2022-02-26 LAB
ALBUMIN SERPL ELPH-MCNC: 3.8 G/DL — SIGNIFICANT CHANGE UP (ref 3.3–5)
ALP SERPL-CCNC: 78 U/L — SIGNIFICANT CHANGE UP (ref 40–120)
ALT FLD-CCNC: 17 U/L — SIGNIFICANT CHANGE UP (ref 4–33)
ANION GAP SERPL CALC-SCNC: 10 MMOL/L — SIGNIFICANT CHANGE UP (ref 7–14)
AST SERPL-CCNC: 15 U/L — SIGNIFICANT CHANGE UP (ref 4–32)
BILIRUB SERPL-MCNC: <0.2 MG/DL — SIGNIFICANT CHANGE UP (ref 0.2–1.2)
BUN SERPL-MCNC: 24 MG/DL — HIGH (ref 7–23)
CALCIUM SERPL-MCNC: 9.3 MG/DL — SIGNIFICANT CHANGE UP (ref 8.4–10.5)
CHLORIDE SERPL-SCNC: 105 MMOL/L — SIGNIFICANT CHANGE UP (ref 98–107)
CO2 SERPL-SCNC: 24 MMOL/L — SIGNIFICANT CHANGE UP (ref 22–31)
CREAT SERPL-MCNC: 0.62 MG/DL — SIGNIFICANT CHANGE UP (ref 0.5–1.3)
GLUCOSE SERPL-MCNC: 179 MG/DL — HIGH (ref 70–99)
HCT VFR BLD CALC: 34.1 % — LOW (ref 34.5–45)
HGB BLD-MCNC: 11 G/DL — LOW (ref 11.5–15.5)
MCHC RBC-ENTMCNC: 28.6 PG — SIGNIFICANT CHANGE UP (ref 27–34)
MCHC RBC-ENTMCNC: 32.3 GM/DL — SIGNIFICANT CHANGE UP (ref 32–36)
MCV RBC AUTO: 88.6 FL — SIGNIFICANT CHANGE UP (ref 80–100)
NRBC # BLD: 0 /100 WBCS — SIGNIFICANT CHANGE UP
NRBC # FLD: 0 K/UL — SIGNIFICANT CHANGE UP
PLATELET # BLD AUTO: 239 K/UL — SIGNIFICANT CHANGE UP (ref 150–400)
POTASSIUM SERPL-MCNC: 3.7 MMOL/L — SIGNIFICANT CHANGE UP (ref 3.5–5.3)
POTASSIUM SERPL-SCNC: 3.7 MMOL/L — SIGNIFICANT CHANGE UP (ref 3.5–5.3)
PROT SERPL-MCNC: 6.7 G/DL — SIGNIFICANT CHANGE UP (ref 6–8.3)
RBC # BLD: 3.85 M/UL — SIGNIFICANT CHANGE UP (ref 3.8–5.2)
RBC # FLD: 14.7 % — HIGH (ref 10.3–14.5)
SODIUM SERPL-SCNC: 139 MMOL/L — SIGNIFICANT CHANGE UP (ref 135–145)
WBC # BLD: 10.87 K/UL — HIGH (ref 3.8–10.5)
WBC # FLD AUTO: 10.87 K/UL — HIGH (ref 3.8–10.5)

## 2022-02-26 RX ORDER — OXYCODONE HYDROCHLORIDE 5 MG/1
5 TABLET ORAL EVERY 4 HOURS
Refills: 0 | Status: DISCONTINUED | OUTPATIENT
Start: 2022-02-26 | End: 2022-03-03

## 2022-02-26 RX ORDER — DEXAMETHASONE 0.5 MG/5ML
ELIXIR ORAL
Refills: 0 | Status: COMPLETED | OUTPATIENT
Start: 2022-02-26 | End: 2022-03-02

## 2022-02-26 RX ORDER — DEXAMETHASONE 0.5 MG/5ML
2 ELIXIR ORAL EVERY 4 HOURS
Refills: 0 | Status: COMPLETED | OUTPATIENT
Start: 2022-02-28 | End: 2022-03-01

## 2022-02-26 RX ORDER — SENNA PLUS 8.6 MG/1
2 TABLET ORAL AT BEDTIME
Refills: 0 | Status: DISCONTINUED | OUTPATIENT
Start: 2022-02-26 | End: 2022-03-07

## 2022-02-26 RX ORDER — DEXAMETHASONE 0.5 MG/5ML
4 ELIXIR ORAL EVERY 4 HOURS
Refills: 0 | Status: COMPLETED | OUTPATIENT
Start: 2022-02-26 | End: 2022-02-27

## 2022-02-26 RX ORDER — DEXAMETHASONE 0.5 MG/5ML
1 ELIXIR ORAL EVERY 4 HOURS
Refills: 0 | Status: COMPLETED | OUTPATIENT
Start: 2022-03-01 | End: 2022-03-02

## 2022-02-26 RX ORDER — DEXAMETHASONE 0.5 MG/5ML
3 ELIXIR ORAL EVERY 4 HOURS
Refills: 0 | Status: COMPLETED | OUTPATIENT
Start: 2022-02-27 | End: 2022-02-28

## 2022-02-26 RX ORDER — DEXAMETHASONE 0.5 MG/5ML
4 ELIXIR ORAL EVERY 4 HOURS
Refills: 0 | Status: DISCONTINUED | OUTPATIENT
Start: 2022-02-26 | End: 2022-02-26

## 2022-02-26 RX ORDER — PANTOPRAZOLE SODIUM 20 MG/1
40 TABLET, DELAYED RELEASE ORAL
Refills: 0 | Status: DISCONTINUED | OUTPATIENT
Start: 2022-02-26 | End: 2022-03-07

## 2022-02-26 RX ADMIN — Medication 975 MILLIGRAM(S): at 05:10

## 2022-02-26 RX ADMIN — Medication 50 MILLIGRAM(S): at 18:06

## 2022-02-26 RX ADMIN — OXYCODONE HYDROCHLORIDE 5 MILLIGRAM(S): 5 TABLET ORAL at 07:09

## 2022-02-26 RX ADMIN — Medication 50 MILLIGRAM(S): at 05:13

## 2022-02-26 RX ADMIN — OXYCODONE HYDROCHLORIDE 5 MILLIGRAM(S): 5 TABLET ORAL at 18:05

## 2022-02-26 RX ADMIN — ENOXAPARIN SODIUM 40 MILLIGRAM(S): 100 INJECTION SUBCUTANEOUS at 12:04

## 2022-02-26 RX ADMIN — Medication 6 MILLIGRAM(S): at 00:54

## 2022-02-26 RX ADMIN — Medication 4 MILLIGRAM(S): at 21:54

## 2022-02-26 RX ADMIN — Medication 4 MILLIGRAM(S): at 18:05

## 2022-02-26 RX ADMIN — GABAPENTIN 600 MILLIGRAM(S): 400 CAPSULE ORAL at 13:02

## 2022-02-26 RX ADMIN — Medication 975 MILLIGRAM(S): at 13:02

## 2022-02-26 RX ADMIN — Medication 6 MILLIGRAM(S): at 13:02

## 2022-02-26 RX ADMIN — Medication 975 MILLIGRAM(S): at 13:31

## 2022-02-26 RX ADMIN — GABAPENTIN 600 MILLIGRAM(S): 400 CAPSULE ORAL at 05:10

## 2022-02-26 RX ADMIN — Medication 6 MILLIGRAM(S): at 09:40

## 2022-02-26 RX ADMIN — Medication 25 MILLIGRAM(S): at 05:10

## 2022-02-26 RX ADMIN — Medication 6 MILLIGRAM(S): at 05:10

## 2022-02-26 RX ADMIN — Medication 975 MILLIGRAM(S): at 21:55

## 2022-02-26 RX ADMIN — OXYCODONE HYDROCHLORIDE 5 MILLIGRAM(S): 5 TABLET ORAL at 13:31

## 2022-02-26 RX ADMIN — GABAPENTIN 600 MILLIGRAM(S): 400 CAPSULE ORAL at 21:55

## 2022-02-26 RX ADMIN — OXYCODONE HYDROCHLORIDE 5 MILLIGRAM(S): 5 TABLET ORAL at 23:53

## 2022-02-26 RX ADMIN — OXYCODONE HYDROCHLORIDE 5 MILLIGRAM(S): 5 TABLET ORAL at 06:27

## 2022-02-26 RX ADMIN — OXYCODONE HYDROCHLORIDE 5 MILLIGRAM(S): 5 TABLET ORAL at 13:01

## 2022-02-26 RX ADMIN — ISOSORBIDE MONONITRATE 30 MILLIGRAM(S): 60 TABLET, EXTENDED RELEASE ORAL at 12:04

## 2022-02-26 NOTE — DIETITIAN INITIAL EVALUATION ADULT. - ORAL INTAKE PTA/DIET HISTORY
Patient reports having good appetite/PO intake PTA. Cooks meals at home and tries to follow a general healthy diet per diet recall.

## 2022-02-26 NOTE — CONSULT NOTE ADULT - SUBJECTIVE AND OBJECTIVE BOX
Neurology Consult    Reason for Consult: Patient is a 72y old  Female who presents with a chief complaint of LE pain (26 Feb 2022 10:31)      HPI:  72 obese AAF with HTN, asthma, RA, chronic low back pain s/p multiple surgeries 2015,2017,  R. thyroid nodule pain presents to ED for b/l LE pain and back pain. Patient reports b/l LE sharp, shooting pain started ~1.5 months ago, associated with burning sensation, numbness and weakness. She states at baseline ambulates with a walker but since worsening of LE pain has become non ambulatory. In addition states undergoing R. thyroid nodule w.u as OP w/ Dr. Magdaleno, scheduled for bx next week. She states because of thyroid nodule has some difficulty with swallowing. She has not seen a physician in a long time and due to multiple medical complaints followed up with a PCP Dr. Frederick yesterday who performed stress test and other w/u. Patient states results are not back yet.    Denies cp, sob, N/V/D, abd pain,  sxs, LE edema. (24 Feb 2022 11:44)       PAST MEDICAL & SURGICAL HISTORY:  Angina    Asthma    H/O sciatica  (left leg)    RA (rheumatoid arthritis)    HTN (hypertension)    Fibroid, uterine    Brain cyst    Dizziness  secondary to brain cyst    Chronic back pain  (with hx/o back surgery in 7/2015 and 1/2017)    History of back surgery  (7/2015 and 1/2017)        Allergies: Allergies    Biaxin (Hives; Rash)    Intolerances        Social History: Denies toxic habits including tobacco, ETOH or illicit drugs.    Family History: FAMILY HISTORY:  Family history of lung cancer    Family history of heart failure    Family history of renal disease    Family history of early CAD (Sibling)    . No family history of strokes    Medications: MEDICATIONS  (STANDING):  acetaminophen     Tablet .. 975 milliGRAM(s) Oral every 8 hours  dexAMETHasone     Tablet 6 milliGRAM(s) Oral every 4 hours  dexAMETHasone     Tablet 4 milliGRAM(s) Oral every 4 hours  enoxaparin Injectable 40 milliGRAM(s) SubCutaneous daily  gabapentin 600 milliGRAM(s) Oral three times a day  hydrochlorothiazide 25 milliGRAM(s) Oral daily  isosorbide   mononitrate ER Tablet (IMDUR) 30 milliGRAM(s) Oral daily  metoprolol tartrate 50 milliGRAM(s) Oral two times a day  polyethylene glycol 3350 17 Gram(s) Oral at bedtime  senna 2 Tablet(s) Oral at bedtime    MEDICATIONS  (PRN):  ALBUTerol    90 MICROgram(s) HFA Inhaler 2 Puff(s) Inhalation every 6 hours PRN Shortness of Breath and/or Wheezing  melatonin 3 milliGRAM(s) Oral at bedtime PRN Insomnia  oxyCODONE    IR 5 milliGRAM(s) Oral every 6 hours PRN Moderate - Severe Pain (4 - 10)      Review of Systems:  CONSTITUTIONAL:  No weight loss, fever, chills, weakness or fatigue.  HEENT:  Eyes:  No visual loss, blurred vision, double vision or yellow sclera. Ears, Nose, Throat:  No hearing loss, sneezing, congestion, runny nose or sore throat.  SKIN:  No rash or itching.  CARDIOVASCULAR:  No chest pain, chest pressure or chest discomfort. No palpitations or edema.  RESPIRATORY:  No shortness of breath, cough or sputum.  GASTROINTESTINAL:  No anorexia, nausea, vomiting or diarrhea. No abdominal pain or blood.  GENITOURINARY:  No burning on urination or incontinence   NEUROLOGICAL:  No headache, dizziness, syncope, paralysis, ataxia, + lower numbness/pain   MUSCULOSKELETAL:  + back pain.   HEMATOLOGIC:  No anemia, bleeding or bruising.  LYMPHATICS:  No enlarged nodes. No history of splenectomy.  PSYCHIATRIC:  No history of depression or anxiety.  ENDOCRINOLOGIC:  No reports of sweating, cold or heat intolerance. No polyuria or polydipsia.      Vitals:  Vital Signs Last 24 Hrs  T(C): 36.6 (26 Feb 2022 05:10), Max: 37.3 (25 Feb 2022 21:15)  T(F): 97.9 (26 Feb 2022 05:10), Max: 99.1 (25 Feb 2022 21:15)  HR: 78 (26 Feb 2022 05:10) (72 - 78)  BP: 158/78 (26 Feb 2022 05:10) (145/71 - 158/78)  BP(mean): --  RR: 17 (26 Feb 2022 05:10) (17 - 17)  SpO2: 100% (26 Feb 2022 05:10) (100% - 100%)    General Exam:   General Appearance: Appropriately dressed and in no acute distress       Head: Normocephalic, atraumatic and no dysmorphic features  Ear, Nose, and Throat: Moist mucous membranes  CVS: S1S2+  Resp: No SOB, no wheeze or rhonchi  GI: soft NT/ND  Extremities: No edema or cyanosis  Skin: No bruises or rashes     Neurological Exam:  Mental Status: Awake, alert and oriented x 3.  Able to follow simple and complex verbal commands. Able to name and repeat. fluent speech. No obvious aphasia or dysarthria noted.   Cranial Nerves: PERRL, EOMI, VFFC, sensation V1-V3 intact,  no obvious facial asymmetry, equal elevation of palate, scm/trap 5/5, tongue is midline on protrusion. no obvious papilledema on fundoscopic exam. hearing is grossly intact.   Motor: Normal bulk, tone and strength throughout B/L uppers 5/5 but RUE pain limited.  B/L LE at least 4/5 able to walk but pain limited worse on R than left   Sensation: Intact to light touch and pinprick throughout. no right/left confusion. no extinction to tactile on DSS. Romberg was negative.   Reflexes: 1+ throughout at biceps, brachioradialis, triceps, patellars and ankles bilaterally and equal. No clonus. R toe and L toe were both downgoing.  Coordination: No dysmetria on FNF   Gait:  walker     Data/Labs/Imaging which I personally reviewed.     Labs:     CBC Full  -  ( 26 Feb 2022 06:58 )  WBC Count : 10.87 K/uL  RBC Count : 3.85 M/uL  Hemoglobin : 11.0 g/dL  Hematocrit : 34.1 %  Platelet Count - Automated : 239 K/uL  Mean Cell Volume : 88.6 fL  Mean Cell Hemoglobin : 28.6 pg  Mean Cell Hemoglobin Concentration : 32.3 gm/dL  Auto Neutrophil # : x  Auto Lymphocyte # : x  Auto Monocyte # : x  Auto Eosinophil # : x  Auto Basophil # : x  Auto Neutrophil % : x  Auto Lymphocyte % : x  Auto Monocyte % : x  Auto Eosinophil % : x  Auto Basophil % : x    02-26    139  |  105  |  24<H>  ----------------------------<  179<H>  3.7   |  24  |  0.62    Ca    9.3      26 Feb 2022 06:58  Phos  2.9     02-25  Mg     2.00     02-25    TPro  6.7  /  Alb  3.8  /  TBili  <0.2  /  DBili  x   /  AST  15  /  ALT  17  /  AlkPhos  78  02-26    LIVER FUNCTIONS - ( 26 Feb 2022 06:58 )  Alb: 3.8 g/dL / Pro: 6.7 g/dL / ALK PHOS: 78 U/L / ALT: 17 U/L / AST: 15 U/L / GGT: x             < from: CT Head No Cont (02.24.22 @ 17:12) >    ACC: [id number] EXAM:  CT BRAIN                          PROCEDURE DATE:  02/24/2022          INTERPRETATION:  CT HEAD    CLINICAL HISTORY: r/o CVA    TECHNIQUE:  Noncontrast CT.  Axial Acquisition.  Sagittal and coronal reformations.    COMPARISON:  Compared to study dated 11/17/2016    FINDINGS:  *  HEMORRHAGE:  No evidence of intracranial hemorrhage.  *  BRAIN PARENCHYMA:  Mild patchy periventricular and subcortical white   matter ischemic changes. No mass or mass effect.  *  VENTRICLES / SHIFT:  No hydrocephalus. No midline shift.  *  EXTRA-AXIAL / BASAL CISTERNS:  No extra-axial mass. Basal cisterns   preserved.  Atherosclerotic calcifications of the cavernous internal   carotid arteries.  *  CALVARIUM AND EXTRACRANIAL SOFT TISSUES:  No depressed calvarial   fracture.  *  SINUSES, ORBITS, MASTOIDS:  The visualized paranasal sinuses and   mastoid air cells are well aerated.    IMPRESSION:  NO EVIDENCE OF AN ACUTE INTRACRANIAL HEMORRHAGE, MIDLINE SHIFT, OR   HYDROCEPHALUS. MILD WHITE MATTER ISCHEMIC CHANGES.  RECOMMEND INTERVAL FOLLOW-UP IF ACUTE ISCHEMIA REMAINS OF CLINICAL CONCERN    --- End of Report ---            HERSON RALPH MD; Attending Radiologist  This document has been electronically signed. Feb 24 2022  5:51PM    < end of copied text >  < from: CT Lumbar Spine No Cont (02.24.22 @ 22:08) >    ACC: [id number] EXAM:  CT LUMBAR SPINE                        ACC: [id number] EXAM:  CT THORACIC SPINE                          PROCEDURE DATE:  02/24/2022          INTERPRETATION:  INDICATIONS:  Weakness. Extensive spine surgery in the   past with hardware.    TECHNIQUE: Contiguous thin section axial images were obtained without   contrast. Sagittal and coronal reformatted images were obtained.    COMPARISON EXAMINATION: MR thoracic and lumbar spine 10/24/2020 and CT   abdomen and pelvis 10/24/2020    FINDINGS:  THORACIC SPINE:    VERTEBRAL BODIES:  Status post T10 through upper sacral posterior fusion   with vertebral body screws and interlocking rods in place as seen   previously. Status post multilevel laminectomies from T12 through L4   producing streak artifact and degrading images through this region.  Multilevel marginal osteophyte formation is again seen. Vertebral bodies   are normal in height.  The left T11 screw is separate from the adjacent sravani which is unchanged   in appearance from the prior CT scan. The remaining screws are attached   and the remaining hardware is intact.  ALIGNMENT:  No subluxations.  INTERVERTEBRAL DISC SPACES:  Unremarkable  NEURAL FORAMINA:  Appear patent  SPINAL CANAL:  No other intradural or extradural defects are seen.  MISCELLANEOUS:  None.    LUMBAR SPINE:    VERTEBRAL BODIES:  Normal in height  ALIGNMENT:  No subluxations.  INTERVERTEBRAL DISC SPACES:  Partially obscured by artifact  NEURAL FORAMINA:  Suboptimally evaluated  SPINAL CANAL:  No other intradural or extradural defects are seen. The   fluid collection previously seen at the laminectomy site is not well   evaluated on the current exam.  MISCELLANEOUS:  Coarsely calcified uterine fibroids are seen.    IMPRESSION:  No significant change identified from the prior exams.    Postoperative changes with multilevel laminectomies and posterior   instrumented fusion.    --- End of Report ---        JESUS MCKEON MD; Attending Radiologist  This document has been electronically signed. Feb 25 2022 11:36AM    < end of copied text >

## 2022-02-26 NOTE — DIETITIAN INITIAL EVALUATION ADULT. - OTHER INFO
72 year old female with a PMH of HTN, asthma, RA, chronic low back pain, R. thyroid nodule admitted for LE pain r/o DVT vs worsening neuropathic pain, s/p VFSS/MBS (2/25) with recommendation for regular/thin liquids per chart.    Patient reports good appetite in house, noted consuming all of breakfast per observation. No GI distress or chewing/swallowing difficulties reported. Has no food allergies. Food preferences reviewed. Reports UBW fluctuates due to fluid shifts, states a dry weight for her is around 169 lbs. ABW is 195.7 lbs. (2/24) per chart. Noted with +1 generalized edema per RN flow sheet and on diuretics per chart. No pressure injuries noted per RN flow sheet.    Recommended patient follow a low sodium diet, which she was receptive to. Reviewed recommended daily sodium intake, foods known to be high in sodium and importance of monitoring food labels.

## 2022-02-26 NOTE — DIETITIAN INITIAL EVALUATION ADULT. - PERTINENT MEDS FT
MEDICATIONS  (STANDING):  acetaminophen     Tablet .. 975 milliGRAM(s) Oral every 8 hours  dexAMETHasone     Tablet 6 milliGRAM(s) Oral every 4 hours  dexAMETHasone     Tablet 4 milliGRAM(s) Oral every 4 hours  enoxaparin Injectable 40 milliGRAM(s) SubCutaneous daily  gabapentin 600 milliGRAM(s) Oral three times a day  hydrochlorothiazide 25 milliGRAM(s) Oral daily  isosorbide   mononitrate ER Tablet (IMDUR) 30 milliGRAM(s) Oral daily  metoprolol tartrate 50 milliGRAM(s) Oral two times a day  polyethylene glycol 3350 17 Gram(s) Oral at bedtime  senna 2 Tablet(s) Oral at bedtime

## 2022-02-26 NOTE — CONSULT NOTE ADULT - ASSESSMENT
72 obese AAF with HTN, asthma, RA, brain cyst?, chronic low back pain s/p multiple surgeries 2015,2017,  R. thyroid nodule pain presents to ED for b/l LE pain and back pain. Patient reports b/l LE sharp, shooting pain started ~1.5 months ago, associated with burning sensation, numbness and weakness. trouble swallowing from thyroid nodule.   o/e walks with walker. RLE pain limited worse R>L. no overt incontinence but + stress/urge incontinence ; does not wear diaper.   follows with neuro Leander Almaraz and also ortho   CTH neg  CT T/L spine: no change from priors.  post op changes with [id number] laminectomie and instrumented fusion.     Impression: LBP with radiculopathy and possible neuropathy may be result of prior disease and surgeries  - getting decadron now. make sure on GI ppx  - gabapentin now 600mg TID for neuropathic pain. can increase as tolerated   - pain manaement.   - PT/OT  - MRI spine C/T/L spine pending.  would prioritize T/L  - ortho/nsx spine f/u  - may need outpatient emg/ncs  - check FS, glucose control <180  - GI/DVT ppx  - Counseling on diet, exercise, and medication adherence was done  - Counseling on smoking cessation and alcohol consumption offered when appropriate.  - Pain assessed and judicious use of narcotics when appropriate was discussed.    - Stroke education given when appropriate.  - Importance of fall prevention discussed.   - Differential diagnosis and plan of care discussed with patient and/or family and primary team  - Thank you for allowing me to participate in the care of this patient. Call with questions.   Celso Cunningham MD  Vascular Neurology  Office: [phone]

## 2022-02-26 NOTE — DIETITIAN INITIAL EVALUATION ADULT. - ADD RECOMMEND
Consider addition of low sodium to diet order. Follow up on nutrition education as needed. Obtain weekly weight and document PO intake to monitor trend.

## 2022-02-26 NOTE — DIETITIAN INITIAL EVALUATION ADULT. - PROBLEM SELECTOR PLAN 4
resume home dose HCTZ, metop, imdur  please obtain collateral from PCP Dr. Frederick regarding stress test results  EKG non ischemic

## 2022-02-27 LAB
ANION GAP SERPL CALC-SCNC: 11 MMOL/L — SIGNIFICANT CHANGE UP (ref 7–14)
APPEARANCE UR: CLEAR — SIGNIFICANT CHANGE UP
BILIRUB UR-MCNC: NEGATIVE — SIGNIFICANT CHANGE UP
BUN SERPL-MCNC: 18 MG/DL — SIGNIFICANT CHANGE UP (ref 7–23)
CALCIUM SERPL-MCNC: 9.5 MG/DL — SIGNIFICANT CHANGE UP (ref 8.4–10.5)
CHLORIDE SERPL-SCNC: 104 MMOL/L — SIGNIFICANT CHANGE UP (ref 98–107)
CO2 SERPL-SCNC: 24 MMOL/L — SIGNIFICANT CHANGE UP (ref 22–31)
COLOR SPEC: YELLOW — SIGNIFICANT CHANGE UP
CREAT SERPL-MCNC: 0.6 MG/DL — SIGNIFICANT CHANGE UP (ref 0.5–1.3)
DIFF PNL FLD: NEGATIVE — SIGNIFICANT CHANGE UP
GLUCOSE SERPL-MCNC: 147 MG/DL — HIGH (ref 70–99)
GLUCOSE UR QL: NEGATIVE — SIGNIFICANT CHANGE UP
HCT VFR BLD CALC: 34.9 % — SIGNIFICANT CHANGE UP (ref 34.5–45)
HGB BLD-MCNC: 11.4 G/DL — LOW (ref 11.5–15.5)
KETONES UR-MCNC: NEGATIVE — SIGNIFICANT CHANGE UP
LEUKOCYTE ESTERASE UR-ACNC: ABNORMAL
MAGNESIUM SERPL-MCNC: 2 MG/DL — SIGNIFICANT CHANGE UP (ref 1.6–2.6)
MCHC RBC-ENTMCNC: 28.3 PG — SIGNIFICANT CHANGE UP (ref 27–34)
MCHC RBC-ENTMCNC: 32.7 GM/DL — SIGNIFICANT CHANGE UP (ref 32–36)
MCV RBC AUTO: 86.6 FL — SIGNIFICANT CHANGE UP (ref 80–100)
NITRITE UR-MCNC: NEGATIVE — SIGNIFICANT CHANGE UP
NRBC # BLD: 0 /100 WBCS — SIGNIFICANT CHANGE UP
NRBC # FLD: 0 K/UL — SIGNIFICANT CHANGE UP
PH UR: 6 — SIGNIFICANT CHANGE UP (ref 5–8)
PHOSPHATE SERPL-MCNC: 2.7 MG/DL — SIGNIFICANT CHANGE UP (ref 2.5–4.5)
PLATELET # BLD AUTO: 276 K/UL — SIGNIFICANT CHANGE UP (ref 150–400)
POTASSIUM SERPL-MCNC: 3.8 MMOL/L — SIGNIFICANT CHANGE UP (ref 3.5–5.3)
POTASSIUM SERPL-SCNC: 3.8 MMOL/L — SIGNIFICANT CHANGE UP (ref 3.5–5.3)
PROT UR-MCNC: ABNORMAL
RBC # BLD: 4.03 M/UL — SIGNIFICANT CHANGE UP (ref 3.8–5.2)
RBC # FLD: 14.8 % — HIGH (ref 10.3–14.5)
SODIUM SERPL-SCNC: 139 MMOL/L — SIGNIFICANT CHANGE UP (ref 135–145)
SP GR SPEC: 1.04 — SIGNIFICANT CHANGE UP (ref 1–1.05)
UROBILINOGEN FLD QL: SIGNIFICANT CHANGE UP
WBC # BLD: 13.41 K/UL — HIGH (ref 3.8–10.5)
WBC # FLD AUTO: 13.41 K/UL — HIGH (ref 3.8–10.5)

## 2022-02-27 PROCEDURE — 93010 ELECTROCARDIOGRAM REPORT: CPT

## 2022-02-27 RX ORDER — CEFTRIAXONE 500 MG/1
1000 INJECTION, POWDER, FOR SOLUTION INTRAMUSCULAR; INTRAVENOUS EVERY 24 HOURS
Refills: 0 | Status: COMPLETED | OUTPATIENT
Start: 2022-02-27 | End: 2022-03-01

## 2022-02-27 RX ORDER — FAMOTIDINE 10 MG/ML
20 INJECTION INTRAVENOUS ONCE
Refills: 0 | Status: COMPLETED | OUTPATIENT
Start: 2022-02-27 | End: 2022-02-27

## 2022-02-27 RX ORDER — ISOSORBIDE MONONITRATE 60 MG/1
30 TABLET, EXTENDED RELEASE ORAL
Refills: 0 | Status: DISCONTINUED | OUTPATIENT
Start: 2022-02-27 | End: 2022-03-07

## 2022-02-27 RX ADMIN — OXYCODONE HYDROCHLORIDE 5 MILLIGRAM(S): 5 TABLET ORAL at 22:59

## 2022-02-27 RX ADMIN — GABAPENTIN 600 MILLIGRAM(S): 400 CAPSULE ORAL at 05:36

## 2022-02-27 RX ADMIN — Medication 4 MILLIGRAM(S): at 01:33

## 2022-02-27 RX ADMIN — OXYCODONE HYDROCHLORIDE 5 MILLIGRAM(S): 5 TABLET ORAL at 00:53

## 2022-02-27 RX ADMIN — ENOXAPARIN SODIUM 40 MILLIGRAM(S): 100 INJECTION SUBCUTANEOUS at 11:04

## 2022-02-27 RX ADMIN — Medication 975 MILLIGRAM(S): at 05:37

## 2022-02-27 RX ADMIN — Medication 4 MILLIGRAM(S): at 13:40

## 2022-02-27 RX ADMIN — GABAPENTIN 600 MILLIGRAM(S): 400 CAPSULE ORAL at 21:55

## 2022-02-27 RX ADMIN — OXYCODONE HYDROCHLORIDE 5 MILLIGRAM(S): 5 TABLET ORAL at 14:10

## 2022-02-27 RX ADMIN — OXYCODONE HYDROCHLORIDE 5 MILLIGRAM(S): 5 TABLET ORAL at 13:40

## 2022-02-27 RX ADMIN — CEFTRIAXONE 100 MILLIGRAM(S): 500 INJECTION, POWDER, FOR SOLUTION INTRAMUSCULAR; INTRAVENOUS at 13:49

## 2022-02-27 RX ADMIN — Medication 4 MILLIGRAM(S): at 05:36

## 2022-02-27 RX ADMIN — Medication 4 MILLIGRAM(S): at 10:55

## 2022-02-27 RX ADMIN — OXYCODONE HYDROCHLORIDE 5 MILLIGRAM(S): 5 TABLET ORAL at 07:49

## 2022-02-27 RX ADMIN — SENNA PLUS 2 TABLET(S): 8.6 TABLET ORAL at 21:56

## 2022-02-27 RX ADMIN — ISOSORBIDE MONONITRATE 30 MILLIGRAM(S): 60 TABLET, EXTENDED RELEASE ORAL at 07:49

## 2022-02-27 RX ADMIN — Medication 50 MILLIGRAM(S): at 18:20

## 2022-02-27 RX ADMIN — GABAPENTIN 600 MILLIGRAM(S): 400 CAPSULE ORAL at 13:40

## 2022-02-27 RX ADMIN — Medication 25 MILLIGRAM(S): at 05:36

## 2022-02-27 RX ADMIN — Medication 3 MILLIGRAM(S): at 18:20

## 2022-02-27 RX ADMIN — Medication 30 MILLILITER(S): at 13:48

## 2022-02-27 RX ADMIN — Medication 3 MILLIGRAM(S): at 21:55

## 2022-02-27 RX ADMIN — OXYCODONE HYDROCHLORIDE 5 MILLIGRAM(S): 5 TABLET ORAL at 21:58

## 2022-02-27 RX ADMIN — FAMOTIDINE 20 MILLIGRAM(S): 10 INJECTION INTRAVENOUS at 13:48

## 2022-02-27 RX ADMIN — PANTOPRAZOLE SODIUM 40 MILLIGRAM(S): 20 TABLET, DELAYED RELEASE ORAL at 05:36

## 2022-02-27 RX ADMIN — Medication 50 MILLIGRAM(S): at 08:33

## 2022-02-27 NOTE — PROVIDER CONTACT NOTE (CHANGE IN STATUS NOTIFICATION) - SITUATION
patient c/o of shortness of breath & sustaining chest pain from earlier but  patient states "The chest pain feels better from this morning, but still hurts a little 3/10 chest pain"
pt c/o 7/10 aching & pressure chest pain radiating to left arm & ear, vss as per chart 2L NC in place

## 2022-02-27 NOTE — PROVIDER CONTACT NOTE (CHANGE IN STATUS NOTIFICATION) - ASSESSMENT
patient moaning & grabbing chest; 7/10 chest pain
chest pain & Shortness of breath; O2 sat 99% room air, other vss as per chart

## 2022-02-27 NOTE — CHART NOTE - NSCHARTNOTEFT_GEN_A_CORE
Vital Signs Last 24 Hrs  T(C): 37 (27 Feb 2022 08:28), Max: 37.7 (26 Feb 2022 13:25)  T(F): 98.6 (27 Feb 2022 08:28), Max: 99.8 (26 Feb 2022 13:25)  HR: 63 (27 Feb 2022 08:28) (55 - 76)  BP: 162/75 (27 Feb 2022 08:28) (148/77 - 162/75)  BP(mean): --  RR: 18 (27 Feb 2022 08:28) (18 - 18)  SpO2: 99% (27 Feb 2022 08:28) (99% - 100%) Vital Signs Last 24 Hrs  T(C): 37 (27 Feb 2022 08:28), Max: 37.7 (26 Feb 2022 13:25)  T(F): 98.6 (27 Feb 2022 08:28), Max: 99.8 (26 Feb 2022 13:25)  HR: 63 (27 Feb 2022 08:28) (55 - 76)  BP: 162/75 (27 Feb 2022 08:28) (148/77 - 162/75)  BP(mean): --  RR: 18 (27 Feb 2022 08:28) (18 - 18)  SpO2: 99% (27 Feb 2022 08:28) (99% - 100%)    Informed by RN this morning that patient c/o lower left chest pain. Upon assessment at bedside, patient reports chest pain occurred earlier this morning 8/10, now improved to 4/10. Patient states "this happens often at home in morning so I take my Imdur/metoprolol which helps." Patient denies any palpitations, chills, headache, dizziness, vision changes, N/V, abdominal pain. Imdur rescheduled for early time, RN to give metoprolol per parameters. EKG ordered- noted with sinus bradycardia, and reviewed with attending Dr. Pike-> no intervention, monitor VS q4h and reassess. Vital Signs Last 24 Hrs  T(C): 37 (27 Feb 2022 08:28), Max: 37.7 (26 Feb 2022 13:25)  T(F): 98.6 (27 Feb 2022 08:28), Max: 99.8 (26 Feb 2022 13:25)  HR: 63 (27 Feb 2022 08:28) (55 - 76)  BP: 162/75 (27 Feb 2022 08:28) (148/77 - 162/75)  BP(mean): --  RR: 18 (27 Feb 2022 08:28) (18 - 18)  SpO2: 99% (27 Feb 2022 08:28) (99% - 100%)    Informed by RN this morning that patient c/o lower left chest pain. Upon assessment at bedside, patient reports chest pain occurred earlier this morning 8/10, now improved to 4/10. Patient states "this happens often at home in morning so I take my Imdur/metoprolol which helps." Patient denies any palpitations, chills, headache, dizziness, vision changes, N/V, abdominal pain. Imdur rescheduled for early time, RN to give metoprolol per parameters. EKG ordered- noted with sinus bradycardia, and reviewed with attending Dr. Pike-> no intervention, monitor VS q4h and reassess.    Addend: @2370: Called by RN again for patient c/o left lower chest pain 3/10 (similar to morning episode). At bedside, patient able to converse normally, A&OX 4, no visible distress noted. Patient states she experiences similar symptoms at home and on present admission, "I usually get this chest pain at home, with left arm nerve-like pain and left ear pain." VSS, afebrile, currently on room air 100%, pain reproducible on palpation. Discussed findings and case in detail with Dr. Pike-> unlikely cardiac etiology, treat neuropathic pain with gabapentin/oxycodonedecadron, and ordered for maalox/pepcid x1 for GI related etiology. No need for telemetry per attending recs. Discussed plan of care with patient and RN at bedside. Patient pending MRI per neurology, called MRI and spoke with Tech Crystal >patient on schedule and test will be performed either tonight or tomorrow morning, patient notified. Will closely continue to monitor Vital Signs Last 24 Hrs  T(C): 37 (27 Feb 2022 08:28), Max: 37.7 (26 Feb 2022 13:25)  T(F): 98.6 (27 Feb 2022 08:28), Max: 99.8 (26 Feb 2022 13:25)  HR: 63 (27 Feb 2022 08:28) (55 - 76)  BP: 162/75 (27 Feb 2022 08:28) (148/77 - 162/75)  BP(mean): --  RR: 18 (27 Feb 2022 08:28) (18 - 18)  SpO2: 99% (27 Feb 2022 08:28) (99% - 100%)    Informed by RN this morning that patient c/o lower left chest pain. Upon assessment at bedside, patient reports chest pain occurred earlier this morning 8/10, now improved to 4/10. Patient states "this happens often at home in morning so I take my Imdur/metoprolol which helps." Patient denies any palpitations, chills, headache, dizziness, vision changes, N/V, abdominal pain. Imdur rescheduled for early time, RN to give metoprolol per parameters. EKG ordered- noted with sinus bradycardia, and reviewed with attending Dr. Pike-> no intervention, monitor VS q4h and reassess.    Addend: @6840: Called by RN again for patient c/o left lower chest pain 7/10 (similar to morning episode). At bedside, patient able to converse normally, A&OX 4, no visible distress noted. Patient states she experiences similar symptoms at home and on present admission, "I usually get this chest pain at home, with left arm nerve-like pain and left ear pain." VSS, afebrile, currently on room air 100%, pain aching, reproducible on palpation. Discussed findings and case in detail with Dr. Pike-> unlikely cardiac etiology, treat neuropathic pain with gabapentin/oxycodonedecadron, and ordered for maalox/pepcid x1 for GI related etiology. No need for telemetry per attending recs. Discussed plan of care with patient and RN at bedside. Patient pending MRI per neurology, called MRI and spoke with Tech Crystal >patient on schedule and test will be performed either tonight or tomorrow morning, patient notified. Will closely continue to monitor Vital Signs Last 24 Hrs  T(C): 37 (27 Feb 2022 08:28), Max: 37.7 (26 Feb 2022 13:25)  T(F): 98.6 (27 Feb 2022 08:28), Max: 99.8 (26 Feb 2022 13:25)  HR: 63 (27 Feb 2022 08:28) (55 - 76)  BP: 162/75 (27 Feb 2022 08:28) (148/77 - 162/75)  BP(mean): --  RR: 18 (27 Feb 2022 08:28) (18 - 18)  SpO2: 99% (27 Feb 2022 08:28) (99% - 100%)    Informed by RN this morning that patient c/o lower left chest pain. Upon assessment at bedside, patient reports chest pain occurred earlier this morning 8/10, now improved to 4/10. Patient states "this happens often at home in morning so I take my Imdur/metoprolol which helps." Patient denies any palpitations, chills, headache, dizziness, vision changes, N/V, abdominal pain. Imdur rescheduled for early time, RN to give metoprolol per parameters. EKG ordered- noted with sinus bradycardia, and reviewed with attending Dr. Pike-> no intervention, monitor VS q4h and reassess.    Addend: @4616: Called by RN again for patient c/o left lower chest pain 7/10 (similar to morning episode). At bedside, patient able to converse normally, A&OX 4, no visible distress noted. Patient states she experiences similar symptoms at home and on present admission, "I usually get this chest pain at home, with left arm nerve-like pain and left ear pain." VSS, afebrile, currently on room air 100%, pain aching, reproducible on palpation. Discussed findings and case in detail with Dr. Pike-> unlikely cardiac etiology, treat neuropathic pain with gabapentin/oxycodonedecadron, and ordered for maalox/pepcid x1 for GI related etiology. No need for telemetry per attending recs. UA also positive for LEs, patient symptomatic, discussed with attending, UCX obtained, and started CTX.  Discussed plan of care with patient and RN at bedside. Patient pending MRI per neurology, called MRI and spoke with Tech Crystal >patient on schedule and test will be performed either tonight or tomorrow morning, patient notified. Will closely continue to monitor

## 2022-02-27 NOTE — PROVIDER CONTACT NOTE (CHANGE IN STATUS NOTIFICATION) - ACTION/TREATMENT ORDERED:
CONNOR Mccullough made aware, place 2L NC O2 supplemental nasal cannula on patient  for comfort; cardiology in on patient in regards to chest pain this AM.
CONNOR Mccullough made aware, advised to give gabapentin, maalox & oxycodone as ordered & will arrive to bedside

## 2022-02-28 LAB
ALBUMIN SERPL ELPH-MCNC: 3.7 G/DL — SIGNIFICANT CHANGE UP (ref 3.3–5)
ALP SERPL-CCNC: 74 U/L — SIGNIFICANT CHANGE UP (ref 40–120)
ALT FLD-CCNC: 32 U/L — SIGNIFICANT CHANGE UP (ref 4–33)
ANION GAP SERPL CALC-SCNC: 9 MMOL/L — SIGNIFICANT CHANGE UP (ref 7–14)
AST SERPL-CCNC: 19 U/L — SIGNIFICANT CHANGE UP (ref 4–32)
BILIRUB SERPL-MCNC: <0.2 MG/DL — SIGNIFICANT CHANGE UP (ref 0.2–1.2)
BUN SERPL-MCNC: 20 MG/DL — SIGNIFICANT CHANGE UP (ref 7–23)
CALCIUM SERPL-MCNC: 9.3 MG/DL — SIGNIFICANT CHANGE UP (ref 8.4–10.5)
CHLORIDE SERPL-SCNC: 103 MMOL/L — SIGNIFICANT CHANGE UP (ref 98–107)
CO2 SERPL-SCNC: 26 MMOL/L — SIGNIFICANT CHANGE UP (ref 22–31)
CREAT SERPL-MCNC: 0.67 MG/DL — SIGNIFICANT CHANGE UP (ref 0.5–1.3)
GLUCOSE SERPL-MCNC: 140 MG/DL — HIGH (ref 70–99)
HCT VFR BLD CALC: 34.8 % — SIGNIFICANT CHANGE UP (ref 34.5–45)
HGB BLD-MCNC: 11.5 G/DL — SIGNIFICANT CHANGE UP (ref 11.5–15.5)
MCHC RBC-ENTMCNC: 28.9 PG — SIGNIFICANT CHANGE UP (ref 27–34)
MCHC RBC-ENTMCNC: 33 GM/DL — SIGNIFICANT CHANGE UP (ref 32–36)
MCV RBC AUTO: 87.4 FL — SIGNIFICANT CHANGE UP (ref 80–100)
NRBC # BLD: 0 /100 WBCS — SIGNIFICANT CHANGE UP
NRBC # FLD: 0 K/UL — SIGNIFICANT CHANGE UP
PLATELET # BLD AUTO: 259 K/UL — SIGNIFICANT CHANGE UP (ref 150–400)
POTASSIUM SERPL-MCNC: 4 MMOL/L — SIGNIFICANT CHANGE UP (ref 3.5–5.3)
POTASSIUM SERPL-SCNC: 4 MMOL/L — SIGNIFICANT CHANGE UP (ref 3.5–5.3)
PROT SERPL-MCNC: 6.5 G/DL — SIGNIFICANT CHANGE UP (ref 6–8.3)
RBC # BLD: 3.98 M/UL — SIGNIFICANT CHANGE UP (ref 3.8–5.2)
RBC # FLD: 14.9 % — HIGH (ref 10.3–14.5)
SODIUM SERPL-SCNC: 138 MMOL/L — SIGNIFICANT CHANGE UP (ref 135–145)
WBC # BLD: 11.7 K/UL — HIGH (ref 3.8–10.5)
WBC # FLD AUTO: 11.7 K/UL — HIGH (ref 3.8–10.5)

## 2022-02-28 RX ADMIN — Medication 2 MILLIGRAM(S): at 21:34

## 2022-02-28 RX ADMIN — ENOXAPARIN SODIUM 40 MILLIGRAM(S): 100 INJECTION SUBCUTANEOUS at 11:41

## 2022-02-28 RX ADMIN — GABAPENTIN 600 MILLIGRAM(S): 400 CAPSULE ORAL at 21:34

## 2022-02-28 RX ADMIN — GABAPENTIN 600 MILLIGRAM(S): 400 CAPSULE ORAL at 05:53

## 2022-02-28 RX ADMIN — Medication 3 MILLIGRAM(S): at 05:52

## 2022-02-28 RX ADMIN — GABAPENTIN 600 MILLIGRAM(S): 400 CAPSULE ORAL at 13:14

## 2022-02-28 RX ADMIN — Medication 50 MILLIGRAM(S): at 06:00

## 2022-02-28 RX ADMIN — Medication 50 MILLIGRAM(S): at 17:20

## 2022-02-28 RX ADMIN — Medication 25 MILLIGRAM(S): at 05:59

## 2022-02-28 RX ADMIN — OXYCODONE HYDROCHLORIDE 5 MILLIGRAM(S): 5 TABLET ORAL at 07:46

## 2022-02-28 RX ADMIN — OXYCODONE HYDROCHLORIDE 5 MILLIGRAM(S): 5 TABLET ORAL at 01:58

## 2022-02-28 RX ADMIN — OXYCODONE HYDROCHLORIDE 5 MILLIGRAM(S): 5 TABLET ORAL at 22:40

## 2022-02-28 RX ADMIN — OXYCODONE HYDROCHLORIDE 5 MILLIGRAM(S): 5 TABLET ORAL at 02:59

## 2022-02-28 RX ADMIN — Medication 3 MILLIGRAM(S): at 09:58

## 2022-02-28 RX ADMIN — Medication 2 MILLIGRAM(S): at 17:49

## 2022-02-28 RX ADMIN — OXYCODONE HYDROCHLORIDE 5 MILLIGRAM(S): 5 TABLET ORAL at 08:46

## 2022-02-28 RX ADMIN — ISOSORBIDE MONONITRATE 30 MILLIGRAM(S): 60 TABLET, EXTENDED RELEASE ORAL at 07:45

## 2022-02-28 RX ADMIN — CEFTRIAXONE 100 MILLIGRAM(S): 500 INJECTION, POWDER, FOR SOLUTION INTRAMUSCULAR; INTRAVENOUS at 11:42

## 2022-02-28 RX ADMIN — OXYCODONE HYDROCHLORIDE 5 MILLIGRAM(S): 5 TABLET ORAL at 21:40

## 2022-02-28 RX ADMIN — Medication 3 MILLIGRAM(S): at 13:14

## 2022-02-28 RX ADMIN — Medication 3 MILLIGRAM(S): at 01:58

## 2022-02-28 RX ADMIN — PANTOPRAZOLE SODIUM 40 MILLIGRAM(S): 20 TABLET, DELAYED RELEASE ORAL at 06:01

## 2022-02-28 NOTE — PROGRESS NOTE ADULT - PROBLEM SELECTOR PLAN 1
may have developed tolerance to current dose of gabapentin contributing to worsening LE neuropathic pain  increase gabapentin to 600 mg TID  check LE doppler   Xray negative for b/l foot fracture  PO tylenol 975 mg
may have developed tolerance to current dose of gabapentin contributing to worsening LE neuropathic pain  increase gabapentin to 600 mg TID  check LE doppler   Xray negative for b/l foot fracture  PO tylenol 975 mg TID x 3days  check A1c, TSH, Vit D, Vit B6/B12
may have developed tolerance to current dose of gabapentin contributing to worsening LE neuropathic pain  increase gabapentin to 600 mg TID  check LE doppler   Xray negative for b/l foot fracture  PO tylenol 975 mg
may have developed tolerance to current dose of gabapentin contributing to worsening LE neuropathic pain  increase gabapentin to 600 mg TID  check LE doppler   Xray negative for b/l foot fracture  PO tylenol 975 mg

## 2022-02-28 NOTE — PROVIDER CONTACT NOTE (OTHER) - SITUATION
Cath lab RN calling to get report on pt who is unaware of procedure. Patient, RN and ACP unaware of any cardiac procedure.

## 2022-02-28 NOTE — CHART NOTE - NSCHARTNOTEFT_GEN_A_CORE
Notified by RN. Cath lab calling for patient to go for Cath.   Spoke with Dr. Pike he was able to get results of patients out pt stress test. According to Dr. Pike the results were positive.  He had scheduled for pt to go to cath lab. Discussed with patient however she declined going for the test as she was not previously made aware.  Pt stated that she has no problem going for test but she would like the courtesy of being given ample time and also an explantation  of what the test is for. Test to be postponed for now and rescheduled once patient and HCP has spoken to Cardiologist.

## 2022-02-28 NOTE — PROGRESS NOTE ADULT - PROBLEM SELECTOR PLAN 3
patient reports undergoing OP w/u w/ Dr. Magdaleno w/ scheduled bx next week  endorses vague swallowing difficulties due to nodule   start bite size diet, SLP eval  TSH 1.65
patient reports undergoing OP w/u w/ Dr. Magdaleno w/ scheduled bx next week  endorses vague swallowing difficulties due to nodule   start bite size diet, SLP marry  f/u TSH
patient reports undergoing OP w/u w/ Dr. Magdaleno w/ scheduled bx next week  endorses vague swallowing difficulties due to nodule   start bite size diet, SLP eval  TSH 1.65
patient reports undergoing OP w/u w/ Dr. Magdaleno w/ scheduled bx next week  endorses vague swallowing difficulties due to nodule   start bite size diet, SLP eval  TSH 1.65

## 2022-02-28 NOTE — PROGRESS NOTE ADULT - PROBLEM SELECTOR PLAN 5
DVT ppx: lovenox subq  Diet: bite size
DVT ppx: lovenox subq  Diet: bite size
DVT ppx: lovenox subq  Diet: bite size   PT c/s
DVT ppx: lovenox subq  Diet: bite size   PT c/s

## 2022-02-28 NOTE — PROGRESS NOTE ADULT - PROBLEM SELECTOR PLAN 2
on exam RLE and RUE strength appears slightly more diminished compared to LUE and LLE  pt denies hx of CVA  obtain CT head to r/o chronic CVA  PT consult
on exam RLE and RUE strength appears slightly more diminished compared to LUE and LLE  pt denies hx of CVA  obtain CT head to r/o chronic CVA  f/u PT consult

## 2022-02-28 NOTE — CHART NOTE - NSCHARTNOTEFT_GEN_A_CORE
Called MRI as per tech pt is on schedule however, they hae a couple of emergencies that they have to attend to first.

## 2022-03-01 LAB
ALBUMIN SERPL ELPH-MCNC: 3.5 G/DL — SIGNIFICANT CHANGE UP (ref 3.3–5)
ALP SERPL-CCNC: 72 U/L — SIGNIFICANT CHANGE UP (ref 40–120)
ALT FLD-CCNC: 54 U/L — HIGH (ref 4–33)
ANA TITR SER: NEGATIVE — SIGNIFICANT CHANGE UP
ANION GAP SERPL CALC-SCNC: 11 MMOL/L — SIGNIFICANT CHANGE UP (ref 7–14)
AST SERPL-CCNC: 29 U/L — SIGNIFICANT CHANGE UP (ref 4–32)
BILIRUB SERPL-MCNC: <0.2 MG/DL — SIGNIFICANT CHANGE UP (ref 0.2–1.2)
BUN SERPL-MCNC: 24 MG/DL — HIGH (ref 7–23)
CALCIUM SERPL-MCNC: 9.2 MG/DL — SIGNIFICANT CHANGE UP (ref 8.4–10.5)
CHLORIDE SERPL-SCNC: 103 MMOL/L — SIGNIFICANT CHANGE UP (ref 98–107)
CO2 SERPL-SCNC: 24 MMOL/L — SIGNIFICANT CHANGE UP (ref 22–31)
CREAT SERPL-MCNC: 0.68 MG/DL — SIGNIFICANT CHANGE UP (ref 0.5–1.3)
CULTURE RESULTS: SIGNIFICANT CHANGE UP
EGFR: 93 ML/MIN/1.73M2 — SIGNIFICANT CHANGE UP
GLUCOSE SERPL-MCNC: 156 MG/DL — HIGH (ref 70–99)
HCT VFR BLD CALC: 35.4 % — SIGNIFICANT CHANGE UP (ref 34.5–45)
HGB BLD-MCNC: 11.4 G/DL — LOW (ref 11.5–15.5)
MCHC RBC-ENTMCNC: 28.1 PG — SIGNIFICANT CHANGE UP (ref 27–34)
MCHC RBC-ENTMCNC: 32.2 GM/DL — SIGNIFICANT CHANGE UP (ref 32–36)
MCV RBC AUTO: 87.2 FL — SIGNIFICANT CHANGE UP (ref 80–100)
NRBC # BLD: 0 /100 WBCS — SIGNIFICANT CHANGE UP
NRBC # FLD: 0 K/UL — SIGNIFICANT CHANGE UP
PLATELET # BLD AUTO: 273 K/UL — SIGNIFICANT CHANGE UP (ref 150–400)
POTASSIUM SERPL-MCNC: 4.1 MMOL/L — SIGNIFICANT CHANGE UP (ref 3.5–5.3)
POTASSIUM SERPL-SCNC: 4.1 MMOL/L — SIGNIFICANT CHANGE UP (ref 3.5–5.3)
PROT SERPL-MCNC: 6 G/DL — SIGNIFICANT CHANGE UP (ref 6–8.3)
RBC # BLD: 4.06 M/UL — SIGNIFICANT CHANGE UP (ref 3.8–5.2)
RBC # FLD: 14.9 % — HIGH (ref 10.3–14.5)
SODIUM SERPL-SCNC: 138 MMOL/L — SIGNIFICANT CHANGE UP (ref 135–145)
SPECIMEN SOURCE: SIGNIFICANT CHANGE UP
WBC # BLD: 12.01 K/UL — HIGH (ref 3.8–10.5)
WBC # FLD AUTO: 12.01 K/UL — HIGH (ref 3.8–10.5)

## 2022-03-01 PROCEDURE — 93458 L HRT ARTERY/VENTRICLE ANGIO: CPT | Mod: 26

## 2022-03-01 PROCEDURE — 99152 MOD SED SAME PHYS/QHP 5/>YRS: CPT

## 2022-03-01 RX ADMIN — PANTOPRAZOLE SODIUM 40 MILLIGRAM(S): 20 TABLET, DELAYED RELEASE ORAL at 05:46

## 2022-03-01 RX ADMIN — Medication 2 MILLIGRAM(S): at 13:38

## 2022-03-01 RX ADMIN — OXYCODONE HYDROCHLORIDE 5 MILLIGRAM(S): 5 TABLET ORAL at 11:27

## 2022-03-01 RX ADMIN — Medication 25 MILLIGRAM(S): at 05:46

## 2022-03-01 RX ADMIN — ENOXAPARIN SODIUM 40 MILLIGRAM(S): 100 INJECTION SUBCUTANEOUS at 11:26

## 2022-03-01 RX ADMIN — Medication 1 MILLIGRAM(S): at 21:00

## 2022-03-01 RX ADMIN — CEFTRIAXONE 100 MILLIGRAM(S): 500 INJECTION, POWDER, FOR SOLUTION INTRAMUSCULAR; INTRAVENOUS at 11:26

## 2022-03-01 RX ADMIN — OXYCODONE HYDROCHLORIDE 5 MILLIGRAM(S): 5 TABLET ORAL at 12:27

## 2022-03-01 RX ADMIN — Medication 50 MILLIGRAM(S): at 21:54

## 2022-03-01 RX ADMIN — GABAPENTIN 600 MILLIGRAM(S): 400 CAPSULE ORAL at 13:39

## 2022-03-01 RX ADMIN — Medication 2 MILLIGRAM(S): at 09:54

## 2022-03-01 RX ADMIN — OXYCODONE HYDROCHLORIDE 5 MILLIGRAM(S): 5 TABLET ORAL at 06:40

## 2022-03-01 RX ADMIN — OXYCODONE HYDROCHLORIDE 5 MILLIGRAM(S): 5 TABLET ORAL at 22:45

## 2022-03-01 RX ADMIN — GABAPENTIN 600 MILLIGRAM(S): 400 CAPSULE ORAL at 05:46

## 2022-03-01 RX ADMIN — GABAPENTIN 600 MILLIGRAM(S): 400 CAPSULE ORAL at 21:53

## 2022-03-01 RX ADMIN — Medication 2 MILLIGRAM(S): at 02:17

## 2022-03-01 RX ADMIN — OXYCODONE HYDROCHLORIDE 5 MILLIGRAM(S): 5 TABLET ORAL at 23:51

## 2022-03-01 RX ADMIN — Medication 50 MILLIGRAM(S): at 05:47

## 2022-03-01 RX ADMIN — Medication 2 MILLIGRAM(S): at 05:46

## 2022-03-01 RX ADMIN — ISOSORBIDE MONONITRATE 30 MILLIGRAM(S): 60 TABLET, EXTENDED RELEASE ORAL at 06:40

## 2022-03-01 NOTE — CHART NOTE - NSCHARTNOTEFT_GEN_A_CORE
Patient status post LHC via right radial access. Site clean, dry, and intact. No hematoma or active bleeding. Extremities warm to touch. Pulses present b/l, capillary refill appropriate. Denies any pain, numbness or tingling. Will continue to monitor.    T(C): 36.8 (03-01-22 @ 21:46), Max: 37.1 (03-01-22 @ 05:45)  HR: 60 (03-01-22 @ 21:46) (55 - 70)  BP: 161/85 (03-01-22 @ 21:46) (159/71 - 177/82)  RR: 17 (03-01-22 @ 21:46) (17 - 18)  SpO2: 100% (03-01-22 @ 21:46) (100% - 100%)      Mikaela Rice PA-C  Department of Medicine  [facility]   In House Page [phone]

## 2022-03-02 LAB
ALBUMIN SERPL ELPH-MCNC: 3.5 G/DL — SIGNIFICANT CHANGE UP (ref 3.3–5)
ALP SERPL-CCNC: 63 U/L — SIGNIFICANT CHANGE UP (ref 40–120)
ALT FLD-CCNC: 48 U/L — HIGH (ref 4–33)
ANION GAP SERPL CALC-SCNC: 11 MMOL/L — SIGNIFICANT CHANGE UP (ref 7–14)
AST SERPL-CCNC: 19 U/L — SIGNIFICANT CHANGE UP (ref 4–32)
BILIRUB SERPL-MCNC: 0.2 MG/DL — SIGNIFICANT CHANGE UP (ref 0.2–1.2)
BUN SERPL-MCNC: 21 MG/DL — SIGNIFICANT CHANGE UP (ref 7–23)
CALCIUM SERPL-MCNC: 8.9 MG/DL — SIGNIFICANT CHANGE UP (ref 8.4–10.5)
CHLORIDE SERPL-SCNC: 102 MMOL/L — SIGNIFICANT CHANGE UP (ref 98–107)
CO2 SERPL-SCNC: 25 MMOL/L — SIGNIFICANT CHANGE UP (ref 22–31)
CREAT SERPL-MCNC: 0.67 MG/DL — SIGNIFICANT CHANGE UP (ref 0.5–1.3)
EGFR: 93 ML/MIN/1.73M2 — SIGNIFICANT CHANGE UP
GLUCOSE SERPL-MCNC: 142 MG/DL — HIGH (ref 70–99)
HCT VFR BLD CALC: 35.6 % — SIGNIFICANT CHANGE UP (ref 34.5–45)
HGB BLD-MCNC: 11.1 G/DL — LOW (ref 11.5–15.5)
MCHC RBC-ENTMCNC: 27.5 PG — SIGNIFICANT CHANGE UP (ref 27–34)
MCHC RBC-ENTMCNC: 31.2 GM/DL — LOW (ref 32–36)
MCV RBC AUTO: 88.1 FL — SIGNIFICANT CHANGE UP (ref 80–100)
NRBC # BLD: 0 /100 WBCS — SIGNIFICANT CHANGE UP
NRBC # FLD: 0.03 K/UL — HIGH
PLATELET # BLD AUTO: 262 K/UL — SIGNIFICANT CHANGE UP (ref 150–400)
POTASSIUM SERPL-MCNC: 3.9 MMOL/L — SIGNIFICANT CHANGE UP (ref 3.5–5.3)
POTASSIUM SERPL-SCNC: 3.9 MMOL/L — SIGNIFICANT CHANGE UP (ref 3.5–5.3)
PROT SERPL-MCNC: 6.3 G/DL — SIGNIFICANT CHANGE UP (ref 6–8.3)
RBC # BLD: 4.04 M/UL — SIGNIFICANT CHANGE UP (ref 3.8–5.2)
RBC # FLD: 15.3 % — HIGH (ref 10.3–14.5)
SODIUM SERPL-SCNC: 138 MMOL/L — SIGNIFICANT CHANGE UP (ref 135–145)
WBC # BLD: 12.13 K/UL — HIGH (ref 3.8–10.5)
WBC # FLD AUTO: 12.13 K/UL — HIGH (ref 3.8–10.5)

## 2022-03-02 PROCEDURE — 72141 MRI NECK SPINE W/O DYE: CPT | Mod: 26

## 2022-03-02 PROCEDURE — 72146 MRI CHEST SPINE W/O DYE: CPT | Mod: 26

## 2022-03-02 PROCEDURE — 72148 MRI LUMBAR SPINE W/O DYE: CPT | Mod: 26

## 2022-03-02 RX ADMIN — GABAPENTIN 600 MILLIGRAM(S): 400 CAPSULE ORAL at 13:57

## 2022-03-02 RX ADMIN — PANTOPRAZOLE SODIUM 40 MILLIGRAM(S): 20 TABLET, DELAYED RELEASE ORAL at 05:31

## 2022-03-02 RX ADMIN — Medication 50 MILLIGRAM(S): at 18:20

## 2022-03-02 RX ADMIN — OXYCODONE HYDROCHLORIDE 5 MILLIGRAM(S): 5 TABLET ORAL at 10:22

## 2022-03-02 RX ADMIN — OXYCODONE HYDROCHLORIDE 5 MILLIGRAM(S): 5 TABLET ORAL at 11:30

## 2022-03-02 RX ADMIN — Medication 1 MILLIGRAM(S): at 05:31

## 2022-03-02 RX ADMIN — Medication 1 MILLIGRAM(S): at 01:28

## 2022-03-02 RX ADMIN — GABAPENTIN 600 MILLIGRAM(S): 400 CAPSULE ORAL at 21:46

## 2022-03-02 RX ADMIN — Medication 1 MILLIGRAM(S): at 10:22

## 2022-03-02 RX ADMIN — ISOSORBIDE MONONITRATE 30 MILLIGRAM(S): 60 TABLET, EXTENDED RELEASE ORAL at 10:22

## 2022-03-02 RX ADMIN — ENOXAPARIN SODIUM 40 MILLIGRAM(S): 100 INJECTION SUBCUTANEOUS at 11:30

## 2022-03-02 RX ADMIN — OXYCODONE HYDROCHLORIDE 5 MILLIGRAM(S): 5 TABLET ORAL at 18:19

## 2022-03-02 RX ADMIN — GABAPENTIN 600 MILLIGRAM(S): 400 CAPSULE ORAL at 05:31

## 2022-03-02 RX ADMIN — Medication 25 MILLIGRAM(S): at 05:31

## 2022-03-02 RX ADMIN — OXYCODONE HYDROCHLORIDE 5 MILLIGRAM(S): 5 TABLET ORAL at 07:09

## 2022-03-02 RX ADMIN — OXYCODONE HYDROCHLORIDE 5 MILLIGRAM(S): 5 TABLET ORAL at 06:36

## 2022-03-02 RX ADMIN — Medication 1 MILLIGRAM(S): at 13:57

## 2022-03-02 RX ADMIN — OXYCODONE HYDROCHLORIDE 5 MILLIGRAM(S): 5 TABLET ORAL at 14:26

## 2022-03-02 RX ADMIN — OXYCODONE HYDROCHLORIDE 5 MILLIGRAM(S): 5 TABLET ORAL at 15:30

## 2022-03-02 NOTE — CHART NOTE - NSCHARTNOTEFT_GEN_A_CORE
Orthopaedic Surgery chart note    MRI C/T/L spine reviewed by Dr Brooks with no acute changes noted.  Pt mat follow up as outpatient [phone].  Medicine team made aware. Orthopaedic Surgery chart note    MRI C/T/L spine reviewed by Dr Brooks with no acute changes noted.  Pt mat follow up as outpatient [phone] or [phone].  Medicine team made aware.

## 2022-03-03 ENCOUNTER — TRANSCRIPTION ENCOUNTER (OUTPATIENT)
Age: 72
End: 2022-03-03

## 2022-03-03 LAB
ANION GAP SERPL CALC-SCNC: 9 MMOL/L — SIGNIFICANT CHANGE UP (ref 7–14)
BUN SERPL-MCNC: 19 MG/DL — SIGNIFICANT CHANGE UP (ref 7–23)
CALCIUM SERPL-MCNC: 8.7 MG/DL — SIGNIFICANT CHANGE UP (ref 8.4–10.5)
CHLORIDE SERPL-SCNC: 103 MMOL/L — SIGNIFICANT CHANGE UP (ref 98–107)
CO2 SERPL-SCNC: 26 MMOL/L — SIGNIFICANT CHANGE UP (ref 22–31)
CREAT SERPL-MCNC: 0.68 MG/DL — SIGNIFICANT CHANGE UP (ref 0.5–1.3)
EGFR: 93 ML/MIN/1.73M2 — SIGNIFICANT CHANGE UP
GLUCOSE SERPL-MCNC: 86 MG/DL — SIGNIFICANT CHANGE UP (ref 70–99)
HCT VFR BLD CALC: 33.5 % — LOW (ref 34.5–45)
HGB BLD-MCNC: 10.6 G/DL — LOW (ref 11.5–15.5)
MAGNESIUM SERPL-MCNC: 1.9 MG/DL — SIGNIFICANT CHANGE UP (ref 1.6–2.6)
MCHC RBC-ENTMCNC: 27.8 PG — SIGNIFICANT CHANGE UP (ref 27–34)
MCHC RBC-ENTMCNC: 31.6 GM/DL — LOW (ref 32–36)
MCV RBC AUTO: 87.9 FL — SIGNIFICANT CHANGE UP (ref 80–100)
NRBC # BLD: 0 /100 WBCS — SIGNIFICANT CHANGE UP
NRBC # FLD: 0.03 K/UL — HIGH
PHOSPHATE SERPL-MCNC: 3.4 MG/DL — SIGNIFICANT CHANGE UP (ref 2.5–4.5)
PLATELET # BLD AUTO: 253 K/UL — SIGNIFICANT CHANGE UP (ref 150–400)
POTASSIUM SERPL-MCNC: 4 MMOL/L — SIGNIFICANT CHANGE UP (ref 3.5–5.3)
POTASSIUM SERPL-SCNC: 4 MMOL/L — SIGNIFICANT CHANGE UP (ref 3.5–5.3)
RBC # BLD: 3.81 M/UL — SIGNIFICANT CHANGE UP (ref 3.8–5.2)
RBC # FLD: 15.3 % — HIGH (ref 10.3–14.5)
SARS-COV-2 RNA SPEC QL NAA+PROBE: SIGNIFICANT CHANGE UP
SODIUM SERPL-SCNC: 138 MMOL/L — SIGNIFICANT CHANGE UP (ref 135–145)
WBC # BLD: 12.12 K/UL — HIGH (ref 3.8–10.5)
WBC # FLD AUTO: 12.12 K/UL — HIGH (ref 3.8–10.5)

## 2022-03-03 RX ORDER — OXYCODONE HYDROCHLORIDE 5 MG/1
5 TABLET ORAL EVERY 4 HOURS
Refills: 0 | Status: DISCONTINUED | OUTPATIENT
Start: 2022-03-03 | End: 2022-03-07

## 2022-03-03 RX ADMIN — ENOXAPARIN SODIUM 40 MILLIGRAM(S): 100 INJECTION SUBCUTANEOUS at 12:01

## 2022-03-03 RX ADMIN — Medication 50 MILLIGRAM(S): at 18:02

## 2022-03-03 RX ADMIN — PANTOPRAZOLE SODIUM 40 MILLIGRAM(S): 20 TABLET, DELAYED RELEASE ORAL at 05:51

## 2022-03-03 RX ADMIN — OXYCODONE HYDROCHLORIDE 5 MILLIGRAM(S): 5 TABLET ORAL at 15:31

## 2022-03-03 RX ADMIN — ISOSORBIDE MONONITRATE 30 MILLIGRAM(S): 60 TABLET, EXTENDED RELEASE ORAL at 09:02

## 2022-03-03 RX ADMIN — Medication 25 MILLIGRAM(S): at 05:51

## 2022-03-03 RX ADMIN — GABAPENTIN 600 MILLIGRAM(S): 400 CAPSULE ORAL at 05:51

## 2022-03-03 RX ADMIN — OXYCODONE HYDROCHLORIDE 5 MILLIGRAM(S): 5 TABLET ORAL at 06:33

## 2022-03-03 RX ADMIN — Medication 3 MILLIGRAM(S): at 21:52

## 2022-03-03 RX ADMIN — GABAPENTIN 600 MILLIGRAM(S): 400 CAPSULE ORAL at 14:57

## 2022-03-03 RX ADMIN — OXYCODONE HYDROCHLORIDE 5 MILLIGRAM(S): 5 TABLET ORAL at 15:01

## 2022-03-03 RX ADMIN — GABAPENTIN 600 MILLIGRAM(S): 400 CAPSULE ORAL at 21:51

## 2022-03-03 RX ADMIN — Medication 50 MILLIGRAM(S): at 05:51

## 2022-03-03 RX ADMIN — OXYCODONE HYDROCHLORIDE 5 MILLIGRAM(S): 5 TABLET ORAL at 05:51

## 2022-03-03 NOTE — DISCHARGE NOTE PROVIDER - NSDCFUSCHEDAPPT_GEN_ALL_CORE_FT
NINA DENNEY ; 03/18/2022 ; NPP Rad  [facility]  NINA DENNEY ; 03/24/2022 ; NPP [facility]

## 2022-03-03 NOTE — DISCHARGE NOTE PROVIDER - NSDCMRMEDTOKEN_GEN_ALL_CORE_FT
gabapentin 300 mg oral capsule: 1 cap(s) orally 3 times a day  hydrochlorothiazide 25 mg oral tablet: 1 tab(s) orally once a day  isosorbide mononitrate 30 mg oral tablet, extended release: 1 tab(s) orally once a day  Metoprolol Tartrate 50 mg oral tablet: 1 tab(s) orally 2 times a day  (Pharmacy filled for 50mg ER tablets BID)  Proventil HFA CFC free 90 mcg/inh inhalation aerosol: 2 puff(s) inhaled 2 times a day, As Needed   gabapentin 300 mg oral capsule: 2 cap(s) orally 3 times a day  hydrochlorothiazide 25 mg oral tablet: 1 tab(s) orally once a day  isosorbide mononitrate 30 mg oral tablet, extended release: 1 tab(s) orally once a day  lidocaine 4% topical film: Apply topically to affected area once a day, As Needed  Metoprolol Tartrate 50 mg oral tablet: 1 tab(s) orally 2 times a day  (Pharmacy filled for 50mg ER tablets BID)  oxyCODONE 5 mg oral tablet: 1 tab(s) orally every 4 hours, As needed, Moderate - Severe Pain (4 - 10)  pantoprazole 40 mg oral delayed release tablet: 1 tab(s) orally once a day (before a meal)  polyethylene glycol 3350 oral powder for reconstitution: 17 gram(s) orally once a day (at bedtime)  Proventil HFA CFC free 90 mcg/inh inhalation aerosol: 2 puff(s) inhaled 2 times a day, As Needed  Pyridium 100 mg oral tablet: 1 tab(s) orally once a day   senna oral tablet: 2 tab(s) orally once a day (at bedtime)

## 2022-03-03 NOTE — DISCHARGE NOTE PROVIDER - HOSPITAL COURSE
72F with pmh of HTN, asthma, RA, chronic low back pain, R. thyroid nodule admitted for LE pain r/o DVT vs worsening neuropathic pain    Neuropathy.   ·  Plan: may have developed tolerance to current dose of gabapentin contributing to worsening LE neuropathic pain  increase gabapentin to 600 mg TID  - LE doppler -No evidence of deep venous thrombosis in either lower extremity.  Xray negative for b/l foot fracture  PO tylenol 975 mg.  -On Decadron  - MRI CLT spine done- no further ortho inpatient intervention- f/u Dr. Brooks OP    Focal motor deficit.   ·  Plan: on exam RLE and RUE strength appears slightly more diminished compared to LUE and LLE  pt denies hx of CVA  -CT head to r/o chronic CVA-NO EVIDENCE OF AN ACUTE INTRACRANIAL HEMORRHAGE, MIDLINE SHIFT, OR   HYDROCEPHALUS. MILD WHITE MATTER ISCHEMIC CHANGES.      Thyroid nodule.   ·  Plan: patient reports undergoing OP w/u w/ Dr. Magdaleno w/ scheduled bx next week  endorses vague swallowing difficulties due to nodule   start bite size diet, SLP eval  TSH 1.65.    Hypertension.   ·  Plan: resume home dose HCTZ, metop, imdur  please obtain collateral from PCP Dr. Frederick regarding stress test results  EKG non ischemic.  Been having chest pain  NST EF 50% Apical inferior ischemia noted  + Stress Test- s/p [facility] 03/01 via [facility]:  luminal irregularities. radial site - stable    Preventive measure.   ·  Plan: DVT ppx: lovenox subq  Diet: bite size.    On ___ this case was reviewed with  ____, the patient is medically stable and optimized for discharge. All medications were reviewed and prescriptions were sent to mutually agreed upon pharmacy.     72F with pmh of HTN, asthma, RA, chronic low back pain, R. thyroid nodule admitted for LE pain r/o DVT vs worsening neuropathic pain    Neuropathy.   ·  Plan: may have developed tolerance to current dose of gabapentin contributing to worsening LE neuropathic pain  increase gabapentin to 600 mg TID  - LE doppler -No evidence of deep venous thrombosis in either lower extremity.  Xray negative for b/l foot fracture  PO tylenol 975 mg.  -On Decadron  - MRI CLT spine done- no further ortho inpatient intervention- f/u Dr. Brooks OP    Focal motor deficit.   ·  Plan: on exam RLE and RUE strength appears slightly more diminished compared to LUE and LLE  pt denies hx of CVA  -CT head to r/o chronic CVA-NO EVIDENCE OF AN ACUTE INTRACRANIAL HEMORRHAGE, MIDLINE SHIFT, OR   HYDROCEPHALUS. MILD WHITE MATTER ISCHEMIC CHANGES.      Thyroid nodule.   ·  Plan: patient reports undergoing OP w/u w/ Dr. Magdaleno w/ scheduled bx next week  endorses vague swallowing difficulties due to nodule   start bite size diet, SLP eval  TSH 1.65.    Hypertension.   ·  Plan: resume home dose HCTZ, metop, imdur  please obtain collateral from PCP Dr. Frederick regarding stress test results  EKG non ischemic.  Been having chest pain  NST EF 50% Apical inferior ischemia noted  + Stress Test- s/p [facility] 03/01 via [facility]:  luminal irregularities. radial site - stable    Preventive measure.   ·  Plan: DVT ppx: lovenox subq  Diet: bite size.    On  3/7/22 this case was reviewed with Dr. Pike,  the patient is medically stable and optimized for discharge. All medications were reviewed and prescriptions were sent to mutually agreed upon pharmacy.

## 2022-03-03 NOTE — DISCHARGE NOTE PROVIDER - CARE PROVIDER_API CALL
Ric Brooks)  [facility]  [address], [address]  [place] [address]  Phone: [phone]  Fax: [phone]  Established Patient  Follow Up Time: 1 week    Pierce Magdaleno)  Plastic Surgery  [address], [address]  [place] [address]  Phone: [phone]  Fax: [phone]  Established Patient  Follow Up Time: 1 week    Dr Otoniel  Please call and xiomy putpatient follow up with Cardiologist in 1- 2 weeks of discharge for ongoing medical management  Phone: (   )    -  Fax: (   )    -  Follow Up Time:

## 2022-03-03 NOTE — DISCHARGE NOTE PROVIDER - NSDCFUADDAPPT_GEN_ALL_CORE_FT
Please call and schedule outpatient follow up Ortho Dr. Brooks in 1-2 weeks following discharge  [phone].  -neuro - may need outpatient emg/ncs   Thyroid nodule. - patient reports undergoing OP w/u w/ Dr. Emir Magdaleno oncologist w/ scheduled bx next week  follows with PMD/cardiologist Dr Frederick .

## 2022-03-03 NOTE — DISCHARGE NOTE PROVIDER - NSDCCPCAREPLAN_GEN_ALL_CORE_FT
PRINCIPAL DISCHARGE DIAGNOSIS  Diagnosis: Neuropathy  Assessment and Plan of Treatment: You came into the hospital with lower extremtiy pain, possibly having developed tolerance to current dose of gabapentin contributing to worsening Lower extremity neuropathic pain. The gabapentin dose was increased  to 600 mg three times daily, lower extremity doppler was negative for deep venous thrombosis in either lower extremity. X-rays were done that was negative for dislocation or fracture on either foot. Continue on Tylenol 975 mg, and decadron.  MRI Cervical, Lumbar, Thoracic spine done- no further ortho inpatient intervention- f/u outpatient with Dr. Brooks        SECONDARY DISCHARGE DIAGNOSES  Diagnosis: Hypertension  Assessment and Plan of Treatment: Your were having some episodes of chest pain, your  EKG non ischemic, You had a stress test that was abnormal, with concern with some  Apical inferior ischemia noted. You underwent a diagnostic cardiac catheterization with results indicating  luminal irregularities that will be medically managed . Continue your hydrohlorothiazide, metoprolol, and Imdur. Please follow up outpatient with Cardiologist / PCP Dr. Frederick in 1-2 weeks following discharge for ongoing medical management    Diagnosis: Thyroid nodule  Assessment and Plan of Treatment:      PRINCIPAL DISCHARGE DIAGNOSIS  Diagnosis: Neuropathy  Assessment and Plan of Treatment: You came into the hospital with lower extremtiy pain, possibly having developed tolerance to current dose of gabapentin contributing to worsening Lower extremity neuropathic pain. The gabapentin dose was increased  to 600 mg three times daily, lower extremity doppler was negative for deep venous thrombosis in either lower extremity. X-rays were done that was negative for dislocation or fracture on either foot. Continue on Tylenol 975 mg, and decadron.  MRI Cervical, Lumbar, Thoracic spine done- no further ortho inpatient intervention- f/u outpatient with Dr. Brooks        SECONDARY DISCHARGE DIAGNOSES  Diagnosis: Thyroid nodule  Assessment and Plan of Treatment: - Follow up with  Dr. Magdaleno within 1 week.    Diagnosis: Hypertension  Assessment and Plan of Treatment: Your were having some episodes of chest pain, your  EKG non ischemic, You had a stress test that was abnormal, with concern with some  Apical inferior ischemia noted. You underwent a diagnostic cardiac catheterization with results indicating  luminal irregularities that will be medically managed . Continue your hydrohlorothiazide, metoprolol, and Imdur. Please follow up outpatient with Cardiologist / PCP Dr. Frederick in 1-2 weeks following discharge for ongoing medical management     PRINCIPAL DISCHARGE DIAGNOSIS  Diagnosis: Neuropathy  Assessment and Plan of Treatment: You came into the hospital with lower extremtiy pain, possibly having developed tolerance to current dose of gabapentin contributing to worsening Lower extremity neuropathic pain. The gabapentin dose was increased  to 600 mg three times daily, lower extremity doppler was negative for deep venous thrombosis in either lower extremity. X-rays were done that was negative for dislocation or fracture on either foot. Continue on Tylenol 975 mg, and decadron.  MRI Cervical, Lumbar, Thoracic spine done- no further ortho inpatient intervention- f/u outpatient with Dr. Brooks.  Follow up with an outpatient neurologist, to evaluate if EMG/NCS is necessary.         SECONDARY DISCHARGE DIAGNOSES  Diagnosis: Thyroid nodule  Assessment and Plan of Treatment: - Follow up with  Dr. Magdaleno within 1 week.    Diagnosis: Preventive measure  Assessment and Plan of Treatment: - pyridium for urinary urgency  -Follow up with your PCP within 1 week    Diagnosis: Hypertension  Assessment and Plan of Treatment: Your were having some episodes of chest pain, your  EKG non ischemic, You had a stress test that was abnormal, with concern with some  Apical inferior ischemia noted. You underwent a diagnostic cardiac catheterization with results indicating  luminal irregularities that will be medically managed . Continue your hydrohlorothiazide, metoprolol, and Imdur. Please follow up outpatient with Cardiologist / PCP Dr. Frederick in 1-2 weeks following discharge for ongoing medical management

## 2022-03-03 NOTE — DISCHARGE NOTE PROVIDER - PROVIDER TOKENS
PROVIDER:[TOKEN:[[id number]],FOLLOWUP:[1 week],ESTABLISHEDPATIENT:[T]],PROVIDER:[TOKEN:[[id number]],FOLLOWUP:[1 week],ESTABLISHEDPATIENT:[T]],FREE:[LAST:[Otoniel],FIRST:[],PHONE:[(   )    -],FAX:[(   )    -],ADDRESS:[Please call and xiomy putpatient follow up with Cardiologist in 1- 2 weeks of discharge for ongoing medical management]]

## 2022-03-04 LAB
ANION GAP SERPL CALC-SCNC: 9 MMOL/L — SIGNIFICANT CHANGE UP (ref 7–14)
ANISOCYTOSIS BLD QL: SLIGHT — SIGNIFICANT CHANGE UP
BASOPHILS # BLD AUTO: 0 K/UL — SIGNIFICANT CHANGE UP (ref 0–0.2)
BASOPHILS NFR BLD AUTO: 0 % — SIGNIFICANT CHANGE UP (ref 0–2)
BUN SERPL-MCNC: 21 MG/DL — SIGNIFICANT CHANGE UP (ref 7–23)
CALCIUM SERPL-MCNC: 8.4 MG/DL — SIGNIFICANT CHANGE UP (ref 8.4–10.5)
CHLORIDE SERPL-SCNC: 105 MMOL/L — SIGNIFICANT CHANGE UP (ref 98–107)
CO2 SERPL-SCNC: 25 MMOL/L — SIGNIFICANT CHANGE UP (ref 22–31)
CREAT SERPL-MCNC: 0.84 MG/DL — SIGNIFICANT CHANGE UP (ref 0.5–1.3)
EGFR: 74 ML/MIN/1.73M2 — SIGNIFICANT CHANGE UP
EOSINOPHIL # BLD AUTO: 0.36 K/UL — SIGNIFICANT CHANGE UP (ref 0–0.5)
EOSINOPHIL NFR BLD AUTO: 3.5 % — SIGNIFICANT CHANGE UP (ref 0–6)
GIANT PLATELETS BLD QL SMEAR: PRESENT — SIGNIFICANT CHANGE UP
GLUCOSE SERPL-MCNC: 109 MG/DL — HIGH (ref 70–99)
HCT VFR BLD CALC: 35.3 % — SIGNIFICANT CHANGE UP (ref 34.5–45)
HGB BLD-MCNC: 11 G/DL — LOW (ref 11.5–15.5)
IANC: 5.22 K/UL — SIGNIFICANT CHANGE UP (ref 1.5–8.5)
LYMPHOCYTES # BLD AUTO: 3.36 K/UL — HIGH (ref 1–3.3)
LYMPHOCYTES # BLD AUTO: 32.7 % — SIGNIFICANT CHANGE UP (ref 13–44)
MACROCYTES BLD QL: SLIGHT — SIGNIFICANT CHANGE UP
MAGNESIUM SERPL-MCNC: 1.8 MG/DL — SIGNIFICANT CHANGE UP (ref 1.6–2.6)
MANUAL SMEAR VERIFICATION: SIGNIFICANT CHANGE UP
MCHC RBC-ENTMCNC: 27.9 PG — SIGNIFICANT CHANGE UP (ref 27–34)
MCHC RBC-ENTMCNC: 31.2 GM/DL — LOW (ref 32–36)
MCV RBC AUTO: 89.6 FL — SIGNIFICANT CHANGE UP (ref 80–100)
METAMYELOCYTES # FLD: 0.9 % — SIGNIFICANT CHANGE UP (ref 0–1)
MONOCYTES # BLD AUTO: 0.91 K/UL — HIGH (ref 0–0.9)
MONOCYTES NFR BLD AUTO: 8.9 % — SIGNIFICANT CHANGE UP (ref 2–14)
MYELOCYTES NFR BLD: 1.8 % — HIGH (ref 0–0)
NEUTROPHILS # BLD AUTO: 5 K/UL — SIGNIFICANT CHANGE UP (ref 1.8–7.4)
NEUTROPHILS NFR BLD AUTO: 47.8 % — SIGNIFICANT CHANGE UP (ref 43–77)
NEUTS BAND # BLD: 0.9 % — SIGNIFICANT CHANGE UP (ref 0–6)
NRBC # BLD: 1 /100 — HIGH (ref 0–0)
OVALOCYTES BLD QL SMEAR: SLIGHT — SIGNIFICANT CHANGE UP
PHOSPHATE SERPL-MCNC: 3.8 MG/DL — SIGNIFICANT CHANGE UP (ref 2.5–4.5)
PLAT MORPH BLD: ABNORMAL
PLATELET # BLD AUTO: 256 K/UL — SIGNIFICANT CHANGE UP (ref 150–400)
PLATELET COUNT - ESTIMATE: NORMAL — SIGNIFICANT CHANGE UP
POIKILOCYTOSIS BLD QL AUTO: SLIGHT — SIGNIFICANT CHANGE UP
POLYCHROMASIA BLD QL SMEAR: SLIGHT — SIGNIFICANT CHANGE UP
POTASSIUM SERPL-MCNC: 4 MMOL/L — SIGNIFICANT CHANGE UP (ref 3.5–5.3)
POTASSIUM SERPL-SCNC: 4 MMOL/L — SIGNIFICANT CHANGE UP (ref 3.5–5.3)
RBC # BLD: 3.94 M/UL — SIGNIFICANT CHANGE UP (ref 3.8–5.2)
RBC # FLD: 15.9 % — HIGH (ref 10.3–14.5)
RBC BLD AUTO: ABNORMAL
SMUDGE CELLS # BLD: PRESENT — SIGNIFICANT CHANGE UP
SODIUM SERPL-SCNC: 139 MMOL/L — SIGNIFICANT CHANGE UP (ref 135–145)
TARGETS BLD QL SMEAR: SLIGHT — SIGNIFICANT CHANGE UP
VARIANT LYMPHS # BLD: 3.5 % — SIGNIFICANT CHANGE UP (ref 0–6)
WBC # BLD: 10.26 K/UL — SIGNIFICANT CHANGE UP (ref 3.8–10.5)
WBC # FLD AUTO: 10.26 K/UL — SIGNIFICANT CHANGE UP (ref 3.8–10.5)

## 2022-03-04 RX ORDER — LIDOCAINE 4 G/100G
1 CREAM TOPICAL DAILY
Refills: 0 | Status: DISCONTINUED | OUTPATIENT
Start: 2022-03-04 | End: 2022-03-07

## 2022-03-04 RX ADMIN — Medication 25 MILLIGRAM(S): at 05:40

## 2022-03-04 RX ADMIN — OXYCODONE HYDROCHLORIDE 5 MILLIGRAM(S): 5 TABLET ORAL at 08:24

## 2022-03-04 RX ADMIN — ENOXAPARIN SODIUM 40 MILLIGRAM(S): 100 INJECTION SUBCUTANEOUS at 11:38

## 2022-03-04 RX ADMIN — Medication 50 MILLIGRAM(S): at 17:05

## 2022-03-04 RX ADMIN — OXYCODONE HYDROCHLORIDE 5 MILLIGRAM(S): 5 TABLET ORAL at 17:05

## 2022-03-04 RX ADMIN — GABAPENTIN 600 MILLIGRAM(S): 400 CAPSULE ORAL at 21:50

## 2022-03-04 RX ADMIN — OXYCODONE HYDROCHLORIDE 5 MILLIGRAM(S): 5 TABLET ORAL at 23:56

## 2022-03-04 RX ADMIN — GABAPENTIN 600 MILLIGRAM(S): 400 CAPSULE ORAL at 05:38

## 2022-03-04 RX ADMIN — ISOSORBIDE MONONITRATE 30 MILLIGRAM(S): 60 TABLET, EXTENDED RELEASE ORAL at 08:24

## 2022-03-04 RX ADMIN — GABAPENTIN 600 MILLIGRAM(S): 400 CAPSULE ORAL at 17:05

## 2022-03-04 RX ADMIN — OXYCODONE HYDROCHLORIDE 5 MILLIGRAM(S): 5 TABLET ORAL at 18:00

## 2022-03-04 RX ADMIN — OXYCODONE HYDROCHLORIDE 5 MILLIGRAM(S): 5 TABLET ORAL at 09:17

## 2022-03-04 RX ADMIN — LIDOCAINE 1 PATCH: 4 CREAM TOPICAL at 11:38

## 2022-03-04 RX ADMIN — LIDOCAINE 1 PATCH: 4 CREAM TOPICAL at 22:10

## 2022-03-04 RX ADMIN — OXYCODONE HYDROCHLORIDE 5 MILLIGRAM(S): 5 TABLET ORAL at 21:53

## 2022-03-04 RX ADMIN — Medication 3 MILLIGRAM(S): at 21:51

## 2022-03-04 RX ADMIN — LIDOCAINE 1 PATCH: 4 CREAM TOPICAL at 23:55

## 2022-03-04 RX ADMIN — PANTOPRAZOLE SODIUM 40 MILLIGRAM(S): 20 TABLET, DELAYED RELEASE ORAL at 05:38

## 2022-03-05 LAB
ANION GAP SERPL CALC-SCNC: 9 MMOL/L — SIGNIFICANT CHANGE UP (ref 7–14)
BUN SERPL-MCNC: 23 MG/DL — SIGNIFICANT CHANGE UP (ref 7–23)
CALCIUM SERPL-MCNC: 8.7 MG/DL — SIGNIFICANT CHANGE UP (ref 8.4–10.5)
CHLORIDE SERPL-SCNC: 101 MMOL/L — SIGNIFICANT CHANGE UP (ref 98–107)
CO2 SERPL-SCNC: 26 MMOL/L — SIGNIFICANT CHANGE UP (ref 22–31)
CREAT SERPL-MCNC: 0.77 MG/DL — SIGNIFICANT CHANGE UP (ref 0.5–1.3)
EGFR: 82 ML/MIN/1.73M2 — SIGNIFICANT CHANGE UP
GLUCOSE SERPL-MCNC: 107 MG/DL — HIGH (ref 70–99)
HCT VFR BLD CALC: 35.7 % — SIGNIFICANT CHANGE UP (ref 34.5–45)
HGB BLD-MCNC: 11.4 G/DL — LOW (ref 11.5–15.5)
MAGNESIUM SERPL-MCNC: 1.9 MG/DL — SIGNIFICANT CHANGE UP (ref 1.6–2.6)
MCHC RBC-ENTMCNC: 28.2 PG — SIGNIFICANT CHANGE UP (ref 27–34)
MCHC RBC-ENTMCNC: 31.9 GM/DL — LOW (ref 32–36)
MCV RBC AUTO: 88.4 FL — SIGNIFICANT CHANGE UP (ref 80–100)
NRBC # BLD: 0 /100 WBCS — SIGNIFICANT CHANGE UP
NRBC # FLD: 0.04 K/UL — HIGH
PHOSPHATE SERPL-MCNC: 4.1 MG/DL — SIGNIFICANT CHANGE UP (ref 2.5–4.5)
PLATELET # BLD AUTO: 233 K/UL — SIGNIFICANT CHANGE UP (ref 150–400)
POTASSIUM SERPL-MCNC: 4.5 MMOL/L — SIGNIFICANT CHANGE UP (ref 3.5–5.3)
POTASSIUM SERPL-SCNC: 4.5 MMOL/L — SIGNIFICANT CHANGE UP (ref 3.5–5.3)
RBC # BLD: 4.04 M/UL — SIGNIFICANT CHANGE UP (ref 3.8–5.2)
RBC # FLD: 16.2 % — HIGH (ref 10.3–14.5)
SODIUM SERPL-SCNC: 136 MMOL/L — SIGNIFICANT CHANGE UP (ref 135–145)
WBC # BLD: 8.81 K/UL — SIGNIFICANT CHANGE UP (ref 3.8–10.5)
WBC # FLD AUTO: 8.81 K/UL — SIGNIFICANT CHANGE UP (ref 3.8–10.5)

## 2022-03-05 RX ORDER — LIDOCAINE 4 G/100G
1 CREAM TOPICAL ONCE
Refills: 0 | Status: COMPLETED | OUTPATIENT
Start: 2022-03-05 | End: 2022-03-05

## 2022-03-05 RX ADMIN — OXYCODONE HYDROCHLORIDE 5 MILLIGRAM(S): 5 TABLET ORAL at 06:20

## 2022-03-05 RX ADMIN — GABAPENTIN 600 MILLIGRAM(S): 400 CAPSULE ORAL at 15:02

## 2022-03-05 RX ADMIN — ISOSORBIDE MONONITRATE 30 MILLIGRAM(S): 60 TABLET, EXTENDED RELEASE ORAL at 08:10

## 2022-03-05 RX ADMIN — Medication 50 MILLIGRAM(S): at 17:55

## 2022-03-05 RX ADMIN — PANTOPRAZOLE SODIUM 40 MILLIGRAM(S): 20 TABLET, DELAYED RELEASE ORAL at 06:12

## 2022-03-05 RX ADMIN — LIDOCAINE 1 PATCH: 4 CREAM TOPICAL at 23:00

## 2022-03-05 RX ADMIN — GABAPENTIN 600 MILLIGRAM(S): 400 CAPSULE ORAL at 06:12

## 2022-03-05 RX ADMIN — Medication 25 MILLIGRAM(S): at 06:12

## 2022-03-05 RX ADMIN — LIDOCAINE 1 PATCH: 4 CREAM TOPICAL at 11:40

## 2022-03-05 RX ADMIN — OXYCODONE HYDROCHLORIDE 5 MILLIGRAM(S): 5 TABLET ORAL at 12:15

## 2022-03-05 RX ADMIN — Medication 3 MILLIGRAM(S): at 22:55

## 2022-03-05 RX ADMIN — GABAPENTIN 600 MILLIGRAM(S): 400 CAPSULE ORAL at 21:10

## 2022-03-05 RX ADMIN — OXYCODONE HYDROCHLORIDE 5 MILLIGRAM(S): 5 TABLET ORAL at 11:40

## 2022-03-05 RX ADMIN — ENOXAPARIN SODIUM 40 MILLIGRAM(S): 100 INJECTION SUBCUTANEOUS at 11:40

## 2022-03-05 RX ADMIN — Medication 50 MILLIGRAM(S): at 06:12

## 2022-03-06 LAB
ANION GAP SERPL CALC-SCNC: 11 MMOL/L — SIGNIFICANT CHANGE UP (ref 7–14)
BUN SERPL-MCNC: 21 MG/DL — SIGNIFICANT CHANGE UP (ref 7–23)
CALCIUM SERPL-MCNC: 8.6 MG/DL — SIGNIFICANT CHANGE UP (ref 8.4–10.5)
CHLORIDE SERPL-SCNC: 102 MMOL/L — SIGNIFICANT CHANGE UP (ref 98–107)
CO2 SERPL-SCNC: 26 MMOL/L — SIGNIFICANT CHANGE UP (ref 22–31)
CREAT SERPL-MCNC: 0.71 MG/DL — SIGNIFICANT CHANGE UP (ref 0.5–1.3)
EGFR: 91 ML/MIN/1.73M2 — SIGNIFICANT CHANGE UP
GLUCOSE SERPL-MCNC: 97 MG/DL — SIGNIFICANT CHANGE UP (ref 70–99)
HCT VFR BLD CALC: 33.4 % — LOW (ref 34.5–45)
HGB BLD-MCNC: 10.6 G/DL — LOW (ref 11.5–15.5)
MAGNESIUM SERPL-MCNC: 1.9 MG/DL — SIGNIFICANT CHANGE UP (ref 1.6–2.6)
MCHC RBC-ENTMCNC: 28.3 PG — SIGNIFICANT CHANGE UP (ref 27–34)
MCHC RBC-ENTMCNC: 31.7 GM/DL — LOW (ref 32–36)
MCV RBC AUTO: 89.3 FL — SIGNIFICANT CHANGE UP (ref 80–100)
NRBC # BLD: 0 /100 WBCS — SIGNIFICANT CHANGE UP
NRBC # FLD: 0.03 K/UL — HIGH
PHOSPHATE SERPL-MCNC: 3.7 MG/DL — SIGNIFICANT CHANGE UP (ref 2.5–4.5)
PLATELET # BLD AUTO: 229 K/UL — SIGNIFICANT CHANGE UP (ref 150–400)
POTASSIUM SERPL-MCNC: 4.2 MMOL/L — SIGNIFICANT CHANGE UP (ref 3.5–5.3)
POTASSIUM SERPL-SCNC: 4.2 MMOL/L — SIGNIFICANT CHANGE UP (ref 3.5–5.3)
RBC # BLD: 3.74 M/UL — LOW (ref 3.8–5.2)
RBC # FLD: 16.1 % — HIGH (ref 10.3–14.5)
SARS-COV-2 RNA SPEC QL NAA+PROBE: SIGNIFICANT CHANGE UP
SODIUM SERPL-SCNC: 139 MMOL/L — SIGNIFICANT CHANGE UP (ref 135–145)
WBC # BLD: 7.5 K/UL — SIGNIFICANT CHANGE UP (ref 3.8–10.5)
WBC # FLD AUTO: 7.5 K/UL — SIGNIFICANT CHANGE UP (ref 3.8–10.5)

## 2022-03-06 RX ORDER — TETRACAINE/BENZOCAINE/BUTAMBEN 2%-14%-2%
1 OINTMENT (GRAM) TOPICAL ONCE
Refills: 0 | Status: DISCONTINUED | OUTPATIENT
Start: 2022-03-06 | End: 2022-03-07

## 2022-03-06 RX ORDER — BENZOCAINE AND MENTHOL 5; 1 G/100ML; G/100ML
1 LIQUID ORAL
Refills: 0 | Status: DISCONTINUED | OUTPATIENT
Start: 2022-03-06 | End: 2022-03-06

## 2022-03-06 RX ADMIN — OXYCODONE HYDROCHLORIDE 5 MILLIGRAM(S): 5 TABLET ORAL at 12:45

## 2022-03-06 RX ADMIN — Medication 3 MILLIGRAM(S): at 21:46

## 2022-03-06 RX ADMIN — OXYCODONE HYDROCHLORIDE 5 MILLIGRAM(S): 5 TABLET ORAL at 11:41

## 2022-03-06 RX ADMIN — Medication 50 MILLIGRAM(S): at 05:44

## 2022-03-06 RX ADMIN — ISOSORBIDE MONONITRATE 30 MILLIGRAM(S): 60 TABLET, EXTENDED RELEASE ORAL at 07:27

## 2022-03-06 RX ADMIN — GABAPENTIN 600 MILLIGRAM(S): 400 CAPSULE ORAL at 13:07

## 2022-03-06 RX ADMIN — LIDOCAINE 1 PATCH: 4 CREAM TOPICAL at 18:10

## 2022-03-06 RX ADMIN — PANTOPRAZOLE SODIUM 40 MILLIGRAM(S): 20 TABLET, DELAYED RELEASE ORAL at 05:44

## 2022-03-06 RX ADMIN — Medication 25 MILLIGRAM(S): at 05:44

## 2022-03-06 RX ADMIN — OXYCODONE HYDROCHLORIDE 5 MILLIGRAM(S): 5 TABLET ORAL at 23:00

## 2022-03-06 RX ADMIN — Medication 50 MILLIGRAM(S): at 17:34

## 2022-03-06 RX ADMIN — ENOXAPARIN SODIUM 40 MILLIGRAM(S): 100 INJECTION SUBCUTANEOUS at 13:07

## 2022-03-06 RX ADMIN — LIDOCAINE 1 PATCH: 4 CREAM TOPICAL at 13:07

## 2022-03-06 RX ADMIN — GABAPENTIN 600 MILLIGRAM(S): 400 CAPSULE ORAL at 05:43

## 2022-03-06 RX ADMIN — GABAPENTIN 600 MILLIGRAM(S): 400 CAPSULE ORAL at 21:39

## 2022-03-06 RX ADMIN — OXYCODONE HYDROCHLORIDE 5 MILLIGRAM(S): 5 TABLET ORAL at 21:46

## 2022-03-06 RX ADMIN — LIDOCAINE 1 PATCH: 4 CREAM TOPICAL at 19:00

## 2022-03-06 RX ADMIN — OXYCODONE HYDROCHLORIDE 5 MILLIGRAM(S): 5 TABLET ORAL at 02:15

## 2022-03-06 RX ADMIN — OXYCODONE HYDROCHLORIDE 5 MILLIGRAM(S): 5 TABLET ORAL at 02:46

## 2022-03-06 NOTE — PROGRESS NOTE ADULT - TIME BILLING
- Review of records, telemetry, vital signs and daily labs.   - General and cardiovascular physical examination.  - Generation of cardiovascular treatment plan.  - Coordination of care.      Patient was seen and examined by me on 2/28/2022,interim events noted,labs and radiology studies reviewed.  Shola Pike MD,FACC.  [address] [place], [place] [id number].  [phone]
- Review of records, telemetry, vital signs and daily labs.   - General and cardiovascular physical examination.  - Generation of cardiovascular treatment plan.  - Coordination of care.      Patient was seen and examined by me on 3/6/2022,interim events noted,labs and radiology studies reviewed.  Shola Pike MD,FACC.  [address] [place], [place] [id number].  [phone]
- Review of records, telemetry, vital signs and daily labs.   - General and cardiovascular physical examination.  - Generation of cardiovascular treatment plan.  - Coordination of care.      Patient was seen and examined by me on 3/4/2022,interim events noted,labs and radiology studies reviewed.  Shola Pike MD,FACC.  [address] [place], [place] [id number].  [phone]
- Review of records, telemetry, vital signs and daily labs.   - General and cardiovascular physical examination.  - Generation of cardiovascular treatment plan.  - Coordination of care.      Patient was seen and examined by me on 3/5/2022,interim events noted,labs and radiology studies reviewed.  Shola Pike MD,FACC.  [address] [place], [place] [id number].  [phone]
- Review of records, telemetry, vital signs and daily labs.   - General and cardiovascular physical examination.  - Generation of cardiovascular treatment plan.  - Coordination of care.      Patient was seen and examined by me on 03/01/2022,interim events noted,labs and radiology studies reviewed.  Shola Pike MD,FACC.  [address] [place], [place] [id number].  [phone]
- Review of records, telemetry, vital signs and daily labs.   - General and cardiovascular physical examination.  - Generation of cardiovascular treatment plan.  - Coordination of care.      Patient was seen and examined by me on 03/02/2022,interim events noted,labs and radiology studies reviewed.  Shola Pike MD,FACC.  [address] [place], [place] [id number].  [phone]
- Review of records, telemetry, vital signs and daily labs.   - General and cardiovascular physical examination.  - Generation of cardiovascular treatment plan.  - Coordination of care.      Patient was seen and examined by me on 03/03/2022,interim events noted,labs and radiology studies reviewed.  Shola Pike MD,FACC.  [address] [place], [place] [id number].  [phone]
- Review of records, telemetry, vital signs and daily labs.   - General and cardiovascular physical examination.  - Generation of cardiovascular treatment plan.  - Coordination of care.      Patient was seen and examined by me on 2/27/2022,interim events noted,labs and radiology studies reviewed.  Shola Pike MD,FACC.  [address] [place], [place] [id number].  [phone]
- Review of records, telemetry, vital signs and daily labs.   - General and cardiovascular physical examination.  - Generation of cardiovascular treatment plan.  - Coordination of care.      Patient was seen and examined by me on 2/25/2022,interim events noted,labs and radiology studies reviewed.  hSola Pike MD,FACC.  [address] [place], [place] [id number].  [phone]
- Review of records, telemetry, vital signs and daily labs.   - General and cardiovascular physical examination.  - Generation of cardiovascular treatment plan.  - Coordination of care.      Patient was seen and examined by me on 2/26/2022,interim events noted,labs and radiology studies reviewed.  Shola Pike MD,FACC.  [address] [place], [place] [id number].  [phone]

## 2022-03-07 ENCOUNTER — TRANSCRIPTION ENCOUNTER (OUTPATIENT)
Age: 72
End: 2022-03-07

## 2022-03-07 VITALS
TEMPERATURE: 98 F | HEART RATE: 74 BPM | RESPIRATION RATE: 17 BRPM | OXYGEN SATURATION: 100 % | DIASTOLIC BLOOD PRESSURE: 46 MMHG | SYSTOLIC BLOOD PRESSURE: 148 MMHG

## 2022-03-07 LAB
ANION GAP SERPL CALC-SCNC: 8 MMOL/L — SIGNIFICANT CHANGE UP (ref 7–14)
APPEARANCE UR: CLEAR — SIGNIFICANT CHANGE UP
BILIRUB UR-MCNC: NEGATIVE — SIGNIFICANT CHANGE UP
BUN SERPL-MCNC: 24 MG/DL — HIGH (ref 7–23)
CALCIUM SERPL-MCNC: 8.5 MG/DL — SIGNIFICANT CHANGE UP (ref 8.4–10.5)
CHLORIDE SERPL-SCNC: 101 MMOL/L — SIGNIFICANT CHANGE UP (ref 98–107)
CO2 SERPL-SCNC: 28 MMOL/L — SIGNIFICANT CHANGE UP (ref 22–31)
COLOR SPEC: SIGNIFICANT CHANGE UP
CREAT SERPL-MCNC: 0.81 MG/DL — SIGNIFICANT CHANGE UP (ref 0.5–1.3)
DIFF PNL FLD: NEGATIVE — SIGNIFICANT CHANGE UP
EGFR: 78 ML/MIN/1.73M2 — SIGNIFICANT CHANGE UP
GLUCOSE SERPL-MCNC: 100 MG/DL — HIGH (ref 70–99)
GLUCOSE UR QL: NEGATIVE — SIGNIFICANT CHANGE UP
KETONES UR-MCNC: NEGATIVE — SIGNIFICANT CHANGE UP
LEUKOCYTE ESTERASE UR-ACNC: NEGATIVE — SIGNIFICANT CHANGE UP
MAGNESIUM SERPL-MCNC: 2 MG/DL — SIGNIFICANT CHANGE UP (ref 1.6–2.6)
NITRITE UR-MCNC: NEGATIVE — SIGNIFICANT CHANGE UP
PH UR: 6 — SIGNIFICANT CHANGE UP (ref 5–8)
PHOSPHATE SERPL-MCNC: 4.4 MG/DL — SIGNIFICANT CHANGE UP (ref 2.5–4.5)
POTASSIUM SERPL-MCNC: 4.1 MMOL/L — SIGNIFICANT CHANGE UP (ref 3.5–5.3)
POTASSIUM SERPL-SCNC: 4.1 MMOL/L — SIGNIFICANT CHANGE UP (ref 3.5–5.3)
PROT UR-MCNC: NEGATIVE — SIGNIFICANT CHANGE UP
SODIUM SERPL-SCNC: 137 MMOL/L — SIGNIFICANT CHANGE UP (ref 135–145)
SP GR SPEC: 1.01 — SIGNIFICANT CHANGE UP (ref 1–1.05)
UROBILINOGEN FLD QL: SIGNIFICANT CHANGE UP

## 2022-03-07 RX ORDER — PHENAZOPYRIDINE HCL 100 MG
1 TABLET ORAL
Qty: 3 | Refills: 0
Start: 2022-03-07 | End: 2022-03-09

## 2022-03-07 RX ORDER — OXYCODONE HYDROCHLORIDE 5 MG/1
1 TABLET ORAL
Qty: 0 | Refills: 0 | DISCHARGE
Start: 2022-03-07

## 2022-03-07 RX ORDER — POLYETHYLENE GLYCOL 3350 17 G/17G
17 POWDER, FOR SOLUTION ORAL
Qty: 0 | Refills: 0 | DISCHARGE
Start: 2022-03-07

## 2022-03-07 RX ORDER — PANTOPRAZOLE SODIUM 20 MG/1
1 TABLET, DELAYED RELEASE ORAL
Qty: 0 | Refills: 0 | DISCHARGE
Start: 2022-03-07

## 2022-03-07 RX ORDER — GABAPENTIN 400 MG/1
2 CAPSULE ORAL
Qty: 0 | Refills: 0 | DISCHARGE
Start: 2022-03-07

## 2022-03-07 RX ORDER — LIDOCAINE 4 G/100G
0 CREAM TOPICAL
Qty: 0 | Refills: 0 | DISCHARGE
Start: 2022-03-07

## 2022-03-07 RX ORDER — SENNA PLUS 8.6 MG/1
2 TABLET ORAL
Qty: 0 | Refills: 0 | DISCHARGE
Start: 2022-03-07

## 2022-03-07 RX ADMIN — LIDOCAINE 1 PATCH: 4 CREAM TOPICAL at 13:12

## 2022-03-07 RX ADMIN — PANTOPRAZOLE SODIUM 40 MILLIGRAM(S): 20 TABLET, DELAYED RELEASE ORAL at 06:10

## 2022-03-07 RX ADMIN — OXYCODONE HYDROCHLORIDE 5 MILLIGRAM(S): 5 TABLET ORAL at 13:52

## 2022-03-07 RX ADMIN — GABAPENTIN 600 MILLIGRAM(S): 400 CAPSULE ORAL at 05:53

## 2022-03-07 RX ADMIN — Medication 50 MILLIGRAM(S): at 05:53

## 2022-03-07 RX ADMIN — ENOXAPARIN SODIUM 40 MILLIGRAM(S): 100 INJECTION SUBCUTANEOUS at 13:12

## 2022-03-07 RX ADMIN — OXYCODONE HYDROCHLORIDE 5 MILLIGRAM(S): 5 TABLET ORAL at 06:10

## 2022-03-07 RX ADMIN — LIDOCAINE 1 PATCH: 4 CREAM TOPICAL at 01:00

## 2022-03-07 RX ADMIN — Medication 25 MILLIGRAM(S): at 05:53

## 2022-03-07 RX ADMIN — ISOSORBIDE MONONITRATE 30 MILLIGRAM(S): 60 TABLET, EXTENDED RELEASE ORAL at 08:42

## 2022-03-07 RX ADMIN — GABAPENTIN 600 MILLIGRAM(S): 400 CAPSULE ORAL at 13:13

## 2022-03-07 RX ADMIN — OXYCODONE HYDROCHLORIDE 5 MILLIGRAM(S): 5 TABLET ORAL at 07:28

## 2022-03-07 RX ADMIN — OXYCODONE HYDROCHLORIDE 5 MILLIGRAM(S): 5 TABLET ORAL at 13:18

## 2022-03-07 NOTE — DISCHARGE NOTE NURSING/CASE MANAGEMENT/SOCIAL WORK - NSDCCRNAME_GEN_ALL_CORE_FT
Ronal address: [address] [place] [id number], 2pm  via [facility] ambulance

## 2022-03-07 NOTE — DISCHARGE NOTE NURSING/CASE MANAGEMENT/SOCIAL WORK - NSDCFUADDAPPT_GEN_ALL_CORE_FT
Please call and schedule outpatient follow up Ortho Dr. Brooks in 1-2 weeks following discharge  [phone].  -neuro - may need outpatient emg/ncs   Thyroid nodule. - patient reports undergoing OP w/u w/ Dr. Emir Magdaleno oncologist w/ scheduled bx next week  follows with PMD/cardiologist Dr Frederick .

## 2022-03-07 NOTE — PROGRESS NOTE ADULT - NSPROGADDITIONALINFOA_GEN_ALL_CORE
- Counseling on diet, exercise, and medication adherence was done  - Counseling on smoking cessation and alcohol consumption offered when appropriate.  - Pain assessed and judicious use of narcotics when appropriate was discussed.    - Stroke education given when appropriate.  - Importance of fall prevention discussed.   - Differential diagnosis and plan of care discussed with patient and/or family and primary team
- Counseling on diet, exercise, and medication adherence was done  - Counseling on smoking cessation and alcohol consumption offered when appropriate.  - Pain assessed and judicious use of narcotics when appropriate was discussed.    - Stroke education given when appropriate.  - Importance of fall prevention discussed.   - Differential diagnosis and plan of care discussed with patient and/or family and primary team

## 2022-03-07 NOTE — PROGRESS NOTE ADULT - PROVIDER SPECIALTY LIST ADULT
Cardiology
Neurology
Cardiology
Neurology
Cardiology
Internal Medicine
Cardiology
Cardiology
Internal Medicine
Internal Medicine

## 2022-03-07 NOTE — DISCHARGE NOTE NURSING/CASE MANAGEMENT/SOCIAL WORK - PATIENT PORTAL LINK FT
You can access the FollowMyHealth Patient Portal offered by [facility] by registering at the following website: [email or web address]. By joining [employer]’s FollowMyHealth portal, you will also be able to view your health information using other applications (apps) compatible with our system.

## 2022-03-07 NOTE — PROGRESS NOTE ADULT - ASSESSMENT
72F with pmh of HTN, asthma, RA, chronic low back pain, R. thyroid nodule admitted for LE pain r/o DVT vs worsening neuropathic pain
72F with pmh of HTN, asthma, RA, chronic low back pain, R. thyroid nodule admitted for LE pain r/o DVT vs worsening neuropathic pain
72 obese AAF with HTN, asthma, RA, brain cyst?, chronic low back pain s/p multiple surgeries 2015,2017,  R. thyroid nodule pain presents to ED for b/l LE pain and back pain. Patient reports b/l LE sharp, shooting pain started ~1.5 months ago, associated with burning sensation, numbness and weakness. trouble swallowing from thyroid nodule.   o/e walks with walker. RLE pain limited worse R>L. no overt incontinence but + stress/urge incontinence ; does not wear diaper.   follows with neuro Leander Almaraz and also ortho   CTH neg  CT T/L spine: no change from priors.  post op changes with [id number] laminectomie and instrumented fusion.   MRI C/T/L spine: with multilevel cervical spondylosis; lumbar extradural collection dorsal to lami site unchanged/smaller; left thyroid nodule     Impression: LBP with radiculopathy and possible neuropathy may be result of prior disease and surgeries  - gabapentin now 600mg TID for neuropathic pain. can increase as tolerated   - pain manaement.   - PT/OT  - ortho recs appreciated. no intervention   - ortho/nsx spine f/u  - may need outpatient emg/ncs  - check FS, glucose control <180  - GI/DVT ppx  - Thank you for allowing me to participate in the care of this patient. Call with questions.   - no objection to d/c planning   Celso Cunningham MD  Vascular Neurology  Office: [phone]   
72 obese AAF with HTN, asthma, RA, brain cyst?, chronic low back pain s/p multiple surgeries 2015,2017,  R. thyroid nodule pain presents to ED for b/l LE pain and back pain. Patient reports b/l LE sharp, shooting pain started ~1.5 months ago, associated with burning sensation, numbness and weakness. trouble swallowing from thyroid nodule.   o/e walks with walker. RLE pain limited worse R>L. no overt incontinence but + stress/urge incontinence ; does not wear diaper.   follows with neuro Leander Almaraz and also ortho   CTH neg  CT T/L spine: no change from priors.  post op changes with [id number] laminectomie and instrumented fusion.   MRI C/T/L spine: with multilevel cervical spondylosis; lumbar extradural collection dorsal to lami site unchanged/smaller; left thyroid nodule     Impression: LBP with radiculopathy and possible neuropathy may be result of prior disease and surgeries  - gabapentin now 600mg TID for neuropathic pain. can increase as tolerated or add amitryptaline 25mg QHS   - pain manaement.   - PT/OT  - ortho recs appreciated. no intervention   - ortho/nsx spine f/u  - may need outpatient emg/ncs  - check FS, glucose control <180  - GI/DVT ppx  - Thank you for allowing me to participate in the care of this patient. Call with questions.   - no objection to d/c planning   Celso Cunningham MD  Vascular Neurology  Office: [phone]   
72F with pmh of HTN, asthma, RA, chronic low back pain, R. thyroid nodule admitted for LE pain r/o DVT vs worsening neuropathic pain
72F with pmh of HTN, asthma, RA, chronic low back pain, R. thyroid nodule admitted for LE pain r/o DVT vs worsening neuropathic pain    Problem/Plan - 1:  ·  Problem: Neuropathy.   ·  Plan: may have developed tolerance to current dose of gabapentin contributing to worsening LE neuropathic pain  increase gabapentin to 600 mg TID  - LE doppler -No evidence of deep venous thrombosis in either lower extremity.  Xray negative for b/l foot fracture  PO tylenol 975 mg.  -On Decadron    Problem/Plan - 2:  ·  Problem: Focal motor deficit.   ·  Plan: on exam RLE and RUE strength appears slightly more diminished compared to LUE and LLE  pt denies hx of CVA  -CT head to r/o chronic CVA-NO EVIDENCE OF AN ACUTE INTRACRANIAL HEMORRHAGE, MIDLINE SHIFT, OR   HYDROCEPHALUS. MILD WHITE MATTER ISCHEMIC CHANGES.      Problem/Plan - 3:  ·  Problem: Thyroid nodule.   ·  Plan: patient reports undergoing OP w/u w/ Dr. Magdaleno w/ scheduled bx next week  endorses vague swallowing difficulties due to nodule   start bite size diet, SLP eval  TSH 1.65.    Problem/Plan - 4:  ·  Problem: Hypertension.   ·  Plan: resume home dose HCTZ, metop, imdur  please obtain collateral from PCP Dr. Frederick regarding stress test results  EKG non ischemic.  Been having chest pain  NST EF 50% Apical inferior ischemia noted  -Will discuss Cardiac cath    Problem/Plan - 5:  ·  Problem: Preventive measure.   ·  Plan: DVT ppx: lovenox subq  Diet: bite size.
72F with pmh of HTN, asthma, RA, chronic low back pain, R. thyroid nodule admitted for LE pain r/o DVT vs worsening neuropathic pain    Problem/Plan - 1:  ·  Problem: Neuropathy.   ·  Plan: may have developed tolerance to current dose of gabapentin contributing to worsening LE neuropathic pain  increase gabapentin to 600 mg TID  - LE doppler -No evidence of deep venous thrombosis in either lower extremity.  Xray negative for b/l foot fracture  PO tylenol 975 mg.  -On Decadron  - MRI CLT spine done- no further ortho inpatient intervention- f/u Dr. Brooks OP    Problem/Plan - 2:  ·  Problem: Focal motor deficit.   ·  Plan: on exam RLE and RUE strength appears slightly more diminished compared to LUE and LLE  pt denies hx of CVA  -CT head to r/o chronic CVA-NO EVIDENCE OF AN ACUTE INTRACRANIAL HEMORRHAGE, MIDLINE SHIFT, OR   HYDROCEPHALUS. MILD WHITE MATTER ISCHEMIC CHANGES.      Problem/Plan - 3:  ·  Problem: Thyroid nodule.   ·  Plan: patient reports undergoing OP w/u w/ Dr. Magdaleno w/ scheduled bx next week  endorses vague swallowing difficulties due to nodule   start bite size diet, SLP eval  TSH 1.65.    Problem/Plan - 4:  ·  Problem: Hypertension.   ·  Plan: resume home dose HCTZ, metop, imdur  please obtain collateral from PCP Dr. Frederick regarding stress test results  EKG non ischemic.  Been having chest pain  NST EF 50% Apical inferior ischemia noted  + Stress Test- s/p [facility] 03/01 via [facility]:  luminal irregularities    Problem/Plan - 5:  ·  Problem: Preventive measure.   ·  Plan: DVT ppx: lovenox subq  Diet: bite size.    -  D/c planning Rehab
72F with pmh of HTN, asthma, RA, chronic low back pain, R. thyroid nodule admitted for LE pain r/o DVT vs worsening neuropathic pain    Problem/Plan - 1:  ·  Problem: Neuropathy.   ·  Plan: may have developed tolerance to current dose of gabapentin contributing to worsening LE neuropathic pain  increase gabapentin to 600 mg TID  - LE doppler -No evidence of deep venous thrombosis in either lower extremity.  Xray negative for b/l foot fracture  PO tylenol 975 mg.  -On Decadron  - MRI CLT spine done- no further ortho inpatient intervention- f/u Dr. Brooks OP  - Neuro following    Problem/Plan - 2:  ·  Problem: Focal motor deficit.   ·  Plan: on exam RLE and RUE strength appears slightly more diminished compared to LUE and LLE  pt denies hx of CVA  -CT head to r/o chronic CVA-NO EVIDENCE OF AN ACUTE INTRACRANIAL HEMORRHAGE, MIDLINE SHIFT, OR   HYDROCEPHALUS. MILD WHITE MATTER ISCHEMIC CHANGES.      Problem/Plan - 3:  ·  Problem: Thyroid nodule.   ·  Plan: patient reports undergoing OP w/u w/ Dr. Magdaleno w/ scheduled bx next week  endorses vague swallowing difficulties due to nodule   start bite size diet, SLP eval  TSH 1.65.    Problem/Plan - 4:  ·  Problem: Hypertension.   ·  Plan: resume home dose HCTZ, metop, imdur  please obtain collateral from PCP Dr. Frederick regarding stress test results  EKG non ischemic.  Been having chest pain  NST EF 50% Apical inferior ischemia noted  + Stress Test- s/p [facility] 03/01 via [facility]:  luminal irregularities    Problem/Plan - 5:  ·  Problem: Preventive measure.   ·  Plan: DVT ppx: lovenox subq  Diet: bite size.    -  D/c planning Rehab
72F with pmh of HTN, asthma, RA, chronic low back pain, R. thyroid nodule admitted for LE pain r/o DVT vs worsening neuropathic pain    Problem/Plan - 1:  ·  Problem: Neuropathy.   ·  Plan: may have developed tolerance to current dose of gabapentin contributing to worsening LE neuropathic pain  increase gabapentin to 600 mg TID  - LE doppler -No evidence of deep venous thrombosis in either lower extremity.  Xray negative for b/l foot fracture  PO tylenol 975 mg.  -On Decadron  - MRI CLT spine done- no further ortho inpatient intervention- f/u Dr. Brooks OP  - Neuro following    Problem/Plan - 2:  ·  Problem: Focal motor deficit.   ·  Plan: on exam RLE and RUE strength appears slightly more diminished compared to LUE and LLE  pt denies hx of CVA  -CT head to r/o chronic CVA-NO EVIDENCE OF AN ACUTE INTRACRANIAL HEMORRHAGE, MIDLINE SHIFT, OR   HYDROCEPHALUS. MILD WHITE MATTER ISCHEMIC CHANGES.      Problem/Plan - 3:  ·  Problem: Thyroid nodule.   ·  Plan: patient reports undergoing OP w/u w/ Dr. Magdaleno w/ scheduled bx next week  endorses vague swallowing difficulties due to nodule   start bite size diet, SLP eval  TSH 1.65.    Problem/Plan - 4:  ·  Problem: Hypertension.   ·  Plan: resume home dose HCTZ, metop, imdur  please obtain collateral from PCP Dr. Frederick regarding stress test results  EKG non ischemic.  Been having chest pain  NST EF 50% Apical inferior ischemia noted  + Stress Test- s/p [facility] 03/01 via [facility]:  luminal irregularities    Problem/Plan - 5:  ·  Problem: Preventive measure.   ·  Plan: DVT ppx: lovenox subq  Diet: bite size.    -  D/c planning Rehab
72F with pmh of HTN, asthma, RA, chronic low back pain, R. thyroid nodule admitted for LE pain r/o DVT vs worsening neuropathic pain    Problem/Plan - 1:  ·  Problem: Neuropathy.   ·  Plan: may have developed tolerance to current dose of gabapentin contributing to worsening LE neuropathic pain  increase gabapentin to 600 mg TID  - LE doppler -No evidence of deep venous thrombosis in either lower extremity.  Xray negative for b/l foot fracture  PO tylenol 975 mg.  -On Decadron  - MRI CLT spine done- no further ortho inpatient intervention- f/u Dr. Brooks OP  - Neuro following    Problem/Plan - 2:  ·  Problem: Focal motor deficit.   ·  Plan: on exam RLE and RUE strength appears slightly more diminished compared to LUE and LLE  pt denies hx of CVA  -CT head to r/o chronic CVA-NO EVIDENCE OF AN ACUTE INTRACRANIAL HEMORRHAGE, MIDLINE SHIFT, OR   HYDROCEPHALUS. MILD WHITE MATTER ISCHEMIC CHANGES.      Problem/Plan - 3:  ·  Problem: Thyroid nodule.   ·  Plan: patient reports undergoing OP w/u w/ Dr. Magdaleno w/ scheduled bx next week  endorses vague swallowing difficulties due to nodule   start bite size diet, SLP eval  TSH 1.65.    Problem/Plan - 4:  ·  Problem: Hypertension.   ·  Plan: resume home dose HCTZ, metop, imdur  please obtain collateral from PCP Dr. Frederick regarding stress test results  EKG non ischemic.  Been having chest pain  NST EF 50% Apical inferior ischemia noted  + Stress Test- s/p [facility] 03/01 via [facility]:  luminal irregularities    Problem/Plan - 5:  ·  Problem: Preventive measure.   ·  Plan: DVT ppx: lovenox subq  Diet: bite size.    -  D/c planning Rehab
72F with pmh of HTN, asthma, RA, chronic low back pain, R. thyroid nodule admitted for LE pain r/o DVT vs worsening neuropathic pain    Problem/Plan - 1:  ·  Problem: Neuropathy.   ·  Plan: may have developed tolerance to current dose of gabapentin contributing to worsening LE neuropathic pain  increase gabapentin to 600 mg TID  - LE doppler -No evidence of deep venous thrombosis in either lower extremity.  Xray negative for b/l foot fracture  PO tylenol 975 mg.  -On Decadron  - MRI CLT spine done- no further ortho inpatient intervention- f/u Dr. Brooks OP    Problem/Plan - 2:  ·  Problem: Focal motor deficit.   ·  Plan: on exam RLE and RUE strength appears slightly more diminished compared to LUE and LLE  pt denies hx of CVA  -CT head to r/o chronic CVA-NO EVIDENCE OF AN ACUTE INTRACRANIAL HEMORRHAGE, MIDLINE SHIFT, OR   HYDROCEPHALUS. MILD WHITE MATTER ISCHEMIC CHANGES.      Problem/Plan - 3:  ·  Problem: Thyroid nodule.   ·  Plan: patient reports undergoing OP w/u w/ Dr. Magdaleno w/ scheduled bx next week  endorses vague swallowing difficulties due to nodule   start bite size diet, SLP eval  TSH 1.65.    Problem/Plan - 4:  ·  Problem: Hypertension.   ·  Plan: resume home dose HCTZ, metop, imdur  please obtain collateral from PCP Dr. Frederick regarding stress test results  EKG non ischemic.  Been having chest pain  NST EF 50% Apical inferior ischemia noted  + Stress Test- s/p [facility] 03/01 via [facility]:  luminal irregularities. radial site - stable    Problem/Plan - 5:  ·  Problem: Preventive measure.   ·  Plan: DVT ppx: lovenox subq  Diet: bite size.    -  D/c planning Rehab
72F with pmh of HTN, asthma, RA, chronic low back pain, R. thyroid nodule admitted for LE pain r/o DVT vs worsening neuropathic pain

## 2022-03-07 NOTE — DISCHARGE NOTE NURSING/CASE MANAGEMENT/SOCIAL WORK - NSDCPEFALRISK_GEN_ALL_CORE
For information on Fall & Injury Prevention, visit: [email or web address] OR  [email or web address] OR  https://www.cdc.gov/steadi/patient.html

## 2022-03-07 NOTE — PROGRESS NOTE ADULT - SUBJECTIVE AND OBJECTIVE BOX
DATE OF SERVICE:  03/01/2022  Patient was seen and examined on 03/01/2022    .Interim events noted.Consultant notes ,Labs,Telemetry reviewed by me       HOSPITAL COURSE: HPI:  72F with pmh of HTN, asthma, RA, chronic low back pain, R. thyroid nodule pain presents to ED for b/l LE pain. Patient reports b/l LE sharp, shooting pain started ~1.5 months ago, associated with burning sensation, numbness and weakness. She states at baseline ambulates with a walker but since worsening of LE pain has become non ambulatory. In addition states undergoing R. thyroid nodule w.u as OP w/ Dr. Magdaleno, scheduled for bx next week. She states because of thyroid nodule has some difficulty with swallowing. She has not seen a physician in a long time and due to multiple medical complaints followed up with a PCP Dr. Frederick yesterday who performed stress test and other w/u.       INTERIM EVENTS:Patient seen at bedside ,interim events noted.Awake alert still c/o LE weakness Reviewed NST results from Cardiologist-EF 50% Apical inferior ischemia noted,      PMH -reviewed admission note, no change since admission  HEART FAILURE: Acute[ ]Chronic[ ] Systolic[ ] Diastolic[ ] Combined Systolic and Diastolic[ ]  CAD[ ] CABG[ ] PCI[ ]  DEVICES[ ] PPM[ ] ICD[ ] ILR[ ]  ATRIAL FIBRILLATION[ ] Paroxysmal[ ] Permanent[ ]  SAAD[ ] CKD1[ ] CKD2[ ] CKD3[ ] CKD4[ ] ESRD[ ]  COPD[ ] HTN[ ]   DM[ ] Type1[ ] Type 2[ ]   CVA[ ] Paresis[ ]    AMBULATION: Assisted[ ] Cane/walker[x ] Independent[ ]    MEDICATIONS  (STANDING):  cefTRIAXone   IVPB 1000 milliGRAM(s) IV Intermittent every 24 hours  dexAMETHasone     Tablet   Oral   dexAMETHasone     Tablet 2 milliGRAM(s) Oral every 4 hours  dexAMETHasone     Tablet 1 milliGRAM(s) Oral every 4 hours  enoxaparin Injectable 40 milliGRAM(s) SubCutaneous daily  gabapentin 600 milliGRAM(s) Oral three times a day  hydrochlorothiazide 25 milliGRAM(s) Oral daily  isosorbide   mononitrate ER Tablet (IMDUR) 30 milliGRAM(s) Oral <User Schedule>  metoprolol tartrate 50 milliGRAM(s) Oral two times a day  pantoprazole    Tablet 40 milliGRAM(s) Oral before breakfast  polyethylene glycol 3350 17 Gram(s) Oral at bedtime  senna 2 Tablet(s) Oral at bedtime    MEDICATIONS  (PRN):  ALBUTerol    90 MICROgram(s) HFA Inhaler 2 Puff(s) Inhalation every 6 hours PRN Shortness of Breath and/or Wheezing  melatonin 3 milliGRAM(s) Oral at bedtime PRN Insomnia  oxyCODONE    IR 5 milliGRAM(s) Oral every 4 hours PRN Moderate - Severe Pain (4 - 10)            REVIEW OF SYSTEMS:  Constitutional: [ ] fever, [ ]weight loss,  [x]fatigue  Eyes: [ ] visual changes  Respiratory: [ ]shortness of breath;  [ ] cough, [ ]wheezing, [ ]chills, [ ]hemoptysis  Cardiovascular: [ x] chest pain, [ ]palpitations, [ ]dizziness,  [ ]leg swelling[ ]orthopnea[ ]PND  Gastrointestinal: [ ] abdominal pain, [ ]nausea, [ ]vomiting,  [ ]diarrhea [ ]Constipation [ ]Melena  Genitourinary: [ ] dysuria, [ ] hematuria [ ]Mathews  Neurologic: [ ] headaches [ ] tremors[x ]weakness [ ]Paralysis Right[ ] Left[ ]  Skin: [ ] itching, [ ]burning, [ ] rashes  Endocrine: [ ] heat or cold intolerance  Musculoskeletal: [ ] joint pain or swelling; [ ] muscle, back, or extremity pain  Psychiatric: [ ] depression, [ ]anxiety, [ ]mood swings, or [ ]difficulty sleeping  Hematologic: [ ] easy bruising, [ ] bleeding gums    [ ] All remaining systems negative except as per above.   [ ]Unable to obtain.  [x] No change in ROS since admission      Vital Signs Last 24 Hrs  T(C): 37.1 (01 Mar 2022 05:45), Max: 37.6 (28 Feb 2022 14:32)  T(F): 98.8 (01 Mar 2022 05:45), Max: 99.7 (28 Feb 2022 14:32)  HR: 64 (01 Mar 2022 05:45) (55 - 68)  BP: 177/82 (01 Mar 2022 05:45) (143/85 - 177/82)  RR: 18 (01 Mar 2022 05:45) (17 - 18)  SpO2: 100% (01 Mar 2022 05:45) (100% - 100%)  I&O's Summary    28 Feb 2022 07:01  -  01 Mar 2022 07:00  --------------------------------------------------------  IN: 300 mL / OUT: 0 mL / NET: 300 mL        PHYSICAL EXAM:  General: No acute distress BMI-31  HEENT: EOMI, PERRL  Neck: Supple, [ ] JVD  Lungs: Equal air entry bilaterally; [ ] rales [ ] wheezing [ ] rhonchi  Heart: Regular rate and rhythm; [x ] murmur   2/6 [ x] systolic [ ] diastolic [ ] radiation[ ] rubs [ ]  gallops  Abdomen: Nontender, bowel sounds present  Extremities: No clubbing, cyanosis, [ ] edema [ ]Pulses  equal and intact  Nervous system:  Alert & Oriented X3,   Psychiatric: Normal affect  Skin: No rashes or lesions    LABS:  02-28    138  |  103  |  20  ----------------------------<  140<H>  4.0   |  26  |  0.67    Ca    9.3      28 Feb 2022 06:47    TPro  6.5  /  Alb  3.7  /  TBili  <0.2  /  DBili  x   /  AST  19  /  ALT  32  /  AlkPhos  74  02-28    Creatinine Trend: 0.67<--, 0.60<--, 0.62<--, 0.69<--, 0.71<--                        11.4   12.01 )-----------( 273      ( 01 Mar 2022 06:49 )             35.4               
DATE OF SERVICE:  3/3/2022  Patient was seen and examined on  3/3/2022   .Interim events noted.Consultant notes ,Labs,Telemetry reviewed by me       HOSPITAL COURSE: HPI:  72F with pmh of HTN, asthma, RA, chronic low back pain, R. thyroid nodule pain presents to ED for b/l LE pain. Patient reports b/l LE sharp, shooting pain started ~1.5 months ago, associated with burning sensation, numbness and weakness. She states at baseline ambulates with a walker but since worsening of LE pain has become non ambulatory. In addition states undergoing R. thyroid nodule w.u as OP w/ Dr. Magdaleno, scheduled for bx next week. She states because of thyroid nodule has some difficulty with swallowing. She has not seen a physician in a long time and due to multiple medical complaints followed up with a PCP Dr. Frederick yesterday who performed stress test and other w/u. Patient states results are not back yet.    Denies cp, sob, N/V/D, abd pain,  sxs, LE edema. (24 Feb 2022 11:44)      INTERIM EVENTS:Patient seen at bedside ,interim events noted.      PMH -reviewed admission note, no change since admission  HEART FAILURE: Acute[ ]Chronic[ ] Systolic[ ] Diastolic[ ] Combined Systolic and Diastolic[ ]  CAD[ ] CABG[ ] PCI[ ]  DEVICES[ ] PPM[ ] ICD[ ] ILR[ ]  ATRIAL FIBRILLATION[ ] Paroxysmal[ ] Permanent[ ]  SAAD[ ] CKD1[ ] CKD2[ ] CKD3[ ] CKD4[ ] ESRD[ ]  COPD[ ] HTN[ ]   DM[ ] Type1[ ] Type 2[ ]   CVA[ ] Paresis[ ]    AMBULATION: Assisted[ ] Cane/walker[ ] Independent[ ]    MEDICATIONS  (STANDING):  enoxaparin Injectable 40 milliGRAM(s) SubCutaneous daily  gabapentin 600 milliGRAM(s) Oral three times a day  hydrochlorothiazide 25 milliGRAM(s) Oral daily  isosorbide   mononitrate ER Tablet (IMDUR) 30 milliGRAM(s) Oral <User Schedule>  metoprolol tartrate 50 milliGRAM(s) Oral two times a day  pantoprazole    Tablet 40 milliGRAM(s) Oral before breakfast  polyethylene glycol 3350 17 Gram(s) Oral at bedtime  senna 2 Tablet(s) Oral at bedtime    MEDICATIONS  (PRN):  ALBUTerol    90 MICROgram(s) HFA Inhaler 2 Puff(s) Inhalation every 6 hours PRN Shortness of Breath and/or Wheezing  melatonin 3 milliGRAM(s) Oral at bedtime PRN Insomnia  oxyCODONE    IR 5 milliGRAM(s) Oral every 4 hours PRN Moderate - Severe Pain (4 - 10)            REVIEW OF SYSTEMS:  Constitutional: [ ] fever, [ ]weight loss,  [ ]fatigue  Eyes: [ ] visual changes  Respiratory: [ ]shortness of breath;  [ ] cough, [ ]wheezing, [ ]chills, [ ]hemoptysis  Cardiovascular: [ ] chest pain, [ ]palpitations, [ ]dizziness,  [ ]leg swelling[ ]orthopnea[ ]PND  Gastrointestinal: [ ] abdominal pain, [ ]nausea, [ ]vomiting,  [ ]diarrhea [ ]Constipation [ ]Melena  Genitourinary: [ ] dysuria, [ ] hematuria [ ]Mathews  Neurologic: [ ] headaches [ ] tremors[ ]weakness [ ]Paralysis Right[ ] Left[ ]  Skin: [ ] itching, [ ]burning, [ ] rashes  Endocrine: [ ] heat or cold intolerance  Musculoskeletal: [ ] joint pain or swelling; [ ] muscle, back, or extremity pain  Psychiatric: [ ] depression, [ ]anxiety, [ ]mood swings, or [ ]difficulty sleeping  Hematologic: [ ] easy bruising, [ ] bleeding gums    [ ] All remaining systems negative except as per above.   [ ]Unable to obtain.  [x] No change in ROS since admission      Vital Signs Last 24 Hrs  T(C): 37.4 (03 Mar 2022 18:03), Max: 37.9 (03 Mar 2022 08:51)  T(F): 99.4 (03 Mar 2022 18:03), Max: 100.3 (03 Mar 2022 08:51)  HR: 70 (03 Mar 2022 18:03) (60 - 70)  BP: 129/59 (03 Mar 2022 18:03) (129/59 - 168/84)  BP(mean): --  RR: 18 (03 Mar 2022 18:03) (16 - 18)  SpO2: 100% (03 Mar 2022 18:03) (100% - 100%)  I&O's Summary      PHYSICAL EXAM:  General: No acute distress BMI-  HEENT: EOMI, PERRL  Neck: Supple, [ ] JVD  Lungs: Equal air entry bilaterally; [ ] rales [ ] wheezing [ ] rhonchi  Heart: Regular rate and rhythm; [x ] murmur   2/6 [ x] systolic [ ] diastolic [ ] radiation[ ] rubs [ ]  gallops  Abdomen: Nontender, bowel sounds present  Extremities: No clubbing, cyanosis, [ ] edema [ ]Pulses  equal and intact  Nervous system:  Alert & Oriented X3, no focal deficits  Psychiatric: Normal affect  Skin: No rashes or lesions    LABS:  03-03    138  |  103  |  19  ----------------------------<  86  4.0   |  26  |  0.68    Ca    8.7      03 Mar 2022 06:44  Phos  3.4     03-03  Mg     1.90     03-03    TPro  6.3  /  Alb  3.5  /  TBili  0.2  /  DBili  x   /  AST  19  /  ALT  48<H>  /  AlkPhos  63  03-02    Creatinine Trend: 0.68<--, 0.67<--, 0.68<--, 0.67<--, 0.60<--, 0.62<--                        10.6   12.12 )-----------( 253      ( 03 Mar 2022 06:44 )             33.5               
Neurology Progress Note    S: Patient seen and examined. No new events overnight. patient denied CP, SOB, HA or pain. c/o 6/10 HA but seems comfortable speaking on her phone     Medication:  ALBUTerol    90 MICROgram(s) HFA Inhaler 2 Puff(s) Inhalation every 6 hours PRN  enoxaparin Injectable 40 milliGRAM(s) SubCutaneous daily  gabapentin 600 milliGRAM(s) Oral three times a day  hydrochlorothiazide 25 milliGRAM(s) Oral daily  isosorbide   mononitrate ER Tablet (IMDUR) 30 milliGRAM(s) Oral <User Schedule>  lidocaine   4% Patch 1 Patch Transdermal daily  melatonin 3 milliGRAM(s) Oral at bedtime PRN  metoprolol tartrate 50 milliGRAM(s) Oral two times a day  oxyCODONE    IR 5 milliGRAM(s) Oral every 4 hours PRN  pantoprazole    Tablet 40 milliGRAM(s) Oral before breakfast  polyethylene glycol 3350 17 Gram(s) Oral at bedtime  senna 2 Tablet(s) Oral at bedtime      Vitals:  Vital Signs Last 24 Hrs  T(C): 36.7 (04 Mar 2022 16:58), Max: 37.4 (03 Mar 2022 18:03)  T(F): 98 (04 Mar 2022 16:58), Max: 99.4 (03 Mar 2022 18:03)  HR: 70 (04 Mar 2022 16:58) (59 - 70)  BP: 130/69 (04 Mar 2022 16:58) (122/59 - 162/71)  BP(mean): --  RR: 17 (04 Mar 2022 16:58) (17 - 18)  SpO2: 100% (04 Mar 2022 16:58) (99% - 100%)    General Exam:   General Appearance: Appropriately dressed and in no acute distress       Head: Normocephalic, atraumatic and no dysmorphic features  Ear, Nose, and Throat: Moist mucous membranes  CVS: S1S2+  Resp: No SOB, no wheeze or rhonchi  GI: soft NT/ND  Extremities: No edema or cyanosis  Skin: No bruises or rashes     Neurological Exam:  Mental Status: Awake, alert and oriented x 3.  Able to follow simple and complex verbal commands. Able to name and repeat. fluent speech. No obvious aphasia or dysarthria noted.   Cranial Nerves: PERRL, EOMI, VFFC, sensation V1-V3 intact,  no obvious facial asymmetry, equal elevation of palate, scm/trap 5/5, tongue is midline on protrusion. no obvious papilledema on fundoscopic exam. hearing is grossly intact.   Motor: Normal bulk, tone and strength throughout B/L uppers 5/5 but RUE pain limited.  B/L LE at least 4/5 able to walk but pain limited worse on R than left   Sensation: Intact to light touch and pinprick throughout. no right/left confusion. no extinction to tactile on DSS. Romberg was negative.   Reflexes: 1+ throughout at biceps, brachioradialis, triceps, patellars and ankles bilaterally and equal. No clonus. R toe and L toe were both downgoing.  Coordination: No dysmetria on FNF   Gait:  walker    I personally reviewed the below data/images/labs:      CBC Full  -  ( 04 Mar 2022 06:31 )  WBC Count : 10.26 K/uL  RBC Count : 3.94 M/uL  Hemoglobin : 11.0 g/dL  Hematocrit : 35.3 %  Platelet Count - Automated : 256 K/uL  Mean Cell Volume : 89.6 fL  Mean Cell Hemoglobin : 27.9 pg  Mean Cell Hemoglobin Concentration : 31.2 gm/dL  Auto Neutrophil # : 5.00 K/uL  Auto Lymphocyte # : 3.36 K/uL  Auto Monocyte # : 0.91 K/uL  Auto Eosinophil # : 0.36 K/uL  Auto Basophil # : 0.00 K/uL  Auto Neutrophil % : 47.8 %  Auto Lymphocyte % : 32.7 %  Auto Monocyte % : 8.9 %  Auto Eosinophil % : 3.5 %  Auto Basophil % : 0.0 %    03-04    139  |  105  |  21  ----------------------------<  109<H>  4.0   |  25  |  0.84    Ca    8.4      04 Mar 2022 06:31  Phos  3.8     03-04  Mg     1.80     03-04        < from: CT Head No Cont (02.24.22 @ 17:12) >    ACC: [id number] EXAM:  CT BRAIN                          PROCEDURE DATE:  02/24/2022          INTERPRETATION:  CT HEAD    CLINICAL HISTORY: r/o CVA    TECHNIQUE:  Noncontrast CT.  Axial Acquisition.  Sagittal and coronal reformations.    COMPARISON:  Compared to study dated 11/17/2016    FINDINGS:  *  HEMORRHAGE:  No evidence of intracranial hemorrhage.  *  BRAIN PARENCHYMA:  Mild patchy periventricular and subcortical white   matter ischemic changes. No mass or mass effect.  *  VENTRICLES / SHIFT:  No hydrocephalus. No midline shift.  *  EXTRA-AXIAL / BASAL CISTERNS:  No extra-axial mass. Basal cisterns   preserved.  Atherosclerotic calcifications of the cavernous internal   carotid arteries.  *  CALVARIUM AND EXTRACRANIAL SOFT TISSUES:  No depressed calvarial   fracture.  *  SINUSES, ORBITS, MASTOIDS:  The visualized paranasal sinuses and   mastoid air cells are well aerated.    IMPRESSION:  NO EVIDENCE OF AN ACUTE INTRACRANIAL HEMORRHAGE, MIDLINE SHIFT, OR   HYDROCEPHALUS. MILD WHITE MATTER ISCHEMIC CHANGES.  RECOMMEND INTERVAL FOLLOW-UP IF ACUTE ISCHEMIA REMAINS OF CLINICAL CONCERN    --- End of Report ---            HERSON RALPH MD; Attending Radiologist  This document has been electronically signed. Feb 24 2022  5:51PM    < end of copied text >  < from: CT Lumbar Spine No Cont (02.24.22 @ 22:08) >    ACC: [id number] EXAM:  CT LUMBAR SPINE                        ACC: [id number] EXAM:  CT THORACIC SPINE                          PROCEDURE DATE:  02/24/2022          INTERPRETATION:  INDICATIONS:  Weakness. Extensive spine surgery in the   past with hardware.    TECHNIQUE: Contiguous thin section axial images were obtained without   contrast. Sagittal and coronal reformatted images were obtained.    COMPARISON EXAMINATION: MR thoracic and lumbar spine 10/24/2020 and CT   abdomen and pelvis 10/24/2020    FINDINGS:  THORACIC SPINE:    VERTEBRAL BODIES:  Status post T10 through upper sacral posterior fusion   with vertebral body screws and interlocking rods in place as seen   previously. Status post multilevel laminectomies from T12 through L4   producing streak artifact and degrading images through this region.  Multilevel marginal osteophyte formation is again seen. Vertebral bodies   are normal in height.  The left T11 screw is separate from the adjacent sravani which is unchanged   in appearance from the prior CT scan. The remaining screws are attached   and the remaining hardware is intact.  ALIGNMENT:  No subluxations.  INTERVERTEBRAL DISC SPACES:  Unremarkable  NEURAL FORAMINA:  Appear patent  SPINAL CANAL:  No other intradural or extradural defects are seen.  MISCELLANEOUS:  None.    LUMBAR SPINE:    VERTEBRAL BODIES:  Normal in height  ALIGNMENT:  No subluxations.  INTERVERTEBRAL DISC SPACES:  Partially obscured by artifact  NEURAL FORAMINA:  Suboptimally evaluated  SPINAL CANAL:  No other intradural or extradural defects are seen. The   fluid collection previously seen at the laminectomy site is not well   evaluated on the current exam.  MISCELLANEOUS:  Coarsely calcified uterine fibroids are seen.    IMPRESSION:  No significant change identified from the prior exams.    Postoperative changes with multilevel laminectomies and posterior   instrumented fusion.    --- End of Report ---        JESUS MCKEON MD; Attending Radiologist  This document has been electronically signed. Feb 25 2022 11:36AM    < end of copied text >    < from: MR Cervical Spine No Cont (03.02.22 @ 08:53) >    ACC: [id number] EXAM:  MR SPINE THORACIC                        ACC: [id number] EXAM:  MR SPINE LUMBAR                        ACC: [id number] EXAM:  MR SPINE CERVICAL                          PROCEDURE DATE:  03/02/2022          INTERPRETATION:  INDICATIONS:  Prior spine instrumentation with hardware   failure, pain, weakness and paresthesias    TECHNIQUE:  Sagittal T1 weighted, STIR and T2 weighted of the spine as   well as axial T1 weighted and T2 weighted images were obtained.    COMPARISON EXAMINATION: Thoracic and lumbar MR 10/24/2020    FINDINGS:    CERVICAL SPINE:  VERTEBRAL BODIES:  Normal in height and signal intensity. Mild C6-C7   discogenic endplate changes.  ALIGNMENT:  Mild C6-C7 anterolisthesis  INTERVERTEBRAL DISC SPACES:  C2-C3 mild disc bulge  C3-C4 disc bulge/ridge with mild spinal stenosis and right foraminal   stenosis. Left facet arthrosis.  C4-C5 diffuse disc bulge/ridge with bilateral facet arthrosis and   foraminal narrowing. Mild spinal stenosis  C6-C7 disc bulge/ridge with left facet arthrosis and marrow edema.    NEURAL FORAMINA:  Otherwise patent  SPINAL CORD: Normal.  SPINAL CANAL:  No other intradural or extradural defects are seen.  MISCELLANEOUS:  None.    THORACIC SPINE:  VERTEBRAL BODIES:  Status post T10 through upper sacral posterior   instrumented fusion with vertebral body screws and interlocking rods in   place as seen previously. Multilevel laminectomies are again seen.   Evaluation of the spinal canal in the region of the hardware is limited.  ALIGNMENT:  No subluxations.  INTERVERTEBRAL DISC SPACES:  The visualized disc spaces are unremarkable.  NEURAL FORAMINA:  The visualized foramina are unremarkable.  SPINAL CORD: Portions are obscured by artifact. The visualized spinal   cord is unremarkable.  SPINAL CANAL:  No other intradural or extradural defects are seen.  MISCELLANEOUS:  Incompletely seen left thyroid nodule measuring   approximately 3.3 cm in height as seen previously.    LUMBAR SPINE:  VERTEBRAL BODIES:  See above. Multilevel laminectomies as seen previously.  ALIGNMENT:  Mild L3-L4 and L4-L5 anterolisthesis as seen previously  INTERVERTEBRAL DISC SPACES:  Degraded by artifact. No significant change.   Multilevel disc bulges. No definite disc herniation.  NEURAL FORAMINA:  Not well seen.  CONUS MEDULLARIS: Not well seen  SPINAL CANAL:  No other intradural or extradural defects are seen.  MISCELLANEOUS:  Again noted is a fluid collection posterior to the   laminectomy site from L1 through L5 which is unchanged or slightly   smaller when compared with the prior exam.    IMPRESSION:  Limited exam due to multilevel posterior instrumented fusion and metallic   artifact.    Multilevel cervical spondylosis.    Lumbar extradural collection dorsal to the laminectomy site unchanged or   slightly smaller.    No other significant interval changes are noted.    Left thyroid nodule for which thyroid sonography is advised for further   evaluation.    --- End of Report ---            JESUS MCKEON MD; Attending Radiologist  This document has been electronically signed. Mar  2 2022 10:28AM    < end of copied text >              
DATE OF SERVICE:  3/4/2022  Patient was seen and examined on  3/4/2022   .Interim events noted.Consultant notes ,Labs,Telemetry reviewed by me       HOSPITAL COURSE: HPI:  72F with pmh of HTN, asthma, RA, chronic low back pain, R. thyroid nodule pain presents to ED for b/l LE pain. Patient reports b/l LE sharp, shooting pain started ~1.5 months ago, associated with burning sensation, numbness and weakness. She states at baseline ambulates with a walker but since worsening of LE pain has become non ambulatory. In addition states undergoing R. thyroid nodule w.u as OP w/ Dr. Magdaleno, scheduled for bx next week. She states because of thyroid nodule has some difficulty with swallowing. She has not seen a physician in a long time and due to multiple medical complaints followed up with a PCP Dr. Frederick yesterday who performed stress test and other w/u. Patient states results are not back yet.    Denies cp, sob, N/V/D, abd pain,  sxs, LE edema. (24 Feb 2022 11:44)      INTERIM EVENTS:Patient seen at bedside ,interim events noted.      PMH -reviewed admission note, no change since admission  HEART FAILURE: Acute[ ]Chronic[ ] Systolic[ ] Diastolic[ ] Combined Systolic and Diastolic[ ]  CAD[ ] CABG[ ] PCI[ ]  DEVICES[ ] PPM[ ] ICD[ ] ILR[ ]  ATRIAL FIBRILLATION[ ] Paroxysmal[ ] Permanent[ ]  SAAD[ ] CKD1[ ] CKD2[ ] CKD3[ ] CKD4[ ] ESRD[ ]  COPD[ ] HTN[ ]   DM[ ] Type1[ ] Type 2[ ]   CVA[ ] Paresis[ ]    AMBULATION: Assisted[ ] Cane/walker[ ] Independent[ ]    MEDICATIONS  (STANDING):  enoxaparin Injectable 40 milliGRAM(s) SubCutaneous daily  gabapentin 600 milliGRAM(s) Oral three times a day  hydrochlorothiazide 25 milliGRAM(s) Oral daily  isosorbide   mononitrate ER Tablet (IMDUR) 30 milliGRAM(s) Oral <User Schedule>  lidocaine   4% Patch 1 Patch Transdermal daily  metoprolol tartrate 50 milliGRAM(s) Oral two times a day  pantoprazole    Tablet 40 milliGRAM(s) Oral before breakfast  polyethylene glycol 3350 17 Gram(s) Oral at bedtime  senna 2 Tablet(s) Oral at bedtime    MEDICATIONS  (PRN):  ALBUTerol    90 MICROgram(s) HFA Inhaler 2 Puff(s) Inhalation every 6 hours PRN Shortness of Breath and/or Wheezing  melatonin 3 milliGRAM(s) Oral at bedtime PRN Insomnia  oxyCODONE    IR 5 milliGRAM(s) Oral every 4 hours PRN Moderate - Severe Pain (4 - 10)            REVIEW OF SYSTEMS:  Constitutional: [ ] fever, [ ]weight loss,  [ ]fatigue  Eyes: [ ] visual changes  Respiratory: [ ]shortness of breath;  [ ] cough, [ ]wheezing, [ ]chills, [ ]hemoptysis  Cardiovascular: [ ] chest pain, [ ]palpitations, [ ]dizziness,  [ ]leg swelling[ ]orthopnea[ ]PND  Gastrointestinal: [ ] abdominal pain, [ ]nausea, [ ]vomiting,  [ ]diarrhea [ ]Constipation [ ]Melena  Genitourinary: [ ] dysuria, [ ] hematuria [ ]Mathews  Neurologic: [ ] headaches [ ] tremors[ ]weakness [ ]Paralysis Right[ ] Left[ ]  Skin: [ ] itching, [ ]burning, [ ] rashes  Endocrine: [ ] heat or cold intolerance  Musculoskeletal: [ ] joint pain or swelling; [ ] muscle, back, or extremity pain  Psychiatric: [ ] depression, [ ]anxiety, [ ]mood swings, or [ ]difficulty sleeping  Hematologic: [ ] easy bruising, [ ] bleeding gums    [ ] All remaining systems negative except as per above.   [ ]Unable to obtain.  [x] No change in ROS since admission      Vital Signs Last 24 Hrs  T(C): 36.7 (04 Mar 2022 16:58), Max: 37 (04 Mar 2022 09:06)  T(F): 98 (04 Mar 2022 16:58), Max: 98.6 (04 Mar 2022 09:06)  HR: 70 (04 Mar 2022 16:58) (59 - 70)  BP: 130/69 (04 Mar 2022 16:58) (122/59 - 162/71)  BP(mean): --  RR: 17 (04 Mar 2022 16:58) (17 - 18)  SpO2: 100% (04 Mar 2022 16:58) (99% - 100%)  I&O's Summary      PHYSICAL EXAM:  General: No acute distress BMI-  HEENT: EOMI, PERRL  Neck: Supple, [ ] JVD  Lungs: Equal air entry bilaterally; [ ] rales [ ] wheezing [ ] rhonchi  Heart: Regular rate and rhythm; [x ] murmur   2/6 [ x] systolic [ ] diastolic [ ] radiation[ ] rubs [ ]  gallops  Abdomen: Nontender, bowel sounds present  Extremities: No clubbing, cyanosis, [ ] edema [ ]Pulses  equal and intact  Nervous system:  Alert & Oriented X3, no focal deficits  Psychiatric: Normal affect  Skin: No rashes or lesions    LABS:  03-04    139  |  105  |  21  ----------------------------<  109<H>  4.0   |  25  |  0.84    Ca    8.4      04 Mar 2022 06:31  Phos  3.8     03-04  Mg     1.80     03-04      Creatinine Trend: 0.84<--, 0.68<--, 0.67<--, 0.68<--, 0.67<--, 0.60<--                        11.0   10.26 )-----------( 256      ( 04 Mar 2022 06:31 )             35.3               
DATE OF SERVICE:  3/2/2022  Patient was seen and examined on  3/2/2022   .Interim events noted.Consultant notes ,Labs,Telemetry reviewed by me       HOSPITAL COURSE: HPI:  72F with pmh of HTN, asthma, RA, chronic low back pain, R. thyroid nodule pain presents to ED for b/l LE pain. Patient reports b/l LE sharp, shooting pain started ~1.5 months ago, associated with burning sensation, numbness and weakness. She states at baseline ambulates with a walker but since worsening of LE pain has become non ambulatory. In addition states undergoing R. thyroid nodule w.u as OP w/ Dr. Magdaleno, scheduled for bx next week. She states because of thyroid nodule has some difficulty with swallowing. She has not seen a physician in a long time and due to multiple medical complaints followed up with a PCP Dr. Frederick yesterday who performed stress test and other w/u. Patient states results are not back yet.    Denies cp, sob, N/V/D, abd pain,  sxs, LE edema. (24 Feb 2022 11:44)      INTERIM EVENTS:Patient seen at bedside ,interim events noted.      PMH -reviewed admission note, no change since admission  HEART FAILURE: Acute[ ]Chronic[ ] Systolic[ ] Diastolic[ ] Combined Systolic and Diastolic[ ]  CAD[ ] CABG[ ] PCI[ ]  DEVICES[ ] PPM[ ] ICD[ ] ILR[ ]  ATRIAL FIBRILLATION[ ] Paroxysmal[ ] Permanent[ ]  SAAD[ ] CKD1[ ] CKD2[ ] CKD3[ ] CKD4[ ] ESRD[ ]  COPD[ ] HTN[ ]   DM[ ] Type1[ ] Type 2[ ]   CVA[ ] Paresis[ ]    AMBULATION: Assisted[ ] Cane/walker[ ] Independent[ ]    MEDICATIONS  (STANDING):  enoxaparin Injectable 40 milliGRAM(s) SubCutaneous daily  gabapentin 600 milliGRAM(s) Oral three times a day  hydrochlorothiazide 25 milliGRAM(s) Oral daily  isosorbide   mononitrate ER Tablet (IMDUR) 30 milliGRAM(s) Oral <User Schedule>  metoprolol tartrate 50 milliGRAM(s) Oral two times a day  pantoprazole    Tablet 40 milliGRAM(s) Oral before breakfast  polyethylene glycol 3350 17 Gram(s) Oral at bedtime  senna 2 Tablet(s) Oral at bedtime    MEDICATIONS  (PRN):  ALBUTerol    90 MICROgram(s) HFA Inhaler 2 Puff(s) Inhalation every 6 hours PRN Shortness of Breath and/or Wheezing  melatonin 3 milliGRAM(s) Oral at bedtime PRN Insomnia  oxyCODONE    IR 5 milliGRAM(s) Oral every 4 hours PRN Moderate - Severe Pain (4 - 10)            REVIEW OF SYSTEMS:  Constitutional: [ ] fever, [ ]weight loss,  [ ]fatigue  Eyes: [ ] visual changes  Respiratory: [ ]shortness of breath;  [ ] cough, [ ]wheezing, [ ]chills, [ ]hemoptysis  Cardiovascular: [ ] chest pain, [ ]palpitations, [ ]dizziness,  [ ]leg swelling[ ]orthopnea[ ]PND  Gastrointestinal: [ ] abdominal pain, [ ]nausea, [ ]vomiting,  [ ]diarrhea [ ]Constipation [ ]Melena  Genitourinary: [ ] dysuria, [ ] hematuria [ ]Mathews  Neurologic: [ ] headaches [ ] tremors[ ]weakness [ ]Paralysis Right[ ] Left[ ]  Skin: [ ] itching, [ ]burning, [ ] rashes  Endocrine: [ ] heat or cold intolerance  Musculoskeletal: [ ] joint pain or swelling; [ ] muscle, back, or extremity pain  Psychiatric: [ ] depression, [ ]anxiety, [ ]mood swings, or [ ]difficulty sleeping  Hematologic: [ ] easy bruising, [ ] bleeding gums    [ ] All remaining systems negative except as per above.   [ ]Unable to obtain.  [x] No change in ROS since admission      Vital Signs Last 24 Hrs  T(C): 36.7 (02 Mar 2022 18:12), Max: 37 (02 Mar 2022 14:11)  T(F): 98 (02 Mar 2022 18:12), Max: 98.6 (02 Mar 2022 14:11)  HR: 82 (02 Mar 2022 18:12) (53 - 82)  BP: 129/79 (02 Mar 2022 18:12) (122/78 - 174/73)  BP(mean): --  RR: 17 (02 Mar 2022 18:12) (17 - 18)  SpO2: 100% (02 Mar 2022 18:12) (100% - 100%)  I&O's Summary      PHYSICAL EXAM:  General: No acute distress BMI-  HEENT: EOMI, PERRL  Neck: Supple, [ ] JVD  Lungs: Equal air entry bilaterally; [ ] rales [ ] wheezing [ ] rhonchi  Heart: Regular rate and rhythm; [x ] murmur   2/6 [ x] systolic [ ] diastolic [ ] radiation[ ] rubs [ ]  gallops  Abdomen: Nontender, bowel sounds present  Extremities: No clubbing, cyanosis, [ ] edema [ ]Pulses  equal and intact  Nervous system:  Alert & Oriented X3, no focal deficits  Psychiatric: Normal affect  Skin: No rashes or lesions    LABS:  03-02    138  |  102  |  21  ----------------------------<  142<H>  3.9   |  25  |  0.67    Ca    8.9      02 Mar 2022 08:07    TPro  6.3  /  Alb  3.5  /  TBili  0.2  /  DBili  x   /  AST  19  /  ALT  48<H>  /  AlkPhos  63  03-02    Creatinine Trend: 0.67<--, 0.68<--, 0.67<--, 0.60<--, 0.62<--, 0.69<--                        11.1   12.13 )-----------( 262      ( 02 Mar 2022 08:07 )             35.6               
Neurology Progress Note    S: Patient seen and examined. No new events overnight. patient denied CP, SOB, HA or pain. c/o pain but seems comfterbal      Medication:  MEDICATIONS  (STANDING):  enoxaparin Injectable 40 milliGRAM(s) SubCutaneous daily  gabapentin 600 milliGRAM(s) Oral three times a day  hydrochlorothiazide 25 milliGRAM(s) Oral daily  isosorbide   mononitrate ER Tablet (IMDUR) 30 milliGRAM(s) Oral <User Schedule>  lidocaine   4% Patch 1 Patch Transdermal daily  metoprolol tartrate 50 milliGRAM(s) Oral two times a day  pantoprazole    Tablet 40 milliGRAM(s) Oral before breakfast  polyethylene glycol 3350 17 Gram(s) Oral at bedtime  senna 2 Tablet(s) Oral at bedtime    MEDICATIONS  (PRN):  ALBUTerol    90 MICROgram(s) HFA Inhaler 2 Puff(s) Inhalation every 6 hours PRN Shortness of Breath and/or Wheezing  melatonin 3 milliGRAM(s) Oral at bedtime PRN Insomnia  oxyCODONE    IR 5 milliGRAM(s) Oral every 4 hours PRN Moderate - Severe Pain (4 - 10)  tetracaine/benzocaine/butamben Spray 1 Spray(s) Topical once PRN sore throat        Vitals:    Vital Signs Last 24 Hrs  T(C): 36.4 (03-07-22 @ 06:00), Max: 37.2 (03-06-22 @ 17:37)  T(F): 97.5 (03-07-22 @ 06:00), Max: 98.9 (03-06-22 @ 17:37)  HR: 72 (03-07-22 @ 08:40) (72 - 81)  BP: 142/58 (03-07-22 @ 08:40) (120/58 - 142/58)  BP(mean): --  RR: 18 (03-07-22 @ 08:40) (17 - 18)  SpO2: 100% (03-07-22 @ 08:40) (100% - 100%)      General Exam:   General Appearance: Appropriately dressed and in no acute distress       Head: Normocephalic, atraumatic and no dysmorphic features  Ear, Nose, and Throat: Moist mucous membranes  CVS: S1S2+  Resp: No SOB, no wheeze or rhonchi  GI: soft NT/ND  Extremities: No edema or cyanosis  Skin: No bruises or rashes     Neurological Exam:  Mental Status: Awake, alert and oriented x 3.  Able to follow simple and complex verbal commands. Able to name and repeat. fluent speech. No obvious aphasia or dysarthria noted.   Cranial Nerves: PERRL, EOMI, VFFC, sensation V1-V3 intact,  no obvious facial asymmetry, equal elevation of palate, scm/trap 5/5, tongue is midline on protrusion. no obvious papilledema on fundoscopic exam. hearing is grossly intact.   Motor: Normal bulk, tone and strength throughout B/L uppers 5/5 but RUE pain limited.  B/L LE at least 4/5 able to walk but pain limited worse on R than left   Sensation: Intact to light touch and pinprick throughout. no right/left confusion. no extinction to tactile on DSS. Romberg was negative.   Reflexes: 1+ throughout at biceps, brachioradialis, triceps, patellars and ankles bilaterally and equal. No clonus. R toe and L toe were both downgoing.  Coordination: No dysmetria on FNF   Gait:  walker    I personally reviewed the below data/images/labs:    CBC Full  -  ( 06 Mar 2022 06:37 )  WBC Count : 7.50 K/uL  RBC Count : 3.74 M/uL  Hemoglobin : 10.6 g/dL  Hematocrit : 33.4 %  Platelet Count - Automated : 229 K/uL  Mean Cell Volume : 89.3 fL  Mean Cell Hemoglobin : 28.3 pg  Mean Cell Hemoglobin Concentration : 31.7 gm/dL  Auto Neutrophil # : x  Auto Lymphocyte # : x  Auto Monocyte # : x  Auto Eosinophil # : x  Auto Basophil # : x  Auto Neutrophil % : x  Auto Lymphocyte % : x  Auto Monocyte % : x  Auto Eosinophil % : x  Auto Basophil % : x    03-07    137  |  101  |  24<H>  ----------------------------<  100<H>  4.1   |  28  |  0.81    Ca    8.5      07 Mar 2022 07:29  Phos  4.4     03-07  Mg     2.00     03-07            < from: CT Head No Cont (02.24.22 @ 17:12) >    ACC: [id number] EXAM:  CT BRAIN                          PROCEDURE DATE:  02/24/2022          INTERPRETATION:  CT HEAD    CLINICAL HISTORY: r/o CVA    TECHNIQUE:  Noncontrast CT.  Axial Acquisition.  Sagittal and coronal reformations.    COMPARISON:  Compared to study dated 11/17/2016    FINDINGS:  *  HEMORRHAGE:  No evidence of intracranial hemorrhage.  *  BRAIN PARENCHYMA:  Mild patchy periventricular and subcortical white   matter ischemic changes. No mass or mass effect.  *  VENTRICLES / SHIFT:  No hydrocephalus. No midline shift.  *  EXTRA-AXIAL / BASAL CISTERNS:  No extra-axial mass. Basal cisterns   preserved.  Atherosclerotic calcifications of the cavernous internal   carotid arteries.  *  CALVARIUM AND EXTRACRANIAL SOFT TISSUES:  No depressed calvarial   fracture.  *  SINUSES, ORBITS, MASTOIDS:  The visualized paranasal sinuses and   mastoid air cells are well aerated.    IMPRESSION:  NO EVIDENCE OF AN ACUTE INTRACRANIAL HEMORRHAGE, MIDLINE SHIFT, OR   HYDROCEPHALUS. MILD WHITE MATTER ISCHEMIC CHANGES.  RECOMMEND INTERVAL FOLLOW-UP IF ACUTE ISCHEMIA REMAINS OF CLINICAL CONCERN    --- End of Report ---            HERSON RALPH MD; Attending Radiologist  This document has been electronically signed. Feb 24 2022  5:51PM    < end of copied text >  < from: CT Lumbar Spine No Cont (02.24.22 @ 22:08) >    ACC: [id number] EXAM:  CT LUMBAR SPINE                        ACC: [id number] EXAM:  CT THORACIC SPINE                          PROCEDURE DATE:  02/24/2022          INTERPRETATION:  INDICATIONS:  Weakness. Extensive spine surgery in the   past with hardware.    TECHNIQUE: Contiguous thin section axial images were obtained without   contrast. Sagittal and coronal reformatted images were obtained.    COMPARISON EXAMINATION: MR thoracic and lumbar spine 10/24/2020 and CT   abdomen and pelvis 10/24/2020    FINDINGS:  THORACIC SPINE:    VERTEBRAL BODIES:  Status post T10 through upper sacral posterior fusion   with vertebral body screws and interlocking rods in place as seen   previously. Status post multilevel laminectomies from T12 through L4   producing streak artifact and degrading images through this region.  Multilevel marginal osteophyte formation is again seen. Vertebral bodies   are normal in height.  The left T11 screw is separate from the adjacent sravani which is unchanged   in appearance from the prior CT scan. The remaining screws are attached   and the remaining hardware is intact.  ALIGNMENT:  No subluxations.  INTERVERTEBRAL DISC SPACES:  Unremarkable  NEURAL FORAMINA:  Appear patent  SPINAL CANAL:  No other intradural or extradural defects are seen.  MISCELLANEOUS:  None.    LUMBAR SPINE:    VERTEBRAL BODIES:  Normal in height  ALIGNMENT:  No subluxations.  INTERVERTEBRAL DISC SPACES:  Partially obscured by artifact  NEURAL FORAMINA:  Suboptimally evaluated  SPINAL CANAL:  No other intradural or extradural defects are seen. The   fluid collection previously seen at the laminectomy site is not well   evaluated on the current exam.  MISCELLANEOUS:  Coarsely calcified uterine fibroids are seen.    IMPRESSION:  No significant change identified from the prior exams.    Postoperative changes with multilevel laminectomies and posterior   instrumented fusion.    --- End of Report ---        JESUS MCKEON MD; Attending Radiologist  This document has been electronically signed. Feb 25 2022 11:36AM    < end of copied text >    < from: MR Cervical Spine No Cont (03.02.22 @ 08:53) >    ACC: [id number] EXAM:  MR SPINE THORACIC                        ACC: [id number] EXAM:  MR SPINE LUMBAR                        ACC: [id number] EXAM:  MR SPINE CERVICAL                          PROCEDURE DATE:  03/02/2022          INTERPRETATION:  INDICATIONS:  Prior spine instrumentation with hardware   failure, pain, weakness and paresthesias    TECHNIQUE:  Sagittal T1 weighted, STIR and T2 weighted of the spine as   well as axial T1 weighted and T2 weighted images were obtained.    COMPARISON EXAMINATION: Thoracic and lumbar MR 10/24/2020    FINDINGS:    CERVICAL SPINE:  VERTEBRAL BODIES:  Normal in height and signal intensity. Mild C6-C7   discogenic endplate changes.  ALIGNMENT:  Mild C6-C7 anterolisthesis  INTERVERTEBRAL DISC SPACES:  C2-C3 mild disc bulge  C3-C4 disc bulge/ridge with mild spinal stenosis and right foraminal   stenosis. Left facet arthrosis.  C4-C5 diffuse disc bulge/ridge with bilateral facet arthrosis and   foraminal narrowing. Mild spinal stenosis  C6-C7 disc bulge/ridge with left facet arthrosis and marrow edema.    NEURAL FORAMINA:  Otherwise patent  SPINAL CORD: Normal.  SPINAL CANAL:  No other intradural or extradural defects are seen.  MISCELLANEOUS:  None.    THORACIC SPINE:  VERTEBRAL BODIES:  Status post T10 through upper sacral posterior   instrumented fusion with vertebral body screws and interlocking rods in   place as seen previously. Multilevel laminectomies are again seen.   Evaluation of the spinal canal in the region of the hardware is limited.  ALIGNMENT:  No subluxations.  INTERVERTEBRAL DISC SPACES:  The visualized disc spaces are unremarkable.  NEURAL FORAMINA:  The visualized foramina are unremarkable.  SPINAL CORD: Portions are obscured by artifact. The visualized spinal   cord is unremarkable.  SPINAL CANAL:  No other intradural or extradural defects are seen.  MISCELLANEOUS:  Incompletely seen left thyroid nodule measuring   approximately 3.3 cm in height as seen previously.    LUMBAR SPINE:  VERTEBRAL BODIES:  See above. Multilevel laminectomies as seen previously.  ALIGNMENT:  Mild L3-L4 and L4-L5 anterolisthesis as seen previously  INTERVERTEBRAL DISC SPACES:  Degraded by artifact. No significant change.   Multilevel disc bulges. No definite disc herniation.  NEURAL FORAMINA:  Not well seen.  CONUS MEDULLARIS: Not well seen  SPINAL CANAL:  No other intradural or extradural defects are seen.  MISCELLANEOUS:  Again noted is a fluid collection posterior to the   laminectomy site from L1 through L5 which is unchanged or slightly   smaller when compared with the prior exam.    IMPRESSION:  Limited exam due to multilevel posterior instrumented fusion and metallic   artifact.    Multilevel cervical spondylosis.    Lumbar extradural collection dorsal to the laminectomy site unchanged or   slightly smaller.    No other significant interval changes are noted.    Left thyroid nodule for which thyroid sonography is advised for further   evaluation.    --- End of Report ---            JESUS MCKEON MD; Attending Radiologist  This document has been electronically signed. Mar  2 2022 10:28AM    < end of copied text >              
DATE OF SERVICE:  3/6/2022  Patient was seen and examined on  3/6/2022   .Interim events noted.Consultant notes ,Labs,Telemetry reviewed by me       HOSPITAL COURSE: HPI:  72F with pmh of HTN, asthma, RA, chronic low back pain, R. thyroid nodule pain presents to ED for b/l LE pain. Patient reports b/l LE sharp, shooting pain started ~1.5 months ago, associated with burning sensation, numbness and weakness. She states at baseline ambulates with a walker but since worsening of LE pain has become non ambulatory. In addition states undergoing R. thyroid nodule w.u as OP w/ Dr. Magdaleno, scheduled for bx next week. She states because of thyroid nodule has some difficulty with swallowing. She has not seen a physician in a long time and due to multiple medical complaints followed up with a PCP Dr. Frederick yesterday who performed stress test and other w/u. Patient states results are not back yet.    Denies cp, sob, N/V/D, abd pain,  sxs, LE edema. (24 Feb 2022 11:44)      INTERIM EVENTS:Patient seen at bedside ,interim events noted.      PMH -reviewed admission note, no change since admission  HEART FAILURE: Acute[ ]Chronic[ ] Systolic[ ] Diastolic[ ] Combined Systolic and Diastolic[ ]  CAD[ ] CABG[ ] PCI[ ]  DEVICES[ ] PPM[ ] ICD[ ] ILR[ ]  ATRIAL FIBRILLATION[ ] Paroxysmal[ ] Permanent[ ]  SAAD[ ] CKD1[ ] CKD2[ ] CKD3[ ] CKD4[ ] ESRD[ ]  COPD[ ] HTN[ ]   DM[ ] Type1[ ] Type 2[ ]   CVA[ ] Paresis[ ]    AMBULATION: Assisted[ ] Cane/walker[ ] Independent[ ]    MEDICATIONS  (STANDING):  enoxaparin Injectable 40 milliGRAM(s) SubCutaneous daily  gabapentin 600 milliGRAM(s) Oral three times a day  hydrochlorothiazide 25 milliGRAM(s) Oral daily  isosorbide   mononitrate ER Tablet (IMDUR) 30 milliGRAM(s) Oral <User Schedule>  lidocaine   4% Patch 1 Patch Transdermal daily  metoprolol tartrate 50 milliGRAM(s) Oral two times a day  pantoprazole    Tablet 40 milliGRAM(s) Oral before breakfast  polyethylene glycol 3350 17 Gram(s) Oral at bedtime  senna 2 Tablet(s) Oral at bedtime    MEDICATIONS  (PRN):  ALBUTerol    90 MICROgram(s) HFA Inhaler 2 Puff(s) Inhalation every 6 hours PRN Shortness of Breath and/or Wheezing  melatonin 3 milliGRAM(s) Oral at bedtime PRN Insomnia  oxyCODONE    IR 5 milliGRAM(s) Oral every 4 hours PRN Moderate - Severe Pain (4 - 10)  tetracaine/benzocaine/butamben Spray 1 Spray(s) Topical once PRN sore throat            REVIEW OF SYSTEMS:  Constitutional: [ ] fever, [ ]weight loss,  [ ]fatigue  Eyes: [ ] visual changes  Respiratory: [ ]shortness of breath;  [ ] cough, [ ]wheezing, [ ]chills, [ ]hemoptysis  Cardiovascular: [ ] chest pain, [ ]palpitations, [ ]dizziness,  [ ]leg swelling[ ]orthopnea[ ]PND  Gastrointestinal: [ ] abdominal pain, [ ]nausea, [ ]vomiting,  [ ]diarrhea [ ]Constipation [ ]Melena  Genitourinary: [ ] dysuria, [ ] hematuria [ ]Mathews  Neurologic: [ ] headaches [ ] tremors[ ]weakness [ ]Paralysis Right[ ] Left[ ]  Skin: [ ] itching, [ ]burning, [ ] rashes  Endocrine: [ ] heat or cold intolerance  Musculoskeletal: [ ] joint pain or swelling; [ ] muscle, back, or extremity pain  Psychiatric: [ ] depression, [ ]anxiety, [ ]mood swings, or [ ]difficulty sleeping  Hematologic: [ ] easy bruising, [ ] bleeding gums    [ ] All remaining systems negative except as per above.   [ ]Unable to obtain.  [x] No change in ROS since admission      Vital Signs Last 24 Hrs  T(C): 37.2 (06 Mar 2022 17:37), Max: 37.2 (06 Mar 2022 17:37)  T(F): 98.9 (06 Mar 2022 17:37), Max: 98.9 (06 Mar 2022 17:37)  HR: 75 (06 Mar 2022 17:37) (71 - 88)  BP: 127/62 (06 Mar 2022 17:37) (120/58 - 143/78)  BP(mean): --  RR: 18 (06 Mar 2022 17:37) (17 - 18)  SpO2: 100% (06 Mar 2022 17:37) (99% - 100%)  I&O's Summary    05 Mar 2022 07:01  -  06 Mar 2022 07:00  --------------------------------------------------------  IN: 600 mL / OUT: 0 mL / NET: 600 mL        PHYSICAL EXAM:  General: No acute distress BMI-  HEENT: EOMI, PERRL  Neck: Supple, [ ] JVD  Lungs: Equal air entry bilaterally; [ ] rales [ ] wheezing [ ] rhonchi  Heart: Regular rate and rhythm; [x ] murmur   2/6 [ x] systolic [ ] diastolic [ ] radiation[ ] rubs [ ]  gallops  Abdomen: Nontender, bowel sounds present  Extremities: No clubbing, cyanosis, [ ] edema [ ]Pulses  equal and intact  Nervous system:  Alert & Oriented X3, no focal deficits  Psychiatric: Normal affect  Skin: No rashes or lesions    LABS:  03-06    139  |  102  |  21  ----------------------------<  97  4.2   |  26  |  0.71    Ca    8.6      06 Mar 2022 06:37  Phos  3.7     03-06  Mg     1.90     03-06      Creatinine Trend: 0.71<--, 0.77<--, 0.84<--, 0.68<--, 0.67<--, 0.68<--                        10.6   7.50  )-----------( 229      ( 06 Mar 2022 06:37 )             33.4               
DATE OF SERVICE:  3/5/2022  Patient was seen and examined on 3/5/2022    .Interim events noted.Consultant notes ,Labs,Telemetry reviewed by me       HOSPITAL COURSE: HPI:  72F with pmh of HTN, asthma, RA, chronic low back pain, R. thyroid nodule pain presents to ED for b/l LE pain. Patient reports b/l LE sharp, shooting pain started ~1.5 months ago, associated with burning sensation, numbness and weakness. She states at baseline ambulates with a walker but since worsening of LE pain has become non ambulatory. In addition states undergoing R. thyroid nodule w.u as OP w/ Dr. Magdaleno, scheduled for bx next week. She states because of thyroid nodule has some difficulty with swallowing. She has not seen a physician in a long time and due to multiple medical complaints followed up with a PCP Dr. Frederick yesterday who performed stress test and other w/u. Patient states results are not back yet.    Denies cp, sob, N/V/D, abd pain,  sxs, LE edema. (24 Feb 2022 11:44)      INTERIM EVENTS:Patient seen at bedside ,interim events noted.      PMH -reviewed admission note, no change since admission  HEART FAILURE: Acute[ ]Chronic[ ] Systolic[ ] Diastolic[ ] Combined Systolic and Diastolic[ ]  CAD[ ] CABG[ ] PCI[ ]  DEVICES[ ] PPM[ ] ICD[ ] ILR[ ]  ATRIAL FIBRILLATION[ ] Paroxysmal[ ] Permanent[ ]  SAAD[ ] CKD1[ ] CKD2[ ] CKD3[ ] CKD4[ ] ESRD[ ]  COPD[ ] HTN[ ]   DM[ ] Type1[ ] Type 2[ ]   CVA[ ] Paresis[ ]    AMBULATION: Assisted[ ] Cane/walker[ ] Independent[ ]    MEDICATIONS  (STANDING):  enoxaparin Injectable 40 milliGRAM(s) SubCutaneous daily  gabapentin 600 milliGRAM(s) Oral three times a day  hydrochlorothiazide 25 milliGRAM(s) Oral daily  isosorbide   mononitrate ER Tablet (IMDUR) 30 milliGRAM(s) Oral <User Schedule>  lidocaine   4% Patch 1 Patch Transdermal daily  metoprolol tartrate 50 milliGRAM(s) Oral two times a day  pantoprazole    Tablet 40 milliGRAM(s) Oral before breakfast  polyethylene glycol 3350 17 Gram(s) Oral at bedtime  senna 2 Tablet(s) Oral at bedtime    MEDICATIONS  (PRN):  ALBUTerol    90 MICROgram(s) HFA Inhaler 2 Puff(s) Inhalation every 6 hours PRN Shortness of Breath and/or Wheezing  melatonin 3 milliGRAM(s) Oral at bedtime PRN Insomnia  oxyCODONE    IR 5 milliGRAM(s) Oral every 4 hours PRN Moderate - Severe Pain (4 - 10)            REVIEW OF SYSTEMS:  Constitutional: [ ] fever, [ ]weight loss,  [ ]fatigue  Eyes: [ ] visual changes  Respiratory: [ ]shortness of breath;  [ ] cough, [ ]wheezing, [ ]chills, [ ]hemoptysis  Cardiovascular: [ ] chest pain, [ ]palpitations, [ ]dizziness,  [ ]leg swelling[ ]orthopnea[ ]PND  Gastrointestinal: [ ] abdominal pain, [ ]nausea, [ ]vomiting,  [ ]diarrhea [ ]Constipation [ ]Melena  Genitourinary: [ ] dysuria, [ ] hematuria [ ]Mathews  Neurologic: [ ] headaches [ ] tremors[ ]weakness [ ]Paralysis Right[ ] Left[ ]  Skin: [ ] itching, [ ]burning, [ ] rashes  Endocrine: [ ] heat or cold intolerance  Musculoskeletal: [ ] joint pain or swelling; [ ] muscle, back, or extremity pain  Psychiatric: [ ] depression, [ ]anxiety, [ ]mood swings, or [ ]difficulty sleeping  Hematologic: [ ] easy bruising, [ ] bleeding gums    [ ] All remaining systems negative except as per above.   [ ]Unable to obtain.  [x] No change in ROS since admission      Vital Signs Last 24 Hrs  T(C): 37.2 (05 Mar 2022 10:18), Max: 37.2 (05 Mar 2022 10:18)  T(F): 98.9 (05 Mar 2022 10:18), Max: 98.9 (05 Mar 2022 10:18)  HR: 71 (05 Mar 2022 10:18) (70 - 92)  BP: 119/63 (05 Mar 2022 10:18) (119/63 - 142/64)  BP(mean): --  RR: 17 (05 Mar 2022 10:18) (17 - 18)  SpO2: 100% (05 Mar 2022 10:18) (100% - 100%)  I&O's Summary    05 Mar 2022 07:01  -  05 Mar 2022 14:40  --------------------------------------------------------  IN: 600 mL / OUT: 0 mL / NET: 600 mL        PHYSICAL EXAM:  General: No acute distress BMI-  HEENT: EOMI, PERRL  Neck: Supple, [ ] JVD  Lungs: Equal air entry bilaterally; [ ] rales [ ] wheezing [ ] rhonchi  Heart: Regular rate and rhythm; [x ] murmur   2/6 [ x] systolic [ ] diastolic [ ] radiation[ ] rubs [ ]  gallops  Abdomen: Nontender, bowel sounds present  Extremities: No clubbing, cyanosis, [ ] edema [ ]Pulses  equal and intact  Nervous system:  Alert & Oriented X3, no focal deficits  Psychiatric: Normal affect  Skin: No rashes or lesions    LABS:  03-05    136  |  101  |  23  ----------------------------<  107<H>  4.5   |  26  |  0.77    Ca    8.7      05 Mar 2022 07:15  Phos  4.1     03-05  Mg     1.90     03-05      Creatinine Trend: 0.77<--, 0.84<--, 0.68<--, 0.67<--, 0.68<--, 0.67<--                        11.4   8.81  )-----------( 233      ( 05 Mar 2022 07:15 )             35.7               
DATE OF SERVICE:  2/27/2022  Patient was seen and examined on  2/27/2022   .Interim events noted.Consultant notes ,Labs,Telemetry reviewed by me       HOSPITAL COURSE: HPI:  72F with pmh of HTN, asthma, RA, chronic low back pain, R. thyroid nodule pain presents to ED for b/l LE pain. Patient reports b/l LE sharp, shooting pain started ~1.5 months ago, associated with burning sensation, numbness and weakness. She states at baseline ambulates with a walker but since worsening of LE pain has become non ambulatory. In addition states undergoing R. thyroid nodule w.u as OP w/ Dr. Magdaleno, scheduled for bx next week. She states because of thyroid nodule has some difficulty with swallowing. She has not seen a physician in a long time and due to multiple medical complaints followed up with a PCP Dr. Frederick yesterday who performed stress test and other w/u. Patient states results are not back yet.    Denies cp, sob, N/V/D, abd pain,  sxs, LE edema. (24 Feb 2022 11:44)      INTERIM EVENTS:Patient seen at bedside ,interim events noted.      PMH -reviewed admission note, no change since admission  HEART FAILURE: Acute[ ]Chronic[ ] Systolic[ ] Diastolic[ ] Combined Systolic and Diastolic[ ]  CAD[ ] CABG[ ] PCI[ ]  DEVICES[ ] PPM[ ] ICD[ ] ILR[ ]  ATRIAL FIBRILLATION[ ] Paroxysmal[ ] Permanent[ ]  SAAD[ ] CKD1[ ] CKD2[ ] CKD3[ ] CKD4[ ] ESRD[ ]  COPD[ ] HTN[ ]   DM[ ] Type1[ ] Type 2[ ]   CVA[ ] Paresis[ ]    AMBULATION: Assisted[ ] Cane/walker[ ] Independent[ ]    MEDICATIONS  (STANDING):  dexAMETHasone     Tablet   Oral   dexAMETHasone     Tablet 4 milliGRAM(s) Oral every 4 hours  dexAMETHasone     Tablet 3 milliGRAM(s) Oral every 4 hours  enoxaparin Injectable 40 milliGRAM(s) SubCutaneous daily  gabapentin 600 milliGRAM(s) Oral three times a day  hydrochlorothiazide 25 milliGRAM(s) Oral daily  isosorbide   mononitrate ER Tablet (IMDUR) 30 milliGRAM(s) Oral <User Schedule>  metoprolol tartrate 50 milliGRAM(s) Oral two times a day  pantoprazole    Tablet 40 milliGRAM(s) Oral before breakfast  polyethylene glycol 3350 17 Gram(s) Oral at bedtime  senna 2 Tablet(s) Oral at bedtime    MEDICATIONS  (PRN):  ALBUTerol    90 MICROgram(s) HFA Inhaler 2 Puff(s) Inhalation every 6 hours PRN Shortness of Breath and/or Wheezing  melatonin 3 milliGRAM(s) Oral at bedtime PRN Insomnia  oxyCODONE    IR 5 milliGRAM(s) Oral every 4 hours PRN Moderate - Severe Pain (4 - 10)            REVIEW OF SYSTEMS:  Constitutional: [ ] fever, [ ]weight loss,  [ ]fatigue  Eyes: [ ] visual changes  Respiratory: [ ]shortness of breath;  [ ] cough, [ ]wheezing, [ ]chills, [ ]hemoptysis  Cardiovascular: [ ] chest pain, [ ]palpitations, [ ]dizziness,  [ ]leg swelling[ ]orthopnea[ ]PND  Gastrointestinal: [ ] abdominal pain, [ ]nausea, [ ]vomiting,  [ ]diarrhea [ ]Constipation [ ]Melena  Genitourinary: [ ] dysuria, [ ] hematuria [ ]Mathews  Neurologic: [ ] headaches [ ] tremors[ ]weakness [ ]Paralysis Right[ ] Left[ ]  Skin: [ ] itching, [ ]burning, [ ] rashes  Endocrine: [ ] heat or cold intolerance  Musculoskeletal: [ ] joint pain or swelling; [ ] muscle, back, or extremity pain  Psychiatric: [ ] depression, [ ]anxiety, [ ]mood swings, or [ ]difficulty sleeping  Hematologic: [ ] easy bruising, [ ] bleeding gums    [ ] All remaining systems negative except as per above.   [ ]Unable to obtain.  [x] No change in ROS since admission      Vital Signs Last 24 Hrs  T(C): 37 (27 Feb 2022 08:28), Max: 37.7 (26 Feb 2022 13:25)  T(F): 98.6 (27 Feb 2022 08:28), Max: 99.8 (26 Feb 2022 13:25)  HR: 63 (27 Feb 2022 08:28) (55 - 76)  BP: 162/75 (27 Feb 2022 08:28) (148/77 - 162/75)  BP(mean): --  RR: 18 (27 Feb 2022 08:28) (18 - 18)  SpO2: 99% (27 Feb 2022 08:28) (99% - 100%)  I&O's Summary      PHYSICAL EXAM:  General: No acute distress BMI-  HEENT: EOMI, PERRL  Neck: Supple, [ ] JVD  Lungs: Equal air entry bilaterally; [ ] rales [ ] wheezing [ ] rhonchi  Heart: Regular rate and rhythm; [x ] murmur   2/6 [ x] systolic [ ] diastolic [ ] radiation[ ] rubs [ ]  gallops  Abdomen: Nontender, bowel sounds present  Extremities: No clubbing, cyanosis, [ ] edema [ ]Pulses  equal and intact  Nervous system:  Alert & Oriented X3, no focal deficits  Psychiatric: Normal affect  Skin: No rashes or lesions    LABS:  02-27    139  |  104  |  18  ----------------------------<  147<H>  3.8   |  24  |  0.60    Ca    9.5      27 Feb 2022 06:15  Phos  2.7     02-27  Mg     2.00     02-27    TPro  6.7  /  Alb  3.8  /  TBili  <0.2  /  DBili  x   /  AST  15  /  ALT  17  /  AlkPhos  78  02-26    Creatinine Trend: 0.60<--, 0.62<--, 0.69<--, 0.71<--                        11.4   13.41 )-----------( 276      ( 27 Feb 2022 06:15 )             34.9               
DATE OF SERVICE:  2/28/2022  Patient was seen and examined on 2/28/2022    .Interim events noted.Consultant notes ,Labs,Telemetry reviewed by me       HOSPITAL COURSE: HPI:  72F with pmh of HTN, asthma, RA, chronic low back pain, R. thyroid nodule pain presents to ED for b/l LE pain. Patient reports b/l LE sharp, shooting pain started ~1.5 months ago, associated with burning sensation, numbness and weakness. She states at baseline ambulates with a walker but since worsening of LE pain has become non ambulatory. In addition states undergoing R. thyroid nodule w.u as OP w/ Dr. Magdaleno, scheduled for bx next week. She states because of thyroid nodule has some difficulty with swallowing. She has not seen a physician in a long time and due to multiple medical complaints followed up with a PCP Dr. Frederick yesterday who performed stress test and other w/u. Patient states results are not back yet.    Denies cp, sob, N/V/D, abd pain,  sxs, LE edema. (24 Feb 2022 11:44)      INTERIM EVENTS:Patient seen at bedside ,interim events noted.      PMH -reviewed admission note, no change since admission  HEART FAILURE: Acute[ ]Chronic[ ] Systolic[ ] Diastolic[ ] Combined Systolic and Diastolic[ ]  CAD[ ] CABG[ ] PCI[ ]  DEVICES[ ] PPM[ ] ICD[ ] ILR[ ]  ATRIAL FIBRILLATION[ ] Paroxysmal[ ] Permanent[ ]  SAAD[ ] CKD1[ ] CKD2[ ] CKD3[ ] CKD4[ ] ESRD[ ]  COPD[ ] HTN[ ]   DM[ ] Type1[ ] Type 2[ ]   CVA[ ] Paresis[ ]    AMBULATION: Assisted[ ] Cane/walker[ ] Independent[ ]    MEDICATIONS  (STANDING):  cefTRIAXone   IVPB 1000 milliGRAM(s) IV Intermittent every 24 hours  dexAMETHasone     Tablet 2 milliGRAM(s) Oral every 4 hours  dexAMETHasone     Tablet   Oral   enoxaparin Injectable 40 milliGRAM(s) SubCutaneous daily  gabapentin 600 milliGRAM(s) Oral three times a day  hydrochlorothiazide 25 milliGRAM(s) Oral daily  isosorbide   mononitrate ER Tablet (IMDUR) 30 milliGRAM(s) Oral <User Schedule>  metoprolol tartrate 50 milliGRAM(s) Oral two times a day  pantoprazole    Tablet 40 milliGRAM(s) Oral before breakfast  polyethylene glycol 3350 17 Gram(s) Oral at bedtime  senna 2 Tablet(s) Oral at bedtime    MEDICATIONS  (PRN):  ALBUTerol    90 MICROgram(s) HFA Inhaler 2 Puff(s) Inhalation every 6 hours PRN Shortness of Breath and/or Wheezing  melatonin 3 milliGRAM(s) Oral at bedtime PRN Insomnia  oxyCODONE    IR 5 milliGRAM(s) Oral every 4 hours PRN Moderate - Severe Pain (4 - 10)            REVIEW OF SYSTEMS:  Constitutional: [ ] fever, [ ]weight loss,  [ ]fatigue  Eyes: [ ] visual changes  Respiratory: [ ]shortness of breath;  [ ] cough, [ ]wheezing, [ ]chills, [ ]hemoptysis  Cardiovascular: [ ] chest pain, [ ]palpitations, [ ]dizziness,  [ ]leg swelling[ ]orthopnea[ ]PND  Gastrointestinal: [ ] abdominal pain, [ ]nausea, [ ]vomiting,  [ ]diarrhea [ ]Constipation [ ]Melena  Genitourinary: [ ] dysuria, [ ] hematuria [ ]Mathews  Neurologic: [ ] headaches [ ] tremors[ ]weakness [ ]Paralysis Right[ ] Left[ ]  Skin: [ ] itching, [ ]burning, [ ] rashes  Endocrine: [ ] heat or cold intolerance  Musculoskeletal: [ ] joint pain or swelling; [ ] muscle, back, or extremity pain  Psychiatric: [ ] depression, [ ]anxiety, [ ]mood swings, or [ ]difficulty sleeping  Hematologic: [ ] easy bruising, [ ] bleeding gums    [ ] All remaining systems negative except as per above.   [ ]Unable to obtain.  [x] No change in ROS since admission      Vital Signs Last 24 Hrs  T(C): 37.6 (28 Feb 2022 14:32), Max: 37.6 (28 Feb 2022 14:32)  T(F): 99.7 (28 Feb 2022 14:32), Max: 99.7 (28 Feb 2022 14:32)  HR: 66 (28 Feb 2022 14:32) (52 - 68)  BP: 143/85 (28 Feb 2022 14:32) (143/85 - 200/92)  BP(mean): --  RR: 18 (28 Feb 2022 14:32) (17 - 18)  SpO2: 100% (28 Feb 2022 14:32) (98% - 100%)  I&O's Summary    28 Feb 2022 07:01  -  28 Feb 2022 14:58  --------------------------------------------------------  IN: 300 mL / OUT: 0 mL / NET: 300 mL        PHYSICAL EXAM:  General: No acute distress BMI-  HEENT: EOMI, PERRL  Neck: Supple, [ ] JVD  Lungs: Equal air entry bilaterally; [ ] rales [ ] wheezing [ ] rhonchi  Heart: Regular rate and rhythm; [x ] murmur   2/6 [ x] systolic [ ] diastolic [ ] radiation[ ] rubs [ ]  gallops  Abdomen: Nontender, bowel sounds present  Extremities: No clubbing, cyanosis, [ ] edema [ ]Pulses  equal and intact  Nervous system:  Alert & Oriented X3, no focal deficits  Psychiatric: Normal affect  Skin: No rashes or lesions    LABS:  02-28    138  |  103  |  20  ----------------------------<  140<H>  4.0   |  26  |  0.67    Ca    9.3      28 Feb 2022 06:47  Phos  2.7     02-27  Mg     2.00     02-27    TPro  6.5  /  Alb  3.7  /  TBili  <0.2  /  DBili  x   /  AST  19  /  ALT  32  /  AlkPhos  74  02-28    Creatinine Trend: 0.67<--, 0.60<--, 0.62<--, 0.69<--, 0.71<--                        11.5   11.70 )-----------( 259      ( 28 Feb 2022 06:47 )             34.8               
DATE OF SERVICE:  2/25/2022  Patient was seen and examined on 2/25/2022    .Interim events noted.Consultant notes ,Labs,Telemetry reviewed by me       HOSPITAL COURSE: HPI:  72F with pmh of HTN, asthma, RA, chronic low back pain, R. thyroid nodule pain presents to ED for b/l LE pain. Patient reports b/l LE sharp, shooting pain started ~1.5 months ago, associated with burning sensation, numbness and weakness. She states at baseline ambulates with a walker but since worsening of LE pain has become non ambulatory. In addition states undergoing R. thyroid nodule w.u as OP w/ Dr. Magdaleno, scheduled for bx next week. She states because of thyroid nodule has some difficulty with swallowing. She has not seen a physician in a long time and due to multiple medical complaints followed up with a PCP Dr. Frederick yesterday who performed stress test and other w/u. Patient states results are not back yet.    Denies cp, sob, N/V/D, abd pain,  sxs, LE edema. (24 Feb 2022 11:44)      INTERIM EVENTS:Patient seen at bedside ,interim events noted.      PMH -reviewed admission note, no change since admission  HEART FAILURE: Acute[ ]Chronic[ ] Systolic[ ] Diastolic[ ] Combined Systolic and Diastolic[ ]  CAD[ ] CABG[ ] PCI[ ]  DEVICES[ ] PPM[ ] ICD[ ] ILR[ ]  ATRIAL FIBRILLATION[ ] Paroxysmal[ ] Permanent[ ]  SAAD[ ] CKD1[ ] CKD2[ ] CKD3[ ] CKD4[ ] ESRD[ ]  COPD[ ] HTN[ ]   DM[ ] Type1[ ] Type 2[ ]   CVA[ ] Paresis[ ]    AMBULATION: Assisted[ ] Cane/walker[ ] Independent[ ]    MEDICATIONS  (STANDING):  acetaminophen     Tablet .. 975 milliGRAM(s) Oral every 8 hours  dexAMETHasone     Tablet 6 milliGRAM(s) Oral every 4 hours  enoxaparin Injectable 40 milliGRAM(s) SubCutaneous daily  gabapentin 600 milliGRAM(s) Oral three times a day  hydrochlorothiazide 25 milliGRAM(s) Oral daily  isosorbide   mononitrate ER Tablet (IMDUR) 30 milliGRAM(s) Oral daily  metoprolol tartrate 50 milliGRAM(s) Oral two times a day  polyethylene glycol 3350 17 Gram(s) Oral at bedtime    MEDICATIONS  (PRN):  ALBUTerol    90 MICROgram(s) HFA Inhaler 2 Puff(s) Inhalation every 6 hours PRN Shortness of Breath and/or Wheezing  melatonin 3 milliGRAM(s) Oral at bedtime PRN Insomnia  oxyCODONE    IR 5 milliGRAM(s) Oral every 6 hours PRN Moderate - Severe Pain (4 - 10)            REVIEW OF SYSTEMS:  Constitutional: [ ] fever, [ ]weight loss,  [ ]fatigue  Eyes: [ ] visual changes  Respiratory: [ ]shortness of breath;  [ ] cough, [ ]wheezing, [ ]chills, [ ]hemoptysis  Cardiovascular: [ ] chest pain, [ ]palpitations, [ ]dizziness,  [ ]leg swelling[ ]orthopnea[ ]PND  Gastrointestinal: [ ] abdominal pain, [ ]nausea, [ ]vomiting,  [ ]diarrhea [ ]Constipation [ ]Melena  Genitourinary: [ ] dysuria, [ ] hematuria [ ]Mathews  Neurologic: [ ] headaches [ ] tremors[ ]weakness [ ]Paralysis Right[ ] Left[ ]  Skin: [ ] itching, [ ]burning, [ ] rashes  Endocrine: [ ] heat or cold intolerance  Musculoskeletal: [ ] joint pain or swelling; [ ] muscle, back, or extremity pain  Psychiatric: [ ] depression, [ ]anxiety, [ ]mood swings, or [ ]difficulty sleeping  Hematologic: [ ] easy bruising, [ ] bleeding gums    [ ] All remaining systems negative except as per above.   [ ]Unable to obtain.  [x] No change in ROS since admission      Vital Signs Last 24 Hrs  T(C): 36.7 (25 Feb 2022 10:14), Max: 37 (24 Feb 2022 22:35)  T(F): 98 (25 Feb 2022 10:14), Max: 98.6 (24 Feb 2022 22:35)  HR: 82 (25 Feb 2022 10:14) (69 - 82)  BP: 132/62 (25 Feb 2022 10:14) (107/57 - 145/78)  BP(mean): --  RR: 18 (25 Feb 2022 10:14) (16 - 18)  SpO2: 100% (25 Feb 2022 10:14) (99% - 100%)  I&O's Summary      PHYSICAL EXAM:  General: No acute distress BMI-  HEENT: EOMI, PERRL  Neck: Supple, [ ] JVD  Lungs: Equal air entry bilaterally; [ ] rales [ ] wheezing [ ] rhonchi  Heart: Regular rate and rhythm; [x ] murmur   2/6 [ x] systolic [ ] diastolic [ ] radiation[ ] rubs [ ]  gallops  Abdomen: Nontender, bowel sounds present  Extremities: No clubbing, cyanosis, [ ] edema [ ]Pulses  equal and intact  Nervous system:  Alert & Oriented X3, no focal deficits  Psychiatric: Normal affect  Skin: No rashes or lesions    LABS:  02-25    138  |  106  |  19  ----------------------------<  102<H>  4.0   |  25  |  0.69    Ca    9.3      25 Feb 2022 07:44  Phos  2.9     02-25  Mg     2.00     02-25    TPro  7.1  /  Alb  3.9  /  TBili  0.2  /  DBili  x   /  AST  31  /  ALT  23  /  AlkPhos  94  02-24    Creatinine Trend: 0.69<--, 0.71<--                        11.8   3.89  )-----------( 268      ( 25 Feb 2022 07:44 )             36.9       < from: CT Pelvis Bony Only No Cont (02.24.22 @ 22:08) >  ACC: [id number] EXAM:  CT PELVIS BONY ONLY                          PROCEDURE DATE:  02/24/2022          INTERPRETATION:  HISTORY: T10 to pelvic fusion with acute on chronic   lower back pain/radiculopathy. Leg weakness.    Helical CT imaging of the bony pelvis was performed without intravenous   contrast. Sagittal and coronal reformats were provided. 3-D reformats   were performed on a separate workstation.    Findings: There is no evidence of acute fracture or dislocation. There is   moderate right lateral hip arthrosis. There is mild pubic symphysis   arthrosis. There is bilateral sacroiliac joint arthrosis.    Patient is status post instrumented spinal fusion with bilateral   sacroiliac bolts. Hardware is incompletely imaged. There is mature   posterior lateral osseous fusion bilaterally from the imaged portion of   the lumbar spine to the level of the sacrum. There is mild to moderate   osseous foraminal narrowing bilaterally at L4/L5 and L5/S1. There is mild   anterolisthesis of L4 on L5. The visualized hardware appears intact   without evidence of osteolysis or loosening.    There is no subcutaneous or intramuscular hematoma. There is no   disproportionate fatty atrophy of the musculature. Multiple calcified   uterine fibroids are noted.    IMPRESSION:    No evidence of acute fracture or dislocation. Status post posterior   spinal fusion with bilateral iliac bolts.    --- End of Report ---          < end of copied text >  < from: CT Lumbar Spine No Cont (02.24.22 @ 22:08) >    ACC: [id number] EXAM:  CT LUMBAR SPINE                        ACC: [id number] EXAM:  CT THORACIC SPINE                          PROCEDURE DATE:  02/24/2022          INTERPRETATION:  INDICATIONS:  Weakness. Extensive spine surgery in the   past with hardware.    TECHNIQUE: Contiguous thin section axial images were obtained without   contrast. Sagittal and coronal reformatted images were obtained.    COMPARISON EXAMINATION: MR thoracic and lumbar spine 10/24/2020 and CT   abdomen and pelvis 10/24/2020    FINDINGS:  THORACIC SPINE:    VERTEBRAL BODIES:  Status post T10 through upper sacral posterior fusion   with vertebral body screws and interlocking rods in place as seen   previously. Status post multilevel laminectomies from T12 through L4   producing streak artifact and degrading images through this region.  Multilevel marginal osteophyte formation is again seen. Vertebral bodies   are normal in height.  The left T11 screw is separate from the adjacent sravani which is unchanged   in appearance from the prior CT scan. The remaining screws are attached   and the remaining hardware is intact.  ALIGNMENT:  No subluxations.  INTERVERTEBRAL DISC SPACES:  Unremarkable  NEURAL FORAMINA:  Appear patent  SPINAL CANAL:  No other intradural or extradural defects are seen.  MISCELLANEOUS:  None.    LUMBAR SPINE:    VERTEBRAL BODIES:  Normal in height  ALIGNMENT:  No subluxations.    < end of copied text >          
DATE OF SERVICE:  2/26/2022  Patient was seen and examined on  2/26/2022   .Interim events noted.Consultant notes ,Labs,Telemetry reviewed by me       HOSPITAL COURSE: HPI:  72F with pmh of HTN, asthma, RA, chronic low back pain, R. thyroid nodule pain presents to ED for b/l LE pain. Patient reports b/l LE sharp, shooting pain started ~1.5 months ago, associated with burning sensation, numbness and weakness. She states at baseline ambulates with a walker but since worsening of LE pain has become non ambulatory. In addition states undergoing R. thyroid nodule w.u as OP w/ Dr. Magdaleno, scheduled for bx next week. She states because of thyroid nodule has some difficulty with swallowing. She has not seen a physician in a long time and due to multiple medical complaints followed up with a PCP Dr. Frederick yesterday who performed stress test and other w/u. Patient states results are not back yet.    Denies cp, sob, N/V/D, abd pain,  sxs, LE edema. (24 Feb 2022 11:44)      INTERIM EVENTS:Patient seen at bedside ,interim events noted.      PMH -reviewed admission note, no change since admission  HEART FAILURE: Acute[ ]Chronic[ ] Systolic[ ] Diastolic[ ] Combined Systolic and Diastolic[ ]  CAD[ ] CABG[ ] PCI[ ]  DEVICES[ ] PPM[ ] ICD[ ] ILR[ ]  ATRIAL FIBRILLATION[ ] Paroxysmal[ ] Permanent[ ]  SAAD[ ] CKD1[ ] CKD2[ ] CKD3[ ] CKD4[ ] ESRD[ ]  COPD[ ] HTN[ ]   DM[ ] Type1[ ] Type 2[ ]   CVA[ ] Paresis[ ]    AMBULATION: Assisted[ ] Cane/walker[ ] Independent[ ]    MEDICATIONS  (STANDING):  acetaminophen     Tablet .. 975 milliGRAM(s) Oral every 8 hours  dexAMETHasone     Tablet 6 milliGRAM(s) Oral every 4 hours  dexAMETHasone     Tablet 4 milliGRAM(s) Oral every 4 hours  enoxaparin Injectable 40 milliGRAM(s) SubCutaneous daily  gabapentin 600 milliGRAM(s) Oral three times a day  hydrochlorothiazide 25 milliGRAM(s) Oral daily  isosorbide   mononitrate ER Tablet (IMDUR) 30 milliGRAM(s) Oral daily  metoprolol tartrate 50 milliGRAM(s) Oral two times a day  polyethylene glycol 3350 17 Gram(s) Oral at bedtime  senna 2 Tablet(s) Oral at bedtime    MEDICATIONS  (PRN):  ALBUTerol    90 MICROgram(s) HFA Inhaler 2 Puff(s) Inhalation every 6 hours PRN Shortness of Breath and/or Wheezing  melatonin 3 milliGRAM(s) Oral at bedtime PRN Insomnia  oxyCODONE    IR 5 milliGRAM(s) Oral every 6 hours PRN Moderate - Severe Pain (4 - 10)            REVIEW OF SYSTEMS:  Constitutional: [ ] fever, [ ]weight loss,  [ ]fatigue  Eyes: [ ] visual changes  Respiratory: [ ]shortness of breath;  [ ] cough, [ ]wheezing, [ ]chills, [ ]hemoptysis  Cardiovascular: [ ] chest pain, [ ]palpitations, [ ]dizziness,  [ ]leg swelling[ ]orthopnea[ ]PND  Gastrointestinal: [ ] abdominal pain, [ ]nausea, [ ]vomiting,  [ ]diarrhea [ ]Constipation [ ]Melena  Genitourinary: [ ] dysuria, [ ] hematuria [ ]Mathews  Neurologic: [ ] headaches [ ] tremors[ ]weakness [ ]Paralysis Right[ ] Left[ ]  Skin: [ ] itching, [ ]burning, [ ] rashes  Endocrine: [ ] heat or cold intolerance  Musculoskeletal: [ ] joint pain or swelling; [ ] muscle, back, or extremity pain  Psychiatric: [ ] depression, [ ]anxiety, [ ]mood swings, or [ ]difficulty sleeping  Hematologic: [ ] easy bruising, [ ] bleeding gums    [ ] All remaining systems negative except as per above.   [ ]Unable to obtain.  [x] No change in ROS since admission      Vital Signs Last 24 Hrs  T(C): 36.6 (26 Feb 2022 05:10), Max: 37.3 (25 Feb 2022 21:15)  T(F): 97.9 (26 Feb 2022 05:10), Max: 99.1 (25 Feb 2022 21:15)  HR: 78 (26 Feb 2022 05:10) (72 - 78)  BP: 158/78 (26 Feb 2022 05:10) (145/71 - 158/78)  BP(mean): --  RR: 17 (26 Feb 2022 05:10) (17 - 17)  SpO2: 100% (26 Feb 2022 05:10) (100% - 100%)  I&O's Summary      PHYSICAL EXAM:  General: No acute distress BMI-  HEENT: EOMI, PERRL  Neck: Supple, [ ] JVD  Lungs: Equal air entry bilaterally; [ ] rales [ ] wheezing [ ] rhonchi  Heart: Regular rate and rhythm; [x ] murmur   2/6 [ x] systolic [ ] diastolic [ ] radiation[ ] rubs [ ]  gallops  Abdomen: Nontender, bowel sounds present  Extremities: No clubbing, cyanosis, [ ] edema [ ]Pulses  equal and intact  Nervous system:  Alert & Oriented X3, no focal deficits  Psychiatric: Normal affect  Skin: No rashes or lesions    LABS:  02-26    139  |  105  |  24<H>  ----------------------------<  179<H>  3.7   |  24  |  0.62    Ca    9.3      26 Feb 2022 06:58  Phos  2.9     02-25  Mg     2.00     02-25    TPro  6.7  /  Alb  3.8  /  TBili  <0.2  /  DBili  x   /  AST  15  /  ALT  17  /  AlkPhos  78  02-26    Creatinine Trend: 0.62<--, 0.69<--, 0.71<--                        11.0   10.87 )-----------( 239      ( 26 Feb 2022 06:58 )             34.1

## 2022-03-18 ENCOUNTER — APPOINTMENT (OUTPATIENT)
Dept: ULTRASOUND IMAGING | Facility: IMAGING CENTER | Age: 72
End: 2022-03-18

## 2022-04-22 ENCOUNTER — INPATIENT (INPATIENT)
Facility: HOSPITAL | Age: 72
LOS: 11 days | Discharge: HOME | End: 2022-05-04
Attending: INTERNAL MEDICINE | Admitting: INTERNAL MEDICINE
Payer: MEDICARE

## 2022-04-22 VITALS
RESPIRATION RATE: 16 BRPM | TEMPERATURE: 98 F | DIASTOLIC BLOOD PRESSURE: 100 MMHG | HEART RATE: 71 BPM | SYSTOLIC BLOOD PRESSURE: 176 MMHG | OXYGEN SATURATION: 100 % | HEIGHT: 63 IN

## 2022-04-22 DIAGNOSIS — Z98.890 OTHER SPECIFIED POSTPROCEDURAL STATES: Chronic | ICD-10-CM

## 2022-04-22 LAB
ALBUMIN SERPL ELPH-MCNC: 3.9 G/DL — SIGNIFICANT CHANGE UP (ref 3.3–5)
ALP SERPL-CCNC: 90 U/L — SIGNIFICANT CHANGE UP (ref 40–120)
ALT FLD-CCNC: 35 U/L — HIGH (ref 4–33)
ANION GAP SERPL CALC-SCNC: 10 MMOL/L — SIGNIFICANT CHANGE UP (ref 7–14)
APTT BLD: 30.9 SEC — SIGNIFICANT CHANGE UP (ref 27–36.3)
AST SERPL-CCNC: 27 U/L — SIGNIFICANT CHANGE UP (ref 4–32)
BASOPHILS # BLD AUTO: 0.04 K/UL — SIGNIFICANT CHANGE UP (ref 0–0.2)
BASOPHILS NFR BLD AUTO: 0.6 % — SIGNIFICANT CHANGE UP (ref 0–2)
BILIRUB SERPL-MCNC: 0.3 MG/DL — SIGNIFICANT CHANGE UP (ref 0.2–1.2)
BUN SERPL-MCNC: 17 MG/DL — SIGNIFICANT CHANGE UP (ref 7–23)
CALCIUM SERPL-MCNC: 9.6 MG/DL — SIGNIFICANT CHANGE UP (ref 8.4–10.5)
CHLORIDE SERPL-SCNC: 100 MMOL/L — SIGNIFICANT CHANGE UP (ref 98–107)
CO2 SERPL-SCNC: 28 MMOL/L — SIGNIFICANT CHANGE UP (ref 22–31)
CREAT SERPL-MCNC: 0.79 MG/DL — SIGNIFICANT CHANGE UP (ref 0.5–1.3)
EGFR: 80 ML/MIN/1.73M2 — SIGNIFICANT CHANGE UP
EOSINOPHIL # BLD AUTO: 0.58 K/UL — HIGH (ref 0–0.5)
EOSINOPHIL NFR BLD AUTO: 8.6 % — HIGH (ref 0–6)
GLUCOSE SERPL-MCNC: 112 MG/DL — HIGH (ref 70–99)
HCT VFR BLD CALC: 38.5 % — SIGNIFICANT CHANGE UP (ref 34.5–45)
HGB BLD-MCNC: 12.3 G/DL — SIGNIFICANT CHANGE UP (ref 11.5–15.5)
IANC: 3.73 K/UL — SIGNIFICANT CHANGE UP (ref 1.8–7.4)
IMM GRANULOCYTES NFR BLD AUTO: 0.4 % — SIGNIFICANT CHANGE UP (ref 0–1.5)
INR BLD: 1.12 RATIO — SIGNIFICANT CHANGE UP (ref 0.88–1.16)
LYMPHOCYTES # BLD AUTO: 1.7 K/UL — SIGNIFICANT CHANGE UP (ref 1–3.3)
LYMPHOCYTES # BLD AUTO: 25.1 % — SIGNIFICANT CHANGE UP (ref 13–44)
MCHC RBC-ENTMCNC: 28.5 PG — SIGNIFICANT CHANGE UP (ref 27–34)
MCHC RBC-ENTMCNC: 31.9 GM/DL — LOW (ref 32–36)
MCV RBC AUTO: 89.3 FL — SIGNIFICANT CHANGE UP (ref 80–100)
MONOCYTES # BLD AUTO: 0.7 K/UL — SIGNIFICANT CHANGE UP (ref 0–0.9)
MONOCYTES NFR BLD AUTO: 10.3 % — SIGNIFICANT CHANGE UP (ref 2–14)
NEUTROPHILS # BLD AUTO: 3.73 K/UL — SIGNIFICANT CHANGE UP (ref 1.8–7.4)
NEUTROPHILS NFR BLD AUTO: 55 % — SIGNIFICANT CHANGE UP (ref 43–77)
NRBC # BLD: 0 /100 WBCS — SIGNIFICANT CHANGE UP
NRBC # FLD: 0 K/UL — SIGNIFICANT CHANGE UP
PLATELET # BLD AUTO: 236 K/UL — SIGNIFICANT CHANGE UP (ref 150–400)
POTASSIUM SERPL-MCNC: 3.6 MMOL/L — SIGNIFICANT CHANGE UP (ref 3.5–5.3)
POTASSIUM SERPL-SCNC: 3.6 MMOL/L — SIGNIFICANT CHANGE UP (ref 3.5–5.3)
PROT SERPL-MCNC: 6.9 G/DL — SIGNIFICANT CHANGE UP (ref 6–8.3)
PROTHROM AB SERPL-ACNC: 13 SEC — SIGNIFICANT CHANGE UP (ref 10.5–13.4)
RBC # BLD: 4.31 M/UL — SIGNIFICANT CHANGE UP (ref 3.8–5.2)
RBC # FLD: 14.6 % — HIGH (ref 10.3–14.5)
SODIUM SERPL-SCNC: 138 MMOL/L — SIGNIFICANT CHANGE UP (ref 135–145)
TROPONIN T, HIGH SENSITIVITY RESULT: 12 NG/L — SIGNIFICANT CHANGE UP
WBC # BLD: 6.78 K/UL — SIGNIFICANT CHANGE UP (ref 3.8–10.5)
WBC # FLD AUTO: 6.78 K/UL — SIGNIFICANT CHANGE UP (ref 3.8–10.5)

## 2022-04-22 PROCEDURE — 70496 CT ANGIOGRAPHY HEAD: CPT | Mod: 26,MA

## 2022-04-22 PROCEDURE — 71045 X-RAY EXAM CHEST 1 VIEW: CPT | Mod: 26

## 2022-04-22 PROCEDURE — 99285 EMERGENCY DEPT VISIT HI MDM: CPT

## 2022-04-22 PROCEDURE — 70498 CT ANGIOGRAPHY NECK: CPT | Mod: 26,MA

## 2022-04-22 RX ORDER — ACETAMINOPHEN 500 MG
975 TABLET ORAL ONCE
Refills: 0 | Status: COMPLETED | OUTPATIENT
Start: 2022-04-22 | End: 2022-04-22

## 2022-04-22 RX ORDER — METOCLOPRAMIDE HCL 10 MG
10 TABLET ORAL ONCE
Refills: 0 | Status: COMPLETED | OUTPATIENT
Start: 2022-04-22 | End: 2022-04-22

## 2022-04-22 RX ORDER — SODIUM CHLORIDE 9 MG/ML
1000 INJECTION INTRAMUSCULAR; INTRAVENOUS; SUBCUTANEOUS ONCE
Refills: 0 | Status: COMPLETED | OUTPATIENT
Start: 2022-04-22 | End: 2022-04-22

## 2022-04-22 RX ORDER — MECLIZINE HCL 12.5 MG
25 TABLET ORAL ONCE
Refills: 0 | Status: COMPLETED | OUTPATIENT
Start: 2022-04-22 | End: 2022-04-22

## 2022-04-22 RX ADMIN — Medication 25 MILLIGRAM(S): at 20:29

## 2022-04-22 RX ADMIN — Medication 975 MILLIGRAM(S): at 20:29

## 2022-04-22 RX ADMIN — Medication 10 MILLIGRAM(S): at 20:29

## 2022-04-22 RX ADMIN — SODIUM CHLORIDE 1000 MILLILITER(S): 9 INJECTION INTRAMUSCULAR; INTRAVENOUS; SUBCUTANEOUS at 20:28

## 2022-04-22 NOTE — ED ADULT TRIAGE NOTE - CHIEF COMPLAINT QUOTE
Pt. c/o headache, dizziness, double-vision and feeling off-balance x 2wks. States [facility] and just [facility] home today. Unknown why pt. was sent to hospital when symptoms first started.

## 2022-04-22 NOTE — ED PROVIDER NOTE - CLINICAL SUMMARY MEDICAL DECISION MAKING FREE TEXT BOX
Tyler, PGY3 - 72F with extensive pmh as above p/w headache, dizziness, RUE/RLE weakness x at least 2 weeks (but likely longer upon chart review, and pt with limited history). RUE/RLE weakness with dysmetria on exam. Concern for CVA, not TPA candidate at this time. Plan for labs, CTs, neuro consult, TBA

## 2022-04-22 NOTE — ED ADULT NURSE NOTE - CHIEF COMPLAINT QUOTE
Pt. c/o headache, dizziness, double-vision and feeling off-balance x 2wks. States [facility] and just [facility] home today. Unknown why pt. was sent to hospital when symptoms first started.

## 2022-04-22 NOTE — ED PROVIDER NOTE - PHYSICAL EXAMINATION
I have reviewed the triage vital signs.  Const: AAOx3, in NAD  Eyes: no conjunctival injection, PERRL, EOMI  HENT: NCAT  CV: RRR, +S1, S2  Resp: CTAB, no respiratory distress  GI: Abdomen soft, NTND, no guarding  Extremities: No peripheral edema  Skin: Warm, well perfused, no rash  MSK: No gross deformities appreciated  Neuro: CN2-12 grossly intact, MS +4/5 in RUE/RLE and 5/5 LUE/LLE, FTN dysmetria, gross sensation intact in UE and LE BL, gait deferred, negative rhomberg  Psych: Appropriate mood and affect

## 2022-04-22 NOTE — ED PROVIDER NOTE - PROGRESS NOTE DETAILS
Tyler, PGY3 - attempted to contact PCP Dr. Matson previously (left callback number with answering service), no call back. Endorsed to Dr. Pike, states he covers Dr. Matson's pts

## 2022-04-22 NOTE — CONSULT NOTE ADULT - ATTENDING COMMENTS
I have examined the pt at bedside.  The risks and the benefits of the proposed diagnostic/therapeutic approach were thoroughly discussed.   I agree with the above plan and have modified it where necessary.    72F RH AAW with pmh of HTN, asthma, RA, chronic low back pain, R. thyroid nodule who presented to ED and neurology was consulted for Headache and R sided weakness. She was just discharged from [facility] 4/22.   She presents with progressive headache and R sided weakness over the past 2 weeks. Was recommended to come to ED while at [facility], and initially refused.   Neuro exam with minimal R sided weakness and sensory loss ot LT/temp. Labs grossly unremarkable. NIHSS 2.   Of note, pt states her back pain and LE pain (bilateral) are limiting her gait as well.   Pt was unable to complete MRI brain due to pain.    Impression  Progressive R sensorimotor hemiparesis + headache 2/2 L brain dysfunction 2/2 possibly structural vs ischemic etiology.    Recommendations   - MR brain with and without contrast - pls, attempt after pain control  - Can consider MR orbits with and with contrast pending optho evaluation  - Start ASA 81mg for now, decision to continue pending imaging   - Headache management  - PT/OT  - Fall precautions   - Opthalmology evaluation   - Rest of care per primary team.

## 2022-04-22 NOTE — ED ADULT NURSE NOTE - OBJECTIVE STATEMENT
73 yo F AAOx4 received to room 21 with mult complaints: report HA with changes in vision, unsteady gait (ambulates with walker) and dizziness- describes as feels like the room is spinning around her, symptoms persisting x 2 weeks, no facial asymmetry on exam, no additional neuro deficits, #20 placed to [facility], medicated as ordered, pending CT, report given to primary RN Yisel

## 2022-04-22 NOTE — ED PROVIDER NOTE - NS ED ROS FT
General: Denies fevers  Eyes: Denies vision changes  ENMT: Denies sore throat  CV: Denies chest pain  Resp: Denies SOB  GI: Denies abdominal pain, nausea, vomiting, diarrhea  : Denies painful urination  Skin: Denies new rashes  Neuro: +headache, dizziness, RUE/RLE weakness, chronic BL toes numbness  MSK: +Chronic back pain. Denies recent trauma

## 2022-04-22 NOTE — ED PROVIDER NOTE - OBJECTIVE STATEMENT
72F PMH HTN, asthma, RA, chronic low back pain, R. thyroid nodule p/w headache, R-sided weakness x 2 weeks. Pt just discharged from [facility] for back pain. Reports headache around her whole head, dizziness (room-spinning sensation), slurred speech, RUE/RLE weakness x 2 weeks. Timeline unclear, because at times pt states symptoms ongoing for 2 months. Symptoms are intermittent. Upon chart review, pt reportedly told [facility] that these symptoms were chronic in the setting of her "brain tumor" and family had confirmed; [facility] had recommended ED evaluation last night, but pt declined and wanted to go after being discharged. Pt denies h/o brain tumor. Previous admission to [facility], pt noted to have RUE/RLE weakness, did not have MRI head. Had MRI spine performed.

## 2022-04-22 NOTE — CONSULT NOTE ADULT - ASSESSMENT
Patient is a 72F with pmh of HTN, asthma, RA, chronic low back pain, R. thyroid nodule who presented to ED and neurology was consulted for Headache and R sided weakness. Discharged from [facility] today. Presents with progressive headache and R sided weakness over the past 2 weeks. Was recommended to come to ED while at [facility] but refused. Neuro exam with R sided weakness and sensory loss ot LT/temp. Labs grossly unremarkable. NIHSS 2. Was     Impression  Progressive R sensorimotor hemiparesis + headache 2/2 L brain dysfunction 2/2 possibly structural vs ischemic etiology     Recommendations   - CTH  - CTA H/N   - MR brain with and without contrast   - Can consider MR orbits with and with contrast pending optho evaluation  - Start ASA 81mg for now, decision to continue pending imaging   - PT/OT  - Fall precautions   - Opthalmology evaluation   - Rest of care per primary team     Case to be discussed with neurology attending Dr. Berrios  Patient is a 72F with pmh of HTN, asthma, RA, chronic low back pain, R. thyroid nodule who presented to ED and neurology was consulted for Headache and R sided weakness. Discharged from [facility] today. Presents with progressive headache and R sided weakness over the past 2 weeks. Was recommended to come to ED while at [facility] but refused. Neuro exam with R sided weakness and sensory loss ot LT/temp. Labs grossly unremarkable. NIHSS 2. Was     Impression  Progressive R sensorimotor hemiparesis + headache 2/2 L brain dysfunction 2/2 possibly structural vs ischemic etiology. ?migraine related     Recommendations   - CTH  - CTA H/N   - MR brain with and without contrast   - Can consider MR orbits with and with contrast pending optho evaluation  - Start ASA 81mg for now, decision to continue pending imaging   - Headache management:  1st line -  reglan 10 q6h, toradol 30 iv q8h, magnesium sulfate IV 1g, tylenol,  2nd line: - solumedrol 250mg IV x1 3rd line: - if not pregnant, Depakote 500mg IV x1   - PT/OT  - Fall precautions   - Opthalmology evaluation   - Rest of care per primary team     Case to be discussed with neurology attending Dr. Berrios

## 2022-04-22 NOTE — CONSULT NOTE ADULT - SUBJECTIVE AND OBJECTIVE BOX
HPI:  Patient is a 72F with pmh of HTN, asthma, RA, chronic low back pain, R. thyroid nodule who presented to ED and neurology was consulted for Headache and R sided weakness. Patient was discharged from [facility]hab today. she states that for the past 2 weeks she has had a progressive headache and R sided weakness. At baseline she ambulates with a walker and states she has some mild R sided numbness. However for the past 2 weeks she has had worsening of these symptoms. States that sometimes her R hand just falls and sometimes she has difficulty holding objects. Reports similar symptoms in 2013 but not as severe. Describes headache as involving her whole head and throbbing. Nothing particularly exacerbates her headache. The medications she was given at [facility] temporarily alleviated her headache. Currently rates it 6/10. She had endorsed these symptoms while at [facility] and was recommended to come to the ED but refused and wanted to be discharged home first. Additionally reports blurry vision in both eyes. Normally only wears reading glasses. Patient was seen by retinal specialist while at [facility] and no clear etiology noted     ROS: A 10-system ROS was performed and is negative except for those items noted above and/or in the HPI.    PAST MEDICAL & SURGICAL HISTORY:  Angina    Asthma    H/O sciatica  (left leg)    RA (rheumatoid arthritis)    HTN (hypertension)    Fibroid, uterine    Brain cyst    Dizziness  secondary to brain cyst    Chronic back pain  (with hx/o back surgery in 7/2015 and 1/2017)    History of back surgery  (7/2015 and 1/2017)      FAMILY HISTORY:  Family history of lung cancer    Family history of heart failure    Family history of renal disease    Family history of early CAD (Sibling)        SOCIAL HISTORY: SOCIAL HISTORY:     Marital Status: (  )   (  ) Single  (  )   (  )      Occupation:      Lives: (  ) alone  (  ) with children   (  ) with spouse  (  ) with parents  (  ) other     Illicit Drug Use: (  ) never used  (  ) other _____     Tobacco Use:  (  ) never smoked  (  ) former smoker  (  ) current smoker  (  ) pack year  (  ) last cigarette date     Alcohol Use:      Sexual History:        MEDICATIONS  Home Medications:  gabapentin 300 mg oral capsule: 2 cap(s) orally 3 times a day (07 Mar 2022 12:28)  hydrochlorothiazide 25 mg oral tablet: 1 tab(s) orally once a day (24 Feb 2022 12:40)  isosorbide mononitrate 30 mg oral tablet, extended release: 1 tab(s) orally once a day (24 Feb 2022 12:40)  lidocaine 4% topical film: Apply topically to affected area once a day, As Needed (07 Mar 2022 12:38)  Metoprolol Tartrate 50 mg oral tablet: 1 tab(s) orally 2 times a day  (Pharmacy filled for 50mg ER tablets BID) (24 Feb 2022 12:40)  oxyCODONE 5 mg oral tablet: 1 tab(s) orally every 4 hours, As needed, Moderate - Severe Pain (4 - 10) (07 Mar 2022 12:28)  pantoprazole 40 mg oral delayed release tablet: 1 tab(s) orally once a day (before a meal) (07 Mar 2022 12:28)  polyethylene glycol 3350 oral powder for reconstitution: 17 gram(s) orally once a day (at bedtime) (07 Mar 2022 12:28)  Proventil HFA CFC free 90 mcg/inh inhalation aerosol: 2 puff(s) inhaled 2 times a day, As Needed (24 Feb 2022 12:40)  senna oral tablet: 2 tab(s) orally once a day (at bedtime) (07 Mar 2022 12:28)      MEDICATIONS  (STANDING):    MEDICATIONS  (PRN):      ALLERGIES/INTOLERANCES:  Allergies  Biaxin (Hives; Rash)    Intolerances      OBJECTIVE:  VITALS   Vital Signs Last 24 Hrs  T(C): 36.4 (22 Apr 2022 17:58), Max: 36.4 (22 Apr 2022 17:58)  T(F): 97.6 (22 Apr 2022 17:58), Max: 97.6 (22 Apr 2022 17:58)  HR: 71 (22 Apr 2022 17:58) (71 - 71)  BP: 176/100 (22 Apr 2022 17:58) (176/100 - 176/100)  BP(mean): --  RR: 16 (22 Apr 2022 17:58) (16 - 16)  SpO2: 100% (22 Apr 2022 17:58) (100% - 100%)    PHYSICAL EXAM:  General: Well developed, in NAD   Head: atraumatic   Respiratory: non-laboured breathing, regular rate  GI: Nondistended   Integumentary: Warm and Dry   Psychiatric: Normal Affect     Neurological Exam:  Mental Status: Orientated to self, date and place.  Attention intact.  No dysarthria, or neglect.  Comprehension intact to naming. Knowledge intact.  Registration intact.  Short and long term memory grossly intact.    Cranial Nerves: PERRL, EOMI, VFF, no nystagmus.  CN V1-3 intact to light touch.  No facial asymmetry.  Hearing intact.  Tongue midline.  Sternocleidomastoid and Trapezius intact bilaterally.  Motor:   Tone: normal            Strength:     Upper extremity                      Delt       Bicep    Tricep                                               R   4/5        4/5       4+/5       4-/5                                            L    5/5        5/5        5/5       5/5  Lower extremity                       HF          KE          KF        DF         PF                                            R  4-/5        4/5        4/5       5/5       4+/5                                            L   5/5        5/5       5/5       5/5        5/5  Pronator drift: none                 Dysmetria: None to finger-nose-finger  Tremor: No resting, postural or action tremor.  No myoclonus.  Sensation: decreased sensation to LT and temperature of RUE and RLE   Deep Tendon Reflexes: 1+ bilateral biceps, triceps, brachioradialis, knee and mute ankle  Toes flexor bilaterally  Gait: Deferred     LABORATORY:  CBC                       12.3   6.78  )-----------( 236      ( 22 Apr 2022 20:42 )             38.5     Chem 04-22    138  |  100  |  17  ----------------------------<  112<H>  3.6   |  28  |  0.79    Ca    9.6      22 Apr 2022 20:42    TPro  6.9  /  Alb  3.9  /  TBili  0.3  /  DBili  x   /  AST  27  /  ALT  35<H>  /  AlkPhos  90  04-22    LFTs LIVER FUNCTIONS - ( 22 Apr 2022 20:42 )  Alb: 3.9 g/dL / Pro: 6.9 g/dL / ALK PHOS: 90 U/L / ALT: 35 U/L / AST: 27 U/L / GGT: x           Coagulopathy PT/INR - ( 22 Apr 2022 20:42 )   PT: 13.0 sec;   INR: 1.12 ratio         PTT - ( 22 Apr 2022 20:42 )  PTT:30.9 sec  Lipid Panel   A1c   Cardiac enzymes     U/A   CSF  Immunological  Other    STUDIES & IMAGING:  Studies (EKG, EEG, EMG, etc):       Radiology (XR, CT, MR, U/S, TTE/ETHAN):

## 2022-04-22 NOTE — ED PROVIDER NOTE - ATTENDING CONTRIBUTION TO CARE
Attending note:   After face to face evaluation of this patient, I concur with above noted hx, pe, and care plan for this patient.  Muse: 72 yof with headache for few weeks. Pt states she has hx of tumor but none noted on previous CT. Pt also noted right sided weakness that is not seen on previous record. Pt is well appearing, no distress, no facial droop, Right upper ext 4-/5, RLE 3+/5, +right drift, sensation normal. EOMI, no nystagmus, clear lungs, RRR, abd soft and non tender, NC/AT. no pitting edema. pulses normal.   Pt's MRI of spine noted but no MRI of head done. needs CT and MRI of brain, neuro eval, labs and admission.

## 2022-04-23 DIAGNOSIS — E04.1 NONTOXIC SINGLE THYROID NODULE: ICD-10-CM

## 2022-04-23 DIAGNOSIS — R51.9 HEADACHE, UNSPECIFIED: ICD-10-CM

## 2022-04-23 DIAGNOSIS — M54.50 LOW BACK PAIN, UNSPECIFIED: ICD-10-CM

## 2022-04-23 DIAGNOSIS — R53.1 WEAKNESS: ICD-10-CM

## 2022-04-23 DIAGNOSIS — H53.8 OTHER VISUAL DISTURBANCES: ICD-10-CM

## 2022-04-23 DIAGNOSIS — J45.909 UNSPECIFIED ASTHMA, UNCOMPLICATED: ICD-10-CM

## 2022-04-23 DIAGNOSIS — Z29.9 ENCOUNTER FOR PROPHYLACTIC MEASURES, UNSPECIFIED: ICD-10-CM

## 2022-04-23 DIAGNOSIS — I10 ESSENTIAL (PRIMARY) HYPERTENSION: ICD-10-CM

## 2022-04-23 DIAGNOSIS — K11.20 SIALOADENITIS, UNSPECIFIED: ICD-10-CM

## 2022-04-23 LAB
A1C WITH ESTIMATED AVERAGE GLUCOSE RESULT: 5.8 % — HIGH (ref 4–5.6)
ANION GAP SERPL CALC-SCNC: 11 MMOL/L — SIGNIFICANT CHANGE UP (ref 7–14)
APPEARANCE UR: CLEAR — SIGNIFICANT CHANGE UP
BACTERIA # UR AUTO: NEGATIVE — SIGNIFICANT CHANGE UP
BILIRUB UR-MCNC: NEGATIVE — SIGNIFICANT CHANGE UP
BUN SERPL-MCNC: 14 MG/DL — SIGNIFICANT CHANGE UP (ref 7–23)
CALCIUM SERPL-MCNC: 9.9 MG/DL — SIGNIFICANT CHANGE UP (ref 8.4–10.5)
CHLORIDE SERPL-SCNC: 101 MMOL/L — SIGNIFICANT CHANGE UP (ref 98–107)
CHOLEST SERPL-MCNC: 201 MG/DL — HIGH
CO2 SERPL-SCNC: 28 MMOL/L — SIGNIFICANT CHANGE UP (ref 22–31)
COLOR SPEC: YELLOW — SIGNIFICANT CHANGE UP
CREAT SERPL-MCNC: 0.64 MG/DL — SIGNIFICANT CHANGE UP (ref 0.5–1.3)
DIFF PNL FLD: NEGATIVE — SIGNIFICANT CHANGE UP
EGFR: 94 ML/MIN/1.73M2 — SIGNIFICANT CHANGE UP
EPI CELLS # UR: 2 /HPF — SIGNIFICANT CHANGE UP (ref 0–5)
ESTIMATED AVERAGE GLUCOSE: 120 — SIGNIFICANT CHANGE UP
GLUCOSE BLDC GLUCOMTR-MCNC: 71 MG/DL — SIGNIFICANT CHANGE UP (ref 70–99)
GLUCOSE SERPL-MCNC: 84 MG/DL — SIGNIFICANT CHANGE UP (ref 70–99)
GLUCOSE UR QL: NEGATIVE — SIGNIFICANT CHANGE UP
HCT VFR BLD CALC: 43.6 % — SIGNIFICANT CHANGE UP (ref 34.5–45)
HDLC SERPL-MCNC: 70 MG/DL — SIGNIFICANT CHANGE UP
HGB BLD-MCNC: 13.9 G/DL — SIGNIFICANT CHANGE UP (ref 11.5–15.5)
KETONES UR-MCNC: NEGATIVE — SIGNIFICANT CHANGE UP
LEUKOCYTE ESTERASE UR-ACNC: ABNORMAL
LIPID PNL WITH DIRECT LDL SERPL: 120 MG/DL — HIGH
MAGNESIUM SERPL-MCNC: 1.8 MG/DL — SIGNIFICANT CHANGE UP (ref 1.6–2.6)
MCHC RBC-ENTMCNC: 28.3 PG — SIGNIFICANT CHANGE UP (ref 27–34)
MCHC RBC-ENTMCNC: 31.9 GM/DL — LOW (ref 32–36)
MCV RBC AUTO: 88.8 FL — SIGNIFICANT CHANGE UP (ref 80–100)
NITRITE UR-MCNC: NEGATIVE — SIGNIFICANT CHANGE UP
NON HDL CHOLESTEROL: 131 MG/DL — HIGH
NRBC # BLD: 0 /100 WBCS — SIGNIFICANT CHANGE UP
NRBC # FLD: 0 K/UL — SIGNIFICANT CHANGE UP
PH UR: 6.5 — SIGNIFICANT CHANGE UP (ref 5–8)
PHOSPHATE SERPL-MCNC: 3.8 MG/DL — SIGNIFICANT CHANGE UP (ref 2.5–4.5)
PLATELET # BLD AUTO: 253 K/UL — SIGNIFICANT CHANGE UP (ref 150–400)
POTASSIUM SERPL-MCNC: 3.5 MMOL/L — SIGNIFICANT CHANGE UP (ref 3.5–5.3)
POTASSIUM SERPL-SCNC: 3.5 MMOL/L — SIGNIFICANT CHANGE UP (ref 3.5–5.3)
PROT UR-MCNC: NEGATIVE — SIGNIFICANT CHANGE UP
RBC # BLD: 4.91 M/UL — SIGNIFICANT CHANGE UP (ref 3.8–5.2)
RBC # FLD: 14.7 % — HIGH (ref 10.3–14.5)
RBC CASTS # UR COMP ASSIST: 1 /HPF — SIGNIFICANT CHANGE UP (ref 0–4)
SARS-COV-2 RNA SPEC QL NAA+PROBE: SIGNIFICANT CHANGE UP
SODIUM SERPL-SCNC: 140 MMOL/L — SIGNIFICANT CHANGE UP (ref 135–145)
SP GR SPEC: 1.01 — SIGNIFICANT CHANGE UP (ref 1–1.05)
TRIGL SERPL-MCNC: 57 MG/DL — SIGNIFICANT CHANGE UP
TROPONIN T, HIGH SENSITIVITY RESULT: 13 NG/L — SIGNIFICANT CHANGE UP
UROBILINOGEN FLD QL: SIGNIFICANT CHANGE UP
WBC # BLD: 5.38 K/UL — SIGNIFICANT CHANGE UP (ref 3.8–10.5)
WBC # FLD AUTO: 5.38 K/UL — SIGNIFICANT CHANGE UP (ref 3.8–10.5)
WBC UR QL: 2 /HPF — SIGNIFICANT CHANGE UP (ref 0–5)

## 2022-04-23 PROCEDURE — 99223 1ST HOSP IP/OBS HIGH 75: CPT

## 2022-04-23 RX ORDER — ASPIRIN/CALCIUM CARB/MAGNESIUM 324 MG
81 TABLET ORAL DAILY
Refills: 0 | Status: DISCONTINUED | OUTPATIENT
Start: 2022-04-23 | End: 2022-05-04

## 2022-04-23 RX ORDER — ATORVASTATIN CALCIUM 80 MG/1
0 TABLET, FILM COATED ORAL
Qty: 0 | Refills: 0 | DISCHARGE

## 2022-04-23 RX ORDER — PANTOPRAZOLE SODIUM 20 MG/1
40 TABLET, DELAYED RELEASE ORAL
Refills: 0 | Status: DISCONTINUED | OUTPATIENT
Start: 2022-04-23 | End: 2022-05-04

## 2022-04-23 RX ORDER — ENOXAPARIN SODIUM 100 MG/ML
40 INJECTION SUBCUTANEOUS EVERY 24 HOURS
Refills: 0 | Status: DISCONTINUED | OUTPATIENT
Start: 2022-04-23 | End: 2022-05-04

## 2022-04-23 RX ORDER — METOPROLOL TARTRATE 50 MG
50 TABLET ORAL
Refills: 0 | Status: DISCONTINUED | OUTPATIENT
Start: 2022-04-23 | End: 2022-05-04

## 2022-04-23 RX ORDER — GABAPENTIN 400 MG/1
300 CAPSULE ORAL THREE TIMES A DAY
Refills: 0 | Status: DISCONTINUED | OUTPATIENT
Start: 2022-04-23 | End: 2022-05-04

## 2022-04-23 RX ORDER — ATORVASTATIN CALCIUM 80 MG/1
20 TABLET, FILM COATED ORAL AT BEDTIME
Refills: 0 | Status: DISCONTINUED | OUTPATIENT
Start: 2022-04-23 | End: 2022-05-01

## 2022-04-23 RX ORDER — ALBUTEROL 90 UG/1
2 AEROSOL, METERED ORAL EVERY 6 HOURS
Refills: 0 | Status: DISCONTINUED | OUTPATIENT
Start: 2022-04-23 | End: 2022-05-04

## 2022-04-23 RX ORDER — ACETAMINOPHEN 500 MG
650 TABLET ORAL EVERY 6 HOURS
Refills: 0 | Status: DISCONTINUED | OUTPATIENT
Start: 2022-04-23 | End: 2022-05-04

## 2022-04-23 RX ORDER — HYDROCHLOROTHIAZIDE 25 MG
25 TABLET ORAL DAILY
Refills: 0 | Status: DISCONTINUED | OUTPATIENT
Start: 2022-04-23 | End: 2022-05-04

## 2022-04-23 RX ORDER — ISOSORBIDE MONONITRATE 60 MG/1
30 TABLET, EXTENDED RELEASE ORAL DAILY
Refills: 0 | Status: DISCONTINUED | OUTPATIENT
Start: 2022-04-23 | End: 2022-05-04

## 2022-04-23 RX ORDER — LANOLIN ALCOHOL/MO/W.PET/CERES
3 CREAM (GRAM) TOPICAL AT BEDTIME
Refills: 0 | Status: DISCONTINUED | OUTPATIENT
Start: 2022-04-23 | End: 2022-05-04

## 2022-04-23 RX ORDER — OXYCODONE HYDROCHLORIDE 5 MG/1
5 TABLET ORAL EVERY 4 HOURS
Refills: 0 | Status: DISCONTINUED | OUTPATIENT
Start: 2022-04-23 | End: 2022-04-28

## 2022-04-23 RX ADMIN — GABAPENTIN 300 MILLIGRAM(S): 400 CAPSULE ORAL at 22:43

## 2022-04-23 RX ADMIN — GABAPENTIN 300 MILLIGRAM(S): 400 CAPSULE ORAL at 10:26

## 2022-04-23 RX ADMIN — OXYCODONE HYDROCHLORIDE 5 MILLIGRAM(S): 5 TABLET ORAL at 19:15

## 2022-04-23 RX ADMIN — Medication 50 MILLIGRAM(S): at 17:34

## 2022-04-23 RX ADMIN — ENOXAPARIN SODIUM 40 MILLIGRAM(S): 100 INJECTION SUBCUTANEOUS at 06:05

## 2022-04-23 RX ADMIN — ATORVASTATIN CALCIUM 20 MILLIGRAM(S): 80 TABLET, FILM COATED ORAL at 22:43

## 2022-04-23 RX ADMIN — Medication 25 MILLIGRAM(S): at 10:25

## 2022-04-23 RX ADMIN — ISOSORBIDE MONONITRATE 30 MILLIGRAM(S): 60 TABLET, EXTENDED RELEASE ORAL at 10:35

## 2022-04-23 RX ADMIN — PANTOPRAZOLE SODIUM 40 MILLIGRAM(S): 20 TABLET, DELAYED RELEASE ORAL at 10:25

## 2022-04-23 RX ADMIN — Medication 81 MILLIGRAM(S): at 17:35

## 2022-04-23 RX ADMIN — GABAPENTIN 300 MILLIGRAM(S): 400 CAPSULE ORAL at 17:35

## 2022-04-23 RX ADMIN — OXYCODONE HYDROCHLORIDE 5 MILLIGRAM(S): 5 TABLET ORAL at 13:45

## 2022-04-23 NOTE — ED ADULT NURSE REASSESSMENT NOTE - NS ED NURSE REASSESS COMMENT FT1
Patient c/o swelling to right lower jaw. On assessment patient able to speak in full sentences, denies trouble breathing or swallowing. Swelling observed, on palpation large nodule felt. MD Wally Sanchez and ACP made aware.
Patient refused medications, states she wants to have them with breakfast or she will feel sick. MD aware. Medications rescheduled.
Report received from NILESH Morillo at 0000. Patient A&O, respirations even and unlabored. Patient placed on cardiac monitor-NS. Vitals stable. Admitting MD at bedside. Patient passed dysphagia screening, documented in chart. Stretcher in lowest position, wheels locked, appropriate side rails in place.

## 2022-04-23 NOTE — CONSULT NOTE ADULT - SUBJECTIVE AND OBJECTIVE BOX
[facility] DEPARTMENT OF OPHTHALMOLOGY - INITIAL ADULT CONSULT  -----------------------------------------------------------------------------------------------------------------  Cameron Navarrete MD  PGY 2  [phone]  -----------------------------------------------------------------------------------------------------------------    HPI: 72F with history of HTN, asthma, RA, cataracts currently admitted for right sided weakness and HA. Ophtho consulted for blurry vision. Patient reports 2 weeks of blurry vision without her glasses. Reports that blurry vision does not bother her, just hard to read. Denies any eye pain, loss of vision, flashes, floaters, double vision, eye redness, or discharge. Endorses frontal headaches, denies jaw claudication, fever, shoulder/hip pain, fevers.       PAST MEDICAL & SURGICAL HISTORY:  Angina    Asthma    H/O sciatica  (left leg)    RA (rheumatoid arthritis)    HTN (hypertension)    Fibroid, uterine    Brain cyst    Dizziness  secondary to brain cyst    Chronic back pain  (with hx/o back surgery in 7/2015 and 1/2017)    History of back surgery  (7/2015 and 1/2017)      Past Ocular History: cataracts OU  Ophthalmic Medications: none    FAMILY HISTORY:  Family history of lung cancer    Family history of heart failure    Family history of renal disease    Family history of early CAD (Sibling)      Social History: denies etoh/tobacco    MEDICATIONS  (STANDING):  aspirin enteric coated 81 milliGRAM(s) Oral daily  atorvastatin 20 milliGRAM(s) Oral at bedtime  enoxaparin Injectable 40 milliGRAM(s) SubCutaneous every 24 hours  gabapentin 300 milliGRAM(s) Oral three times a day  hydrochlorothiazide 25 milliGRAM(s) Oral daily  isosorbide   mononitrate ER Tablet (IMDUR) 30 milliGRAM(s) Oral daily  metoprolol tartrate 50 milliGRAM(s) Oral two times a day  pantoprazole    Tablet 40 milliGRAM(s) Oral before breakfast    MEDICATIONS  (PRN):  acetaminophen     Tablet .. 650 milliGRAM(s) Oral every 6 hours PRN Temp greater or equal to 38C (100.4F), Mild Pain (1 - 3)  ALBUTerol    90 MICROgram(s) HFA Inhaler 2 Puff(s) Inhalation every 6 hours PRN Shortness of Breath and/or Wheezing  melatonin 3 milliGRAM(s) Oral at bedtime PRN Insomnia  oxyCODONE    IR 5 milliGRAM(s) Oral every 4 hours PRN Moderate - Severe Pain (4 - 10)    Allergies & Intolerances:   Biaxin (Hives; Rash)    Review of Systems:  Constitutional: No fever, chills  Eyes: No blurry vision, flashes, floaters, FBS, erythema, discharge, double vision, OU  Neuro: No tremors  Cardiovascular: No chest pain, palpitations  Respiratory: No SOB, no cough  GI: No nausea, vomiting, abdominal pain  : No dysuria  Skin: no rash  Psych: no depression  Endocrine: no polyuria, polydipsia  Heme/lymph: no swelling    VITALS: T(C): 36.8 (04-23-22 @ 10:23)  T(F): 98.2 (04-23-22 @ 10:23), Max: 98.3 (04-22-22 @ 23:47)  HR: 67 (04-23-22 @ 10:23) (58 - 71)  BP: 170/71 (04-23-22 @ 10:23) (137/59 - 176/100)  RR:  (15 - 16)  SpO2:  (100% - 100%)  Wt(kg): --  General: AAO x 3, appropriate mood and affect    Ophthalmology Exam:  Visual acuity (sc): 20/20 OU  Pupils: PERRL OU, no APD  Ttono: 16 OU  Extraocular movements (EOMs): Full OU, no pain, no diplopia  Confrontational Visual Field (CVF): Full OU  Color Plates: 12/12 OU    Pen Light Exam (PLE)  External: Flat OU; full temporal pulses, no tenderness to palpation on either side  Lids/Lashes/Lacrimal Ducts: Flat OU    Sclera/Conjunctiva: W+Q OU  Cornea: Cl OU  Anterior Chamber: D+F OU    Iris: Flat OU  Lens: NS OU    Fundus Exam: dilated with 1% tropicamide and 2.5% phenylephrine  Approval obtained from primary team for dilation  Patient aware that pupils can remained dilated for at least 4-6 hours  Exam performed with 20D lens    Vitreous: wnl OU  Disc, cup/disc: sharp and pink, 0.4 OU  Macula: wnl OU  Vessels: wnl OU  Periphery: wnl OU   [facility] DEPARTMENT OF OPHTHALMOLOGY - INITIAL ADULT CONSULT  -----------------------------------------------------------------------------------------------------------------  Cameron Navarrete MD  PGY 2  [phone]  -----------------------------------------------------------------------------------------------------------------    HPI: 72F with history of HTN, asthma, RA, cataracts currently admitted for right sided weakness and HA. Ophtho consulted for blurry vision. Patient reports 2 weeks of blurry vision without her glasses. Reports that blurry vision does not bother her, just hard to read. Denies any eye pain, loss of vision, flashes, floaters, double vision, eye redness, or discharge. Endorses frontal headaches, denies jaw claudication, fever, shoulder/hip pain, fevers.       PAST MEDICAL & SURGICAL HISTORY:  Angina    Asthma    H/O sciatica  (left leg)    RA (rheumatoid arthritis)    HTN (hypertension)    Fibroid, uterine    Brain cyst    Dizziness  secondary to brain cyst    Chronic back pain  (with hx/o back surgery in 7/2015 and 1/2017)    History of back surgery  (7/2015 and 1/2017)      Past Ocular History: cataracts OU  Ophthalmic Medications: none    FAMILY HISTORY:  Family history of lung cancer    Family history of heart failure    Family history of renal disease    Family history of early CAD (Sibling)      Social History: denies etoh/tobacco    MEDICATIONS  (STANDING):  aspirin enteric coated 81 milliGRAM(s) Oral daily  atorvastatin 20 milliGRAM(s) Oral at bedtime  enoxaparin Injectable 40 milliGRAM(s) SubCutaneous every 24 hours  gabapentin 300 milliGRAM(s) Oral three times a day  hydrochlorothiazide 25 milliGRAM(s) Oral daily  isosorbide   mononitrate ER Tablet (IMDUR) 30 milliGRAM(s) Oral daily  metoprolol tartrate 50 milliGRAM(s) Oral two times a day  pantoprazole    Tablet 40 milliGRAM(s) Oral before breakfast    MEDICATIONS  (PRN):  acetaminophen     Tablet .. 650 milliGRAM(s) Oral every 6 hours PRN Temp greater or equal to 38C (100.4F), Mild Pain (1 - 3)  ALBUTerol    90 MICROgram(s) HFA Inhaler 2 Puff(s) Inhalation every 6 hours PRN Shortness of Breath and/or Wheezing  melatonin 3 milliGRAM(s) Oral at bedtime PRN Insomnia  oxyCODONE    IR 5 milliGRAM(s) Oral every 4 hours PRN Moderate - Severe Pain (4 - 10)    Allergies & Intolerances:   Biaxin (Hives; Rash)    Review of Systems:  Constitutional: No fever, chills  Eyes: No blurry vision, flashes, floaters, FBS, erythema, discharge, double vision, OU  Neuro: No tremors  Cardiovascular: No chest pain, palpitations  Respiratory: No SOB, no cough  GI: No nausea, vomiting, abdominal pain  : No dysuria  Skin: no rash  Psych: no depression  Endocrine: no polyuria, polydipsia  Heme/lymph: no swelling    VITALS: T(C): 36.8 (04-23-22 @ 10:23)  T(F): 98.2 (04-23-22 @ 10:23), Max: 98.3 (04-22-22 @ 23:47)  HR: 67 (04-23-22 @ 10:23) (58 - 71)  BP: 170/71 (04-23-22 @ 10:23) (137/59 - 176/100)  RR:  (15 - 16)  SpO2:  (100% - 100%)  Wt(kg): --  General: AAO x 3, appropriate mood and affect    Ophthalmology Exam:  Visual acuity (sc): 20/20 OU  Pupils: PERRL OU, no APD  Ttono: 16 OU  Extraocular movements (EOMs): Full OU, no pain, no diplopia  Confrontational Visual Field (CVF): Full OU  Color Plates: 12/12 OU    Pen Light Exam (PLE)  External: Flat OU; full temporal pulses, no tenderness to palpation on either side  Lids/Lashes/Lacrimal Ducts: Flat OU    Sclera/Conjunctiva: W+Q OU  Cornea: Cl OU  Anterior Chamber: D+F OU    Iris: Flat OU  Lens: NS OU    Fundus Exam: dilated with 1% tropicamide and 2.5% phenylephrine  Approval obtained from primary team for dilation  Patient aware that pupils can remained dilated for at least 4-6 hours  Exam performed with 20D lens    Vitreous: wnl OU  Disc, cup/disc: sharp and pink, 0.4 OU  Macula: wnl OU  Vessels: wnl OU  Periphery: wnl OU    Imaging:    CT Head  CTA H&N:    IMPRESSION:    Noncontrast CT Head: No acute intracranal hemorrhage, mass effect, or   evidence of acute vascular territorial infarct. If clinical symptoms   persist or worsen, more sensitive evaluation with brain MRI may be   obtained, if no contraindications exist.    CTA Neck: No evidence of hemodynamically significant stenosis using   NASCET criteria. Patent vertebral arteries. No evidence of vascular   dissection.    Right submandibular sialadenitis, with 2 mm stone identified within the   expected portion of the distal submandibular duct.    CTA Head: No major vessel occlusion, proximal stenosis or aneurysm.    --- End of Report ---      DASIA GONZALEZ MD; Resident Radiology  This document has been electronically signed.  EDWARD STONE MD; Attending Radiologist  This document has been electronically signed. Apr 23 2022 12:15AM   [facility] DEPARTMENT OF OPHTHALMOLOGY - INITIAL ADULT CONSULT  -----------------------------------------------------------------------------------------------------------------  Cameron Navarrete MD  PGY 2  [phone]  -----------------------------------------------------------------------------------------------------------------    HPI: 72F with history of HTN, asthma, RA, cataracts currently admitted for right sided weakness and HA. Ophtho consulted for blurry vision. Patient reports 2 weeks of blurry vision without her glasses. Reports that blurry vision does not bother her, just hard to read. Denies any eye pain, loss of vision, flashes, floaters, double vision, eye redness, or discharge. Endorses frontal headaches, denies jaw claudication, fever, shoulder/hip pain, fevers.       PAST MEDICAL & SURGICAL HISTORY:  Angina    Asthma    H/O sciatica  (left leg)    RA (rheumatoid arthritis)    HTN (hypertension)    Fibroid, uterine    Brain cyst    Dizziness  secondary to brain cyst    Chronic back pain  (with hx/o back surgery in 7/2015 and 1/2017)    History of back surgery  (7/2015 and 1/2017)      Past Ocular History: cataracts OU  Ophthalmic Medications: none    FAMILY HISTORY:  Family history of lung cancer    Family history of heart failure    Family history of renal disease    Family history of early CAD (Sibling)      Social History: denies etoh/tobacco    MEDICATIONS  (STANDING):  aspirin enteric coated 81 milliGRAM(s) Oral daily  atorvastatin 20 milliGRAM(s) Oral at bedtime  enoxaparin Injectable 40 milliGRAM(s) SubCutaneous every 24 hours  gabapentin 300 milliGRAM(s) Oral three times a day  hydrochlorothiazide 25 milliGRAM(s) Oral daily  isosorbide   mononitrate ER Tablet (IMDUR) 30 milliGRAM(s) Oral daily  metoprolol tartrate 50 milliGRAM(s) Oral two times a day  pantoprazole    Tablet 40 milliGRAM(s) Oral before breakfast    MEDICATIONS  (PRN):  acetaminophen     Tablet .. 650 milliGRAM(s) Oral every 6 hours PRN Temp greater or equal to 38C (100.4F), Mild Pain (1 - 3)  ALBUTerol    90 MICROgram(s) HFA Inhaler 2 Puff(s) Inhalation every 6 hours PRN Shortness of Breath and/or Wheezing  melatonin 3 milliGRAM(s) Oral at bedtime PRN Insomnia  oxyCODONE    IR 5 milliGRAM(s) Oral every 4 hours PRN Moderate - Severe Pain (4 - 10)    Allergies & Intolerances:   Biaxin (Hives; Rash)    Review of Systems:  Constitutional: No fever, chills  Eyes: No blurry vision, flashes, floaters, FBS, erythema, discharge, double vision, OU  Neuro: No tremors  Cardiovascular: No chest pain, palpitations  Respiratory: No SOB, no cough  GI: No nausea, vomiting, abdominal pain  : No dysuria  Skin: no rash  Psych: no depression  Endocrine: no polyuria, polydipsia  Heme/lymph: no swelling    VITALS: T(C): 36.8 (04-23-22 @ 10:23)  T(F): 98.2 (04-23-22 @ 10:23), Max: 98.3 (04-22-22 @ 23:47)  HR: 67 (04-23-22 @ 10:23) (58 - 71)  BP: 170/71 (04-23-22 @ 10:23) (137/59 - 176/100)  RR:  (15 - 16)  SpO2:  (100% - 100%)  Wt(kg): --  General: AAO x 3, appropriate mood and affect    Ophthalmology Exam:  Visual acuity (sc): 20/20 OU  Pupils: PERRL OU, no APD  Ttono: 16 OU  Extraocular movements (EOMs): Full OU, no pain, no diplopia  Confrontational Visual Field (CVF): Full OU  Color Plates: 12/12 OU    Pen Light Exam (PLE)  External: Flat OU; full temporal pulses, no tenderness to palpation on either side  Lids/Lashes/Lacrimal Ducts: Flat OU    Sclera/Conjunctiva: W+Q OU  Cornea: Cl OU  Anterior Chamber: D+F OU    Iris: Flat OU  Lens: NS OU    Fundus Exam: dilated with 1% tropicamide and 2.5% phenylephrine  Approval obtained from primary team for dilation  Patient aware that pupils can remained dilated for at least 4-6 hours  Exam performed with 20D lens    Vitreous: wnl OD, PVD OS  Disc, cup/disc: sharp and pink, 0.4 OU  Macula: wnl OU  Vessels: wnl OU  Periphery: wnl OU    Imaging:    CT Head  CTA H&N:    IMPRESSION:    Noncontrast CT Head: No acute intracranal hemorrhage, mass effect, or   evidence of acute vascular territorial infarct. If clinical symptoms   persist or worsen, more sensitive evaluation with brain MRI may be   obtained, if no contraindications exist.    CTA Neck: No evidence of hemodynamically significant stenosis using   NASCET criteria. Patent vertebral arteries. No evidence of vascular   dissection.    Right submandibular sialadenitis, with 2 mm stone identified within the   expected portion of the distal submandibular duct.    CTA Head: No major vessel occlusion, proximal stenosis or aneurysm.    --- End of Report ---      DASIA GONZALEZ MD; Resident Radiology  This document has been electronically signed.  EDWARD STONE MD; Attending Radiologist  This document has been electronically signed. Apr 23 2022 12:15AM

## 2022-04-23 NOTE — H&P ADULT - PROBLEM SELECTOR PLAN 7
Bilateral hypodense thyroid nodules measuring up to 2.7 cm within the left thyroid lobe, demonstrating relative   stability dating back to 2013.  -per patient she has been arranging to get biopsy done as outpatient but was not able to get done while at [facility]  -patient aware to arrange f/u with her outpatient endocrinologist for biopsy and she is agreeable to do so

## 2022-04-23 NOTE — OCCUPATIONAL THERAPY INITIAL EVALUATION ADULT - ADDITIONAL COMMENTS
Patient requesting refill of Alprazolam.     Request routed to MD for approval.    Script pending, please approve and route back.    Last appt: 12/09/19  Next appt: none  Last refill date: 12/09/19      
Patient reports being admitted from [facility] where she needed some assistance with ADLs and functional mobility using a rolling walker. Prior to initial hospitalization/rehab stay, patient lived in a private home alone with 1 step to enter + 14 steps to bedroom. Patient used a rollator for functional mobility and was independent with ADLs.

## 2022-04-23 NOTE — OCCUPATIONAL THERAPY INITIAL EVALUATION ADULT - GROSSLY INTACT, SENSORY
Patient reports decreased sensation of right UE/LE compared to left UE/LE, however, remains grossly intact to light touch

## 2022-04-23 NOTE — H&P ADULT - HISTORY OF PRESENT ILLNESS
72F with PMHx asthma, HTN, RA, chronic low back pain, thyroid nodule presenting with two weeks of right sided weakness, HA, dizziness. Pt endorses chronic lower back pain of which she has had multiple surgeries for. She had right sided arm and leg weakness years ago before surgery which then improved after. Pt was admitted 2/24-3/7/2022 and noted to once again have right sided weakness and had MRI C/T/L spine without acute changes. She also had LHC which revealed luminal irregularities. Pt was discharged to [facility] rehab. For the last two weeks, pt notes episodic diffuse HA, slurred speech, room-spinning dizzy sensation and blurry vision often times in the morning. These sx sometimes last only a few minutes to hours at a time. She was told by [facility] to come yesterday to the ED to r/o stroke but she decided to stay until discharge today to then present. Her LKN was about two weeks ago but symptoms are episodic. Two weeks ago she still had some right sided weakness but she now feels as though it is worse. Her HA is currently resolved at this time. Of note she saw an opthalmologist while at [facility] for her episodic blurry vision and was told she has cataracts. She did not have a dilated eye exam. Currently she does not have blurry vision and her main complaint is the right sided weakness and HA.

## 2022-04-23 NOTE — OCCUPATIONAL THERAPY INITIAL EVALUATION ADULT - DIAGNOSIS, OT EVAL
s/p right sided weakness, s/p blurry vision, s/p headache, s/p r/o CVA; decreased functional mobility, decreased ADL performance

## 2022-04-23 NOTE — H&P ADULT - NSHPPHYSICALEXAM_GEN_ALL_CORE
PHYSICAL EXAM:  Vital Signs Last 24 Hrs  T(C): 36.8 (04-23-22 @ 02:33)  T(F): 98.2 (04-23-22 @ 02:33), Max: 98.3 (04-22-22 @ 23:47)  HR: 67 (04-23-22 @ 02:33) (64 - 71)  BP: 143/82 (04-23-22 @ 02:33)  BP(mean): --  RR: 16 (04-23-22 @ 02:33) (16 - 16)  SpO2: 100% (04-23-22 @ 02:33) (100% - 100%)  Wt(kg): --    Constitutional: NAD, awake and alert, well developed  EYES: EOMI, conjunctiva clear  ENT:  Normal Hearing, no tonsillar exudates   Neck: Soft and supple , no thyromegaly   Respiratory: Breath sounds are clear bilaterally, No wheezing, rales or rhonchi, no tachypnea, no accessory muscle use  Cardiovascular: S1 and S2, regular rate and rhythm, no Murmurs, gallops or rubs, no JVD, no leg edema  Gastrointestinal: Bowel Sounds present, soft, nontender, nondistended, no guarding, no rebound  Extremities: No cyanosis or clubbing; warm to touch  Vascular: 2+ peripheral pulses lower ex  Neurological:   CN II-XII intact bilaterally,   Decreased sensation to light touch on RLE compared to LLE  Strength 4/5 R , R arm extension, R arm flexion, 4/5 R hip flexion, 5/5 plantar and dorsiflexion  Left side 5/5 strength throughout  Pupils are equally reactive to light and symmetrical in size.   Skin: No rashes, no ulcerations PHYSICAL EXAM:  Vital Signs Last 24 Hrs  T(C): 36.8 (04-23-22 @ 02:33)  T(F): 98.2 (04-23-22 @ 02:33), Max: 98.3 (04-22-22 @ 23:47)  HR: 67 (04-23-22 @ 02:33) (64 - 71)  BP: 143/82 (04-23-22 @ 02:33)  BP(mean): --  RR: 16 (04-23-22 @ 02:33) (16 - 16)  SpO2: 100% (04-23-22 @ 02:33) (100% - 100%)  Wt(kg): --    Constitutional: NAD, awake and alert, well developed  EYES: EOMI, conjunctiva clear  ENT:  Normal Hearing, no tonsillar exudates. +right submandibular swelling and TTP  Neck: Soft and supple , no thyromegaly   Respiratory: Breath sounds are clear bilaterally, No wheezing, rales or rhonchi, no tachypnea, no accessory muscle use  Cardiovascular: S1 and S2, regular rate and rhythm, no Murmurs, gallops or rubs, no JVD, no leg edema  Gastrointestinal: Bowel Sounds present, soft, nontender, nondistended, no guarding, no rebound  Extremities: No cyanosis or clubbing; warm to touch  Vascular: 2+ peripheral pulses lower ex  Neurological:   CN II-XII intact bilaterally,   Decreased sensation to light touch on RLE compared to LLE  Strength 4/5 R , R arm extension, R arm flexion, 4/5 R hip flexion, 5/5 plantar and dorsiflexion  Left side 5/5 strength throughout  Pupils are equally reactive to light and symmetrical in size.   Skin: No rashes, no ulcerations

## 2022-04-23 NOTE — OCCUPATIONAL THERAPY INITIAL EVALUATION ADULT - PHYSICAL ASSIST/NONPHYSICAL ASSIST:DRESS UPPER BODY, OT EVAL
ACTIVITY: Rest and take it easy for the first 24 hours.  A responsible adult is recommended to remain with you during that time.  It is normal to feel sleepy.  We encourage you to not do anything that requires balance, judgment or coordination.    MILD FLU-LIKE SYMPTOMS ARE NORMAL. YOU MAY EXPERIENCE GENERALIZED MUSCLE ACHES, THROAT IRRITATION, HEADACHE AND/OR SOME NAUSEA.    FOR 24 HOURS DO NOT:  Drive, operate machinery or run household appliances.  Drink beer or alcoholic beverages.   Make important decisions or sign legal documents.    SPECIAL INSTRUCTIONS: *Return to ER for SOB, serious bleeding or dehydration.  keep upright for 24 to 48 hours. Notify MD for any problems**    DIET: To avoid nausea, slowly advance diet as tolerated, avoiding spicy or greasy foods for the first day.  Add more substantial food to your diet according to your physician's instructions.  Babies can be fed formula or breast milk as soon as they are hungry.  INCREASE FLUIDS AND FIBER TO AVOID CONSTIPATION.    SURGICAL DRESSING/BATHING:*change drip pad as needed, no nose blowing for 72 hours**    FOLLOW-UP APPOINTMENT:  A follow-up appointment should be arranged with your doctor in *2 weeks**; call to schedule.    You should CALL YOUR PHYSICIAN if you develop:  Fever greater than 101 degrees F.  Pain not relieved by medication, or persistent nausea or vomiting.  Excessive bleeding (blood soaking through dressing) or unexpected drainage from the wound.  Extreme redness or swelling around the incision site, drainage of pus or foul smelling drainage.  Inability to urinate or empty your bladder within 8 hours.  Problems with breathing or chest pain.    You should call 911 if you develop problems with breathing or chest pain.  If you are unable to contact your doctor or surgical center, you should go to the nearest emergency room or urgent care center.  Physician's telephone #: **[phone]*    If any questions arise, call your doctor.  If  verbal cues/nonverbal cues (demo/gestures)/1 person assist your doctor is not available, please feel free to call the Surgical Center at [phone].  The Center is open Monday through Friday from 7AM to 7PM.  You can also call the HEALTH HOTLINE open 24 hours/day, 7 days/week and speak to a nurse at [phone], or toll free at [phone].    A registered nurse may call you a few days after your surgery to see how you are doing after your procedure.    MEDICATIONS: Resume taking daily medication.  Take prescribed pain medication with food.  If no medication is prescribed, you may take non-aspirin pain medication if needed.  PAIN MEDICATION CAN BE VERY CONSTIPATING.  Take a stool softener or laxative such as senokot, pericolace, or milk of magnesia if needed.    Prescription given for **Lortab*.  Last pain medication given at *_________**.    If your physician has prescribed pain medication that includes Acetaminophen (Tylenol), do not take additional Acetaminophen (Tylenol) while taking the prescribed medication.    Depression / Suicide Risk    As you are discharged from this [facility] facility, it is important to learn how to keep safe from harming yourself.    Recognize the warning signs:  · Abrupt changes in personality, positive or negative- including increase in energy   · Giving away possessions  · Change in eating patterns- significant weight changes-  positive or negative  · Change in sleeping patterns- unable to sleep or sleeping all the time   · Unwillingness or inability to communicate  · Depression  · Unusual sadness, discouragement and loneliness  · Talk of wanting to die  · Neglect of personal appearance   · Rebelliousness- reckless behavior  · Withdrawal from people/activities they love  · Confusion- inability to concentrate     If you or a loved one observes any of these behaviors or has concerns about self-harm, here's what you can do:  · Talk about it- your feelings and reasons for harming yourself  · Remove any means that you might use to  hurt yourself (examples: pills, rope, extension cords, firearm)  · Get professional help from the community (Mental Health, Substance Abuse, psychological counseling)  · Do not be alone:Call your Safe Contact- someone whom you trust who will be there for you.  · Call your local CRISIS HOTLINE 971-8661 or 744-205-3018  · Call your local Children's Mobile Crisis Response Team [place] (401) 821-4864 or www.YellowSchedule  · Call the toll free National Suicide Prevention Hotlines   · National Suicide Prevention Lifeline 906-105-NBSW (1768)  · National Hope Line Network 800-SUICIDE (781-7364)

## 2022-04-23 NOTE — H&P ADULT - NSHPREVIEWOFSYSTEMS_GEN_ALL_CORE
ROS:    Constitutional: [ ] fevers [ ] chills   HEENT:  [ ] postnasal drip [ ] nasal congestion  CV: [ ] chest pain [ ] orthopnea [ ] palpitations  Resp: [ ] cough [ ] shortness of breath [ ] dyspnea [ ] wheezing   GI: [ ] nausea [ ] vomiting [ ] diarrhea  [ ] abd pain   : [ ] dysuria [ ] increased urinary frequency  Musculoskeletal: [x ] back pain [ ] myalgias [ ] arthralgias   Skin: [ ] rash [ ] itch  Neurological: [ x] headache [ x] dizziness [ ] syncope [x ] weakness [x ] numbness  Endocrine: [ ] diabetes [x ] thyroid problem  Hematologic/Lymphatic: [ ] anemia [ ] bleeding problem  [x ] All other systems negative

## 2022-04-23 NOTE — H&P ADULT - PROBLEM SELECTOR PLAN 2
No hx migraines. CTH neg for acute pathology  -tylenol PRN  -HA improved right now, if worsens can do 1st line -  reglan 10 q6h, toradol 30 iv q8h, magnesium sulfate IV 1g, tylenol,  2nd line: - solumedrol 250mg IV x1 per neuro recs  -MRI brain pending

## 2022-04-23 NOTE — OCCUPATIONAL THERAPY INITIAL EVALUATION ADULT - PERTINENT HX OF CURRENT PROBLEM, REHAB EVAL
72 year old female with history of asthma, HTN, RA, chronic low back pain, thyroid nodule presenting with two weeks of right sided weakness, HA, dizziness. CT brain negative for acute CVA. Admitted for further management.

## 2022-04-23 NOTE — H&P ADULT - PROBLEM SELECTOR PLAN 3
Episodic blurry vision when having headaches and currently without blurry vision. Per patient was seen by ophtho at [facility] but without dilated eye exam. Was told she has cataracts  -ophthalmology consult in AM - will need to determine if MR orbits are necessary

## 2022-04-23 NOTE — H&P ADULT - PROBLEM SELECTOR PLAN 1
Progressive R sensorimotor hemiparesis + headache 2/2 L brain dysfunction 2/2 possibly structural vs ischemic etiology. ?migraine related   May be related to chronic radiculopathy though per patient has worsened with episodic HAs over last two weeks  -appreciate neuro recs  -CTA without LVO, no hemorrhage  -MRI brain with and without contrast ordered  -possible MRI orbits pending ophtho eval  -ASA 81mg qd  -PT/OT/S&S eval  -passed dysphagia screen  -puree diet until S&S eval

## 2022-04-23 NOTE — CONSULT NOTE ADULT - ASSESSMENT
INCOMPLETE NOTE, FINAL RECS TO FOLLOW    Assessment and Recommendations:  72y female w/ pmhx/ochx of HTN, asthma, RA, cataracts consulted for blurry vision. On exam, patient's visual acuity was 20/20 in the right eye, 20/20 in the left eye. There was no APD. IOP were measured to be 16 right eye, 16 left eye. Extraocular movements, confrontational visual fields, and color plates were full in both eyes. Anterior segment exam with penlight revealed cataracts. Posterior segment exam with 20D lens after dilation revealed ***.   # Blurry vision  - Visual acuity 20/20 with a pair of reading glasses  - GCA ROS negative; full temporal pulses  - History of cataracts in both eyes  - Patient endorses improvement in vision with examiner's glasses; patient currently without her glasses  - Posterior segment exam shows ***  - Findings and plan discussed with patient and primary team.    Outpatient follow-up: Patient should follow-up with his/her ophthalmologist or with [facility] Department of Ophthalmology at the address below     [address] [place] [address]. [address]  [place] [address]  [phone]     INCOMPLETE NOTE, FINAL RECS TO FOLLOW    Assessment and Recommendations:  72y female w/ pmhx/ochx of HTN, asthma, RA, cataracts consulted for blurry vision. On exam, patient's visual acuity was 20/20 in the right eye, 20/20 in the left eye. There was no APD. IOP were measured to be 16 right eye, 16 left eye. Extraocular movements, confrontational visual fields, and color plates were full in both eyes. Anterior segment exam with penlight revealed cataracts. Posterior segment exam with 20D lens after dilation revealed ***.   # Blurry vision  - Visual acuity 20/20 with a pair of reading glasses  - GCA ROS negative; full temporal pulses  - History of cataracts in both eyes  - Patient endorses improvement in vision with examiner's glasses; patient currently without her glasses  - Posterior segment exam shows ***  - CT Head, CTA H&N unremarkable  - Findings and plan discussed with patient and primary team.    Outpatient follow-up: Patient should follow-up with his/her ophthalmologist or with [facility] Department of Ophthalmology at the address below     [address]  [place] [address]  [phone]     Assessment and Recommendations:  72y female w/ pmhx/ochx of HTN, asthma, RA, cataracts consulted for blurry vision. On exam, patient's visual acuity was 20/20 in the right eye, 20/20 in the left eye. There was no APD. IOP were measured to be 16 right eye, 16 left eye. Extraocular movements, confrontational visual fields, and color plates were full in both eyes. Anterior segment exam with penlight revealed cataracts. Posterior segment exam with 20D lens after dilation revealed no acute abnormalities.   # Blurry vision  - Visual acuity 20/20 with a pair of reading glasses  - GCA ROS negative; full temporal pulses  - History of cataracts in both eyes  - Patient endorses improvement in vision with examiner's glasses; patient currently without her glasses  - Posterior segment exam shows no acute abnormalities  - CT Head, CTA H&N unremarkable  - Findings and plan discussed with patient and primary team.    Outpatient follow-up: Patient should follow-up with his/her ophthalmologist or with [facility] Department of Ophthalmology at the address below     [address]  [place] [address]  [phone]

## 2022-04-23 NOTE — PHYSICAL THERAPY INITIAL EVALUATION ADULT - PERTINENT HX OF CURRENT PROBLEM, REHAB EVAL
Pt is a 72 Female presenting to [facility] with two weeks of right sided weakness, HA, dizziness. PMHx asthma, HTN, RA, chronic low back pain, thyroid nodule.

## 2022-04-23 NOTE — H&P ADULT - ASSESSMENT
72F with PMHx asthma, HTN, RA, chronic low back pain, thyroid nodule presenting with two weeks of right sided weakness, HA, dizziness.

## 2022-04-23 NOTE — PHYSICAL THERAPY INITIAL EVALUATION ADULT - STANDING BALANCE: STATIC
Is This A New Presentation, Or A Follow-Up?: Growths
What Type Of Note Output Would You Prefer (Optional)?: Bullet Format
Has Your Skin Lesion Been Treated?: not been treated
fair plus

## 2022-04-23 NOTE — PHYSICAL THERAPY INITIAL EVALUATION ADULT - ADDITIONAL COMMENTS
none
Pt lives in a house alone with 1 step to enter and a full flight within. Pt uses a rollator to ambulate and was independent with ADLs prior. Pt recent discharge from [facility] re[facility] and saw improvement in strength and overall functional mobility.     Pt left sitting at edge of bed, in NAD, lines/tubes intact, call bell within reach, RN aware.

## 2022-04-23 NOTE — H&P ADULT - PROBLEM SELECTOR PLAN 4
Warm compresses, massaging, lemon drops if available +right submandibular swelling and TTP likely from sialoadenitis found on CT  -Warm compresses, instructed on massaging, encouraged hydration, lemon drops if available

## 2022-04-24 LAB
ANION GAP SERPL CALC-SCNC: 13 MMOL/L — SIGNIFICANT CHANGE UP (ref 7–14)
BUN SERPL-MCNC: 15 MG/DL — SIGNIFICANT CHANGE UP (ref 7–23)
CALCIUM SERPL-MCNC: 9.7 MG/DL — SIGNIFICANT CHANGE UP (ref 8.4–10.5)
CHLORIDE SERPL-SCNC: 100 MMOL/L — SIGNIFICANT CHANGE UP (ref 98–107)
CO2 SERPL-SCNC: 23 MMOL/L — SIGNIFICANT CHANGE UP (ref 22–31)
CREAT SERPL-MCNC: 0.66 MG/DL — SIGNIFICANT CHANGE UP (ref 0.5–1.3)
CULTURE RESULTS: SIGNIFICANT CHANGE UP
EGFR: 94 ML/MIN/1.73M2 — SIGNIFICANT CHANGE UP
GLUCOSE SERPL-MCNC: 69 MG/DL — LOW (ref 70–99)
HCT VFR BLD CALC: 38.9 % — SIGNIFICANT CHANGE UP (ref 34.5–45)
HGB BLD-MCNC: 12.5 G/DL — SIGNIFICANT CHANGE UP (ref 11.5–15.5)
MAGNESIUM SERPL-MCNC: 1.8 MG/DL — SIGNIFICANT CHANGE UP (ref 1.6–2.6)
MCHC RBC-ENTMCNC: 28.1 PG — SIGNIFICANT CHANGE UP (ref 27–34)
MCHC RBC-ENTMCNC: 32.1 GM/DL — SIGNIFICANT CHANGE UP (ref 32–36)
MCV RBC AUTO: 87.4 FL — SIGNIFICANT CHANGE UP (ref 80–100)
NRBC # BLD: 0 /100 WBCS — SIGNIFICANT CHANGE UP
NRBC # FLD: 0 K/UL — SIGNIFICANT CHANGE UP
PHOSPHATE SERPL-MCNC: 4.4 MG/DL — SIGNIFICANT CHANGE UP (ref 2.5–4.5)
PLATELET # BLD AUTO: 250 K/UL — SIGNIFICANT CHANGE UP (ref 150–400)
POTASSIUM SERPL-MCNC: 3.7 MMOL/L — SIGNIFICANT CHANGE UP (ref 3.5–5.3)
POTASSIUM SERPL-SCNC: 3.7 MMOL/L — SIGNIFICANT CHANGE UP (ref 3.5–5.3)
RBC # BLD: 4.45 M/UL — SIGNIFICANT CHANGE UP (ref 3.8–5.2)
RBC # FLD: 14.8 % — HIGH (ref 10.3–14.5)
SODIUM SERPL-SCNC: 136 MMOL/L — SIGNIFICANT CHANGE UP (ref 135–145)
SPECIMEN SOURCE: SIGNIFICANT CHANGE UP
WBC # BLD: 5.38 K/UL — SIGNIFICANT CHANGE UP (ref 3.8–10.5)
WBC # FLD AUTO: 5.38 K/UL — SIGNIFICANT CHANGE UP (ref 3.8–10.5)

## 2022-04-24 RX ADMIN — Medication 1 TABLET(S): at 13:16

## 2022-04-24 RX ADMIN — Medication 25 MILLIGRAM(S): at 05:48

## 2022-04-24 RX ADMIN — ATORVASTATIN CALCIUM 20 MILLIGRAM(S): 80 TABLET, FILM COATED ORAL at 21:35

## 2022-04-24 RX ADMIN — ISOSORBIDE MONONITRATE 30 MILLIGRAM(S): 60 TABLET, EXTENDED RELEASE ORAL at 13:16

## 2022-04-24 RX ADMIN — PANTOPRAZOLE SODIUM 40 MILLIGRAM(S): 20 TABLET, DELAYED RELEASE ORAL at 05:48

## 2022-04-24 RX ADMIN — Medication 50 MILLIGRAM(S): at 17:24

## 2022-04-24 RX ADMIN — Medication 1 TABLET(S): at 23:58

## 2022-04-24 RX ADMIN — GABAPENTIN 300 MILLIGRAM(S): 400 CAPSULE ORAL at 13:16

## 2022-04-24 RX ADMIN — OXYCODONE HYDROCHLORIDE 5 MILLIGRAM(S): 5 TABLET ORAL at 05:52

## 2022-04-24 RX ADMIN — OXYCODONE HYDROCHLORIDE 5 MILLIGRAM(S): 5 TABLET ORAL at 13:31

## 2022-04-24 RX ADMIN — GABAPENTIN 300 MILLIGRAM(S): 400 CAPSULE ORAL at 21:34

## 2022-04-24 RX ADMIN — GABAPENTIN 300 MILLIGRAM(S): 400 CAPSULE ORAL at 05:48

## 2022-04-24 RX ADMIN — Medication 50 MILLIGRAM(S): at 05:48

## 2022-04-24 RX ADMIN — ENOXAPARIN SODIUM 40 MILLIGRAM(S): 100 INJECTION SUBCUTANEOUS at 05:48

## 2022-04-24 RX ADMIN — Medication 81 MILLIGRAM(S): at 13:17

## 2022-04-24 NOTE — PATIENT PROFILE ADULT - FALL HARM RISK - HARM RISK INTERVENTIONS
Assistance with ambulation/Assistance OOB with selected safe patient handling equipment/Communicate Risk of Fall with Harm to all staff/Discuss with provider need for PT consult/Monitor gait and stability/Provide patient with walking aids - walker, cane, crutches/Reinforce activity limits and safety measures with patient and family/Sit up slowly, dangle for a short time, stand at bedside before walking/Tailored Fall Risk Interventions/Visual Cue: Yellow wristband and red socks/Bed in lowest position, wheels locked, appropriate side rails in place/Call bell, personal items and telephone in reach/Instruct patient to call for assistance before getting out of bed or chair/Non-slip footwear when patient is out of bed/[place] to call system/Physically safe environment - no spills, clutter or unnecessary equipment/Purposeful Proactive Rounding/Room/bathroom lighting operational, light cord in reach

## 2022-04-24 NOTE — PROGRESS NOTE ADULT - PROBLEM SELECTOR PLAN 4
+right submandibular swelling and TTP likely from sialoadenitis found on CT  -Warm compresses, instructed on massaging, encouraged hydration, lemon drops if available

## 2022-04-25 LAB
ANION GAP SERPL CALC-SCNC: 11 MMOL/L — SIGNIFICANT CHANGE UP (ref 7–14)
BUN SERPL-MCNC: 14 MG/DL — SIGNIFICANT CHANGE UP (ref 7–23)
CALCIUM SERPL-MCNC: 9.3 MG/DL — SIGNIFICANT CHANGE UP (ref 8.4–10.5)
CHLORIDE SERPL-SCNC: 102 MMOL/L — SIGNIFICANT CHANGE UP (ref 98–107)
CO2 SERPL-SCNC: 24 MMOL/L — SIGNIFICANT CHANGE UP (ref 22–31)
CREAT SERPL-MCNC: 0.7 MG/DL — SIGNIFICANT CHANGE UP (ref 0.5–1.3)
EGFR: 92 ML/MIN/1.73M2 — SIGNIFICANT CHANGE UP
GLUCOSE SERPL-MCNC: 70 MG/DL — SIGNIFICANT CHANGE UP (ref 70–99)
HCT VFR BLD CALC: 38.3 % — SIGNIFICANT CHANGE UP (ref 34.5–45)
HGB BLD-MCNC: 12.2 G/DL — SIGNIFICANT CHANGE UP (ref 11.5–15.5)
MAGNESIUM SERPL-MCNC: 1.8 MG/DL — SIGNIFICANT CHANGE UP (ref 1.6–2.6)
MCHC RBC-ENTMCNC: 28.3 PG — SIGNIFICANT CHANGE UP (ref 27–34)
MCHC RBC-ENTMCNC: 31.9 GM/DL — LOW (ref 32–36)
MCV RBC AUTO: 88.9 FL — SIGNIFICANT CHANGE UP (ref 80–100)
NRBC # BLD: 0 /100 WBCS — SIGNIFICANT CHANGE UP
NRBC # FLD: 0 K/UL — SIGNIFICANT CHANGE UP
PHOSPHATE SERPL-MCNC: 3.5 MG/DL — SIGNIFICANT CHANGE UP (ref 2.5–4.5)
PLATELET # BLD AUTO: 249 K/UL — SIGNIFICANT CHANGE UP (ref 150–400)
POTASSIUM SERPL-MCNC: 3.8 MMOL/L — SIGNIFICANT CHANGE UP (ref 3.5–5.3)
POTASSIUM SERPL-SCNC: 3.8 MMOL/L — SIGNIFICANT CHANGE UP (ref 3.5–5.3)
RBC # BLD: 4.31 M/UL — SIGNIFICANT CHANGE UP (ref 3.8–5.2)
RBC # FLD: 14.7 % — HIGH (ref 10.3–14.5)
SODIUM SERPL-SCNC: 137 MMOL/L — SIGNIFICANT CHANGE UP (ref 135–145)
WBC # BLD: 5 K/UL — SIGNIFICANT CHANGE UP (ref 3.8–10.5)
WBC # FLD AUTO: 5 K/UL — SIGNIFICANT CHANGE UP (ref 3.8–10.5)

## 2022-04-25 PROCEDURE — 93306 TTE W/DOPPLER COMPLETE: CPT | Mod: 26

## 2022-04-25 RX ADMIN — Medication 50 MILLIGRAM(S): at 05:58

## 2022-04-25 RX ADMIN — ISOSORBIDE MONONITRATE 30 MILLIGRAM(S): 60 TABLET, EXTENDED RELEASE ORAL at 11:33

## 2022-04-25 RX ADMIN — OXYCODONE HYDROCHLORIDE 5 MILLIGRAM(S): 5 TABLET ORAL at 14:38

## 2022-04-25 RX ADMIN — Medication 1 TABLET(S): at 05:59

## 2022-04-25 RX ADMIN — GABAPENTIN 300 MILLIGRAM(S): 400 CAPSULE ORAL at 14:29

## 2022-04-25 RX ADMIN — OXYCODONE HYDROCHLORIDE 5 MILLIGRAM(S): 5 TABLET ORAL at 05:56

## 2022-04-25 RX ADMIN — Medication 81 MILLIGRAM(S): at 11:33

## 2022-04-25 RX ADMIN — Medication 1 TABLET(S): at 21:21

## 2022-04-25 RX ADMIN — Medication 1 TABLET(S): at 14:29

## 2022-04-25 RX ADMIN — ATORVASTATIN CALCIUM 20 MILLIGRAM(S): 80 TABLET, FILM COATED ORAL at 21:21

## 2022-04-25 RX ADMIN — GABAPENTIN 300 MILLIGRAM(S): 400 CAPSULE ORAL at 05:57

## 2022-04-25 RX ADMIN — OXYCODONE HYDROCHLORIDE 5 MILLIGRAM(S): 5 TABLET ORAL at 15:38

## 2022-04-25 RX ADMIN — GABAPENTIN 300 MILLIGRAM(S): 400 CAPSULE ORAL at 21:21

## 2022-04-25 RX ADMIN — ENOXAPARIN SODIUM 40 MILLIGRAM(S): 100 INJECTION SUBCUTANEOUS at 06:00

## 2022-04-25 RX ADMIN — Medication 3 MILLIGRAM(S): at 22:45

## 2022-04-25 RX ADMIN — Medication 50 MILLIGRAM(S): at 18:16

## 2022-04-25 RX ADMIN — PANTOPRAZOLE SODIUM 40 MILLIGRAM(S): 20 TABLET, DELAYED RELEASE ORAL at 06:28

## 2022-04-25 RX ADMIN — Medication 25 MILLIGRAM(S): at 05:57

## 2022-04-25 RX ADMIN — OXYCODONE HYDROCHLORIDE 5 MILLIGRAM(S): 5 TABLET ORAL at 06:56

## 2022-04-25 NOTE — SWALLOW BEDSIDE ASSESSMENT ADULT - COMMENTS
Per Neurology Note 4/22/22: "Patient is a 72F with pmh of HTN, asthma, RA, chronic low back pain, R. thyroid nodule who presented to ED and neurology was consulted for Headache and R sided weakness. Discharged from [facility] today. Presents with progressive headache and R sided weakness over the past 2 weeks. Was recommended to come to ED while at [facility] but refused. Neuro exam with R sided weakness and sensory loss ot LT/temp. Labs grossly unremarkable. NIHSS 2. Was   Impression: Progressive R sensorimotor hemiparesis + headache 2/2 L brain dysfunction 2/2 possibly structural vs ischemic etiology. ?migraine related"    Patient is familiar  to this department as the patient was seen during previous admissions on 2/24/22 for a swallow evaluation (see consult) and 2/25/22 for a cinesophagram with recommendation of regular solids with thin liquids. refer to full report  for further details.     Patient received upright at edge of bed, awake, alert and communicative with ability to follow multi-step commands and participate in conversation with SLP. patient denies symptoms of dysphagia.

## 2022-04-26 LAB
ANION GAP SERPL CALC-SCNC: 11 MMOL/L — SIGNIFICANT CHANGE UP (ref 7–14)
BUN SERPL-MCNC: 15 MG/DL — SIGNIFICANT CHANGE UP (ref 7–23)
CALCIUM SERPL-MCNC: 9.6 MG/DL — SIGNIFICANT CHANGE UP (ref 8.4–10.5)
CHLORIDE SERPL-SCNC: 103 MMOL/L — SIGNIFICANT CHANGE UP (ref 98–107)
CO2 SERPL-SCNC: 25 MMOL/L — SIGNIFICANT CHANGE UP (ref 22–31)
CREAT SERPL-MCNC: 0.67 MG/DL — SIGNIFICANT CHANGE UP (ref 0.5–1.3)
EGFR: 93 ML/MIN/1.73M2 — SIGNIFICANT CHANGE UP
GLUCOSE SERPL-MCNC: 83 MG/DL — SIGNIFICANT CHANGE UP (ref 70–99)
HCT VFR BLD CALC: 39.1 % — SIGNIFICANT CHANGE UP (ref 34.5–45)
HGB BLD-MCNC: 12.7 G/DL — SIGNIFICANT CHANGE UP (ref 11.5–15.5)
MAGNESIUM SERPL-MCNC: 1.7 MG/DL — SIGNIFICANT CHANGE UP (ref 1.6–2.6)
MCHC RBC-ENTMCNC: 28.6 PG — SIGNIFICANT CHANGE UP (ref 27–34)
MCHC RBC-ENTMCNC: 32.5 GM/DL — SIGNIFICANT CHANGE UP (ref 32–36)
MCV RBC AUTO: 88.1 FL — SIGNIFICANT CHANGE UP (ref 80–100)
NRBC # BLD: 0 /100 WBCS — SIGNIFICANT CHANGE UP
NRBC # FLD: 0 K/UL — SIGNIFICANT CHANGE UP
PHOSPHATE SERPL-MCNC: 3.8 MG/DL — SIGNIFICANT CHANGE UP (ref 2.5–4.5)
PLATELET # BLD AUTO: 256 K/UL — SIGNIFICANT CHANGE UP (ref 150–400)
POTASSIUM SERPL-MCNC: 3.8 MMOL/L — SIGNIFICANT CHANGE UP (ref 3.5–5.3)
POTASSIUM SERPL-SCNC: 3.8 MMOL/L — SIGNIFICANT CHANGE UP (ref 3.5–5.3)
RBC # BLD: 4.44 M/UL — SIGNIFICANT CHANGE UP (ref 3.8–5.2)
RBC # FLD: 14.4 % — SIGNIFICANT CHANGE UP (ref 10.3–14.5)
SODIUM SERPL-SCNC: 139 MMOL/L — SIGNIFICANT CHANGE UP (ref 135–145)
WBC # BLD: 5.49 K/UL — SIGNIFICANT CHANGE UP (ref 3.8–10.5)
WBC # FLD AUTO: 5.49 K/UL — SIGNIFICANT CHANGE UP (ref 3.8–10.5)

## 2022-04-26 PROCEDURE — 93010 ELECTROCARDIOGRAM REPORT: CPT

## 2022-04-26 RX ORDER — PENICILLIN V POTASSIUM 250 MG
500 TABLET ORAL EVERY 8 HOURS
Refills: 0 | Status: COMPLETED | OUTPATIENT
Start: 2022-04-26 | End: 2022-04-29

## 2022-04-26 RX ADMIN — ISOSORBIDE MONONITRATE 30 MILLIGRAM(S): 60 TABLET, EXTENDED RELEASE ORAL at 12:11

## 2022-04-26 RX ADMIN — GABAPENTIN 300 MILLIGRAM(S): 400 CAPSULE ORAL at 05:31

## 2022-04-26 RX ADMIN — Medication 81 MILLIGRAM(S): at 12:11

## 2022-04-26 RX ADMIN — OXYCODONE HYDROCHLORIDE 5 MILLIGRAM(S): 5 TABLET ORAL at 15:21

## 2022-04-26 RX ADMIN — Medication 500 MILLIGRAM(S): at 21:47

## 2022-04-26 RX ADMIN — OXYCODONE HYDROCHLORIDE 5 MILLIGRAM(S): 5 TABLET ORAL at 02:24

## 2022-04-26 RX ADMIN — Medication 50 MILLIGRAM(S): at 17:44

## 2022-04-26 RX ADMIN — Medication 50 MILLIGRAM(S): at 05:31

## 2022-04-26 RX ADMIN — ENOXAPARIN SODIUM 40 MILLIGRAM(S): 100 INJECTION SUBCUTANEOUS at 05:31

## 2022-04-26 RX ADMIN — GABAPENTIN 300 MILLIGRAM(S): 400 CAPSULE ORAL at 21:47

## 2022-04-26 RX ADMIN — Medication 1 TABLET(S): at 05:31

## 2022-04-26 RX ADMIN — OXYCODONE HYDROCHLORIDE 5 MILLIGRAM(S): 5 TABLET ORAL at 14:01

## 2022-04-26 RX ADMIN — OXYCODONE HYDROCHLORIDE 5 MILLIGRAM(S): 5 TABLET ORAL at 21:47

## 2022-04-26 RX ADMIN — OXYCODONE HYDROCHLORIDE 5 MILLIGRAM(S): 5 TABLET ORAL at 22:17

## 2022-04-26 RX ADMIN — OXYCODONE HYDROCHLORIDE 5 MILLIGRAM(S): 5 TABLET ORAL at 02:54

## 2022-04-26 RX ADMIN — Medication 25 MILLIGRAM(S): at 05:31

## 2022-04-26 RX ADMIN — Medication 500 MILLIGRAM(S): at 13:55

## 2022-04-26 RX ADMIN — GABAPENTIN 300 MILLIGRAM(S): 400 CAPSULE ORAL at 13:55

## 2022-04-26 RX ADMIN — ATORVASTATIN CALCIUM 20 MILLIGRAM(S): 80 TABLET, FILM COATED ORAL at 21:47

## 2022-04-26 NOTE — PROVIDER CONTACT NOTE (OTHER) - ASSESSMENT
Pt Aox4, bp 146/68. No SOB, dizziness, weakness or numbness. no complaints of any other symptoms besides palpitations.
Patient passed dysphagia screen with no issues.

## 2022-04-27 LAB
ANION GAP SERPL CALC-SCNC: 13 MMOL/L — SIGNIFICANT CHANGE UP (ref 7–14)
BUN SERPL-MCNC: 16 MG/DL — SIGNIFICANT CHANGE UP (ref 7–23)
CALCIUM SERPL-MCNC: 9.5 MG/DL — SIGNIFICANT CHANGE UP (ref 8.4–10.5)
CHLORIDE SERPL-SCNC: 100 MMOL/L — SIGNIFICANT CHANGE UP (ref 98–107)
CO2 SERPL-SCNC: 23 MMOL/L — SIGNIFICANT CHANGE UP (ref 22–31)
CREAT SERPL-MCNC: 0.79 MG/DL — SIGNIFICANT CHANGE UP (ref 0.5–1.3)
EGFR: 80 ML/MIN/1.73M2 — SIGNIFICANT CHANGE UP
GLUCOSE SERPL-MCNC: 99 MG/DL — SIGNIFICANT CHANGE UP (ref 70–99)
HCT VFR BLD CALC: 38.7 % — SIGNIFICANT CHANGE UP (ref 34.5–45)
HGB BLD-MCNC: 12.6 G/DL — SIGNIFICANT CHANGE UP (ref 11.5–15.5)
MAGNESIUM SERPL-MCNC: 1.8 MG/DL — SIGNIFICANT CHANGE UP (ref 1.6–2.6)
MCHC RBC-ENTMCNC: 28.4 PG — SIGNIFICANT CHANGE UP (ref 27–34)
MCHC RBC-ENTMCNC: 32.6 GM/DL — SIGNIFICANT CHANGE UP (ref 32–36)
MCV RBC AUTO: 87.2 FL — SIGNIFICANT CHANGE UP (ref 80–100)
NRBC # BLD: 0 /100 WBCS — SIGNIFICANT CHANGE UP
NRBC # FLD: 0 K/UL — SIGNIFICANT CHANGE UP
PHOSPHATE SERPL-MCNC: 4.3 MG/DL — SIGNIFICANT CHANGE UP (ref 2.5–4.5)
PLATELET # BLD AUTO: 255 K/UL — SIGNIFICANT CHANGE UP (ref 150–400)
POTASSIUM SERPL-MCNC: 3.5 MMOL/L — SIGNIFICANT CHANGE UP (ref 3.5–5.3)
POTASSIUM SERPL-SCNC: 3.5 MMOL/L — SIGNIFICANT CHANGE UP (ref 3.5–5.3)
RBC # BLD: 4.44 M/UL — SIGNIFICANT CHANGE UP (ref 3.8–5.2)
RBC # FLD: 14.5 % — SIGNIFICANT CHANGE UP (ref 10.3–14.5)
SODIUM SERPL-SCNC: 136 MMOL/L — SIGNIFICANT CHANGE UP (ref 135–145)
WBC # BLD: 5.52 K/UL — SIGNIFICANT CHANGE UP (ref 3.8–10.5)
WBC # FLD AUTO: 5.52 K/UL — SIGNIFICANT CHANGE UP (ref 3.8–10.5)

## 2022-04-27 RX ADMIN — PANTOPRAZOLE SODIUM 40 MILLIGRAM(S): 20 TABLET, DELAYED RELEASE ORAL at 05:55

## 2022-04-27 RX ADMIN — Medication 500 MILLIGRAM(S): at 22:36

## 2022-04-27 RX ADMIN — OXYCODONE HYDROCHLORIDE 5 MILLIGRAM(S): 5 TABLET ORAL at 09:21

## 2022-04-27 RX ADMIN — GABAPENTIN 300 MILLIGRAM(S): 400 CAPSULE ORAL at 13:33

## 2022-04-27 RX ADMIN — Medication 500 MILLIGRAM(S): at 05:54

## 2022-04-27 RX ADMIN — Medication 50 MILLIGRAM(S): at 18:02

## 2022-04-27 RX ADMIN — Medication 25 MILLIGRAM(S): at 05:54

## 2022-04-27 RX ADMIN — ATORVASTATIN CALCIUM 20 MILLIGRAM(S): 80 TABLET, FILM COATED ORAL at 22:38

## 2022-04-27 RX ADMIN — Medication 81 MILLIGRAM(S): at 13:33

## 2022-04-27 RX ADMIN — Medication 50 MILLIGRAM(S): at 05:54

## 2022-04-27 RX ADMIN — GABAPENTIN 300 MILLIGRAM(S): 400 CAPSULE ORAL at 05:54

## 2022-04-27 RX ADMIN — ENOXAPARIN SODIUM 40 MILLIGRAM(S): 100 INJECTION SUBCUTANEOUS at 05:54

## 2022-04-27 RX ADMIN — OXYCODONE HYDROCHLORIDE 5 MILLIGRAM(S): 5 TABLET ORAL at 22:36

## 2022-04-27 RX ADMIN — GABAPENTIN 300 MILLIGRAM(S): 400 CAPSULE ORAL at 22:36

## 2022-04-27 RX ADMIN — OXYCODONE HYDROCHLORIDE 5 MILLIGRAM(S): 5 TABLET ORAL at 10:20

## 2022-04-27 RX ADMIN — OXYCODONE HYDROCHLORIDE 5 MILLIGRAM(S): 5 TABLET ORAL at 23:45

## 2022-04-27 RX ADMIN — ISOSORBIDE MONONITRATE 30 MILLIGRAM(S): 60 TABLET, EXTENDED RELEASE ORAL at 13:33

## 2022-04-27 RX ADMIN — Medication 500 MILLIGRAM(S): at 13:33

## 2022-04-28 LAB
ANION GAP SERPL CALC-SCNC: 12 MMOL/L — SIGNIFICANT CHANGE UP (ref 7–14)
BUN SERPL-MCNC: 20 MG/DL — SIGNIFICANT CHANGE UP (ref 7–23)
CALCIUM SERPL-MCNC: 9.3 MG/DL — SIGNIFICANT CHANGE UP (ref 8.4–10.5)
CHLORIDE SERPL-SCNC: 102 MMOL/L — SIGNIFICANT CHANGE UP (ref 98–107)
CO2 SERPL-SCNC: 23 MMOL/L — SIGNIFICANT CHANGE UP (ref 22–31)
CREAT SERPL-MCNC: 0.7 MG/DL — SIGNIFICANT CHANGE UP (ref 0.5–1.3)
EGFR: 92 ML/MIN/1.73M2 — SIGNIFICANT CHANGE UP
GLUCOSE SERPL-MCNC: 101 MG/DL — HIGH (ref 70–99)
HCT VFR BLD CALC: 40.2 % — SIGNIFICANT CHANGE UP (ref 34.5–45)
HGB BLD-MCNC: 12.8 G/DL — SIGNIFICANT CHANGE UP (ref 11.5–15.5)
MAGNESIUM SERPL-MCNC: 1.7 MG/DL — SIGNIFICANT CHANGE UP (ref 1.6–2.6)
MCHC RBC-ENTMCNC: 28 PG — SIGNIFICANT CHANGE UP (ref 27–34)
MCHC RBC-ENTMCNC: 31.8 GM/DL — LOW (ref 32–36)
MCV RBC AUTO: 88 FL — SIGNIFICANT CHANGE UP (ref 80–100)
NRBC # BLD: 0 /100 WBCS — SIGNIFICANT CHANGE UP
NRBC # FLD: 0 K/UL — SIGNIFICANT CHANGE UP
PHOSPHATE SERPL-MCNC: 3.4 MG/DL — SIGNIFICANT CHANGE UP (ref 2.5–4.5)
PLATELET # BLD AUTO: 293 K/UL — SIGNIFICANT CHANGE UP (ref 150–400)
POTASSIUM SERPL-MCNC: 3.7 MMOL/L — SIGNIFICANT CHANGE UP (ref 3.5–5.3)
POTASSIUM SERPL-SCNC: 3.7 MMOL/L — SIGNIFICANT CHANGE UP (ref 3.5–5.3)
RBC # BLD: 4.57 M/UL — SIGNIFICANT CHANGE UP (ref 3.8–5.2)
RBC # FLD: 14.5 % — SIGNIFICANT CHANGE UP (ref 10.3–14.5)
SODIUM SERPL-SCNC: 137 MMOL/L — SIGNIFICANT CHANGE UP (ref 135–145)
WBC # BLD: 5.85 K/UL — SIGNIFICANT CHANGE UP (ref 3.8–10.5)
WBC # FLD AUTO: 5.85 K/UL — SIGNIFICANT CHANGE UP (ref 3.8–10.5)

## 2022-04-28 RX ORDER — OXYCODONE HYDROCHLORIDE 5 MG/1
5 TABLET ORAL EVERY 4 HOURS
Refills: 0 | Status: DISCONTINUED | OUTPATIENT
Start: 2022-04-28 | End: 2022-05-04

## 2022-04-28 RX ADMIN — Medication 25 MILLIGRAM(S): at 05:11

## 2022-04-28 RX ADMIN — Medication 500 MILLIGRAM(S): at 05:09

## 2022-04-28 RX ADMIN — PANTOPRAZOLE SODIUM 40 MILLIGRAM(S): 20 TABLET, DELAYED RELEASE ORAL at 05:10

## 2022-04-28 RX ADMIN — Medication 500 MILLIGRAM(S): at 13:31

## 2022-04-28 RX ADMIN — GABAPENTIN 300 MILLIGRAM(S): 400 CAPSULE ORAL at 13:31

## 2022-04-28 RX ADMIN — OXYCODONE HYDROCHLORIDE 5 MILLIGRAM(S): 5 TABLET ORAL at 23:00

## 2022-04-28 RX ADMIN — ISOSORBIDE MONONITRATE 30 MILLIGRAM(S): 60 TABLET, EXTENDED RELEASE ORAL at 12:38

## 2022-04-28 RX ADMIN — ENOXAPARIN SODIUM 40 MILLIGRAM(S): 100 INJECTION SUBCUTANEOUS at 05:13

## 2022-04-28 RX ADMIN — Medication 81 MILLIGRAM(S): at 12:38

## 2022-04-28 RX ADMIN — ATORVASTATIN CALCIUM 20 MILLIGRAM(S): 80 TABLET, FILM COATED ORAL at 22:29

## 2022-04-28 RX ADMIN — OXYCODONE HYDROCHLORIDE 5 MILLIGRAM(S): 5 TABLET ORAL at 22:33

## 2022-04-28 RX ADMIN — Medication 500 MILLIGRAM(S): at 22:29

## 2022-04-28 RX ADMIN — Medication 50 MILLIGRAM(S): at 18:03

## 2022-04-28 RX ADMIN — OXYCODONE HYDROCHLORIDE 5 MILLIGRAM(S): 5 TABLET ORAL at 11:20

## 2022-04-28 RX ADMIN — Medication 50 MILLIGRAM(S): at 05:11

## 2022-04-28 RX ADMIN — OXYCODONE HYDROCHLORIDE 5 MILLIGRAM(S): 5 TABLET ORAL at 10:22

## 2022-04-28 RX ADMIN — GABAPENTIN 300 MILLIGRAM(S): 400 CAPSULE ORAL at 05:11

## 2022-04-28 RX ADMIN — GABAPENTIN 300 MILLIGRAM(S): 400 CAPSULE ORAL at 22:29

## 2022-04-28 RX ADMIN — Medication 3 MILLIGRAM(S): at 22:33

## 2022-04-28 RX ADMIN — OXYCODONE HYDROCHLORIDE 5 MILLIGRAM(S): 5 TABLET ORAL at 05:12

## 2022-04-29 LAB
ANION GAP SERPL CALC-SCNC: 12 MMOL/L — SIGNIFICANT CHANGE UP (ref 7–14)
BUN SERPL-MCNC: 19 MG/DL — SIGNIFICANT CHANGE UP (ref 7–23)
CALCIUM SERPL-MCNC: 9.6 MG/DL — SIGNIFICANT CHANGE UP (ref 8.4–10.5)
CHLORIDE SERPL-SCNC: 102 MMOL/L — SIGNIFICANT CHANGE UP (ref 98–107)
CO2 SERPL-SCNC: 23 MMOL/L — SIGNIFICANT CHANGE UP (ref 22–31)
CREAT SERPL-MCNC: 0.63 MG/DL — SIGNIFICANT CHANGE UP (ref 0.5–1.3)
EGFR: 95 ML/MIN/1.73M2 — SIGNIFICANT CHANGE UP
GLUCOSE SERPL-MCNC: 81 MG/DL — SIGNIFICANT CHANGE UP (ref 70–99)
HCT VFR BLD CALC: 39.2 % — SIGNIFICANT CHANGE UP (ref 34.5–45)
HGB BLD-MCNC: 12.8 G/DL — SIGNIFICANT CHANGE UP (ref 11.5–15.5)
MAGNESIUM SERPL-MCNC: 1.9 MG/DL — SIGNIFICANT CHANGE UP (ref 1.6–2.6)
MCHC RBC-ENTMCNC: 28.7 PG — SIGNIFICANT CHANGE UP (ref 27–34)
MCHC RBC-ENTMCNC: 32.7 GM/DL — SIGNIFICANT CHANGE UP (ref 32–36)
MCV RBC AUTO: 87.9 FL — SIGNIFICANT CHANGE UP (ref 80–100)
NRBC # BLD: 0 /100 WBCS — SIGNIFICANT CHANGE UP
NRBC # FLD: 0 K/UL — SIGNIFICANT CHANGE UP
PHOSPHATE SERPL-MCNC: 4.3 MG/DL — SIGNIFICANT CHANGE UP (ref 2.5–4.5)
PLATELET # BLD AUTO: 256 K/UL — SIGNIFICANT CHANGE UP (ref 150–400)
POTASSIUM SERPL-MCNC: 3.8 MMOL/L — SIGNIFICANT CHANGE UP (ref 3.5–5.3)
POTASSIUM SERPL-SCNC: 3.8 MMOL/L — SIGNIFICANT CHANGE UP (ref 3.5–5.3)
RBC # BLD: 4.46 M/UL — SIGNIFICANT CHANGE UP (ref 3.8–5.2)
RBC # FLD: 14.5 % — SIGNIFICANT CHANGE UP (ref 10.3–14.5)
SODIUM SERPL-SCNC: 137 MMOL/L — SIGNIFICANT CHANGE UP (ref 135–145)
WBC # BLD: 5.33 K/UL — SIGNIFICANT CHANGE UP (ref 3.8–10.5)
WBC # FLD AUTO: 5.33 K/UL — SIGNIFICANT CHANGE UP (ref 3.8–10.5)

## 2022-04-29 RX ADMIN — GABAPENTIN 300 MILLIGRAM(S): 400 CAPSULE ORAL at 22:03

## 2022-04-29 RX ADMIN — Medication 50 MILLIGRAM(S): at 05:18

## 2022-04-29 RX ADMIN — PANTOPRAZOLE SODIUM 40 MILLIGRAM(S): 20 TABLET, DELAYED RELEASE ORAL at 05:18

## 2022-04-29 RX ADMIN — GABAPENTIN 300 MILLIGRAM(S): 400 CAPSULE ORAL at 13:14

## 2022-04-29 RX ADMIN — ATORVASTATIN CALCIUM 20 MILLIGRAM(S): 80 TABLET, FILM COATED ORAL at 22:03

## 2022-04-29 RX ADMIN — Medication 81 MILLIGRAM(S): at 13:14

## 2022-04-29 RX ADMIN — Medication 25 MILLIGRAM(S): at 05:18

## 2022-04-29 RX ADMIN — OXYCODONE HYDROCHLORIDE 5 MILLIGRAM(S): 5 TABLET ORAL at 22:30

## 2022-04-29 RX ADMIN — Medication 500 MILLIGRAM(S): at 05:17

## 2022-04-29 RX ADMIN — Medication 50 MILLIGRAM(S): at 17:44

## 2022-04-29 RX ADMIN — ISOSORBIDE MONONITRATE 30 MILLIGRAM(S): 60 TABLET, EXTENDED RELEASE ORAL at 13:14

## 2022-04-29 RX ADMIN — GABAPENTIN 300 MILLIGRAM(S): 400 CAPSULE ORAL at 05:18

## 2022-04-29 RX ADMIN — OXYCODONE HYDROCHLORIDE 5 MILLIGRAM(S): 5 TABLET ORAL at 22:11

## 2022-04-29 RX ADMIN — ENOXAPARIN SODIUM 40 MILLIGRAM(S): 100 INJECTION SUBCUTANEOUS at 05:28

## 2022-04-30 LAB
ANION GAP SERPL CALC-SCNC: 13 MMOL/L — SIGNIFICANT CHANGE UP (ref 7–14)
BUN SERPL-MCNC: 19 MG/DL — SIGNIFICANT CHANGE UP (ref 7–23)
CALCIUM SERPL-MCNC: 9.8 MG/DL — SIGNIFICANT CHANGE UP (ref 8.4–10.5)
CHLORIDE SERPL-SCNC: 99 MMOL/L — SIGNIFICANT CHANGE UP (ref 98–107)
CO2 SERPL-SCNC: 21 MMOL/L — LOW (ref 22–31)
CREAT SERPL-MCNC: 0.66 MG/DL — SIGNIFICANT CHANGE UP (ref 0.5–1.3)
EGFR: 94 ML/MIN/1.73M2 — SIGNIFICANT CHANGE UP
GLUCOSE SERPL-MCNC: 72 MG/DL — SIGNIFICANT CHANGE UP (ref 70–99)
HCT VFR BLD CALC: 40.6 % — SIGNIFICANT CHANGE UP (ref 34.5–45)
HGB BLD-MCNC: 13 G/DL — SIGNIFICANT CHANGE UP (ref 11.5–15.5)
MAGNESIUM SERPL-MCNC: 1.9 MG/DL — SIGNIFICANT CHANGE UP (ref 1.6–2.6)
MCHC RBC-ENTMCNC: 28.3 PG — SIGNIFICANT CHANGE UP (ref 27–34)
MCHC RBC-ENTMCNC: 32 GM/DL — SIGNIFICANT CHANGE UP (ref 32–36)
MCV RBC AUTO: 88.3 FL — SIGNIFICANT CHANGE UP (ref 80–100)
NRBC # BLD: 0 /100 WBCS — SIGNIFICANT CHANGE UP
NRBC # FLD: 0 K/UL — SIGNIFICANT CHANGE UP
PHOSPHATE SERPL-MCNC: 3.9 MG/DL — SIGNIFICANT CHANGE UP (ref 2.5–4.5)
PLATELET # BLD AUTO: 294 K/UL — SIGNIFICANT CHANGE UP (ref 150–400)
POTASSIUM SERPL-MCNC: 3.9 MMOL/L — SIGNIFICANT CHANGE UP (ref 3.5–5.3)
POTASSIUM SERPL-SCNC: 3.9 MMOL/L — SIGNIFICANT CHANGE UP (ref 3.5–5.3)
RBC # BLD: 4.6 M/UL — SIGNIFICANT CHANGE UP (ref 3.8–5.2)
RBC # FLD: 14.6 % — HIGH (ref 10.3–14.5)
SODIUM SERPL-SCNC: 133 MMOL/L — LOW (ref 135–145)
WBC # BLD: 6.42 K/UL — SIGNIFICANT CHANGE UP (ref 3.8–10.5)
WBC # FLD AUTO: 6.42 K/UL — SIGNIFICANT CHANGE UP (ref 3.8–10.5)

## 2022-04-30 PROCEDURE — 70553 MRI BRAIN STEM W/O & W/DYE: CPT | Mod: 26

## 2022-04-30 RX ADMIN — OXYCODONE HYDROCHLORIDE 5 MILLIGRAM(S): 5 TABLET ORAL at 13:53

## 2022-04-30 RX ADMIN — ENOXAPARIN SODIUM 40 MILLIGRAM(S): 100 INJECTION SUBCUTANEOUS at 05:56

## 2022-04-30 RX ADMIN — GABAPENTIN 300 MILLIGRAM(S): 400 CAPSULE ORAL at 05:42

## 2022-04-30 RX ADMIN — Medication 50 MILLIGRAM(S): at 18:07

## 2022-04-30 RX ADMIN — Medication 81 MILLIGRAM(S): at 11:20

## 2022-04-30 RX ADMIN — PANTOPRAZOLE SODIUM 40 MILLIGRAM(S): 20 TABLET, DELAYED RELEASE ORAL at 06:36

## 2022-04-30 RX ADMIN — GABAPENTIN 300 MILLIGRAM(S): 400 CAPSULE ORAL at 13:27

## 2022-04-30 RX ADMIN — Medication 25 MILLIGRAM(S): at 05:41

## 2022-04-30 RX ADMIN — OXYCODONE HYDROCHLORIDE 5 MILLIGRAM(S): 5 TABLET ORAL at 12:53

## 2022-04-30 RX ADMIN — ISOSORBIDE MONONITRATE 30 MILLIGRAM(S): 60 TABLET, EXTENDED RELEASE ORAL at 11:21

## 2022-04-30 RX ADMIN — Medication 1 MILLIGRAM(S): at 11:20

## 2022-04-30 RX ADMIN — GABAPENTIN 300 MILLIGRAM(S): 400 CAPSULE ORAL at 21:09

## 2022-04-30 RX ADMIN — Medication 50 MILLIGRAM(S): at 05:41

## 2022-04-30 RX ADMIN — ATORVASTATIN CALCIUM 20 MILLIGRAM(S): 80 TABLET, FILM COATED ORAL at 21:09

## 2022-04-30 RX ADMIN — OXYCODONE HYDROCHLORIDE 5 MILLIGRAM(S): 5 TABLET ORAL at 23:44

## 2022-04-30 NOTE — DIETITIAN INITIAL EVALUATION ADULT - OTHER INFO
Pt with recent admission (2/24-3/7) and was discharged to [facility] rehab. Pt was discharged from [facility] on the day of presentation. Likely plan for discharge to [facility]. Pt reports she will be moving in with her son in July.   S/p bedside swallow assessment (4/25) recommending regular solids/thin liquids, in line with current diet order.

## 2022-04-30 NOTE — DIETITIAN INITIAL EVALUATION ADULT - ENERGY INTAKE
Pt reports continued good appetite in house, endorses difficulty chewing/swallowing secondary to salivary gland infection however is cutting food into small pieces and able to tolerate diet. Pt amenable to provision of nutrition supplement.

## 2022-04-30 NOTE — DIETITIAN INITIAL EVALUATION ADULT - ORAL INTAKE PTA/DIET HISTORY
Pt confirms NKFA. Pt lives at home alone, endorses difficulty preparing meals PTA secondary to difficulty ambulating. Pt reports generally good appetite/PO intake PTA, supplements with Ensure daily.

## 2022-04-30 NOTE — DIETITIAN INITIAL EVALUATION ADULT - ORAL NUTRITION SUPPLEMENTS
2) Recommend addition of Ensure Enlive 1 PO 3x daily (provides 350 kcal, 20 gm protein per 8oz serving).

## 2022-05-01 ENCOUNTER — TRANSCRIPTION ENCOUNTER (OUTPATIENT)
Age: 72
End: 2022-05-01

## 2022-05-01 LAB
ANION GAP SERPL CALC-SCNC: 11 MMOL/L — SIGNIFICANT CHANGE UP (ref 7–14)
BUN SERPL-MCNC: 18 MG/DL — SIGNIFICANT CHANGE UP (ref 7–23)
CALCIUM SERPL-MCNC: 9.4 MG/DL — SIGNIFICANT CHANGE UP (ref 8.4–10.5)
CHLORIDE SERPL-SCNC: 95 MMOL/L — LOW (ref 98–107)
CO2 SERPL-SCNC: 26 MMOL/L — SIGNIFICANT CHANGE UP (ref 22–31)
CREAT SERPL-MCNC: 0.68 MG/DL — SIGNIFICANT CHANGE UP (ref 0.5–1.3)
EGFR: 93 ML/MIN/1.73M2 — SIGNIFICANT CHANGE UP
GLUCOSE SERPL-MCNC: 73 MG/DL — SIGNIFICANT CHANGE UP (ref 70–99)
HCT VFR BLD CALC: 40.2 % — SIGNIFICANT CHANGE UP (ref 34.5–45)
HGB BLD-MCNC: 12.9 G/DL — SIGNIFICANT CHANGE UP (ref 11.5–15.5)
MAGNESIUM SERPL-MCNC: 1.8 MG/DL — SIGNIFICANT CHANGE UP (ref 1.6–2.6)
MCHC RBC-ENTMCNC: 28.1 PG — SIGNIFICANT CHANGE UP (ref 27–34)
MCHC RBC-ENTMCNC: 32.1 GM/DL — SIGNIFICANT CHANGE UP (ref 32–36)
MCV RBC AUTO: 87.6 FL — SIGNIFICANT CHANGE UP (ref 80–100)
NRBC # BLD: 0 /100 WBCS — SIGNIFICANT CHANGE UP
NRBC # FLD: 0 K/UL — SIGNIFICANT CHANGE UP
PHOSPHATE SERPL-MCNC: 3.6 MG/DL — SIGNIFICANT CHANGE UP (ref 2.5–4.5)
PLATELET # BLD AUTO: 284 K/UL — SIGNIFICANT CHANGE UP (ref 150–400)
POTASSIUM SERPL-MCNC: 3.4 MMOL/L — LOW (ref 3.5–5.3)
POTASSIUM SERPL-SCNC: 3.4 MMOL/L — LOW (ref 3.5–5.3)
RBC # BLD: 4.59 M/UL — SIGNIFICANT CHANGE UP (ref 3.8–5.2)
RBC # FLD: 14.5 % — SIGNIFICANT CHANGE UP (ref 10.3–14.5)
SODIUM SERPL-SCNC: 132 MMOL/L — LOW (ref 135–145)
WBC # BLD: 5.37 K/UL — SIGNIFICANT CHANGE UP (ref 3.8–10.5)
WBC # FLD AUTO: 5.37 K/UL — SIGNIFICANT CHANGE UP (ref 3.8–10.5)

## 2022-05-01 RX ORDER — ATORVASTATIN CALCIUM 80 MG/1
80 TABLET, FILM COATED ORAL AT BEDTIME
Refills: 0 | Status: DISCONTINUED | OUTPATIENT
Start: 2022-05-01 | End: 2022-05-04

## 2022-05-01 RX ORDER — BENZOCAINE AND MENTHOL 5; 1 G/100ML; G/100ML
1 LIQUID ORAL
Refills: 0 | Status: DISCONTINUED | OUTPATIENT
Start: 2022-05-01 | End: 2022-05-01

## 2022-05-01 RX ADMIN — OXYCODONE HYDROCHLORIDE 5 MILLIGRAM(S): 5 TABLET ORAL at 06:19

## 2022-05-01 RX ADMIN — GABAPENTIN 300 MILLIGRAM(S): 400 CAPSULE ORAL at 06:18

## 2022-05-01 RX ADMIN — GABAPENTIN 300 MILLIGRAM(S): 400 CAPSULE ORAL at 11:55

## 2022-05-01 RX ADMIN — ATORVASTATIN CALCIUM 80 MILLIGRAM(S): 80 TABLET, FILM COATED ORAL at 21:11

## 2022-05-01 RX ADMIN — Medication 25 MILLIGRAM(S): at 06:19

## 2022-05-01 RX ADMIN — OXYCODONE HYDROCHLORIDE 5 MILLIGRAM(S): 5 TABLET ORAL at 18:19

## 2022-05-01 RX ADMIN — GABAPENTIN 300 MILLIGRAM(S): 400 CAPSULE ORAL at 21:11

## 2022-05-01 RX ADMIN — Medication 81 MILLIGRAM(S): at 11:55

## 2022-05-01 RX ADMIN — ENOXAPARIN SODIUM 40 MILLIGRAM(S): 100 INJECTION SUBCUTANEOUS at 06:19

## 2022-05-01 RX ADMIN — OXYCODONE HYDROCHLORIDE 5 MILLIGRAM(S): 5 TABLET ORAL at 19:19

## 2022-05-01 RX ADMIN — Medication 50 MILLIGRAM(S): at 06:19

## 2022-05-01 RX ADMIN — Medication 50 MILLIGRAM(S): at 18:20

## 2022-05-01 RX ADMIN — ISOSORBIDE MONONITRATE 30 MILLIGRAM(S): 60 TABLET, EXTENDED RELEASE ORAL at 11:56

## 2022-05-01 RX ADMIN — PANTOPRAZOLE SODIUM 40 MILLIGRAM(S): 20 TABLET, DELAYED RELEASE ORAL at 06:19

## 2022-05-01 NOTE — DISCHARGE NOTE PROVIDER - NSDCMRMEDTOKEN_GEN_ALL_CORE_FT
gabapentin 300 mg oral capsule: 1 cap(s) orally 3 times a day  hydrochlorothiazide 25 mg oral tablet: 1 tab(s) orally once a day  isosorbide mononitrate 30 mg oral tablet, extended release: 1 tab(s) orally once a day  Metoprolol Tartrate 50 mg oral tablet: 1 tab(s) orally 2 times a day  (Pharmacy filled for 50mg ER tablets BID)  oxyCODONE 5 mg oral tablet: 1 tab(s) orally every 4 hours, As needed, Moderate - Severe Pain (4 - 10)  pantoprazole 40 mg oral delayed release tablet: 1 tab(s) orally once a day (before a meal)  Proventil HFA CFC free 90 mcg/inh inhalation aerosol: 2 puff(s) inhaled 2 times a day, As Needed  rosuvastatin 5 mg oral tablet: 1 tab(s) orally once a day   acetaminophen 325 mg oral tablet: 2 tab(s) orally every 6 hours, As needed, Temp greater or equal to 38C (100.4F), Mild Pain (1 - 3)  aspirin 81 mg oral delayed release tablet: 1 tab(s) orally once a day  gabapentin 300 mg oral capsule: 1 cap(s) orally 3 times a day  hydrochlorothiazide 25 mg oral tablet: 1 tab(s) orally once a day  isosorbide mononitrate 30 mg oral tablet, extended release: 1 tab(s) orally once a day  melatonin 3 mg oral tablet: 1 tab(s) orally once a day (at bedtime), As needed, Insomnia  Metoprolol Tartrate 50 mg oral tablet: 1 tab(s) orally 2 times a day  (Pharmacy filled for 50mg ER tablets BID)  oxyCODONE 5 mg oral tablet: 1 tab(s) orally every 4 hours, As needed, Moderate - Severe Pain (4 - 10)  pantoprazole 40 mg oral delayed release tablet: 1 tab(s) orally once a day (before a meal)  Proventil HFA CFC free 90 mcg/inh inhalation aerosol: 2 puff(s) inhaled 2 times a day, As Needed  rosuvastatin 5 mg oral tablet: 1 tab(s) orally once a day

## 2022-05-01 NOTE — DISCHARGE NOTE PROVIDER - CARE PROVIDER_API CALL
Dustin,   [address] [place] [address]  [phone]  Phone: (   )    -  Fax: (   )    -  Follow Up Time: 1 week    Neurology,   [address] [place] [address]   Please call [phone] to schedule this appointment.  Phone: (   )    -  Fax: (   )    -  Follow Up Time: 2 weeks

## 2022-05-01 NOTE — DISCHARGE NOTE PROVIDER - HOSPITAL COURSE
72F with PMHx asthma, HTN, RA, chronic low back pain, thyroid nodule presenting with two weeks of right sided weakness, HA, dizziness.    Right sided weakness.   Progressive R sensorimotor hemiparesis + headache 2/2 L brain dysfunction 2/2 possibly structural vs ischemic etiology. ?migraine related   May be related to chronic radiculopathy   CTA without LVO, no hemorrhage  MRI brain with and without contrast: Moderate periventricular, deep and subcortical white matter   ischemia. Mild pontine white matter ischemia  Neurolgy rec no further inpatient workup, follow up outpatient.    Headache.   No hx migraines. CTH neg for acute pathology  MRI brain: Moderate periventricular, deep and subcortical white matter ischemia. Mild pontine white matter ischemia  CTH neg for acute pathology  Neurology rec for outpt follow up    Blurry vision.   c/o blurred vision R (new), worsened on L and dipoplia on L (optho called)  20/20 w/glasses  outpt F/U.    Sialoadenitis.   +right submandibular swelling and TTP likely from sialoadenitis found on CT  Warm compresses, instructed on massaging, encouraged hydration, lemon drops if available.    HTN (hypertension).   Resume HCTZ, IMDUR, metoprolol.    Asthma.   No respiratory issues    Thyroid nodule.   Bilateral hypodense thyroid nodules measuring up to 2.7 cm within the left thyroid lobe, demonstrating relative   stability dating back to 2013.  pt to follow up with outpatient endocrinologist for biopsy    Chronic lower back pain.   MRI CTL spine stable in March    Case discussed with  --- on ---- and patient cleared for discharge. Reviewed discharge medications with patient; All new medications requiring new prescription sent to pharmacy of patients choice. Reviewed need for prescription for previous home medications and new prescriptions sent if requested. Patient in agreement and understands.           Patient is a 72 year old Female with PMHx asthma, HTN, RA, chronic low back pain, thyroid nodule presenting with two weeks of right sided weakness, HA, dizziness.    Right sided weakness.   Progressive R sensorimotor hemiparesis + headache 2/2 L brain dysfunction 2/2 possibly structural vs ischemic etiology. ?migraine related   May be related to chronic radiculopathy   CTA without LVO, no hemorrhage  MRI brain with and without contrast: Moderate periventricular, deep and subcortical white matter   ischemia. Mild pontine white matter ischemia  Neurolgy rec no further inpatient workup, follow up outpatient.    Headache.   No hx migraines. CTH neg for acute pathology  MRI brain: Moderate periventricular, deep and subcortical white matter ischemia. Mild pontine white matter ischemia  CTH neg for acute pathology  Neurology rec for outpt follow up    Blurry vision.   c/o blurred vision R (new), worsened on L and dipoplia on L (optho called)  20/20 w/glasses  outpt F/U.    Sialoadenitis.   +right submandibular swelling and TTP likely from sialoadenitis found on CT  Warm compresses, instructed on massaging, encouraged hydration, lemon drops if available.    HTN (hypertension).   Resume HCTZ, IMDUR, metoprolol.    Asthma.   No respiratory issues    Thyroid nodule.   Bilateral hypodense thyroid nodules measuring up to 2.7 cm within the left thyroid lobe, demonstrating relative   stability dating back to 2013.  pt to follow up with outpatient endocrinologist for biopsy    Chronic lower back pain.   MRI CTL spine stable in March    Case discussed with Dr. Pike on 6/4/22 and patient cleared for discharge. Reviewed discharge medications with patient; All new medications requiring new prescription sent to pharmacy of patients choice. Reviewed need for prescription for previous home medications and new prescriptions sent if requested. Patient in agreement and understands.

## 2022-05-01 NOTE — DISCHARGE NOTE PROVIDER - NSDCCPCAREPLAN_GEN_ALL_CORE_FT
PRINCIPAL DISCHARGE DIAGNOSIS  Diagnosis: Right sided weakness  Assessment and Plan of Treatment: The CT scan of your head was stable. The MRI of your brain was stable. You were seen by neurology, who recommends continue aspirin and cholestrol. Follow up with neurology as an outpatient.      SECONDARY DISCHARGE DIAGNOSES  Diagnosis: Thyroid nodule  Assessment and Plan of Treatment: Thyroid nodule.   Bilateral hypodense thyroid nodules measuring up to 2.7 cm within the left thyroid lobe, demonstrating relative   stability dating back to 2013.  pt to follow up with outpatient endocrinologist for biopsy    Diagnosis: Sialoadenitis  Assessment and Plan of Treatment: Sialoadenitis.   +right submandibular swelling and TTP likely from sialoadenitis found on CT  Warm compresses, instructed on massaging, encouraged hydration, lemon drops if available.    Diagnosis: Headache  Assessment and Plan of Treatment: Headache.   No hx migraines. CTH neg for acute pathology  MRI brain: Moderate periventricular, deep and subcortical white matter ischemia. Mild pontine white matter ischemia  CTH neg for acute pathology  Neurology rec for outpt follow up    Diagnosis: Blurry vision  Assessment and Plan of Treatment: You were seen by opthamalogy.    Diagnosis: HTN (hypertension)  Assessment and Plan of Treatment: Continue current blood pressure medication regimen as directed. Monitor for any visual changes, headaches or dizziness.  Monitor blood pressure regularly.  Follow up with your PCP for further management for high blood pressure.

## 2022-05-01 NOTE — DISCHARGE NOTE PROVIDER - PROVIDER TOKENS
FREE:[LAST:[Chan],PHONE:[(   )    -],FAX:[(   )    -],ADDRESS:[[address] [place] [address]  [phone]],FOLLOWUP:[1 week]],FREE:[LAST:[Neurology],PHONE:[(   )    -],FAX:[(   )    -],ADDRESS:[[address] [place] [address]   Please call [phone] to schedule this appointment.],FOLLOWUP:[2 weeks]]

## 2022-05-01 NOTE — DISCHARGE NOTE PROVIDER - NSDCHC_MEDRECSTATUS_GEN_ALL_CORE
Referred by: Gloria Terrell MD; Medical Diagnosis (from order):    Diagnosis Information      Diagnosis    729.5 (ICD-9-CM) - M79.643 (ICD-10-CM) - Pain of hand, unspecified laterality                Occupational Therapy -  Daily Treatment Note    Visit:  3     SUBJECTIVE                                                                                                             My thumb got bent back a few times this week which was painful but the trigger action is not happening at all.       OBJECTIVE                                                                                                                        TREATMENT                                                                                                                initial evaluation completed    Therapeutic Exercise:  IP blocking AROM x5 x2  Composite stone pick-up large x2 small x4    Manual Therapy:  DTM, instrument assist massage base of thumb  Edema massage L hand and forearm    Skilled input: verbal instruction/cues and facilitation    Writer verbally educated and received verbal consent for hand placement, positioning of patient, and techniques to be performed today from patient for therapist position for techniques, hand placement and palpation for techniques and modality application as described above and how they are pertinent to the patient's plan of care.    Home Exercise Program: Orthosis use, IP blocking    Ultrasound (57649)  Location: L thumb base  Position: sitting  Duty Cycle: 50%  Frequency: 3 Mhz  : 0.8.  Intensity: patient tolerance  Duration: 8 minutes  Results: decreased pain and tissue softening  Reaction: no adverse reaction to treatment      ASSESSMENT                                                                                                             64 y/o/f with onset of pain and triggering base of L thumb following hyperextension thumb injury during gardening activity to continue to be seen in skilled  Admission Reconciliation is Completed  Discharge Reconciliation is Not Complete Occupational Therapy by a Certified Hand Therapist for modalities, orthosis fabrication/modification, DTM, and HEP advancement to facilitate patient ability to resume pre-morbid ADL/IADLs with minimal discomfort.  Pain/symptoms after session: 2    Patient Education:   Results of above outlined education: Verbalizes understanding and Demonstrates understanding      PLAN                                                                                                                           Suggestions for next session as indicated: Progress per plan of care      GOALS                                                                                                                       Long Term Goals: To be met by end of plan of care:      Home Exercise Program: Independent with progressed and modified home exercise program (HEP)      Status:  Progressing/ongoing  Orthotics: Verbalize and/or demonstrate proper use of orthotics/orthosis    Status:  Progressing/ongoing    Pain: Decrease pain/symptoms to 1 with squeezing toothpaste    Status:  Progressing/ongoing    Patient Reported Outcome Measure: Improvement in function /disability/impairment as indicated by Quick DASH (minimal clinically important difference = 15.91) < or =   9     Status:  Progressing/ongoing      Procedures and total treatment time documented Time Entry flowsheet.   Admission Reconciliation is Completed  Discharge Reconciliation is Completed

## 2022-05-01 NOTE — CHART NOTE - NSCHARTNOTEFT_GEN_A_CORE
I have personally reviewed this patient's ISTOP, reference #[id number]    Rx Written	Rx Dispensed	Drug	Quantity	Days Supply	Prescriber Name	Prescriber [place] #	Payment Method	Dispenser  04/13/2022	04/13/2022	oxycodone hcl (ir) 5 mg tablet	30	5	Janice Oliver	[id number]	[place]	[facility]  03/30/2022	03/30/2022	oxycodone hcl (ir) 5 mg tablet	30	5	Janice Oliver	[id number]	[place]	[facility]  03/27/2022	03/27/2022	oxycodone hcl (ir) 5 mg tablet	36	3	Janice Oliver	[id number]	[place]	[facility]
MRI resulted, consulted with neurology. Recommendation at this time to continue on aspirin, and increase Lipitor to 80mg qhs. Will await further recommendations.     Светлана Walker NP  [id number]
Neurology reviewed MRI brain w/o as noted below:    < from: MR Head w/wo IV Cont (04.30.22 @ 13:06) >    IMPRESSION:  Moderate periventricular, deep and subcortical white matter   ischemia. Mild pontine white matter ischemia also noted.  No abnormal   enhancement is seen which    --- End of Report ---    < end of copied text >    Briefly, pt is 72 y.o. RH F with PMH of HTN, RA, chronic low back pain, R thyroid nodule came infor headache and R sided weakness. [facility] Neuro exam at the time of eval showed mild R sided weakness and sensory loss to LT/Touch. Had blurry vision but was not wearing own glasses. Given no L hemispheric finding on MRI raven with acute ischemic stroke, would defer further w/u for mild R weakness for outpt.     Recommendations:  [] C/w ASA 81 mg given chronic signs of white matter ischemia  [] HA management per primary team  [] PT/OT  [] As long as pt sx improved, can follow up with general neurology at [address] [place] [address] 1-2 weeks after discharge. Please instruct the patient to call [phone] to schedule this appointment.     Jeannette Jefferson  Neurology PGY2
Writer called MRI to inquire on timing of pending imaging. Order has been in place since 4/23.
notified by RN patient was tachycardiac on tele to "190 only for a few seconds" patient was seen immediately bedside. States she has prior some palpitation, now resolved, no chest pain ,SOB, HA, dizziness, abd pain. pulse to palpation is Normal rhythm 72. EKG was obtained.   was notified, saw patient bedside, informed to continue to monitor.
Discussed with  patient's diet. Recommends regular diet.  spending speech evaluation.

## 2022-05-02 LAB
ANION GAP SERPL CALC-SCNC: 12 MMOL/L — SIGNIFICANT CHANGE UP (ref 7–14)
BUN SERPL-MCNC: 16 MG/DL — SIGNIFICANT CHANGE UP (ref 7–23)
CALCIUM SERPL-MCNC: 9.5 MG/DL — SIGNIFICANT CHANGE UP (ref 8.4–10.5)
CHLORIDE SERPL-SCNC: 101 MMOL/L — SIGNIFICANT CHANGE UP (ref 98–107)
CO2 SERPL-SCNC: 22 MMOL/L — SIGNIFICANT CHANGE UP (ref 22–31)
CREAT SERPL-MCNC: 0.66 MG/DL — SIGNIFICANT CHANGE UP (ref 0.5–1.3)
EGFR: 94 ML/MIN/1.73M2 — SIGNIFICANT CHANGE UP
GLUCOSE SERPL-MCNC: 87 MG/DL — SIGNIFICANT CHANGE UP (ref 70–99)
HCT VFR BLD CALC: 39.1 % — SIGNIFICANT CHANGE UP (ref 34.5–45)
HGB BLD-MCNC: 12.6 G/DL — SIGNIFICANT CHANGE UP (ref 11.5–15.5)
MAGNESIUM SERPL-MCNC: 1.8 MG/DL — SIGNIFICANT CHANGE UP (ref 1.6–2.6)
MCHC RBC-ENTMCNC: 28.3 PG — SIGNIFICANT CHANGE UP (ref 27–34)
MCHC RBC-ENTMCNC: 32.2 GM/DL — SIGNIFICANT CHANGE UP (ref 32–36)
MCV RBC AUTO: 87.9 FL — SIGNIFICANT CHANGE UP (ref 80–100)
NRBC # BLD: 0 /100 WBCS — SIGNIFICANT CHANGE UP
NRBC # FLD: 0 K/UL — SIGNIFICANT CHANGE UP
PHOSPHATE SERPL-MCNC: 3.6 MG/DL — SIGNIFICANT CHANGE UP (ref 2.5–4.5)
PLATELET # BLD AUTO: 278 K/UL — SIGNIFICANT CHANGE UP (ref 150–400)
POTASSIUM SERPL-MCNC: 3.7 MMOL/L — SIGNIFICANT CHANGE UP (ref 3.5–5.3)
POTASSIUM SERPL-SCNC: 3.7 MMOL/L — SIGNIFICANT CHANGE UP (ref 3.5–5.3)
RBC # BLD: 4.45 M/UL — SIGNIFICANT CHANGE UP (ref 3.8–5.2)
RBC # FLD: 14.3 % — SIGNIFICANT CHANGE UP (ref 10.3–14.5)
SARS-COV-2 RNA SPEC QL NAA+PROBE: SIGNIFICANT CHANGE UP
SODIUM SERPL-SCNC: 135 MMOL/L — SIGNIFICANT CHANGE UP (ref 135–145)
WBC # BLD: 5.14 K/UL — SIGNIFICANT CHANGE UP (ref 3.8–10.5)
WBC # FLD AUTO: 5.14 K/UL — SIGNIFICANT CHANGE UP (ref 3.8–10.5)

## 2022-05-02 RX ADMIN — GABAPENTIN 300 MILLIGRAM(S): 400 CAPSULE ORAL at 21:19

## 2022-05-02 RX ADMIN — OXYCODONE HYDROCHLORIDE 5 MILLIGRAM(S): 5 TABLET ORAL at 12:27

## 2022-05-02 RX ADMIN — ATORVASTATIN CALCIUM 80 MILLIGRAM(S): 80 TABLET, FILM COATED ORAL at 21:19

## 2022-05-02 RX ADMIN — Medication 81 MILLIGRAM(S): at 12:22

## 2022-05-02 RX ADMIN — OXYCODONE HYDROCHLORIDE 5 MILLIGRAM(S): 5 TABLET ORAL at 03:48

## 2022-05-02 RX ADMIN — GABAPENTIN 300 MILLIGRAM(S): 400 CAPSULE ORAL at 05:30

## 2022-05-02 RX ADMIN — GABAPENTIN 300 MILLIGRAM(S): 400 CAPSULE ORAL at 12:22

## 2022-05-02 RX ADMIN — Medication 50 MILLIGRAM(S): at 17:08

## 2022-05-02 RX ADMIN — Medication 650 MILLIGRAM(S): at 17:09

## 2022-05-02 RX ADMIN — ENOXAPARIN SODIUM 40 MILLIGRAM(S): 100 INJECTION SUBCUTANEOUS at 05:31

## 2022-05-02 RX ADMIN — OXYCODONE HYDROCHLORIDE 5 MILLIGRAM(S): 5 TABLET ORAL at 22:19

## 2022-05-02 RX ADMIN — Medication 25 MILLIGRAM(S): at 05:30

## 2022-05-02 RX ADMIN — PANTOPRAZOLE SODIUM 40 MILLIGRAM(S): 20 TABLET, DELAYED RELEASE ORAL at 05:30

## 2022-05-02 RX ADMIN — ISOSORBIDE MONONITRATE 30 MILLIGRAM(S): 60 TABLET, EXTENDED RELEASE ORAL at 12:22

## 2022-05-02 RX ADMIN — OXYCODONE HYDROCHLORIDE 5 MILLIGRAM(S): 5 TABLET ORAL at 21:19

## 2022-05-02 RX ADMIN — OXYCODONE HYDROCHLORIDE 5 MILLIGRAM(S): 5 TABLET ORAL at 02:03

## 2022-05-02 RX ADMIN — Medication 50 MILLIGRAM(S): at 05:30

## 2022-05-03 LAB
ANION GAP SERPL CALC-SCNC: 11 MMOL/L — SIGNIFICANT CHANGE UP (ref 7–14)
BUN SERPL-MCNC: 16 MG/DL — SIGNIFICANT CHANGE UP (ref 7–23)
CALCIUM SERPL-MCNC: 9.8 MG/DL — SIGNIFICANT CHANGE UP (ref 8.4–10.5)
CHLORIDE SERPL-SCNC: 100 MMOL/L — SIGNIFICANT CHANGE UP (ref 98–107)
CO2 SERPL-SCNC: 27 MMOL/L — SIGNIFICANT CHANGE UP (ref 22–31)
CREAT SERPL-MCNC: 0.71 MG/DL — SIGNIFICANT CHANGE UP (ref 0.5–1.3)
EGFR: 91 ML/MIN/1.73M2 — SIGNIFICANT CHANGE UP
GLUCOSE SERPL-MCNC: 87 MG/DL — SIGNIFICANT CHANGE UP (ref 70–99)
HCT VFR BLD CALC: 44.3 % — SIGNIFICANT CHANGE UP (ref 34.5–45)
HGB BLD-MCNC: 14.1 G/DL — SIGNIFICANT CHANGE UP (ref 11.5–15.5)
MAGNESIUM SERPL-MCNC: 1.8 MG/DL — SIGNIFICANT CHANGE UP (ref 1.6–2.6)
MCHC RBC-ENTMCNC: 28.5 PG — SIGNIFICANT CHANGE UP (ref 27–34)
MCHC RBC-ENTMCNC: 31.8 GM/DL — LOW (ref 32–36)
MCV RBC AUTO: 89.7 FL — SIGNIFICANT CHANGE UP (ref 80–100)
NRBC # BLD: 0 /100 WBCS — SIGNIFICANT CHANGE UP
NRBC # FLD: 0 K/UL — SIGNIFICANT CHANGE UP
PHOSPHATE SERPL-MCNC: 3.8 MG/DL — SIGNIFICANT CHANGE UP (ref 2.5–4.5)
PLATELET # BLD AUTO: 321 K/UL — SIGNIFICANT CHANGE UP (ref 150–400)
POTASSIUM SERPL-MCNC: 3.7 MMOL/L — SIGNIFICANT CHANGE UP (ref 3.5–5.3)
POTASSIUM SERPL-SCNC: 3.7 MMOL/L — SIGNIFICANT CHANGE UP (ref 3.5–5.3)
RBC # BLD: 4.94 M/UL — SIGNIFICANT CHANGE UP (ref 3.8–5.2)
RBC # FLD: 14.4 % — SIGNIFICANT CHANGE UP (ref 10.3–14.5)
SODIUM SERPL-SCNC: 138 MMOL/L — SIGNIFICANT CHANGE UP (ref 135–145)
WBC # BLD: 5.41 K/UL — SIGNIFICANT CHANGE UP (ref 3.8–10.5)
WBC # FLD AUTO: 5.41 K/UL — SIGNIFICANT CHANGE UP (ref 3.8–10.5)

## 2022-05-03 RX ADMIN — Medication 25 MILLIGRAM(S): at 05:14

## 2022-05-03 RX ADMIN — OXYCODONE HYDROCHLORIDE 5 MILLIGRAM(S): 5 TABLET ORAL at 21:14

## 2022-05-03 RX ADMIN — OXYCODONE HYDROCHLORIDE 5 MILLIGRAM(S): 5 TABLET ORAL at 06:14

## 2022-05-03 RX ADMIN — ENOXAPARIN SODIUM 40 MILLIGRAM(S): 100 INJECTION SUBCUTANEOUS at 05:14

## 2022-05-03 RX ADMIN — Medication 650 MILLIGRAM(S): at 19:06

## 2022-05-03 RX ADMIN — Medication 50 MILLIGRAM(S): at 18:12

## 2022-05-03 RX ADMIN — Medication 81 MILLIGRAM(S): at 13:20

## 2022-05-03 RX ADMIN — GABAPENTIN 300 MILLIGRAM(S): 400 CAPSULE ORAL at 05:14

## 2022-05-03 RX ADMIN — GABAPENTIN 300 MILLIGRAM(S): 400 CAPSULE ORAL at 13:20

## 2022-05-03 RX ADMIN — OXYCODONE HYDROCHLORIDE 5 MILLIGRAM(S): 5 TABLET ORAL at 22:14

## 2022-05-03 RX ADMIN — Medication 50 MILLIGRAM(S): at 05:14

## 2022-05-03 RX ADMIN — ATORVASTATIN CALCIUM 80 MILLIGRAM(S): 80 TABLET, FILM COATED ORAL at 21:13

## 2022-05-03 RX ADMIN — ISOSORBIDE MONONITRATE 30 MILLIGRAM(S): 60 TABLET, EXTENDED RELEASE ORAL at 13:21

## 2022-05-03 RX ADMIN — PANTOPRAZOLE SODIUM 40 MILLIGRAM(S): 20 TABLET, DELAYED RELEASE ORAL at 05:14

## 2022-05-03 RX ADMIN — Medication 650 MILLIGRAM(S): at 18:14

## 2022-05-03 RX ADMIN — GABAPENTIN 300 MILLIGRAM(S): 400 CAPSULE ORAL at 21:14

## 2022-05-03 RX ADMIN — OXYCODONE HYDROCHLORIDE 5 MILLIGRAM(S): 5 TABLET ORAL at 05:14

## 2022-05-03 NOTE — PROGRESS NOTE ADULT - PROBLEM SELECTOR PLAN 9
Lovenox 40mg qd   Puree diet pending S&S eval
Lovenox 40mg qd   Puree diet pending S&S eval
Lovenox 40mg qd   Puree diet pending S&S eval    Dispo: [facility]
Lovenox 40mg qd   Puree diet pending S&S eval    Dispo: [facility]
Lovenox 40mg qd   Puree diet pending S&S eval
Lovenox 40mg qd   Puree diet pending S&S eval    Dispo: [facility]
Lovenox 40mg qd   Puree diet pending S&S eval    Dispo: [facility]
Lovenox 40mg qd   Puree diet pending S&S eval
Lovenox 40mg qd   Puree diet pending S&S eval    Dispo: [facility]
Lovenox 40mg qd   Puree diet pending S&S eval
Lovenox 40mg qd   Puree diet pending S&S eval    Dispo: Pt optimized for D/c planning for  [facility]

## 2022-05-03 NOTE — PROGRESS NOTE ADULT - REASON FOR ADMISSION
right sided weakness, HA, dizziness x2 weeks

## 2022-05-03 NOTE — PROGRESS NOTE ADULT - PROBLEM SELECTOR PLAN 2
No hx migraines. CTH neg for acute pathology  -tylenol PRN  -HA improved right now, if worsens can do 1st line -  reglan 10 q6h, toradol 30 iv q8h, magnesium sulfate IV 1g, tylenol,  2nd line: - solumedrol 250mg IV x1 per neuro recs  -MRI brain______No hx migraines. CTH neg for acute pathology  -tylenol PRN  -HA improved right now, if worsens can do 1st line -  reglan 10 q6h, toradol 30 iv q8h, magnesium sulfate IV 1g, tylenol,  2nd line: - solumedrol 250mg IV x1 per neuro recs  -F/u MRI brain
No hx migraines. CTH neg for acute pathology  -tylenol PRN  -HA improved right now, if worsens can do 1st line -  reglan 10 q6h, toradol 30 iv q8h, magnesium sulfate IV 1g, tylenol,  2nd line: - solumedrol 250mg IV x1 per neuro recs  -F/u brain pending
No hx migraines. CTH neg for acute pathology  -tylenol PRN  -HA improved right now, if worsens can do 1st line -  reglan 10 q6h, toradol 30 iv q8h, magnesium sulfate IV 1g, tylenol,  2nd line: - solumedrol 250mg IV x1 per neuro recs  -MRI brain______No hx migraines. CTH neg for acute pathology  -tylenol PRN  -HA improved right now, if worsens can do 1st line -  reglan 10 q6h, toradol 30 iv q8h, magnesium sulfate IV 1g, tylenol,  2nd line: - solumedrol 250mg IV x1 per neuro recs  -F/u MRI brain
No hx migraines. CTH neg for acute pathology  -tylenol PRN  -HA improved right now, if worsens can do 1st line -  reglan 10 q6h, toradol 30 iv q8h, magnesium sulfate IV 1g, tylenol,  2nd line: - solumedrol 250mg IV x1 per neuro recs  -MRI brain______No hx migraines. CTH neg for acute pathology  -tylenol PRN  -
No hx migraines. CTH neg for acute pathology  -tylenol PRN  -HA improved right now, if worsens can do 1st line -  reglan 10 q6h, toradol 30 iv q8h, magnesium sulfate IV 1g, tylenol,  2nd line: - solumedrol 250mg IV x1 per neuro recs  -MRI brain______No hx migraines. CTH neg for acute pathology  -tylenol PRN  -HA improved right now, if worsens can do 1st line -  reglan 10 q6h, toradol 30 iv q8h, magnesium sulfate IV 1g, tylenol,  2nd line: - solumedrol 250mg IV x1 per neuro recs  -F/u MRI brain

## 2022-05-03 NOTE — PROGRESS NOTE ADULT - PROBLEM SELECTOR PLAN 3
- c/o blurred vision R (new), worsened on L and dipoplia on L (optho called)  -20/20 w/glasses  -outpt F/U
Episodic blurry vision when having headaches and currently without blurry vision. Per patient was seen by ophtho at [facility] but without dilated eye exam. Was told she has cataracts  -ophthalmology consult
- c/o blurred vision R (new), worsened on L and dipoplia on L (optho called)  -20/20 w/glasses  -outpt F/U
- c/o blurred vision R (new), worsened on L and dipoplia on L (optho called)  -20/20 w/glasses  -outpt F/U

## 2022-05-03 NOTE — PROGRESS NOTE ADULT - PROBLEM SELECTOR PROBLEM 2
Headache

## 2022-05-03 NOTE — PROGRESS NOTE ADULT - PROBLEM SELECTOR PROBLEM 8
Chronic lower back pain

## 2022-05-03 NOTE — PROGRESS NOTE ADULT - PROBLEM SELECTOR PROBLEM 3
Blurry vision

## 2022-05-03 NOTE — PROGRESS NOTE ADULT - PROBLEM SELECTOR PLAN 8
Tylenol PRN  Oxycodone PRN  MRI CTL spine stable in March

## 2022-05-03 NOTE — PROGRESS NOTE ADULT - PROBLEM SELECTOR PROBLEM 7
Thyroid nodule

## 2022-05-03 NOTE — PROGRESS NOTE ADULT - PROBLEM SELECTOR PLAN 6
No respiratory issues  Albuterol PRN

## 2022-05-03 NOTE — PROGRESS NOTE ADULT - PROBLEM SELECTOR PROBLEM 4
Sialoadenitis

## 2022-05-03 NOTE — PROGRESS NOTE ADULT - PROBLEM SELECTOR PLAN 7
-Bilateral hypodense thyroid nodules measuring up to 2.7 cm within the left thyroid lobe, demonstrating relative   stability dating back to 2013.  -per patient she has been arranging to get biopsy done as outpatient but was not able to get done while at [facility]  -patient aware to arrange f/u with her outpatient endocrinologist for biopsy and she is agreeable to do so.
-Bilateral hypodense thyroid nodules measuring up to 2.7 cm within the left thyroid lobe, demonstrating relative   stability dating back to 2013.  -per patient she has been arranging to get biopsy done as outpatient but was not able to get done while at [facility]  -patient aware to arrange f/u with her outpatient endocrinologist for biopsy and she is agreeable to do so.
-Bilateral hypodense thyroid nodules measuring up to 2.7 cm within the left thyroid lobe, demonstrating relative   stability dating back to 2013.  -per patient she has been arranging to get biopsy done as outpatient but was not able to get done while at [facility]  -patient aware to arrange f/u with her outpatient endocrinologist for biopsy and she is agreeable to do so.
-Bilateral hypodense thyroid nodules measuring up to 2.7 cm within the left thyroid lobe, demonstrating relative   stability dating back to 2013.  -per patient she has been arranging to get biopsy done as outpatient but was not able to get done while at [facility]  -patient aware to arrange f/u with her outpatient endocrinologist for biopsy and she is agreeable to do so.
-Bilateral hypodense thyroid nodules measuring up to 2.7 cm within the left thyroid lobe, demonstrating relative   stability dating back to 2013.  -per patient she has been arranging to get biopsy done as outpatient but was not able to get done while at [facility]  -patient aware to arrange f/u with her outpatient endocrinologist for biopsy and she is agreeable to do so.
-Bilateral hypodense thyroid nodules measuring up to 2.7 cm within the left thyroid lobe, demonstrating relative   stability dating back to 2013.  -per patient she has been arranging to get biopsy done as outpatient but was not able to get done while at [facility]  -patient aware to arrange f/u with her outpatient endocrinologist for biopsy and she is agreeable to do so.
-Bilateral hypodense thyroid nodules measuring up to 2.7 cm within the left thyroid lobe, demonstrating relative   stability dating back to 2013.  -per patient she has been arranging to get biopsy done as outpatient but was not able to get done while at [facility]  -patient aware to arrange f/u with her outpatient endocrinologist for biopsy and she is agreeable to do so.
-Bilateral hypodense thyroid nodules measuring up to 2.7 cm within the left thyroid lobe, demonstrating relative   stability dating back to 2013.  -per patient she has been arranging to get biopsy done as outpatient but was not able to get done while at [facility]  -patient aware to arrange f/u with her outpatient endocrinologist for biopsy and she is agreeable to do so.
Bilateral hypodense thyroid nodules measuring up to 2.7 cm within the left thyroid lobe, demonstrating relative   stability dating back to 2013.  -per patient she has been arranging to get biopsy done as outpatient but was not able to get done while at [facility]  -patient aware to arrange f/u with her outpatient endocrinologist for biopsy and she is agreeable to do so
-Bilateral hypodense thyroid nodules measuring up to 2.7 cm within the left thyroid lobe, demonstrating relative   stability dating back to 2013.  -per patient she has been arranging to get biopsy done as outpatient but was not able to get done while at [facility]  -patient aware to arrange f/u with her outpatient endocrinologist for biopsy and she is agreeable to do so.
-Bilateral hypodense thyroid nodules measuring up to 2.7 cm within the left thyroid lobe, demonstrating relative   stability dating back to 2013.  -per patient she has been arranging to get biopsy done as outpatient but was not able to get done while at [facility]  -patient aware to arrange f/u with her outpatient endocrinologist for biopsy and she is agreeable to do so.
-Bilateral hypodense thyroid nodules measuring up to 2.7 cm within the left thyroid lobe, demonstrating relative   stability dating back to 2013.  -per patient she has been arranging to get biopsy done as outpatient but was not able to get done while at [facility]  -patient aware to arrange f/u with her outpatient endocrinologist for biopsy and she is agreeable to do so.
-Bilateral hypodense thyroid nodules measuring up to 2.7 cm within the left thyroid lobe, demonstrating relative   stability dating back to 2013.  -per patient she has been arranging to get biopsy done as outpatient but was not able to get done while at [facility]  -patient aware to arrange f/u with her outpatient endocrinologist for biopsy and she is agreeable to do so.

## 2022-05-03 NOTE — PROGRESS NOTE ADULT - PROBLEM SELECTOR PLAN 1
Progressive R sensorimotor hemiparesis + headache 2/2 L brain dysfunction 2/2 possibly structural vs ischemic etiology. ?migraine related   May be related to chronic radiculopathy though per patient has worsened with episodic HAs over last two weeks  -appreciate neuro recs  -CTA without LVO, no hemorrhage  -MRI brain with and without contrast ordered  -possible MRI orbits pending ophtho eval  -ASA 81mg qd  -PT/OT/S&S eval  -passed dysphagia screen  -puree diet until S&S eval
Right sided weakness.   -Progressive R sensorimotor hemiparesis + headache 2/2 L brain dysfunction 2/2 possibly structural vs ischemic etiology. ?migraine related   May be related to chronic radiculopathy   -CTA without LVO, no hemorrhage  -f/u MRI brain with and without contrast  -ASA 81mg qd  -PT/OT/S&S eval
Right sided weakness.   -Progressive R sensorimotor hemiparesis + headache 2/2 L brain dysfunction 2/2 possibly structural vs ischemic etiology. ?migraine related   May be related to chronic radiculopathy   -CTA without LVO, no hemorrhage  -f/u MRI brain with and without contrast  -ASA 81mg qd  -PT/OT/S&S eval
Right sided weakness.   -Progressive R sensorimotor hemiparesis + headache 2/2 L brain dysfunction 2/2 possibly structural vs ischemic etiology. ?migraine related   May be related to chronic radiculopathy   -CTA without LVO, no hemorrhage  - cont aspirin
Right sided weakness.   -Progressive R sensorimotor hemiparesis + headache 2/2 L brain dysfunction 2/2 possibly structural vs ischemic etiology. ?migraine related   May be related to chronic radiculopathy   -CTA without LVO, no hemorrhage  -f/u MRI brain with and without contrast  -ASA 81mg qd  -PT/OT/S&S eval
Right sided weakness.   -Progressive R sensorimotor hemiparesis + headache 2/2 L brain dysfunction 2/2 possibly structural vs ischemic etiology. ?migraine related   May be related to chronic radiculopathy   -CTA without LVO, no hemorrhage  -f/u MRI brain with and without contrast  -possible MRI orbits pending ophtho eval  -ASA 81mg qd  -PT/OT/S&S eval
Right sided weakness.   -Progressive R sensorimotor hemiparesis + headache 2/2 L brain dysfunction 2/2 possibly structural vs ischemic etiology. ?migraine related   May be related to chronic radiculopathy   -CTA without LVO, no hemorrhage  -f/u MRI brain with and without contrast  -ASA 81mg qd  -PT/OT/S&S eval

## 2022-05-03 NOTE — PROGRESS NOTE ADULT - PROVIDER SPECIALTY LIST ADULT
Cardiology
Internal Medicine
Cardiology
Internal Medicine
Cardiology
Cardiology
Internal Medicine

## 2022-05-03 NOTE — PROGRESS NOTE ADULT - SUBJECTIVE AND OBJECTIVE BOX
PATIENT SEEN AND EXAMINED ON :- 4/28/22  DATE OF SERVICE:   4/28/22          Interim events noted,Labs ,Radiological studies and Cardiology tests reviewed .   HOSPITAL COURSE: HPI:  72F with PMHx asthma, HTN, RA, chronic low back pain, thyroid nodule presenting with two weeks of right sided weakness, HA, dizziness. Pt endorses chronic lower back pain of which she has had multiple surgeries for. She had right sided arm and leg weakness years ago before surgery which then improved after. Pt was admitted 2/24-3/7/2022 and noted to once again have right sided weakness and had MRI C/T/L spine without acute changes. She also had LHC which revealed luminal irregularities. Pt was discharged to [facility] rehab. For the last two weeks, pt notes episodic diffuse HA, slurred speech, room-spinning dizzy sensation and blurry vision often times in the morning. These sx sometimes last only a few minutes to hours at a time. She was told by [facility] to come yesterday to the ED to r/o stroke but she decided to stay until discharge today to then present. Her LKN was about two weeks ago but symptoms are episodic. Two weeks ago she still had some right sided weakness but she now feels as though it is worse. Her HA is currently resolved at this time. Of note she saw an opthalmologist while at [facility] for her episodic blurry vision and was told she has cataracts. She did not have a dilated eye exam. Currently she does not have blurry vision and her main complaint is the right sided weakness and HA. (23 Apr 2022 02:50)      INTERIM EVENTS:Patient seen at bedside ,interim events noted.      [facility] -reviewed admission note, no change since admission  HEART FAILURE: Acute[ ]Chronic[ ] Systolic[ ] Diastolic[ ] Combined Systolic and Diastolic[ ]  CAD[ ] CABG[ ] PCI[ ]  DEVICES[ ] PPM[ ] ICD[ ] ILR[ ]  ATRIAL FIBRILLATION[ ] Paroxysmal[ ] Permanent[ ]  SAAD[ ] CKD1[ ] CKD2[ ] CKD3[ ] CKD4[ ] ESRD[ ]  COPD[ ] HTN[ ]   DM[ ] Type1[ ] Type 2[ ]   CVA[ ] Paresis[ ]    AMBULATION: Assisted[ ] Cane/walker[ ] Independent[ ]    MEDICATIONS  (STANDING):  aspirin enteric coated 81 milliGRAM(s) Oral daily  atorvastatin 20 milliGRAM(s) Oral at bedtime  enoxaparin Injectable 40 milliGRAM(s) SubCutaneous every 24 hours  gabapentin 300 milliGRAM(s) Oral three times a day  hydrochlorothiazide 25 milliGRAM(s) Oral daily  isosorbide   mononitrate ER Tablet (IMDUR) 30 milliGRAM(s) Oral daily  metoprolol tartrate 50 milliGRAM(s) Oral two times a day  pantoprazole    Tablet 40 milliGRAM(s) Oral before breakfast  penicillin   milliGRAM(s) Oral every 8 hours    MEDICATIONS  (PRN):  acetaminophen     Tablet .. 650 milliGRAM(s) Oral every 6 hours PRN Temp greater or equal to 38C (100.4F), Mild Pain (1 - 3)  ALBUTerol    90 MICROgram(s) HFA Inhaler 2 Puff(s) Inhalation every 6 hours PRN Shortness of Breath and/or Wheezing  melatonin 3 milliGRAM(s) Oral at bedtime PRN Insomnia  oxyCODONE    IR 5 milliGRAM(s) Oral every 4 hours PRN Moderate - Severe Pain (4 - 10)            REVIEW OF SYSTEMS:  Constitutional: [ ] fever, [ ]weight loss,  [ ]fatigue  Eyes: [ ] visual changes  Respiratory: [ ]shortness of breath;  [ ] cough, [ ]wheezing, [ ]chills, [ ]hemoptysis  Cardiovascular: [ ] chest pain, [ ]palpitations, [ ]dizziness,  [ ]leg swelling[ ]orthopnea[ ]PND  Gastrointestinal: [ ] abdominal pain, [ ]nausea, [ ]vomiting,  [ ]diarrhea [ ]Constipation [ ]Melena  Genitourinary: [ ] dysuria, [ ] hematuria [ ]Mathews  Neurologic: [ ] headaches [ ] tremors[ ]weakness [ ]Paralysis Right[ ] Left[ ]  Skin: [ ] itching, [ ]burning, [ ] rashes  Endocrine: [ ] heat or cold intolerance  Musculoskeletal: [ ] joint pain or swelling; [ ] muscle, back, or extremity pain  Psychiatric: [ ] depression, [ ]anxiety, [ ]mood swings, or [ ]difficulty sleeping  Hematologic: [ ] easy bruising, [ ] bleeding gums    [ ] All remaining systems negative except as per above.   [ ]Unable to obtain.  [x] No change in ROS since admission      Vital Signs Last 24 Hrs  T(C): 36.7 (28 Apr 2022 17:16), Max: 36.8 (28 Apr 2022 09:00)  T(F): 98 (28 Apr 2022 17:16), Max: 98.3 (28 Apr 2022 12:53)  HR: 64 (28 Apr 2022 17:16) (59 - 65)  BP: 139/88 (28 Apr 2022 17:16) (127/64 - 154/65)  BP(mean): --  RR: 18 (28 Apr 2022 17:16) (15 - 20)  SpO2: 100% (28 Apr 2022 17:16) (98% - 100%)  I&O's Summary      PHYSICAL EXAM:  General: No acute distress BMI-  HEENT: EOMI, PERRL  Neck: Supple, [ ] JVD  Lungs: Equal air entry bilaterally; [ ] rales [ ] wheezing [ ] rhonchi  Heart: Regular rate and rhythm; [x ] murmur   2/6 [ x] systolic [ ] diastolic [ ] radiation[ ] rubs [ ]  gallops  Abdomen: Nontender, bowel sounds present  Extremities: No clubbing, cyanosis, [ ] edema [ ]Pulses  equal and intact  Nervous system:  Alert & Oriented X3, no focal deficits  Psychiatric: Normal affect  Skin: No rashes or lesions    LABS:  04-28    137  |  102  |  20  ----------------------------<  101<H>  3.7   |  23  |  0.70    Ca    9.3      28 Apr 2022 07:03  Phos  3.4     04-28  Mg     1.70     04-28      Creatinine Trend: 0.70<--, 0.79<--, 0.67<--, 0.70<--, 0.66<--, 0.64<--                        12.8   5.85  )-----------( 293      ( 28 Apr 2022 07:03 )             40.2               
PATIENT SEEN AND EXAMINED ON :- 4/29/22    INTERIM EVENTS:Patient seen at bedside ,interim events noted.    MEDICATIONS  (STANDING):  aspirin enteric coated 81 milliGRAM(s) Oral daily  atorvastatin 20 milliGRAM(s) Oral at bedtime  enoxaparin Injectable 40 milliGRAM(s) SubCutaneous every 24 hours  gabapentin 300 milliGRAM(s) Oral three times a day  hydrochlorothiazide 25 milliGRAM(s) Oral daily  isosorbide   mononitrate ER Tablet (IMDUR) 30 milliGRAM(s) Oral daily  LORazepam     Tablet 1 milliGRAM(s) Oral once  metoprolol tartrate 50 milliGRAM(s) Oral two times a day  pantoprazole    Tablet 40 milliGRAM(s) Oral before breakfast    MEDICATIONS  (PRN):  acetaminophen     Tablet .. 650 milliGRAM(s) Oral every 6 hours PRN Temp greater or equal to 38C (100.4F), Mild Pain (1 - 3)  ALBUTerol    90 MICROgram(s) HFA Inhaler 2 Puff(s) Inhalation every 6 hours PRN Shortness of Breath and/or Wheezing  melatonin 3 milliGRAM(s) Oral at bedtime PRN Insomnia  oxyCODONE    IR 5 milliGRAM(s) Oral every 4 hours PRN Moderate - Severe Pain (4 - 10)            REVIEW OF SYSTEMS:  Constitutional: [ ] fever, [ ]weight loss,  [ ]fatigue  Eyes: [ ] visual changes  Respiratory: [ ]shortness of breath;  [ ] cough, [ ]wheezing, [ ]chills, [ ]hemoptysis  Cardiovascular: [ ] chest pain, [ ]palpitations, [ ]dizziness,  [ ]leg swelling[ ]orthopnea[ ]PND  Gastrointestinal: [ ] abdominal pain, [ ]nausea, [ ]vomiting,  [ ]diarrhea [ ]Constipation [ ]Melena  Genitourinary: [ ] dysuria, [ ] hematuria [ ]Mathews  Neurologic: [ ] headaches [ ] tremors[ ]weakness [ ]Paralysis Right[ ] Left[ ]  Skin: [ ] itching, [ ]burning, [ ] rashes  Endocrine: [ ] heat or cold intolerance  Musculoskeletal: [ ] joint pain or swelling; [ ] muscle, back, or extremity pain  Psychiatric: [ ] depression, [ ]anxiety, [ ]mood swings, or [ ]difficulty sleeping  Hematologic: [ ] easy bruising, [ ] bleeding gums    [ ] All remaining systems negative except as per above.   [ ]Unable to obtain.  [x] No change in ROS since admission      Vital Signs Last 24 Hrs  T(C): 37 (29 Apr 2022 17:40), Max: 37 (29 Apr 2022 17:40)  T(F): 98.6 (29 Apr 2022 17:40), Max: 98.6 (29 Apr 2022 17:40)  HR: 71 (29 Apr 2022 17:40) (60 - 71)  BP: 154/82 (29 Apr 2022 17:40) (132/68 - 154/82)  BP(mean): --  RR: 19 (29 Apr 2022 17:40) (18 - 19)  SpO2: 100% (29 Apr 2022 17:40) (97% - 100%)  I&O's Summary      PHYSICAL EXAM:  General: No acute distress BMI-  HEENT: EOMI, PERRL  Neck: Supple, [ ] JVD  Lungs: dec breath sounds at bases  Heart: Regular rate and rhythm; [x ] murmur   2/6 [ x] systolic [ ] diastolic [ ] radiation[ ] rubs [ ]  gallops  Abdomen: Nontender, bowel sounds present  Extremities: No clubbing, cyanosis, [ ] edema [ ]Pulses  equal and intact  Nervous system:  Alert awake  Psychiatric: Normal affect  Skin: No rashes or lesions    LABS:  04-29    137  |  102  |  19  ----------------------------<  81  3.8   |  23  |  0.63    Ca    9.6      29 Apr 2022 06:27  Phos  4.3     04-29  Mg     1.90     04-29      Creatinine Trend: 0.63<--, 0.70<--, 0.79<--, 0.67<--, 0.70<--, 0.66<--                        12.8   5.33  )-----------( 256      ( 29 Apr 2022 06:27 )             39.2               
PATIENT SEEN AND EXAMINED ON :- 4/29/22  DATE OF SERVICE:4/29/22             Interim events noted,Labs ,Radiological studies and Cardiology tests reviewed .     HOSPITAL COURSE: HPI:  72F with PMHx asthma, HTN, RA, chronic low back pain, thyroid nodule presenting with two weeks of right sided weakness, HA, dizziness. Pt endorses chronic lower back pain of which she has had multiple surgeries for. She had right sided arm and leg weakness years ago before surgery which then improved after. Pt was admitted 2/24-3/7/2022 and noted to once again have right sided weakness and had MRI C/T/L spine without acute changes. She also had LHC which revealed luminal irregularities. Pt was discharged to [facility] rehab. For the last two weeks, pt notes episodic diffuse HA, slurred speech, room-spinning dizzy sensation and blurry vision often times in the morning. These sx sometimes last only a few minutes to hours at a time. She was told by [facility] to come yesterday to the ED to r/o stroke but she decided to stay until discharge today to then present. Her LKN was about two weeks ago but symptoms are episodic. Two weeks ago she still had some right sided weakness but she now feels as though it is worse. Her HA is currently resolved at this time. Of note she saw an opthalmologist while at [facility] for her episodic blurry vision and was told she has cataracts. She did not have a dilated eye exam. Currently she does not have blurry vision and her main complaint is the right sided weakness and HA. (23 Apr 2022 02:50)      INTERIM EVENTS:Patient seen at bedside ,interim events noted.      [facility] -reviewed admission note, no change since admission  HEART FAILURE: Acute[ ]Chronic[ ] Systolic[ ] Diastolic[ ] Combined Systolic and Diastolic[ ]  CAD[ ] CABG[ ] PCI[ ]  DEVICES[ ] PPM[ ] ICD[ ] ILR[ ]  ATRIAL FIBRILLATION[ ] Paroxysmal[ ] Permanent[ ]  SAAD[ ] CKD1[ ] CKD2[ ] CKD3[ ] CKD4[ ] ESRD[ ]  COPD[ ] HTN[ ]   DM[ ] Type1[ ] Type 2[ ]   CVA[ ] Paresis[ ]    AMBULATION: Assisted[ ] Cane/walker[ ] Independent[ ]    MEDICATIONS  (STANDING):  aspirin enteric coated 81 milliGRAM(s) Oral daily  atorvastatin 20 milliGRAM(s) Oral at bedtime  enoxaparin Injectable 40 milliGRAM(s) SubCutaneous every 24 hours  gabapentin 300 milliGRAM(s) Oral three times a day  hydrochlorothiazide 25 milliGRAM(s) Oral daily  isosorbide   mononitrate ER Tablet (IMDUR) 30 milliGRAM(s) Oral daily  LORazepam     Tablet 1 milliGRAM(s) Oral once  metoprolol tartrate 50 milliGRAM(s) Oral two times a day  pantoprazole    Tablet 40 milliGRAM(s) Oral before breakfast    MEDICATIONS  (PRN):  acetaminophen     Tablet .. 650 milliGRAM(s) Oral every 6 hours PRN Temp greater or equal to 38C (100.4F), Mild Pain (1 - 3)  ALBUTerol    90 MICROgram(s) HFA Inhaler 2 Puff(s) Inhalation every 6 hours PRN Shortness of Breath and/or Wheezing  melatonin 3 milliGRAM(s) Oral at bedtime PRN Insomnia  oxyCODONE    IR 5 milliGRAM(s) Oral every 4 hours PRN Moderate - Severe Pain (4 - 10)            REVIEW OF SYSTEMS:  Constitutional: [ ] fever, [ ]weight loss,  [ ]fatigue  Eyes: [ ] visual changes  Respiratory: [ ]shortness of breath;  [ ] cough, [ ]wheezing, [ ]chills, [ ]hemoptysis  Cardiovascular: [ ] chest pain, [ ]palpitations, [ ]dizziness,  [ ]leg swelling[ ]orthopnea[ ]PND  Gastrointestinal: [ ] abdominal pain, [ ]nausea, [ ]vomiting,  [ ]diarrhea [ ]Constipation [ ]Melena  Genitourinary: [ ] dysuria, [ ] hematuria [ ]Mathews  Neurologic: [ ] headaches [ ] tremors[ ]weakness [ ]Paralysis Right[ ] Left[ ]  Skin: [ ] itching, [ ]burning, [ ] rashes  Endocrine: [ ] heat or cold intolerance  Musculoskeletal: [ ] joint pain or swelling; [ ] muscle, back, or extremity pain  Psychiatric: [ ] depression, [ ]anxiety, [ ]mood swings, or [ ]difficulty sleeping  Hematologic: [ ] easy bruising, [ ] bleeding gums    [ ] All remaining systems negative except as per above.   [ ]Unable to obtain.  [x] No change in ROS since admission      Vital Signs Last 24 Hrs  T(C): 37 (29 Apr 2022 17:40), Max: 37 (29 Apr 2022 17:40)  T(F): 98.6 (29 Apr 2022 17:40), Max: 98.6 (29 Apr 2022 17:40)  HR: 71 (29 Apr 2022 17:40) (60 - 71)  BP: 154/82 (29 Apr 2022 17:40) (132/68 - 154/82)  BP(mean): --  RR: 19 (29 Apr 2022 17:40) (18 - 19)  SpO2: 100% (29 Apr 2022 17:40) (97% - 100%)  I&O's Summary      PHYSICAL EXAM:  General: No acute distress BMI-  HEENT: EOMI, PERRL  Neck: Supple, [ ] JVD  Lungs: Equal air entry bilaterally; [ ] rales [ ] wheezing [ ] rhonchi  Heart: Regular rate and rhythm; [x ] murmur   2/6 [ x] systolic [ ] diastolic [ ] radiation[ ] rubs [ ]  gallops  Abdomen: Nontender, bowel sounds present  Extremities: No clubbing, cyanosis, [ ] edema [ ]Pulses  equal and intact  Nervous system:  Alert & Oriented X3, no focal deficits  Psychiatric: Normal affect  Skin: No rashes or lesions    LABS:  04-29    137  |  102  |  19  ----------------------------<  81  3.8   |  23  |  0.63    Ca    9.6      29 Apr 2022 06:27  Phos  4.3     04-29  Mg     1.90     04-29      Creatinine Trend: 0.63<--, 0.70<--, 0.79<--, 0.67<--, 0.70<--, 0.66<--                        12.8   5.33  )-----------( 256      ( 29 Apr 2022 06:27 )             39.2               
PATIENT SEEN AND EXAMINED ON :- 4/27/22  DATE OF SERVICE:  4/27/22           Interim events noted,Labs ,Radiological studies and Cardiology tests reviewed .     HOSPITAL COURSE: HPI:  72F with PMHx asthma, HTN, RA, chronic low back pain, thyroid nodule presenting with two weeks of right sided weakness, HA, dizziness. Pt endorses chronic lower back pain of which she has had multiple surgeries for. She had right sided arm and leg weakness years ago before surgery which then improved after. Pt was admitted 2/24-3/7/2022 and noted to once again have right sided weakness and had MRI C/T/L spine without acute changes. She also had LHC which revealed luminal irregularities. Pt was discharged to [facility] rehab. For the last two weeks, pt notes episodic diffuse HA, slurred speech, room-spinning dizzy sensation and blurry vision often times in the morning. These sx sometimes last only a few minutes to hours at a time. She was told by [facility] to come yesterday to the ED to r/o stroke but she decided to stay until discharge today to then present. Her LKN was about two weeks ago but symptoms are episodic. Two weeks ago she still had some right sided weakness but she now feels as though it is worse. Her HA is currently resolved at this time. Of note she saw an opthalmologist while at [facility] for her episodic blurry vision and was told she has cataracts. She did not have a dilated eye exam. Currently she does not have blurry vision and her main complaint is the right sided weakness and HA. (23 Apr 2022 02:50)      INTERIM EVENTS:Patient seen at bedside ,interim events noted.      [facility] -reviewed admission note, no change since admission  HEART FAILURE: Acute[ ]Chronic[ ] Systolic[ ] Diastolic[ ] Combined Systolic and Diastolic[ ]  CAD[ ] CABG[ ] PCI[ ]  DEVICES[ ] PPM[ ] ICD[ ] ILR[ ]  ATRIAL FIBRILLATION[ ] Paroxysmal[ ] Permanent[ ]  SAAD[ ] CKD1[ ] CKD2[ ] CKD3[ ] CKD4[ ] ESRD[ ]  COPD[ ] HTN[ ]   DM[ ] Type1[ ] Type 2[ ]   CVA[ ] Paresis[ ]    AMBULATION: Assisted[ ] Cane/walker[ ] Independent[ ]    MEDICATIONS  (STANDING):  aspirin enteric coated 81 milliGRAM(s) Oral daily  atorvastatin 20 milliGRAM(s) Oral at bedtime  enoxaparin Injectable 40 milliGRAM(s) SubCutaneous every 24 hours  gabapentin 300 milliGRAM(s) Oral three times a day  hydrochlorothiazide 25 milliGRAM(s) Oral daily  isosorbide   mononitrate ER Tablet (IMDUR) 30 milliGRAM(s) Oral daily  metoprolol tartrate 50 milliGRAM(s) Oral two times a day  pantoprazole    Tablet 40 milliGRAM(s) Oral before breakfast  penicillin   milliGRAM(s) Oral every 8 hours    MEDICATIONS  (PRN):  acetaminophen     Tablet .. 650 milliGRAM(s) Oral every 6 hours PRN Temp greater or equal to 38C (100.4F), Mild Pain (1 - 3)  ALBUTerol    90 MICROgram(s) HFA Inhaler 2 Puff(s) Inhalation every 6 hours PRN Shortness of Breath and/or Wheezing  melatonin 3 milliGRAM(s) Oral at bedtime PRN Insomnia  oxyCODONE    IR 5 milliGRAM(s) Oral every 4 hours PRN Moderate - Severe Pain (4 - 10)            REVIEW OF SYSTEMS:  Constitutional: [ ] fever, [ ]weight loss,  [ ]fatigue  Eyes: [ ] visual changes  Respiratory: [ ]shortness of breath;  [ ] cough, [ ]wheezing, [ ]chills, [ ]hemoptysis  Cardiovascular: [ ] chest pain, [ ]palpitations, [ ]dizziness,  [ ]leg swelling[ ]orthopnea[ ]PND  Gastrointestinal: [ ] abdominal pain, [ ]nausea, [ ]vomiting,  [ ]diarrhea [ ]Constipation [ ]Melena  Genitourinary: [ ] dysuria, [ ] hematuria [ ]Mathews  Neurologic: [ ] headaches [ ] tremors[ ]weakness [ ]Paralysis Right[ ] Left[ ]  Skin: [ ] itching, [ ]burning, [ ] rashes  Endocrine: [ ] heat or cold intolerance  Musculoskeletal: [ ] joint pain or swelling; [ ] muscle, back, or extremity pain  Psychiatric: [ ] depression, [ ]anxiety, [ ]mood swings, or [ ]difficulty sleeping  Hematologic: [ ] easy bruising, [ ] bleeding gums    [ ] All remaining systems negative except as per above.   [ ]Unable to obtain.  [x] No change in ROS since admission      Vital Signs Last 24 Hrs  T(C): 36.8 (27 Apr 2022 18:00), Max: 37 (27 Apr 2022 09:20)  T(F): 98.3 (27 Apr 2022 18:00), Max: 98.6 (27 Apr 2022 09:20)  HR: 77 (27 Apr 2022 18:00) (63 - 77)  BP: 149/91 (27 Apr 2022 18:00) (122/67 - 159/91)  BP(mean): --  RR: 18 (27 Apr 2022 18:00) (16 - 18)  SpO2: 99% (27 Apr 2022 18:00) (99% - 100%)  I&O's Summary      PHYSICAL EXAM:  General: No acute distress BMI-  HEENT: EOMI, PERRL  Neck: Supple, [ ] JVD  Lungs: Equal air entry bilaterally; [ ] rales [ ] wheezing [ ] rhonchi  Heart: Regular rate and rhythm; [x ] murmur   2/6 [ x] systolic [ ] diastolic [ ] radiation[ ] rubs [ ]  gallops  Abdomen: Nontender, bowel sounds present  Extremities: No clubbing, cyanosis, [ ] edema [ ]Pulses  equal and intact  Nervous system:  Alert & Oriented X3, no focal deficits  Psychiatric: Normal affect  Skin: No rashes or lesions    LABS:  04-27    136  |  100  |  16  ----------------------------<  99  3.5   |  23  |  0.79    Ca    9.5      27 Apr 2022 07:37  Phos  4.3     04-27  Mg     1.80     04-27      Creatinine Trend: 0.79<--, 0.67<--, 0.70<--, 0.66<--, 0.64<--, 0.79<--                        12.6   5.52  )-----------( 255      ( 27 Apr 2022 07:37 )             38.7           < from: Transthoracic Echocardiogram (04.25.22 @ 07:31) >  CONCLUSIONS:  1. Mitral annular calcification, otherwise normal mitral  valve. Minimal mitral regurgitation.  2. Normal left ventricular internal dimensions and wall  thicknesses.  3. Normal left ventricular systolic function. No segmental  wall motion abnormalities.  4. Mild diastolic dysfunction (Stage I).  5. Normal right ventricular size and function.  6. A bubble study was performed with the intravenous  injection of agitated saline contrast and was inconclusive  regarding the presence of an interatrial shunt.  No obviouscardiac source of embolus was identified on this  transthoracic study.  If clinical suspicion is high,  consider ETHAN for further evaluation.  ------------------------------------------------------------------------  Confirmed on  4/25/2022 - 11:02:23by Brian Sanchez M.D.,    < end of copied text >      
PATIENT SEEN AND EXAMINED ON :- 5/01/22    INTERIM EVENTS:Patient seen at bedside ,interim events noted.    MEDICATIONS  (STANDING):  aspirin enteric coated 81 milliGRAM(s) Oral daily  atorvastatin 80 milliGRAM(s) Oral at bedtime  enoxaparin Injectable 40 milliGRAM(s) SubCutaneous every 24 hours  gabapentin 300 milliGRAM(s) Oral three times a day  hydrochlorothiazide 25 milliGRAM(s) Oral daily  isosorbide   mononitrate ER Tablet (IMDUR) 30 milliGRAM(s) Oral daily  metoprolol tartrate 50 milliGRAM(s) Oral two times a day  pantoprazole    Tablet 40 milliGRAM(s) Oral before breakfast    MEDICATIONS  (PRN):  acetaminophen     Tablet .. 650 milliGRAM(s) Oral every 6 hours PRN Temp greater or equal to 38C (100.4F), Mild Pain (1 - 3)  ALBUTerol    90 MICROgram(s) HFA Inhaler 2 Puff(s) Inhalation every 6 hours PRN Shortness of Breath and/or Wheezing  melatonin 3 milliGRAM(s) Oral at bedtime PRN Insomnia  oxyCODONE    IR 5 milliGRAM(s) Oral every 4 hours PRN Moderate - Severe Pain (4 - 10)    Vital Signs Last 24 Hrs  T(C): 37.1 (05-01-22 @ 22:21), Max: 37.2 (05-01-22 @ 09:38)  T(F): 98.7 (05-01-22 @ 22:21), Max: 99 (05-01-22 @ 09:38)  HR: 72 (05-01-22 @ 22:21) (62 - 75)  BP: 140/89 (05-01-22 @ 22:21) (118/63 - 159/90)  BP(mean): --  RR: 17 (05-01-22 @ 22:21) (17 - 20)  SpO2: 100% (05-01-22 @ 22:21) (99% - 100%)      REVIEW OF SYSTEMS:  Constitutional: [ ] fever, [ ]weight loss,  [ ]fatigue  Eyes: [ ] visual changes  Respiratory: [ ]shortness of breath;  [ ] cough, [ ]wheezing, [ ]chills, [ ]hemoptysis  Cardiovascular: [ ] chest pain, [ ]palpitations, [ ]dizziness,  [ ]leg swelling[ ]orthopnea[ ]PND  Gastrointestinal: [ ] abdominal pain, [ ]nausea, [ ]vomiting,  [ ]diarrhea [ ]Constipation [ ]Melena  Genitourinary: [ ] dysuria, [ ] hematuria [ ]Mathews  Neurologic: [ ] headaches [ ] tremors[ ]weakness [ ]Paralysis Right[ ] Left[ ]  Skin: [ ] itching, [ ]burning, [ ] rashes  Endocrine: [ ] heat or cold intolerance  Musculoskeletal: [ ] joint pain or swelling; [ ] muscle, back, or extremity pain  Psychiatric: [ ] depression, [ ]anxiety, [ ]mood swings, or [ ]difficulty sleeping  Hematologic: [ ] easy bruising, [ ] bleeding gums    [ ] All remaining systems negative except as per above.   [ ]Unable to obtain.  [x] No change in ROS since admission        PHYSICAL EXAM:  General: No acute distress BMI-  HEENT: EOMI, PERRL  Neck: Supple, [ ] JVD  Lungs: dec breath sounds at bases  Heart: Regular rate and rhythm; [x ] murmur   2/6 [ x] systolic [ ] diastolic [ ] radiation[ ] rubs [ ]  gallops  Abdomen: Nontender, bowel sounds present  Extremities: No clubbing, cyanosis, [ ] edema [ ]Pulses  equal and intact  Nervous system:  Alert awake  Psychiatric: Normal affect  Skin: No rashes or lesions    LABS:  05-01    132<L>  |  95<L>  |  18  ----------------------------<  73  3.4<L>   |  26  |  0.68    Ca    9.4      01 May 2022 07:54  Phos  3.6     05-01  Mg     1.80     05-01      Creatinine Trend: 0.68 <--, 0.66 <--, 0.63 <--, 0.70 <--, 0.79 <--, 0.67 <--, 0.70 <--                        12.9   5.37  )-----------( 284      ( 01 May 2022 07:54 )             40.2     Urine Studies:                    
PATIENT SEEN AND EXAMINED ON :- 4/26/22  DATE OF SERVICE:   4/26/22          Interim events noted,Labs ,Radiological studies and Cardiology tests reviewed .     HOSPITAL COURSE: HPI:  72F with PMHx asthma, HTN, RA, chronic low back pain, thyroid nodule presenting with two weeks of right sided weakness, HA, dizziness. Pt endorses chronic lower back pain of which she has had multiple surgeries for. She had right sided arm and leg weakness years ago before surgery which then improved after. Pt was admitted 2/24-3/7/2022 and noted to once again have right sided weakness and had MRI C/T/L spine without acute changes. She also had LHC which revealed luminal irregularities. Pt was discharged to [facility] rehab. For the last two weeks, pt notes episodic diffuse HA, slurred speech, room-spinning dizzy sensation and blurry vision often times in the morning. These sx sometimes last only a few minutes to hours at a time. She was told by [facility] to come yesterday to the ED to r/o stroke but she decided to stay until discharge today to then present. Her LKN was about two weeks ago but symptoms are episodic. Two weeks ago she still had some right sided weakness but she now feels as though it is worse. Her HA is currently resolved at this time. Of note she saw an opthalmologist while at [facility] for her episodic blurry vision and was told she has cataracts. She did not have a dilated eye exam. Currently she does not have blurry vision and her main complaint is the right sided weakness and HA. (23 Apr 2022 02:50)      INTERIM EVENTS:Patient seen at bedside ,interim events noted.      [facility] -reviewed admission note, no change since admission  HEART FAILURE: Acute[ ]Chronic[ ] Systolic[ ] Diastolic[ ] Combined Systolic and Diastolic[ ]  CAD[ ] CABG[ ] PCI[ ]  DEVICES[ ] PPM[ ] ICD[ ] ILR[ ]  ATRIAL FIBRILLATION[ ] Paroxysmal[ ] Permanent[ ]  SAAD[ ] CKD1[ ] CKD2[ ] CKD3[ ] CKD4[ ] ESRD[ ]  COPD[ ] HTN[ ]   DM[ ] Type1[ ] Type 2[ ]   CVA[ ] Paresis[ ]    AMBULATION: Assisted[ ] Cane/walker[ ] Independent[ ]    MEDICATIONS  (STANDING):  aspirin enteric coated 81 milliGRAM(s) Oral daily  atorvastatin 20 milliGRAM(s) Oral at bedtime  enoxaparin Injectable 40 milliGRAM(s) SubCutaneous every 24 hours  gabapentin 300 milliGRAM(s) Oral three times a day  hydrochlorothiazide 25 milliGRAM(s) Oral daily  isosorbide   mononitrate ER Tablet (IMDUR) 30 milliGRAM(s) Oral daily  metoprolol tartrate 50 milliGRAM(s) Oral two times a day  pantoprazole    Tablet 40 milliGRAM(s) Oral before breakfast  penicillin   milliGRAM(s) Oral every 8 hours    MEDICATIONS  (PRN):  acetaminophen     Tablet .. 650 milliGRAM(s) Oral every 6 hours PRN Temp greater or equal to 38C (100.4F), Mild Pain (1 - 3)  ALBUTerol    90 MICROgram(s) HFA Inhaler 2 Puff(s) Inhalation every 6 hours PRN Shortness of Breath and/or Wheezing  melatonin 3 milliGRAM(s) Oral at bedtime PRN Insomnia  oxyCODONE    IR 5 milliGRAM(s) Oral every 4 hours PRN Moderate - Severe Pain (4 - 10)            REVIEW OF SYSTEMS:  Constitutional: [ ] fever, [ ]weight loss,  [ ]fatigue  Eyes: [ ] visual changes  Respiratory: [ ]shortness of breath;  [ ] cough, [ ]wheezing, [ ]chills, [ ]hemoptysis  Cardiovascular: [ ] chest pain, [ ]palpitations, [ ]dizziness,  [ ]leg swelling[ ]orthopnea[ ]PND  Gastrointestinal: [ ] abdominal pain, [ ]nausea, [ ]vomiting,  [ ]diarrhea [ ]Constipation [ ]Melena  Genitourinary: [ ] dysuria, [ ] hematuria [ ]Mathews  Neurologic: [ ] headaches [ ] tremors[ ]weakness [ ]Paralysis Right[ ] Left[ ]  Skin: [ ] itching, [ ]burning, [ ] rashes  Endocrine: [ ] heat or cold intolerance  Musculoskeletal: [ ] joint pain or swelling; [ ] muscle, back, or extremity pain  Psychiatric: [ ] depression, [ ]anxiety, [ ]mood swings, or [ ]difficulty sleeping  Hematologic: [ ] easy bruising, [ ] bleeding gums    [ ] All remaining systems negative except as per above.   [ ]Unable to obtain.  [x] No change in ROS since admission      Vital Signs Last 24 Hrs  T(C): 36.8 (26 Apr 2022 09:13), Max: 37.1 (26 Apr 2022 01:00)  T(F): 98.2 (26 Apr 2022 09:13), Max: 98.7 (26 Apr 2022 01:00)  HR: 70 (26 Apr 2022 12:13) (60 - 78)  BP: 155/86 (26 Apr 2022 12:13) (134/66 - 155/86)  BP(mean): --  RR: 18 (26 Apr 2022 09:13) (18 - 18)  SpO2: 100% (26 Apr 2022 09:13) (100% - 100%)  I&O's Summary      PHYSICAL EXAM:  General: No acute distress, weakness  HEENT: EOMI, PERRL  Neck: Supple, [ ] JVD  Lungs: Equal air entry bilaterally; [ ] rales [ ] wheezing [ ] rhonchi  Heart: Regular rate and rhythm; [x ] murmur   2/6 [ x] systolic [ ] diastolic [ ] radiation[ ] rubs [ ]  gallops  Abdomen: Nontender, bowel sounds present  Extremities: No clubbing, cyanosis, [ ] edema [ ]Pulses  equal and intact  Nervous system:  Alert & Oriented X3, no focal deficits  Psychiatric: Normal affect  Skin: No rashes or lesions    LABS:  04-26    139  |  103  |  15  ----------------------------<  83  3.8   |  25  |  0.67    Ca    9.6      26 Apr 2022 06:46  Phos  3.8     04-26  Mg     1.70     04-26      Creatinine Trend: 0.67<--, 0.70<--, 0.66<--, 0.64<--, 0.79<--                        12.7   5.49  )-----------( 256      ( 26 Apr 2022 06:46 )             39.1         < from: Transthoracic Echocardiogram (04.25.22 @ 07:31) >  CONCLUSIONS:  1. Mitral annular calcification, otherwise normal mitral  valve. Minimal mitral regurgitation.  2. Normal left ventricular internal dimensions and wall  thicknesses.  3. Normal left ventricular systolic function. No segmental  wall motion abnormalities.  4. Mild diastolic dysfunction (Stage I).  5. Normal right ventricular size and function.  6. A bubble study was performed with the intravenous  injection of agitated saline contrast and was inconclusive  regarding the presence of an interatrial shunt.  No obviouscardiac source of embolus was identified on this  transthoracic study.  If clinical suspicion is high,  consider ETHAN for further evaluation.    < end of copied text >        
PATIENT SEEN AND EXAMINED ON :- 4/24/22  DATE OF SERVICE: 4/24/22            Interim events noted,Labs ,Radiological studies and Cardiology tests reviewed .     HOSPITAL COURSE: HPI:  72F with PMHx asthma, HTN, RA, chronic low back pain, thyroid nodule presenting with two weeks of right sided weakness, HA, dizziness. Pt endorses chronic lower back pain of which she has had multiple surgeries for. She had right sided arm and leg weakness years ago before surgery which then improved after. Pt was admitted 2/24-3/7/2022 and noted to once again have right sided weakness and had MRI C/T/L spine without acute changes. She also had LHC which revealed luminal irregularities. Pt was discharged to [facility] rehab. For the last two weeks, pt notes episodic diffuse HA, slurred speech, room-spinning dizzy sensation and blurry vision often times in the morning. These sx sometimes last only a few minutes to hours at a time. She was told by [facility] to come yesterday to the ED to r/o stroke but she decided to stay until discharge today to then present. Her LKN was about two weeks ago but symptoms are episodic. Two weeks ago she still had some right sided weakness but she now feels as though it is worse. Her HA is currently resolved at this time. Of note she saw an opthalmologist while at [facility] for her episodic blurry vision and was told she has cataracts. She did not have a dilated eye exam. Currently she does not have blurry vision and her main complaint is the right sided weakness and HA. (23 Apr 2022 02:50)      INTERIM EVENTS:Patient seen at bedside ,interim events noted.      [facility] -reviewed admission note, no change since admission  HEART FAILURE: Acute[ ]Chronic[ ] Systolic[ ] Diastolic[ ] Combined Systolic and Diastolic[ ]  CAD[ ] CABG[ ] PCI[ ]  DEVICES[ ] PPM[ ] ICD[ ] ILR[ ]  ATRIAL FIBRILLATION[ ] Paroxysmal[ ] Permanent[ ]  SAAD[ ] CKD1[ ] CKD2[ ] CKD3[ ] CKD4[ ] ESRD[ ]  COPD[ ] HTN[ ]   DM[ ] Type1[ ] Type 2[ ]   CVA[ ] Paresis[ ]    AMBULATION: Assisted[ ] Cane/walker[ ] Independent[ ]    MEDICATIONS  (STANDING):  aspirin enteric coated 81 milliGRAM(s) Oral daily  atorvastatin 20 milliGRAM(s) Oral at bedtime  enoxaparin Injectable 40 milliGRAM(s) SubCutaneous every 24 hours  gabapentin 300 milliGRAM(s) Oral three times a day  hydrochlorothiazide 25 milliGRAM(s) Oral daily  isosorbide   mononitrate ER Tablet (IMDUR) 30 milliGRAM(s) Oral daily  metoprolol tartrate 50 milliGRAM(s) Oral two times a day  pantoprazole    Tablet 40 milliGRAM(s) Oral before breakfast    MEDICATIONS  (PRN):  acetaminophen     Tablet .. 650 milliGRAM(s) Oral every 6 hours PRN Temp greater or equal to 38C (100.4F), Mild Pain (1 - 3)  ALBUTerol    90 MICROgram(s) HFA Inhaler 2 Puff(s) Inhalation every 6 hours PRN Shortness of Breath and/or Wheezing  melatonin 3 milliGRAM(s) Oral at bedtime PRN Insomnia  oxyCODONE    IR 5 milliGRAM(s) Oral every 4 hours PRN Moderate - Severe Pain (4 - 10)            REVIEW OF SYSTEMS:  Constitutional: [ ] fever, [ ]weight loss,  [ ]fatigue  Eyes: [ ] visual changes  Respiratory: [ ]shortness of breath;  [ ] cough, [ ]wheezing, [ ]chills, [ ]hemoptysis  Cardiovascular: [ ] chest pain, [ ]palpitations, [ ]dizziness,  [ ]leg swelling[ ]orthopnea[ ]PND  Gastrointestinal: [ ] abdominal pain, [ ]nausea, [ ]vomiting,  [ ]diarrhea [ ]Constipation [ ]Melena  Genitourinary: [ ] dysuria, [ ] hematuria [ ]Mathews  Neurologic: [ ] headaches [ ] tremors[ ]weakness [ ]Paralysis Right[ ] Left[ ]  Skin: [ ] itching, [ ]burning, [ ] rashes  Endocrine: [ ] heat or cold intolerance  Musculoskeletal: [ ] joint pain or swelling; [ ] muscle, back, or extremity pain  Psychiatric: [ ] depression, [ ]anxiety, [ ]mood swings, or [ ]difficulty sleeping  Hematologic: [ ] easy bruising, [ ] bleeding gums    [ ] All remaining systems negative except as per above.   [ ]Unable to obtain.  [x] No change in ROS since admission      Vital Signs Last 24 Hrs  T(C): 36.4 (24 Apr 2022 10:33), Max: 37 (23 Apr 2022 22:43)  T(F): 97.6 (24 Apr 2022 10:33), Max: 98.6 (23 Apr 2022 22:43)  HR: 72 (24 Apr 2022 10:33) (68 - 80)  BP: 133/79 (24 Apr 2022 10:33) (132/64 - 152/81)  BP(mean): --  RR: 18 (24 Apr 2022 10:33) (16 - 18)  SpO2: 100% (24 Apr 2022 10:33) (98% - 100%)  I&O's Summary      PHYSICAL EXAM:  General: No acute distress BMI-  HEENT: EOMI, PERRL  Neck: Supple, [ ] JVD  Lungs: Equal air entry bilaterally; [ ] rales [ ] wheezing [ ] rhonchi  Heart: Regular rate and rhythm; [x ] murmur   2/6 [ x] systolic [ ] diastolic [ ] radiation[ ] rubs [ ]  gallops  Abdomen: Nontender, bowel sounds present  Extremities: No clubbing, cyanosis, [ ] edema [ ]Pulses  equal and intact  Nervous system:  Alert & Oriented X3, no focal deficits  Psychiatric: Normal affect  Skin: No rashes or lesions    LABS:  04-24    136  |  100  |  15  ----------------------------<  69<L>  3.7   |  23  |  0.66    Ca    9.7      24 Apr 2022 08:19  Phos  4.4     04-24  Mg     1.80     04-24    TPro  6.9  /  Alb  3.9  /  TBili  0.3  /  DBili  x   /  AST  27  /  ALT  35<H>  /  AlkPhos  90  04-22    Creatinine Trend: 0.66<--, 0.64<--, 0.79<--                        12.5   5.38  )-----------( 250      ( 24 Apr 2022 08:19 )             38.9     PT/INR - ( 22 Apr 2022 20:42 )   PT: 13.0 sec;   INR: 1.12 ratio         PTT - ( 22 Apr 2022 20:42 )  PTT:30.9 sec          
PATIENT SEEN AND EXAMINED ON :- 5/3/22  DATE OF SERVICE:  5/3/22           Interim events noted,Labs ,Radiological studies and Cardiology tests reviewed .       HOSPITAL COURSE: HPI:  72F with PMHx asthma, HTN, RA, chronic low back pain, thyroid nodule presenting with two weeks of right sided weakness, HA, dizziness. Pt endorses chronic lower back pain of which she has had multiple surgeries for. She had right sided arm and leg weakness years ago before surgery which then improved after. Pt was admitted 2/24-3/7/2022 and noted to once again have right sided weakness and had MRI C/T/L spine without acute changes. She also had LHC which revealed luminal irregularities. Pt was discharged to [facility] rehab. For the last two weeks, pt notes episodic diffuse HA, slurred speech, room-spinning dizzy sensation and blurry vision often times in the morning. These sx sometimes last only a few minutes to hours at a time. She was told by [facility] to come yesterday to the ED to r/o stroke but she decided to stay until discharge today to then present. Her LKN was about two weeks ago but symptoms are episodic. Two weeks ago she still had some right sided weakness but she now feels as though it is worse. Her HA is currently resolved at this time. Of note she saw an opthalmologist while at [facility] for her episodic blurry vision and was told she has cataracts. She did not have a dilated eye exam. Currently she does not have blurry vision and her main complaint is the right sided weakness and HA. (23 Apr 2022 02:50)      INTERIM EVENTS:Patient seen at bedside ,interim events noted.      [facility] -reviewed admission note, no change since admission  HEART FAILURE: Acute[ ]Chronic[ ] Systolic[ ] Diastolic[ ] Combined Systolic and Diastolic[ ]  CAD[ ] CABG[ ] PCI[ ]  DEVICES[ ] PPM[ ] ICD[ ] ILR[ ]  ATRIAL FIBRILLATION[ ] Paroxysmal[ ] Permanent[ ]  SAAD[ ] CKD1[ ] CKD2[ ] CKD3[ ] CKD4[ ] ESRD[ ]  COPD[ ] HTN[ ]   DM[ ] Type1[ ] Type 2[ ]   CVA[ ] Paresis[ ]    AMBULATION: Assisted[ ] Cane/walker[ ] Independent[ ]    MEDICATIONS  (STANDING):  aspirin enteric coated 81 milliGRAM(s) Oral daily  atorvastatin 80 milliGRAM(s) Oral at bedtime  enoxaparin Injectable 40 milliGRAM(s) SubCutaneous every 24 hours  gabapentin 300 milliGRAM(s) Oral three times a day  hydrochlorothiazide 25 milliGRAM(s) Oral daily  isosorbide   mononitrate ER Tablet (IMDUR) 30 milliGRAM(s) Oral daily  metoprolol tartrate 50 milliGRAM(s) Oral two times a day  pantoprazole    Tablet 40 milliGRAM(s) Oral before breakfast    MEDICATIONS  (PRN):  acetaminophen     Tablet .. 650 milliGRAM(s) Oral every 6 hours PRN Temp greater or equal to 38C (100.4F), Mild Pain (1 - 3)  ALBUTerol    90 MICROgram(s) HFA Inhaler 2 Puff(s) Inhalation every 6 hours PRN Shortness of Breath and/or Wheezing  melatonin 3 milliGRAM(s) Oral at bedtime PRN Insomnia  oxyCODONE    IR 5 milliGRAM(s) Oral every 4 hours PRN Moderate - Severe Pain (4 - 10)            REVIEW OF SYSTEMS:  Constitutional: [ ] fever, [ ]weight loss,  [ ]fatigue  Eyes: [ ] visual changes  Respiratory: [ ]shortness of breath;  [ ] cough, [ ]wheezing, [ ]chills, [ ]hemoptysis  Cardiovascular: [ ] chest pain, [ ]palpitations, [ ]dizziness,  [ ]leg swelling[ ]orthopnea[ ]PND  Gastrointestinal: [ ] abdominal pain, [ ]nausea, [ ]vomiting,  [ ]diarrhea [ ]Constipation [ ]Melena  Genitourinary: [ ] dysuria, [ ] hematuria [ ]Mathews  Neurologic: [ ] headaches [ ] tremors[ ]weakness [ ]Paralysis Right[ ] Left[ ]  Skin: [ ] itching, [ ]burning, [ ] rashes  Endocrine: [ ] heat or cold intolerance  Musculoskeletal: [ ] joint pain or swelling; [ ] muscle, back, or extremity pain  Psychiatric: [ ] depression, [ ]anxiety, [ ]mood swings, or [ ]difficulty sleeping  Hematologic: [ ] easy bruising, [ ] bleeding gums    [ ] All remaining systems negative except as per above.   [ ]Unable to obtain.  [x] No change in ROS since admission      Vital Signs Last 24 Hrs  T(C): 36.8 (03 May 2022 18:00), Max: 36.9 (03 May 2022 09:51)  T(F): 98.2 (03 May 2022 18:00), Max: 98.5 (03 May 2022 09:51)  HR: 72 (03 May 2022 18:00) (60 - 72)  BP: 160/81 (03 May 2022 18:00) (121/57 - 160/81)  BP(mean): --  RR: 18 (03 May 2022 18:00) (17 - 18)  SpO2: 100% (03 May 2022 18:00) (99% - 100%)  I&O's Summary      PHYSICAL EXAM:  General: No acute distress BMI-  HEENT: EOMI, PERRL  Neck: Supple, [ ] JVD  Lungs: Equal air entry bilaterally; [ ] rales [ ] wheezing [ ] rhonchi  Heart: Regular rate and rhythm; [x ] murmur   2/6 [ x] systolic [ ] diastolic [ ] radiation[ ] rubs [ ]  gallops  Abdomen: Nontender, bowel sounds present  Extremities: No clubbing, cyanosis, [ ] edema [ ]Pulses  equal and intact  Nervous system:  Alert & Oriented X3, no focal deficits  Psychiatric: Normal affect  Skin: No rashes or lesions    LABS:  05-03    138  |  100  |  16  ----------------------------<  87  3.7   |  27  |  0.71    Ca    9.8      03 May 2022 06:25  Phos  3.8     05-03  Mg     1.80     05-03      Creatinine Trend: 0.71<--, 0.66<--, 0.68<--, 0.66<--, 0.63<--, 0.70<--                        14.1   5.41  )-----------( 321      ( 03 May 2022 06:25 )             44.3               
PATIENT SEEN AND EXAMINED ON :- 4/30/22    INTERIM EVENTS:Patient seen at bedside ,interim events noted.    MEDICATIONS  (STANDING):  aspirin enteric coated 81 milliGRAM(s) Oral daily  atorvastatin 20 milliGRAM(s) Oral at bedtime  enoxaparin Injectable 40 milliGRAM(s) SubCutaneous every 24 hours  gabapentin 300 milliGRAM(s) Oral three times a day  hydrochlorothiazide 25 milliGRAM(s) Oral daily  isosorbide   mononitrate ER Tablet (IMDUR) 30 milliGRAM(s) Oral daily  metoprolol tartrate 50 milliGRAM(s) Oral two times a day  pantoprazole    Tablet 40 milliGRAM(s) Oral before breakfast    MEDICATIONS  (PRN):  acetaminophen     Tablet .. 650 milliGRAM(s) Oral every 6 hours PRN Temp greater or equal to 38C (100.4F), Mild Pain (1 - 3)  ALBUTerol    90 MICROgram(s) HFA Inhaler 2 Puff(s) Inhalation every 6 hours PRN Shortness of Breath and/or Wheezing  melatonin 3 milliGRAM(s) Oral at bedtime PRN Insomnia  oxyCODONE    IR 5 milliGRAM(s) Oral every 4 hours PRN Moderate - Severe Pain (4 - 10)    Vital Signs Last 24 Hrs  T(C): 36.9 (04-30-22 @ 21:06), Max: 37.2 (04-30-22 @ 10:28)  T(F): 98.5 (04-30-22 @ 21:06), Max: 98.9 (04-30-22 @ 10:28)  HR: 65 (04-30-22 @ 21:06) (64 - 74)  BP: 128/94 (04-30-22 @ 21:06) (115/82 - 155/92)  BP(mean): --  RR: 20 (04-30-22 @ 21:06) (16 - 20)  SpO2: 99% (04-30-22 @ 21:06) (98% - 100%)    REVIEW OF SYSTEMS:  Constitutional: [ ] fever, [ ]weight loss,  [ ]fatigue  Eyes: [ ] visual changes  Respiratory: [ ]shortness of breath;  [ ] cough, [ ]wheezing, [ ]chills, [ ]hemoptysis  Cardiovascular: [ ] chest pain, [ ]palpitations, [ ]dizziness,  [ ]leg swelling[ ]orthopnea[ ]PND  Gastrointestinal: [ ] abdominal pain, [ ]nausea, [ ]vomiting,  [ ]diarrhea [ ]Constipation [ ]Melena  Genitourinary: [ ] dysuria, [ ] hematuria [ ]Mathews  Neurologic: [ ] headaches [ ] tremors[ ]weakness [ ]Paralysis Right[ ] Left[ ]  Skin: [ ] itching, [ ]burning, [ ] rashes  Endocrine: [ ] heat or cold intolerance  Musculoskeletal: [ ] joint pain or swelling; [ ] muscle, back, or extremity pain  Psychiatric: [ ] depression, [ ]anxiety, [ ]mood swings, or [ ]difficulty sleeping  Hematologic: [ ] easy bruising, [ ] bleeding gums    [ ] All remaining systems negative except as per above.   [ ]Unable to obtain.  [x] No change in ROS since admission        PHYSICAL EXAM:  General: No acute distress BMI-  HEENT: EOMI, PERRL  Neck: Supple, [ ] JVD  Lungs: dec breath sounds at bases  Heart: Regular rate and rhythm; [x ] murmur   2/6 [ x] systolic [ ] diastolic [ ] radiation[ ] rubs [ ]  gallops  Abdomen: Nontender, bowel sounds present  Extremities: No clubbing, cyanosis, [ ] edema [ ]Pulses  equal and intact  Nervous system:  Alert awake  Psychiatric: Normal affect  Skin: No rashes or lesions    LABS:  04-30    133<L>  |  99  |  19  ----------------------------<  72  3.9   |  21<L>  |  0.66    Ca    9.8      30 Apr 2022 07:27  Phos  3.9     04-30  Mg     1.90     04-30      Creatinine Trend: 0.66 <--, 0.63 <--, 0.70 <--, 0.79 <--, 0.67 <--, 0.70 <--, 0.66 <--                        13.0   6.42  )-----------( 294      ( 30 Apr 2022 07:27 )             40.6     Urine Studies:                            
PATIENT SEEN AND EXAMINED ON :- 4/25/22  DATE OF SERVICE:   4/25/22          Interim events noted,Labs ,Radiological studies and Cardiology tests reviewed .     HOSPITAL COURSE: HPI:  72F with PMHx asthma, HTN, RA, chronic low back pain, thyroid nodule presenting with two weeks of right sided weakness, HA, dizziness. Pt endorses chronic lower back pain of which she has had multiple surgeries for. She had right sided arm and leg weakness years ago before surgery which then improved after. Pt was admitted 2/24-3/7/2022 and noted to once again have right sided weakness and had MRI C/T/L spine without acute changes. She also had LHC which revealed luminal irregularities. Pt was discharged to [facility] rehab. For the last two weeks, pt notes episodic diffuse HA, slurred speech, room-spinning dizzy sensation and blurry vision often times in the morning. These sx sometimes last only a few minutes to hours at a time. She was told by [facility] to come yesterday to the ED to r/o stroke but she decided to stay until discharge today to then present. Her LKN was about two weeks ago but symptoms are episodic. Two weeks ago she still had some right sided weakness but she now feels as though it is worse. Her HA is currently resolved at this time. Of note she saw an opthalmologist while at [facility] for her episodic blurry vision and was told she has cataracts. She did not have a dilated eye exam. Currently she does not have blurry vision and her main complaint is the right sided weakness and HA. (23 Apr 2022 02:50)      INTERIM EVENTS:Patient seen at bedside ,interim events noted.      [facility] -reviewed admission note, no change since admission  HEART FAILURE: Acute[ ]Chronic[ ] Systolic[ ] Diastolic[ ] Combined Systolic and Diastolic[ ]  CAD[ ] CABG[ ] PCI[ ]  DEVICES[ ] PPM[ ] ICD[ ] ILR[ ]  ATRIAL FIBRILLATION[ ] Paroxysmal[ ] Permanent[ ]  SAAD[ ] CKD1[ ] CKD2[ ] CKD3[ ] CKD4[ ] ESRD[ ]  COPD[ ] HTN[ ]   DM[ ] Type1[ ] Type 2[ ]   CVA[ ] Paresis[ ]    AMBULATION: Assisted[ ] Cane/walker[ ] Independent[ ]    MEDICATIONS  (STANDING):  amoxicillin  500 milliGRAM(s)/clavulanate 1 Tablet(s) Oral three times a day  aspirin enteric coated 81 milliGRAM(s) Oral daily  atorvastatin 20 milliGRAM(s) Oral at bedtime  enoxaparin Injectable 40 milliGRAM(s) SubCutaneous every 24 hours  gabapentin 300 milliGRAM(s) Oral three times a day  hydrochlorothiazide 25 milliGRAM(s) Oral daily  isosorbide   mononitrate ER Tablet (IMDUR) 30 milliGRAM(s) Oral daily  metoprolol tartrate 50 milliGRAM(s) Oral two times a day  pantoprazole    Tablet 40 milliGRAM(s) Oral before breakfast    MEDICATIONS  (PRN):  acetaminophen     Tablet .. 650 milliGRAM(s) Oral every 6 hours PRN Temp greater or equal to 38C (100.4F), Mild Pain (1 - 3)  ALBUTerol    90 MICROgram(s) HFA Inhaler 2 Puff(s) Inhalation every 6 hours PRN Shortness of Breath and/or Wheezing  melatonin 3 milliGRAM(s) Oral at bedtime PRN Insomnia  oxyCODONE    IR 5 milliGRAM(s) Oral every 4 hours PRN Moderate - Severe Pain (4 - 10)            REVIEW OF SYSTEMS:  Constitutional: [ ] fever, [ ]weight loss,  [ ]fatigue  Eyes: [ ] visual changes  Respiratory: [ ]shortness of breath;  [ ] cough, [ ]wheezing, [ ]chills, [ ]hemoptysis  Cardiovascular: [ ] chest pain, [ ]palpitations, [ ]dizziness,  [ ]leg swelling[ ]orthopnea[ ]PND  Gastrointestinal: [ ] abdominal pain, [ ]nausea, [ ]vomiting,  [ ]diarrhea [ ]Constipation [ ]Melena  Genitourinary: [ ] dysuria, [ ] hematuria [ ]Mathews  Neurologic: [ ] headaches [ ] tremors[ ]weakness [ ]Paralysis Right[ ] Left[ ]  Skin: [ ] itching, [ ]burning, [ ] rashes  Endocrine: [ ] heat or cold intolerance  Musculoskeletal: [ ] joint pain or swelling; [ ] muscle, back, or extremity pain  Psychiatric: [ ] depression, [ ]anxiety, [ ]mood swings, or [ ]difficulty sleeping  Hematologic: [ ] easy bruising, [ ] bleeding gums    [ ] All remaining systems negative except as per above.   [ ]Unable to obtain.  [x] No change in ROS since admission      Vital Signs Last 24 Hrs  T(C): 36.5 (25 Apr 2022 18:07), Max: 36.9 (25 Apr 2022 14:26)  T(F): 97.7 (25 Apr 2022 18:07), Max: 98.4 (25 Apr 2022 14:26)  HR: 73 (25 Apr 2022 18:07) (62 - 79)  BP: 130/71 (25 Apr 2022 18:07) (130/71 - 155/87)  BP(mean): --  RR: 18 (25 Apr 2022 18:07) (18 - 19)  SpO2: 100% (25 Apr 2022 18:07) (100% - 100%)  I&O's Summary      PHYSICAL EXAM:  General: No acute distress BMI-  HEENT: EOMI, PERRL  Neck: Supple, [ ] JVD  Lungs: Equal air entry bilaterally; [ ] rales [ ] wheezing [ ] rhonchi  Heart: Regular rate and rhythm; [x ] murmur   2/6 [ x] systolic [ ] diastolic [ ] radiation[ ] rubs [ ]  gallops  Abdomen: Nontender, bowel sounds present  Extremities: No clubbing, cyanosis, [ ] edema [ ]Pulses  equal and intact  Nervous system:  Alert & Oriented X3, no focal deficits  Psychiatric: Normal affect  Skin: No rashes or lesions    LABS:  04-25    137  |  102  |  14  ----------------------------<  70  3.8   |  24  |  0.70    Ca    9.3      25 Apr 2022 09:07  Phos  3.5     04-25  Mg     1.80     04-25      Creatinine Trend: 0.70<--, 0.66<--, 0.64<--, 0.79<--                        12.2   5.00  )-----------( 249      ( 25 Apr 2022 09:07 )             38.3     < from: Transthoracic Echocardiogram (04.25.22 @ 07:31) >  CONCLUSIONS:  1. Mitral annular calcification, otherwise normal mitral  valve. Minimal mitral regurgitation.  2. Normal left ventricular internal dimensions and wall  thicknesses.  3. Normal left ventricular systolic function. No segmental  wall motion abnormalities.  4. Mild diastolic dysfunction (Stage I).  5. Normal right ventricular size and function.  6. A bubble study was performed with the intravenous  injection of agitated saline contrast and was inconclusive  regarding the presence of an interatrial shunt.  No obviouscardiac source of embolus was identified on this  transthoracic study.  If clinical suspicion is high,  consider ETHAN for further evaluation.    < end of copied text >            
PATIENT SEEN AND EXAMINED ON :-5/1/2022  DATE OF SERVICE:    5/1/2022         Interim events noted,Labs ,Radiological studies and Cardiology tests reviewed .     HOSPITAL COURSE: HPI:  72F with PMHx asthma, HTN, RA, chronic low back pain, thyroid nodule presenting with two weeks of right sided weakness, HA, dizziness. Pt endorses chronic lower back pain of which she has had multiple surgeries for. She had right sided arm and leg weakness years ago before surgery which then improved after. Pt was admitted 2/24-3/7/2022 and noted to once again have right sided weakness and had MRI C/T/L spine without acute changes. She also had LHC which revealed luminal irregularities. Pt was discharged to [facility] rehab. For the last two weeks, pt notes episodic diffuse HA, slurred speech, room-spinning dizzy sensation and blurry vision often times in the morning. These sx sometimes last only a few minutes to hours at a time. She was told by [facility] to come yesterday to the ED to r/o stroke but she decided to stay until discharge today to then present. Her LKN was about two weeks ago but symptoms are episodic. Two weeks ago she still had some right sided weakness but she now feels as though it is worse. Her HA is currently resolved at this time. Of note she saw an opthalmologist while at [facility] for her episodic blurry vision and was told she has cataracts. She did not have a dilated eye exam. Currently she does not have blurry vision and her main complaint is the right sided weakness and HA. (23 Apr 2022 02:50)      INTERIM EVENTS:Patient seen at bedside ,interim events noted.      [facility] -reviewed admission note, no change since admission  HEART FAILURE: Acute[ ]Chronic[ ] Systolic[ ] Diastolic[ ] Combined Systolic and Diastolic[ ]  CAD[ ] CABG[ ] PCI[ ]  DEVICES[ ] PPM[ ] ICD[ ] ILR[ ]  ATRIAL FIBRILLATION[ ] Paroxysmal[ ] Permanent[ ]  SAAD[ ] CKD1[ ] CKD2[ ] CKD3[ ] CKD4[ ] ESRD[ ]  COPD[ ] HTN[ ]   DM[ ] Type1[ ] Type 2[ ]   CVA[ ] Paresis[ ]    AMBULATION: Assisted[ ] Cane/walker[ ] Independent[ ]    MEDICATIONS  (STANDING):  aspirin enteric coated 81 milliGRAM(s) Oral daily  atorvastatin 20 milliGRAM(s) Oral at bedtime  enoxaparin Injectable 40 milliGRAM(s) SubCutaneous every 24 hours  gabapentin 300 milliGRAM(s) Oral three times a day  hydrochlorothiazide 25 milliGRAM(s) Oral daily  isosorbide   mononitrate ER Tablet (IMDUR) 30 milliGRAM(s) Oral daily  metoprolol tartrate 50 milliGRAM(s) Oral two times a day  pantoprazole    Tablet 40 milliGRAM(s) Oral before breakfast    MEDICATIONS  (PRN):  acetaminophen     Tablet .. 650 milliGRAM(s) Oral every 6 hours PRN Temp greater or equal to 38C (100.4F), Mild Pain (1 - 3)  ALBUTerol    90 MICROgram(s) HFA Inhaler 2 Puff(s) Inhalation every 6 hours PRN Shortness of Breath and/or Wheezing  melatonin 3 milliGRAM(s) Oral at bedtime PRN Insomnia  oxyCODONE    IR 5 milliGRAM(s) Oral every 4 hours PRN Moderate - Severe Pain (4 - 10)            REVIEW OF SYSTEMS:  Constitutional: [ ] fever, [ ]weight loss,  [ ]fatigue  Eyes: [ ] visual changes  Respiratory: [ ]shortness of breath;  [ ] cough, [ ]wheezing, [ ]chills, [ ]hemoptysis  Cardiovascular: [ ] chest pain, [ ]palpitations, [ ]dizziness,  [ ]leg swelling[ ]orthopnea[ ]PND  Gastrointestinal: [ ] abdominal pain, [ ]nausea, [ ]vomiting,  [ ]diarrhea [ ]Constipation [ ]Melena  Genitourinary: [ ] dysuria, [ ] hematuria [ ]Mathews  Neurologic: [ ] headaches [ ] tremors[ ]weakness [ ]Paralysis Right[ ] Left[ ]  Skin: [ ] itching, [ ]burning, [ ] rashes  Endocrine: [ ] heat or cold intolerance  Musculoskeletal: [ ] joint pain or swelling; [ ] muscle, back, or extremity pain  Psychiatric: [ ] depression, [ ]anxiety, [ ]mood swings, or [ ]difficulty sleeping  Hematologic: [ ] easy bruising, [ ] bleeding gums    [ ] All remaining systems negative except as per above.   [ ]Unable to obtain.  [x] No change in ROS since admission      Vital Signs Last 24 Hrs  T(C): 37.2 (01 May 2022 09:38), Max: 37.2 (30 Apr 2022 10:28)  T(F): 99 (01 May 2022 09:38), Max: 99 (01 May 2022 09:38)  HR: 64 (01 May 2022 09:38) (64 - 75)  BP: 159/90 (01 May 2022 09:38) (115/82 - 159/90)  BP(mean): --  RR: 18 (01 May 2022 09:38) (18 - 20)  SpO2: 100% (01 May 2022 09:38) (98% - 100%)  I&O's Summary    30 Apr 2022 07:01  -  01 May 2022 07:00  --------------------------------------------------------  IN: 580 mL / OUT: 750 mL / NET: -170 mL        PHYSICAL EXAM:  General: No acute distress BMI-  HEENT: EOMI, PERRL  Neck: Supple, [ ] JVD  Lungs: Equal air entry bilaterally; [ ] rales [ ] wheezing [ ] rhonchi  Heart: Regular rate and rhythm; [x ] murmur   2/6 [ x] systolic [ ] diastolic [ ] radiation[ ] rubs [ ]  gallops  Abdomen: Nontender, bowel sounds present  Extremities: No clubbing, cyanosis, [ ] edema [ ]Pulses  equal and intact  Nervous system:  Alert & Oriented X3, no focal deficits  Psychiatric: Normal affect  Skin: No rashes or lesions    LABS:  05-01    132<L>  |  95<L>  |  18  ----------------------------<  73  3.4<L>   |  26  |  0.68    Ca    9.4      01 May 2022 07:54  Phos  3.6     05-01  Mg     1.80     05-01      Creatinine Trend: 0.68<--, 0.66<--, 0.63<--, 0.70<--, 0.79<--, 0.67<--                        12.9   5.37  )-----------( 284      ( 01 May 2022 07:54 )             40.2               
PATIENT SEEN AND EXAMINED ON :- 4/30/22  DATE OF SERVICE:   4/30/22          Interim events noted,Labs ,Radiological studies and Cardiology tests reviewed .     HOSPITAL COURSE: HPI:  72F with PMHx asthma, HTN, RA, chronic low back pain, thyroid nodule presenting with two weeks of right sided weakness, HA, dizziness. Pt endorses chronic lower back pain of which she has had multiple surgeries for. She had right sided arm and leg weakness years ago before surgery which then improved after. Pt was admitted 2/24-3/7/2022 and noted to once again have right sided weakness and had MRI C/T/L spine without acute changes. She also had LHC which revealed luminal irregularities. Pt was discharged to [facility] rehab. For the last two weeks, pt notes episodic diffuse HA, slurred speech, room-spinning dizzy sensation and blurry vision often times in the morning. These sx sometimes last only a few minutes to hours at a time. She was told by [facility] to come yesterday to the ED to r/o stroke but she decided to stay until discharge today to then present. Her LKN was about two weeks ago but symptoms are episodic. Two weeks ago she still had some right sided weakness but she now feels as though it is worse. Her HA is currently resolved at this time. Of note she saw an opthalmologist while at [facility] for her episodic blurry vision and was told she has cataracts. She did not have a dilated eye exam. Currently she does not have blurry vision and her main complaint is the right sided weakness and HA. (23 Apr 2022 02:50)      INTERIM EVENTS:Patient seen at bedside ,interim events noted.      [facility] -reviewed admission note, no change since admission  HEART FAILURE: Acute[ ]Chronic[ ] Systolic[ ] Diastolic[ ] Combined Systolic and Diastolic[ ]  CAD[ ] CABG[ ] PCI[ ]  DEVICES[ ] PPM[ ] ICD[ ] ILR[ ]  ATRIAL FIBRILLATION[ ] Paroxysmal[ ] Permanent[ ]  SAAD[ ] CKD1[ ] CKD2[ ] CKD3[ ] CKD4[ ] ESRD[ ]  COPD[ ] HTN[ ]   DM[ ] Type1[ ] Type 2[ ]   CVA[ ] Paresis[ ]    AMBULATION: Assisted[ ] Cane/walker[ ] Independent[ ]    MEDICATIONS  (STANDING):  aspirin enteric coated 81 milliGRAM(s) Oral daily  atorvastatin 20 milliGRAM(s) Oral at bedtime  enoxaparin Injectable 40 milliGRAM(s) SubCutaneous every 24 hours  gabapentin 300 milliGRAM(s) Oral three times a day  hydrochlorothiazide 25 milliGRAM(s) Oral daily  isosorbide   mononitrate ER Tablet (IMDUR) 30 milliGRAM(s) Oral daily  LORazepam   Injectable 1 milliGRAM(s) IV Push once  metoprolol tartrate 50 milliGRAM(s) Oral two times a day  pantoprazole    Tablet 40 milliGRAM(s) Oral before breakfast    MEDICATIONS  (PRN):  acetaminophen     Tablet .. 650 milliGRAM(s) Oral every 6 hours PRN Temp greater or equal to 38C (100.4F), Mild Pain (1 - 3)  ALBUTerol    90 MICROgram(s) HFA Inhaler 2 Puff(s) Inhalation every 6 hours PRN Shortness of Breath and/or Wheezing  melatonin 3 milliGRAM(s) Oral at bedtime PRN Insomnia  oxyCODONE    IR 5 milliGRAM(s) Oral every 4 hours PRN Moderate - Severe Pain (4 - 10)            REVIEW OF SYSTEMS:  Constitutional: [ ] fever, [ ]weight loss,  [ ]fatigue  Eyes: [ ] visual changes  Respiratory: [ ]shortness of breath;  [ ] cough, [ ]wheezing, [ ]chills, [ ]hemoptysis  Cardiovascular: [ ] chest pain, [ ]palpitations, [ ]dizziness,  [ ]leg swelling[ ]orthopnea[ ]PND  Gastrointestinal: [ ] abdominal pain, [ ]nausea, [ ]vomiting,  [ ]diarrhea [ ]Constipation [ ]Melena  Genitourinary: [ ] dysuria, [ ] hematuria [ ]Mathews  Neurologic: [ ] headaches [ ] tremors[ ]weakness [ ]Paralysis Right[ ] Left[ ]  Skin: [ ] itching, [ ]burning, [ ] rashes  Endocrine: [ ] heat or cold intolerance  Musculoskeletal: [ ] joint pain or swelling; [ ] muscle, back, or extremity pain  Psychiatric: [ ] depression, [ ]anxiety, [ ]mood swings, or [ ]difficulty sleeping  Hematologic: [ ] easy bruising, [ ] bleeding gums    [ ] All remaining systems negative except as per above.   [ ]Unable to obtain.  [x] No change in ROS since admission      Vital Signs Last 24 Hrs  T(C): 37.2 (30 Apr 2022 10:28), Max: 37.2 (30 Apr 2022 10:28)  T(F): 98.9 (30 Apr 2022 10:28), Max: 98.9 (30 Apr 2022 10:28)  HR: 71 (30 Apr 2022 10:28) (65 - 71)  BP: 115/82 (30 Apr 2022 10:28) (115/82 - 154/82)  BP(mean): --  RR: 18 (30 Apr 2022 10:28) (16 - 19)  SpO2: 98% (30 Apr 2022 10:28) (98% - 100%)  I&O's Summary      PHYSICAL EXAM:  General: No acute distress BMI-  HEENT: EOMI, PERRL  Neck: Supple, [ ] JVD  Lungs: Equal air entry bilaterally; [ ] rales [ ] wheezing [ ] rhonchi  Heart: Regular rate and rhythm; [x ] murmur   2/6 [ x] systolic [ ] diastolic [ ] radiation[ ] rubs [ ]  gallops  Abdomen: Nontender, bowel sounds present  Extremities: No clubbing, cyanosis, [ ] edema [ ]Pulses  equal and intact  Nervous system:  Alert & Oriented X3, no focal deficits  Psychiatric: Normal affect  Skin: No rashes or lesions    LABS:  04-30    133<L>  |  99  |  19  ----------------------------<  72  3.9   |  21<L>  |  0.66    Ca    9.8      30 Apr 2022 07:27  Phos  3.9     04-30  Mg     1.90     04-30      Creatinine Trend: 0.66<--, 0.63<--, 0.70<--, 0.79<--, 0.67<--, 0.70<--                        13.0   6.42  )-----------( 294      ( 30 Apr 2022 07:27 )             40.6               
PATIENT SEEN AND EXAMINED ON :- 5/2/22  DATE OF SERVICE:  5/2/22        Interim events noted,Labs ,Radiological studies and Cardiology tests reviewed .   HOSPITAL COURSE: HPI:  72F with PMHx asthma, HTN, RA, chronic low back pain, thyroid nodule presenting with two weeks of right sided weakness, HA, dizziness. Pt endorses chronic lower back pain of which she has had multiple surgeries for. She had right sided arm and leg weakness years ago before surgery which then improved after. Pt was admitted 2/24-3/7/2022 and noted to once again have right sided weakness and had MRI C/T/L spine without acute changes. She also had LHC which revealed luminal irregularities. Pt was discharged to [facility] rehab. For the last two weeks, pt notes episodic diffuse HA, slurred speech, room-spinning dizzy sensation and blurry vision often times in the morning. These sx sometimes last only a few minutes to hours at a time. She was told by [facility] to come yesterday to the ED to r/o stroke but she decided to stay until discharge today to then present. Her LKN was about two weeks ago but symptoms are episodic. Two weeks ago she still had some right sided weakness but she now feels as though it is worse. Her HA is currently resolved at this time. Of note she saw an opthalmologist while at [facility] for her episodic blurry vision and was told she has cataracts. She did not have a dilated eye exam. Currently she does not have blurry vision and her main complaint is the right sided weakness and HA. (23 Apr 2022 02:50)      INTERIM EVENTS:Patient seen at bedside ,interim events noted.      [facility] -reviewed admission note, no change since admission  HEART FAILURE: Acute[ ]Chronic[ ] Systolic[ ] Diastolic[ ] Combined Systolic and Diastolic[ ]  CAD[ ] CABG[ ] PCI[ ]  DEVICES[ ] PPM[ ] ICD[ ] ILR[ ]  ATRIAL FIBRILLATION[ ] Paroxysmal[ ] Permanent[ ]  SAAD[ ] CKD1[ ] CKD2[ ] CKD3[ ] CKD4[ ] ESRD[ ]  COPD[ ] HTN[ ]   DM[ ] Type1[ ] Type 2[ ]   CVA[ ] Paresis[ ]    AMBULATION: Assisted[ ] Cane/walker[ ] Independent[ ]    MEDICATIONS  (STANDING):  aspirin enteric coated 81 milliGRAM(s) Oral daily  atorvastatin 80 milliGRAM(s) Oral at bedtime  enoxaparin Injectable 40 milliGRAM(s) SubCutaneous every 24 hours  gabapentin 300 milliGRAM(s) Oral three times a day  hydrochlorothiazide 25 milliGRAM(s) Oral daily  isosorbide   mononitrate ER Tablet (IMDUR) 30 milliGRAM(s) Oral daily  metoprolol tartrate 50 milliGRAM(s) Oral two times a day  pantoprazole    Tablet 40 milliGRAM(s) Oral before breakfast    MEDICATIONS  (PRN):  acetaminophen     Tablet .. 650 milliGRAM(s) Oral every 6 hours PRN Temp greater or equal to 38C (100.4F), Mild Pain (1 - 3)  ALBUTerol    90 MICROgram(s) HFA Inhaler 2 Puff(s) Inhalation every 6 hours PRN Shortness of Breath and/or Wheezing  melatonin 3 milliGRAM(s) Oral at bedtime PRN Insomnia  oxyCODONE    IR 5 milliGRAM(s) Oral every 4 hours PRN Moderate - Severe Pain (4 - 10)            REVIEW OF SYSTEMS:  Constitutional: [ ] fever, [ ]weight loss,  [ ]fatigue  Eyes: [ ] visual changes  Respiratory: [ ]shortness of breath;  [ ] cough, [ ]wheezing, [ ]chills, [ ]hemoptysis  Cardiovascular: [ ] chest pain, [ ]palpitations, [ ]dizziness,  [ ]leg swelling[ ]orthopnea[ ]PND  Gastrointestinal: [ ] abdominal pain, [ ]nausea, [ ]vomiting,  [ ]diarrhea [ ]Constipation [ ]Melena  Genitourinary: [ ] dysuria, [ ] hematuria [ ]Mathews  Neurologic: [ ] headaches [ ] tremors[ ]weakness [ ]Paralysis Right[ ] Left[ ]  Skin: [ ] itching, [ ]burning, [ ] rashes  Endocrine: [ ] heat or cold intolerance  Musculoskeletal: [ ] joint pain or swelling; [ ] muscle, back, or extremity pain  Psychiatric: [ ] depression, [ ]anxiety, [ ]mood swings, or [ ]difficulty sleeping  Hematologic: [ ] easy bruising, [ ] bleeding gums    [ ] All remaining systems negative except as per above.   [ ]Unable to obtain.  [x] No change in ROS since admission      Vital Signs Last 24 Hrs  T(C): 36.7 (02 May 2022 17:00), Max: 37.1 (01 May 2022 22:21)  T(F): 98 (02 May 2022 17:00), Max: 98.7 (01 May 2022 22:21)  HR: 66 (02 May 2022 17:00) (65 - 75)  BP: 135/84 (02 May 2022 17:00) (123/75 - 149/83)  BP(mean): --  RR: 16 (02 May 2022 17:00) (16 - 18)  SpO2: 97% (02 May 2022 17:00) (97% - 100%)  I&O's Summary      PHYSICAL EXAM:  General: No acute distress BMI-  HEENT: EOMI, PERRL  Neck: Supple, [ ] JVD  Lungs: Equal air entry bilaterally; [ ] rales [ ] wheezing [ ] rhonchi  Heart: Regular rate and rhythm; [x ] murmur   2/6 [ x] systolic [ ] diastolic [ ] radiation[ ] rubs [ ]  gallops  Abdomen: Nontender, bowel sounds present  Extremities: No clubbing, cyanosis, [ ] edema [ ]Pulses  equal and intact  Nervous system:  Alert & Oriented X3, no focal deficits  Psychiatric: Normal affect  Skin: No rashes or lesions    LABS:  05-02    135  |  101  |  16  ----------------------------<  87  3.7   |  22  |  0.66    Ca    9.5      02 May 2022 07:48  Phos  3.6     05-02  Mg     1.80     05-02      Creatinine Trend: 0.66<--, 0.68<--, 0.66<--, 0.63<--, 0.70<--, 0.79<--                        12.6   5.14  )-----------( 278      ( 02 May 2022 07:48 )             39.1     < from: Transthoracic Echocardiogram (04.25.22 @ 07:31) >  CONCLUSIONS:  1. Mitral annular calcification, otherwise normal mitral  valve. Minimal mitral regurgitation.  2. Normal left ventricular internal dimensions and wall  thicknesses.  3. Normal left ventricular systolic function. No segmental  wall motion abnormalities.  4. Mild diastolic dysfunction (Stage I).  5. Normal right ventricular size and function.  6. A bubble study was performed with the intravenous  injection of agitated saline contrast and was inconclusive  regarding the presence of an interatrial shunt.  No obviouscardiac source of embolus was identified on this  transthoracic study.  If clinical suspicion is high,  consider ETHAN for further evaluation.  ------------------------------------------------------------------------    < end of copied text >

## 2022-05-03 NOTE — PROGRESS NOTE ADULT - PROBLEM SELECTOR PROBLEM 1
Right sided weakness

## 2022-05-03 NOTE — PROGRESS NOTE ADULT - TIME BILLING
- Review of records, telemetry, vital signs and daily labs.   - General and cardiovascular physical examination.  - Generation of cardiovascular treatment plan.  - Coordination of care.      Patient was seen and examined by me on 4/30/22,interim events noted,labs and radiology studies reviewed.  Shola Pike MD,FACC.  [address] [place], [place] [id number].  [phone]
- Review of records, telemetry, vital signs and daily labs.   - General and cardiovascular physical examination.  - Generation of cardiovascular treatment plan.  - Coordination of care.      Patient was seen and examined by me on 4/24/22,interim events noted,labs and radiology studies reviewed.  Shola Pike MD,FACC.  [address] [place], [place] [id number].  [phone]
- Review of records, telemetry, vital signs and daily labs.   - General and cardiovascular physical examination.  - Generation of cardiovascular treatment plan.  - Coordination of care.      Patient was seen and examined by me on 4/27/22,interim events noted,labs and radiology studies reviewed.  Shola Pike MD,FACC.  [address] [place], [place] [id number].  [phone]
- Review of records, telemetry, vital signs and daily labs.   - General and cardiovascular physical examination.  - Generation of cardiovascular treatment plan.  - Coordination of care.      Patient was seen and examined by me on 4/29/22,interim events noted,labs and radiology studies reviewed.  Shola Pike MD,FACC.  [address] [place], [place] [id number].  [phone]
- Review of records, telemetry, vital signs and daily labs.   - General and cardiovascular physical examination.  - Generation of cardiovascular treatment plan.  - Coordination of care.      Patient was seen and examined by me on 4/28/22,interim events noted,labs and radiology studies reviewed.  Shola Pike MD,FACC.  [address] [place], [place] [id number].  [phone]
- Review of records, telemetry, vital signs and daily labs.   - General and cardiovascular physical examination.  - Generation of cardiovascular treatment plan.  - Coordination of care.      Patient was seen and examined by me on 5/1/22,interim events noted,labs and radiology studies reviewed.  Shola Pike MD,FACC.  [address] [place], [place] [id number].  [phone]
- Review of records, telemetry, vital signs and daily labs.   - General and cardiovascular physical examination.  - Generation of cardiovascular treatment plan.  - Coordination of care.      Patient was seen and examined by me on 5/2/22,interim events noted,labs and radiology studies reviewed.  Shola Pike MD,FACC.  [address] [place] [address] [place], [place] [id number].  [phone]
- Review of records, telemetry, vital signs and daily labs.   - General and cardiovascular physical examination.  - Generation of cardiovascular treatment plan.  - Coordination of care.      Patient was seen and examined by me on 5/3/22,interim events noted,labs and radiology studies reviewed.  Shola Pike MD,FACC.  [address] [place], [place] [id number].  [phone]
- Review of records, telemetry, vital signs and daily labs.   - General and cardiovascular physical examination.  - Generation of cardiovascular treatment plan.  - Coordination of care.      Patient was seen and examined by me on 4/26/22,interim events noted,labs and radiology studies reviewed.  Shola Pike MD,FACC.  [address] [place], [place] [id number].  [phone]
- Review of records, telemetry, vital signs and daily labs.   - General and cardiovascular physical examination.  - Generation of cardiovascular treatment plan.  - Coordination of care.      Patient was seen and examined by me on 4/25/22,interim events noted,labs and radiology studies reviewed.  Shola Pike MD,FACC.  [address] [place], [place] [id number].  [phone]

## 2022-05-03 NOTE — PROGRESS NOTE ADULT - PROBLEM SELECTOR PLAN 5
/82  Resume HCTZ, IMDUR, metoprolol

## 2022-05-04 ENCOUNTER — TRANSCRIPTION ENCOUNTER (OUTPATIENT)
Age: 72
End: 2022-05-04

## 2022-05-04 VITALS
TEMPERATURE: 98 F | DIASTOLIC BLOOD PRESSURE: 86 MMHG | HEART RATE: 62 BPM | OXYGEN SATURATION: 100 % | RESPIRATION RATE: 18 BRPM | SYSTOLIC BLOOD PRESSURE: 144 MMHG

## 2022-05-04 LAB
ANION GAP SERPL CALC-SCNC: 11 MMOL/L — SIGNIFICANT CHANGE UP (ref 7–14)
BUN SERPL-MCNC: 15 MG/DL — SIGNIFICANT CHANGE UP (ref 7–23)
CALCIUM SERPL-MCNC: 9.6 MG/DL — SIGNIFICANT CHANGE UP (ref 8.4–10.5)
CHLORIDE SERPL-SCNC: 102 MMOL/L — SIGNIFICANT CHANGE UP (ref 98–107)
CO2 SERPL-SCNC: 25 MMOL/L — SIGNIFICANT CHANGE UP (ref 22–31)
CREAT SERPL-MCNC: 0.68 MG/DL — SIGNIFICANT CHANGE UP (ref 0.5–1.3)
EGFR: 93 ML/MIN/1.73M2 — SIGNIFICANT CHANGE UP
GLUCOSE SERPL-MCNC: 97 MG/DL — SIGNIFICANT CHANGE UP (ref 70–99)
HCT VFR BLD CALC: 38.9 % — SIGNIFICANT CHANGE UP (ref 34.5–45)
HGB BLD-MCNC: 12.5 G/DL — SIGNIFICANT CHANGE UP (ref 11.5–15.5)
MAGNESIUM SERPL-MCNC: 1.7 MG/DL — SIGNIFICANT CHANGE UP (ref 1.6–2.6)
MCHC RBC-ENTMCNC: 28.2 PG — SIGNIFICANT CHANGE UP (ref 27–34)
MCHC RBC-ENTMCNC: 32.1 GM/DL — SIGNIFICANT CHANGE UP (ref 32–36)
MCV RBC AUTO: 87.8 FL — SIGNIFICANT CHANGE UP (ref 80–100)
NRBC # BLD: 0 /100 WBCS — SIGNIFICANT CHANGE UP
NRBC # FLD: 0 K/UL — SIGNIFICANT CHANGE UP
PHOSPHATE SERPL-MCNC: 3.6 MG/DL — SIGNIFICANT CHANGE UP (ref 2.5–4.5)
PLATELET # BLD AUTO: 265 K/UL — SIGNIFICANT CHANGE UP (ref 150–400)
POTASSIUM SERPL-MCNC: 3.6 MMOL/L — SIGNIFICANT CHANGE UP (ref 3.5–5.3)
POTASSIUM SERPL-SCNC: 3.6 MMOL/L — SIGNIFICANT CHANGE UP (ref 3.5–5.3)
RBC # BLD: 4.43 M/UL — SIGNIFICANT CHANGE UP (ref 3.8–5.2)
RBC # FLD: 14.2 % — SIGNIFICANT CHANGE UP (ref 10.3–14.5)
SODIUM SERPL-SCNC: 138 MMOL/L — SIGNIFICANT CHANGE UP (ref 135–145)
WBC # BLD: 5.03 K/UL — SIGNIFICANT CHANGE UP (ref 3.8–10.5)
WBC # FLD AUTO: 5.03 K/UL — SIGNIFICANT CHANGE UP (ref 3.8–10.5)

## 2022-05-04 RX ORDER — ASPIRIN/CALCIUM CARB/MAGNESIUM 324 MG
1 TABLET ORAL
Qty: 0 | Refills: 0 | DISCHARGE
Start: 2022-05-04

## 2022-05-04 RX ORDER — LANOLIN ALCOHOL/MO/W.PET/CERES
1 CREAM (GRAM) TOPICAL
Qty: 0 | Refills: 0 | DISCHARGE
Start: 2022-05-04

## 2022-05-04 RX ORDER — ACETAMINOPHEN 500 MG
2 TABLET ORAL
Qty: 0 | Refills: 0 | DISCHARGE
Start: 2022-05-04

## 2022-05-04 RX ADMIN — OXYCODONE HYDROCHLORIDE 5 MILLIGRAM(S): 5 TABLET ORAL at 04:45

## 2022-05-04 RX ADMIN — PANTOPRAZOLE SODIUM 40 MILLIGRAM(S): 20 TABLET, DELAYED RELEASE ORAL at 05:02

## 2022-05-04 RX ADMIN — GABAPENTIN 300 MILLIGRAM(S): 400 CAPSULE ORAL at 11:51

## 2022-05-04 RX ADMIN — Medication 25 MILLIGRAM(S): at 05:03

## 2022-05-04 RX ADMIN — Medication 50 MILLIGRAM(S): at 05:02

## 2022-05-04 RX ADMIN — Medication 81 MILLIGRAM(S): at 11:51

## 2022-05-04 RX ADMIN — ISOSORBIDE MONONITRATE 30 MILLIGRAM(S): 60 TABLET, EXTENDED RELEASE ORAL at 11:51

## 2022-05-04 RX ADMIN — OXYCODONE HYDROCHLORIDE 5 MILLIGRAM(S): 5 TABLET ORAL at 05:45

## 2022-05-04 RX ADMIN — ENOXAPARIN SODIUM 40 MILLIGRAM(S): 100 INJECTION SUBCUTANEOUS at 05:03

## 2022-05-04 RX ADMIN — GABAPENTIN 300 MILLIGRAM(S): 400 CAPSULE ORAL at 05:01

## 2022-05-04 NOTE — DISCHARGE NOTE NURSING/CASE MANAGEMENT/SOCIAL WORK - PATIENT PORTAL LINK FT
You can access the FollowMyHealth Patient Portal offered by [facility] by registering at the following website: [email or web address]. By joining [employer]’s FollowMyHealth portal, you will also be able to view your health information using other applications (apps) compatible with our system.

## 2022-05-04 NOTE — DISCHARGE NOTE NURSING/CASE MANAGEMENT/SOCIAL WORK - NSDCPEFALRISK_GEN_ALL_CORE
For information on Fall & Injury Prevention, visit: [email or web address] OR  [email or web address] OR  https://www.cdc.gov/steadi/patient.html

## 2022-05-23 ENCOUNTER — INPATIENT (INPATIENT)
Facility: HOSPITAL | Age: 72
LOS: 14 days | Discharge: NURSING, REHAB OR LONG-TERM CARE | End: 2022-06-07
Attending: INTERNAL MEDICINE | Admitting: INTERNAL MEDICINE
Payer: MEDICARE

## 2022-05-23 VITALS
DIASTOLIC BLOOD PRESSURE: 53 MMHG | TEMPERATURE: 99 F | HEART RATE: 81 BPM | OXYGEN SATURATION: 99 % | RESPIRATION RATE: 18 BRPM | SYSTOLIC BLOOD PRESSURE: 146 MMHG | HEIGHT: 63 IN

## 2022-05-23 DIAGNOSIS — Z98.890 OTHER SPECIFIED POSTPROCEDURAL STATES: Chronic | ICD-10-CM

## 2022-05-23 LAB
ALBUMIN SERPL ELPH-MCNC: 4.3 G/DL — SIGNIFICANT CHANGE UP (ref 3.3–5)
ALP SERPL-CCNC: 77 U/L — SIGNIFICANT CHANGE UP (ref 40–120)
ALT FLD-CCNC: 25 U/L — SIGNIFICANT CHANGE UP (ref 4–33)
ANION GAP SERPL CALC-SCNC: 12 MMOL/L — SIGNIFICANT CHANGE UP (ref 7–14)
AST SERPL-CCNC: 44 U/L — HIGH (ref 4–32)
BASOPHILS # BLD AUTO: 0.03 K/UL — SIGNIFICANT CHANGE UP (ref 0–0.2)
BASOPHILS NFR BLD AUTO: 0.4 % — SIGNIFICANT CHANGE UP (ref 0–2)
BILIRUB SERPL-MCNC: 0.5 MG/DL — SIGNIFICANT CHANGE UP (ref 0.2–1.2)
BUN SERPL-MCNC: 14 MG/DL — SIGNIFICANT CHANGE UP (ref 7–23)
CALCIUM SERPL-MCNC: 9.8 MG/DL — SIGNIFICANT CHANGE UP (ref 8.4–10.5)
CHLORIDE SERPL-SCNC: 103 MMOL/L — SIGNIFICANT CHANGE UP (ref 98–107)
CO2 SERPL-SCNC: 24 MMOL/L — SIGNIFICANT CHANGE UP (ref 22–31)
CREAT SERPL-MCNC: 0.67 MG/DL — SIGNIFICANT CHANGE UP (ref 0.5–1.3)
EGFR: 93 ML/MIN/1.73M2 — SIGNIFICANT CHANGE UP
EOSINOPHIL # BLD AUTO: 0.15 K/UL — SIGNIFICANT CHANGE UP (ref 0–0.5)
EOSINOPHIL NFR BLD AUTO: 2 % — SIGNIFICANT CHANGE UP (ref 0–6)
FLUAV AG NPH QL: SIGNIFICANT CHANGE UP
FLUBV AG NPH QL: SIGNIFICANT CHANGE UP
GLUCOSE SERPL-MCNC: 99 MG/DL — SIGNIFICANT CHANGE UP (ref 70–99)
HCT VFR BLD CALC: 41.8 % — SIGNIFICANT CHANGE UP (ref 34.5–45)
HGB BLD-MCNC: 13.4 G/DL — SIGNIFICANT CHANGE UP (ref 11.5–15.5)
IANC: 4.61 K/UL — SIGNIFICANT CHANGE UP (ref 1.8–7.4)
IMM GRANULOCYTES NFR BLD AUTO: 0.4 % — SIGNIFICANT CHANGE UP (ref 0–1.5)
LYMPHOCYTES # BLD AUTO: 1.7 K/UL — SIGNIFICANT CHANGE UP (ref 1–3.3)
LYMPHOCYTES # BLD AUTO: 22.8 % — SIGNIFICANT CHANGE UP (ref 13–44)
MCHC RBC-ENTMCNC: 28.1 PG — SIGNIFICANT CHANGE UP (ref 27–34)
MCHC RBC-ENTMCNC: 32.1 GM/DL — SIGNIFICANT CHANGE UP (ref 32–36)
MCV RBC AUTO: 87.6 FL — SIGNIFICANT CHANGE UP (ref 80–100)
MONOCYTES # BLD AUTO: 0.94 K/UL — HIGH (ref 0–0.9)
MONOCYTES NFR BLD AUTO: 12.6 % — SIGNIFICANT CHANGE UP (ref 2–14)
NEUTROPHILS # BLD AUTO: 4.61 K/UL — SIGNIFICANT CHANGE UP (ref 1.8–7.4)
NEUTROPHILS NFR BLD AUTO: 61.8 % — SIGNIFICANT CHANGE UP (ref 43–77)
NRBC # BLD: 0 /100 WBCS — SIGNIFICANT CHANGE UP
NRBC # FLD: 0 K/UL — SIGNIFICANT CHANGE UP
PLATELET # BLD AUTO: 268 K/UL — SIGNIFICANT CHANGE UP (ref 150–400)
POTASSIUM SERPL-MCNC: 4.5 MMOL/L — SIGNIFICANT CHANGE UP (ref 3.5–5.3)
POTASSIUM SERPL-SCNC: 4.5 MMOL/L — SIGNIFICANT CHANGE UP (ref 3.5–5.3)
PROT SERPL-MCNC: 7.6 G/DL — SIGNIFICANT CHANGE UP (ref 6–8.3)
RBC # BLD: 4.77 M/UL — SIGNIFICANT CHANGE UP (ref 3.8–5.2)
RBC # FLD: 14.1 % — SIGNIFICANT CHANGE UP (ref 10.3–14.5)
RSV RNA NPH QL NAA+NON-PROBE: SIGNIFICANT CHANGE UP
SARS-COV-2 RNA SPEC QL NAA+PROBE: SIGNIFICANT CHANGE UP
SODIUM SERPL-SCNC: 139 MMOL/L — SIGNIFICANT CHANGE UP (ref 135–145)
WBC # BLD: 7.46 K/UL — SIGNIFICANT CHANGE UP (ref 3.8–10.5)
WBC # FLD AUTO: 7.46 K/UL — SIGNIFICANT CHANGE UP (ref 3.8–10.5)

## 2022-05-23 PROCEDURE — 99285 EMERGENCY DEPT VISIT HI MDM: CPT

## 2022-05-23 RX ORDER — MORPHINE SULFATE 50 MG/1
15 CAPSULE, EXTENDED RELEASE ORAL ONCE
Refills: 0 | Status: DISCONTINUED | OUTPATIENT
Start: 2022-05-23 | End: 2022-05-23

## 2022-05-23 RX ORDER — KETOROLAC TROMETHAMINE 30 MG/ML
15 SYRINGE (ML) INJECTION ONCE
Refills: 0 | Status: DISCONTINUED | OUTPATIENT
Start: 2022-05-23 | End: 2022-05-23

## 2022-05-23 RX ORDER — OXYCODONE AND ACETAMINOPHEN 5; 325 MG/1; MG/1
1 TABLET ORAL ONCE
Refills: 0 | Status: DISCONTINUED | OUTPATIENT
Start: 2022-05-23 | End: 2022-05-23

## 2022-05-23 RX ORDER — OXYCODONE HYDROCHLORIDE 5 MG/1
15 TABLET ORAL ONCE
Refills: 0 | Status: DISCONTINUED | OUTPATIENT
Start: 2022-05-23 | End: 2022-05-23

## 2022-05-23 RX ADMIN — Medication 15 MILLIGRAM(S): at 22:30

## 2022-05-23 RX ADMIN — OXYCODONE AND ACETAMINOPHEN 1 TABLET(S): 5; 325 TABLET ORAL at 22:30

## 2022-05-23 NOTE — ED PROVIDER NOTE - CLINICAL SUMMARY MEDICAL DECISION MAKING FREE TEXT BOX
Pt is 73 y/o female with PMHx of HTN, chronic back pain, surgical hx of back, RA, and herniated disc, presenting to ED with acute worsening of back pain. Will order basic blood; No clinical imaging needed; Will provided analgesia, Consider admission d/t failure to thrive.

## 2022-05-23 NOTE — ED ADULT TRIAGE NOTE - CHIEF COMPLAINT QUOTE
Patient has c/o pain to her back pain (due for surgery next month) and abdominal pain radiating to groin.

## 2022-05-23 NOTE — ED PROVIDER NOTE - OBJECTIVE STATEMENT
Pt is 71 y/o female with PMHx of HTN, chronic back pain, surgical hx of back, RA, and herniated disc, presenting to ED with acute worsening of back pain. Per patient, patient was in rehab facility until 2 days ago, signed AMA to go home but immediately found difficult for self-management; Of note, difficulty with ambulation is not new, Pt has pending surgery with neurosurgery tentative. Denies new Sx or changes to Sx, as well as falls and neurological deficits.    Pt states she wants to be admitted to hospital on failure-to-thrive.

## 2022-05-24 DIAGNOSIS — I20.9 ANGINA PECTORIS, UNSPECIFIED: ICD-10-CM

## 2022-05-24 DIAGNOSIS — M06.9 RHEUMATOID ARTHRITIS, UNSPECIFIED: ICD-10-CM

## 2022-05-24 DIAGNOSIS — I10 ESSENTIAL (PRIMARY) HYPERTENSION: ICD-10-CM

## 2022-05-24 DIAGNOSIS — M54.30 SCIATICA, UNSPECIFIED SIDE: ICD-10-CM

## 2022-05-24 DIAGNOSIS — M54.9 DORSALGIA, UNSPECIFIED: ICD-10-CM

## 2022-05-24 DIAGNOSIS — Z29.9 ENCOUNTER FOR PROPHYLACTIC MEASURES, UNSPECIFIED: ICD-10-CM

## 2022-05-24 RX ORDER — ACETAMINOPHEN 500 MG
650 TABLET ORAL EVERY 6 HOURS
Refills: 0 | Status: ACTIVE | OUTPATIENT
Start: 2022-05-24 | End: 2023-01-01

## 2022-05-24 RX ORDER — ENOXAPARIN SODIUM 100 MG/ML
40 INJECTION SUBCUTANEOUS EVERY 24 HOURS
Refills: 0 | Status: DISCONTINUED | OUTPATIENT
Start: 2022-05-24 | End: 2022-06-07

## 2022-05-24 RX ORDER — GABAPENTIN 400 MG/1
300 CAPSULE ORAL THREE TIMES A DAY
Refills: 0 | Status: DISCONTINUED | OUTPATIENT
Start: 2022-05-24 | End: 2022-05-31

## 2022-05-24 RX ORDER — OXYCODONE HYDROCHLORIDE 5 MG/1
5 TABLET ORAL EVERY 4 HOURS
Refills: 0 | Status: DISCONTINUED | OUTPATIENT
Start: 2022-05-24 | End: 2022-05-26

## 2022-05-24 RX ORDER — ATORVASTATIN CALCIUM 80 MG/1
80 TABLET, FILM COATED ORAL AT BEDTIME
Refills: 0 | Status: DISCONTINUED | OUTPATIENT
Start: 2022-05-24 | End: 2022-06-07

## 2022-05-24 RX ORDER — ASPIRIN/CALCIUM CARB/MAGNESIUM 324 MG
325 TABLET ORAL DAILY
Refills: 0 | Status: DISCONTINUED | OUTPATIENT
Start: 2022-05-24 | End: 2022-05-31

## 2022-05-24 RX ORDER — METOPROLOL TARTRATE 50 MG
50 TABLET ORAL
Refills: 0 | Status: DISCONTINUED | OUTPATIENT
Start: 2022-05-24 | End: 2022-06-07

## 2022-05-24 RX ORDER — HYDROCHLOROTHIAZIDE 25 MG
25 TABLET ORAL DAILY
Refills: 0 | Status: DISCONTINUED | OUTPATIENT
Start: 2022-05-24 | End: 2022-06-07

## 2022-05-24 RX ORDER — ALBUTEROL 90 UG/1
2 AEROSOL, METERED ORAL EVERY 6 HOURS
Refills: 0 | Status: DISCONTINUED | OUTPATIENT
Start: 2022-05-24 | End: 2022-06-07

## 2022-05-24 RX ORDER — PANTOPRAZOLE SODIUM 20 MG/1
40 TABLET, DELAYED RELEASE ORAL
Refills: 0 | Status: DISCONTINUED | OUTPATIENT
Start: 2022-05-24 | End: 2022-06-07

## 2022-05-24 RX ORDER — ISOSORBIDE MONONITRATE 60 MG/1
30 TABLET, EXTENDED RELEASE ORAL DAILY
Refills: 0 | Status: DISCONTINUED | OUTPATIENT
Start: 2022-05-24 | End: 2022-06-07

## 2022-05-24 RX ORDER — LANOLIN ALCOHOL/MO/W.PET/CERES
3 CREAM (GRAM) TOPICAL AT BEDTIME
Refills: 0 | Status: DISCONTINUED | OUTPATIENT
Start: 2022-05-24 | End: 2022-06-07

## 2022-05-24 RX ADMIN — Medication 25 MILLIGRAM(S): at 11:13

## 2022-05-24 RX ADMIN — GABAPENTIN 300 MILLIGRAM(S): 400 CAPSULE ORAL at 13:27

## 2022-05-24 RX ADMIN — GABAPENTIN 300 MILLIGRAM(S): 400 CAPSULE ORAL at 21:57

## 2022-05-24 RX ADMIN — Medication 325 MILLIGRAM(S): at 11:13

## 2022-05-24 RX ADMIN — OXYCODONE HYDROCHLORIDE 5 MILLIGRAM(S): 5 TABLET ORAL at 23:06

## 2022-05-24 RX ADMIN — ATORVASTATIN CALCIUM 80 MILLIGRAM(S): 80 TABLET, FILM COATED ORAL at 21:58

## 2022-05-24 RX ADMIN — Medication 50 MILLIGRAM(S): at 17:38

## 2022-05-24 RX ADMIN — OXYCODONE HYDROCHLORIDE 5 MILLIGRAM(S): 5 TABLET ORAL at 11:13

## 2022-05-24 RX ADMIN — ENOXAPARIN SODIUM 40 MILLIGRAM(S): 100 INJECTION SUBCUTANEOUS at 17:38

## 2022-05-24 RX ADMIN — ISOSORBIDE MONONITRATE 30 MILLIGRAM(S): 60 TABLET, EXTENDED RELEASE ORAL at 11:13

## 2022-05-24 RX ADMIN — OXYCODONE HYDROCHLORIDE 5 MILLIGRAM(S): 5 TABLET ORAL at 12:15

## 2022-05-24 RX ADMIN — OXYCODONE HYDROCHLORIDE 5 MILLIGRAM(S): 5 TABLET ORAL at 22:06

## 2022-05-24 RX ADMIN — PANTOPRAZOLE SODIUM 40 MILLIGRAM(S): 20 TABLET, DELAYED RELEASE ORAL at 11:13

## 2022-05-24 NOTE — H&P ADULT - HISTORY OF PRESENT ILLNESS
Pt is 71 y/o female with PMHx of HTN, chronic back pain, surgical hx of back, RA, and herniated disc, presenting to ED with acute worsening of back pain. Per patient, patient was in rehab facility until 2 days ago, signed AMA to go home but immediately found difficult for self-management; Of note, difficulty with ambulation is not new, Pt has pending surgery with neurosurgery tentative. Denies new Sx or changes to Sx, as well as falls and neurological deficits.  
sinus infection on antibiotic started on 11/29/2019

## 2022-05-24 NOTE — H&P ADULT - NSHPPHYSICALEXAM_GEN_ALL_CORE
PHYSICAL EXAM:   · CONSTITUTIONAL: Well appearing, awake, alert, oriented to person, place, time/situation and in no apparent distress.  · ENMT: Airway patent, Nasal mucosa clear. Mouth with normal mucosa. Throat has no vesicles, no oropharyngeal exudates and uvula is midline.  · EYES: Clear bilaterally, pupils equal, round and reactive to light.  · CARDIAC: Normal rate, regular rhythm.  Heart sounds S1, S2.  No murmurs, rubs or gallops.  · RESPIRATORY: Breath sounds clear and equal bilaterally.  · GASTROINTESTINAL: Abdomen soft, non-tender, no guarding.  · MUSCULOSKELETAL: Midline back pain, no paraspinal tenderness, full ROM, no saddle anesthesia  · NEUROLOGICAL: Alert and oriented, no focal deficits, no motor or sensory deficits, difficulty with ambulation  · SKIN: Skin normal color for race, warm, dry and intact. No evidence of rash.

## 2022-05-24 NOTE — H&P ADULT - ASSESSMENT
Pt is 73 y/o female with PMHx of HTN, chronic back pain, surgical hx of back, RA, and herniated disc, presenting to ED with acute worsening of back pain. Admitted to medicine for unable to ambulate  2/2 acute on chronic back pain. Pain management consult.

## 2022-05-24 NOTE — PATIENT PROFILE ADULT - FALL HARM RISK - FACTORS NURSING JUDGEMENT
Other (Free Text): Can remove for $10 for cosmetic
Note Text (......Xxx Chief Complaint.): This diagnosis correlates with the
Detail Level: Detailed
Yes

## 2022-05-24 NOTE — H&P ADULT - TIME BILLING
- Review of records, telemetry, vital signs and daily labs.   - General and cardiovascular physical examination.  - Generation of cardiovascular treatment plan.  - Coordination of care.      Patient was seen and examined by me on 5/24/22,interim events noted,labs and radiology studies reviewed.  Shola Pike MD,FACC.  [address] [place], [place] [id number].  [phone]

## 2022-05-24 NOTE — H&P ADULT - PROBLEM SELECTOR PLAN 1
p/w worsening back pain, unable to ambulate  - C/w pain management   - Pt consult  -pain management consult  - pt has OP plan for future surgery

## 2022-05-25 LAB
ALBUMIN SERPL ELPH-MCNC: 3.6 G/DL — SIGNIFICANT CHANGE UP (ref 3.3–5)
ALP SERPL-CCNC: 74 U/L — SIGNIFICANT CHANGE UP (ref 40–120)
ALT FLD-CCNC: 16 U/L — SIGNIFICANT CHANGE UP (ref 4–33)
ANION GAP SERPL CALC-SCNC: 10 MMOL/L — SIGNIFICANT CHANGE UP (ref 7–14)
AST SERPL-CCNC: 17 U/L — SIGNIFICANT CHANGE UP (ref 4–32)
BILIRUB SERPL-MCNC: 0.5 MG/DL — SIGNIFICANT CHANGE UP (ref 0.2–1.2)
BUN SERPL-MCNC: 14 MG/DL — SIGNIFICANT CHANGE UP (ref 7–23)
CALCIUM SERPL-MCNC: 9.1 MG/DL — SIGNIFICANT CHANGE UP (ref 8.4–10.5)
CHLORIDE SERPL-SCNC: 104 MMOL/L — SIGNIFICANT CHANGE UP (ref 98–107)
CO2 SERPL-SCNC: 25 MMOL/L — SIGNIFICANT CHANGE UP (ref 22–31)
CREAT SERPL-MCNC: 0.7 MG/DL — SIGNIFICANT CHANGE UP (ref 0.5–1.3)
EGFR: 92 ML/MIN/1.73M2 — SIGNIFICANT CHANGE UP
GLUCOSE SERPL-MCNC: 91 MG/DL — SIGNIFICANT CHANGE UP (ref 70–99)
MAGNESIUM SERPL-MCNC: 1.8 MG/DL — SIGNIFICANT CHANGE UP (ref 1.6–2.6)
PHOSPHATE SERPL-MCNC: 3.4 MG/DL — SIGNIFICANT CHANGE UP (ref 2.5–4.5)
POTASSIUM SERPL-MCNC: 3.9 MMOL/L — SIGNIFICANT CHANGE UP (ref 3.5–5.3)
POTASSIUM SERPL-SCNC: 3.9 MMOL/L — SIGNIFICANT CHANGE UP (ref 3.5–5.3)
PROT SERPL-MCNC: 6.5 G/DL — SIGNIFICANT CHANGE UP (ref 6–8.3)
SODIUM SERPL-SCNC: 139 MMOL/L — SIGNIFICANT CHANGE UP (ref 135–145)

## 2022-05-25 RX ADMIN — GABAPENTIN 300 MILLIGRAM(S): 400 CAPSULE ORAL at 21:49

## 2022-05-25 RX ADMIN — Medication 325 MILLIGRAM(S): at 12:07

## 2022-05-25 RX ADMIN — OXYCODONE HYDROCHLORIDE 5 MILLIGRAM(S): 5 TABLET ORAL at 08:59

## 2022-05-25 RX ADMIN — Medication 50 MILLIGRAM(S): at 17:53

## 2022-05-25 RX ADMIN — OXYCODONE HYDROCHLORIDE 5 MILLIGRAM(S): 5 TABLET ORAL at 17:56

## 2022-05-25 RX ADMIN — Medication 50 MILLIGRAM(S): at 06:13

## 2022-05-25 RX ADMIN — GABAPENTIN 300 MILLIGRAM(S): 400 CAPSULE ORAL at 13:25

## 2022-05-25 RX ADMIN — GABAPENTIN 300 MILLIGRAM(S): 400 CAPSULE ORAL at 06:13

## 2022-05-25 RX ADMIN — Medication 25 MILLIGRAM(S): at 06:14

## 2022-05-25 RX ADMIN — ISOSORBIDE MONONITRATE 30 MILLIGRAM(S): 60 TABLET, EXTENDED RELEASE ORAL at 12:07

## 2022-05-25 RX ADMIN — ENOXAPARIN SODIUM 40 MILLIGRAM(S): 100 INJECTION SUBCUTANEOUS at 17:53

## 2022-05-25 RX ADMIN — OXYCODONE HYDROCHLORIDE 5 MILLIGRAM(S): 5 TABLET ORAL at 09:53

## 2022-05-25 RX ADMIN — PANTOPRAZOLE SODIUM 40 MILLIGRAM(S): 20 TABLET, DELAYED RELEASE ORAL at 06:13

## 2022-05-25 RX ADMIN — ATORVASTATIN CALCIUM 80 MILLIGRAM(S): 80 TABLET, FILM COATED ORAL at 21:49

## 2022-05-25 RX ADMIN — Medication 3 MILLIGRAM(S): at 21:49

## 2022-05-25 RX ADMIN — ALBUTEROL 2 PUFF(S): 90 AEROSOL, METERED ORAL at 00:10

## 2022-05-25 RX ADMIN — OXYCODONE HYDROCHLORIDE 5 MILLIGRAM(S): 5 TABLET ORAL at 18:50

## 2022-05-25 NOTE — PHYSICAL THERAPY INITIAL EVALUATION ADULT - GAIT DEVIATIONS NOTED, PT EVAL
Significant forward flexed posture; demonstrates with poor coordination and proprioception/decreased moustapha/decreased step length/decreased stride length/decreased weight-shifting ability

## 2022-05-25 NOTE — PHYSICAL THERAPY INITIAL EVALUATION ADULT - DISCHARGE DISPOSITION, PT EVAL
Recommend discharge to acute rehabilitation to address impairments and facilitate functional independence. PMR consult to determine eligibility for acute rehab.

## 2022-05-25 NOTE — PHYSICAL THERAPY INITIAL EVALUATION ADULT - BALANCE DISTURBANCE, IDENTIFIED IMPAIRMENT CONTRIBUTE, REHAB EVAL
impaired coordination/impaired motor control/pain/impaired postural control/impaired sensory feedback/decreased strength

## 2022-05-25 NOTE — PHYSICAL THERAPY INITIAL EVALUATION ADULT - ADDITIONAL COMMENTS
Pt lives in a pr Pt lives in a private home alone with significant amount of stairs to negotiate. There are 3 steps to enter front door or 1 step to enter back door, 10 steps to bedroom, 7 steps to basement, 5 steps to kitchen. Pt reports prior to back injury, she was independent with all mobility with cane for household ambulation and rollator for community ambulation, self-care, and ADLs. Owns rollator, rolling walker, cane, commode, and shower chair.

## 2022-05-25 NOTE — PHYSICAL THERAPY INITIAL EVALUATION ADULT - PERTINENT HX OF CURRENT PROBLEM, REHAB EVAL
Pt is a 72 year old female presenting with worsening low back pain and inability to ambulate. Pt left rehab AMA 2 days ago and went home and was unable to negotiate stairs inside her home; [facility]

## 2022-05-25 NOTE — PHYSICAL THERAPY INITIAL EVALUATION ADULT - IMPAIRED TRANSFERS: SIT/STAND, REHAB EVAL
impaired balance/impaired coordination/impaired motor control/pain/impaired postural control/impaired sensory feedback/decreased strength

## 2022-05-25 NOTE — PHYSICAL THERAPY INITIAL EVALUATION ADULT - TRANSFER SAFETY CONCERNS NOTED: SIT/STAND, REHAB EVAL
excessive forward flexed posture/losing balance/decreased sequencing ability/decreased weight-shifting ability

## 2022-05-25 NOTE — PROGRESS NOTE ADULT - SUBJECTIVE AND OBJECTIVE BOX
PATIENT SEEN AND EXAMINED ON :- 5/25/22  DATE OF SERVICE: 5/25/22            Interim events noted,Labs ,Radiological studies and Cardiology tests reviewed .       HOSPITAL COURSE: HPI:  Pt is 71 y/o female with PMHx of HTN, chronic back pain, surgical hx of back, RA, and herniated disc, presenting to ED with acute worsening of back pain. Per patient, patient was in rehab facility until 2 days ago, signed AMA to go home but immediately found difficult for self-management; Of note, difficulty with ambulation is not new, Pt has pending surgery with neurosurgery tentative. Denies new Sx or changes to Sx, as well as falls and neurological deficits.   (24 May 2022 12:55)      INTERIM EVENTS:Patient seen at bedside ,interim events noted.      PMH -reviewed admission note, no change since admission  HEART FAILURE: Acute[ ]Chronic[ ] Systolic[ ] Diastolic[ ] Combined Systolic and Diastolic[ ]  CAD[ ] CABG[ ] PCI[ ]  DEVICES[ ] PPM[ ] ICD[ ] ILR[ ]  ATRIAL FIBRILLATION[ ] Paroxysmal[ ] Permanent[ ]  SAAD[ ] CKD1[ ] CKD2[ ] CKD3[ ] CKD4[ ] ESRD[ ]  COPD[ ] HTN[ ]   DM[ ] Type1[ ] Type 2[ ]   CVA[ ] Paresis[ ]    AMBULATION: Assisted[ ] Cane/walker[ ] Independent[ ]    MEDICATIONS  (STANDING):  aspirin enteric coated 325 milliGRAM(s) Oral daily  atorvastatin 80 milliGRAM(s) Oral at bedtime  enoxaparin Injectable 40 milliGRAM(s) SubCutaneous every 24 hours  gabapentin 300 milliGRAM(s) Oral three times a day  hydrochlorothiazide 25 milliGRAM(s) Oral daily  isosorbide   mononitrate ER Tablet (IMDUR) 30 milliGRAM(s) Oral daily  metoprolol tartrate 50 milliGRAM(s) Oral two times a day  pantoprazole    Tablet 40 milliGRAM(s) Oral before breakfast    MEDICATIONS  (PRN):  acetaminophen     Tablet .. 650 milliGRAM(s) Oral every 6 hours PRN Temp greater or equal to 38C (100.4F), Mild Pain (1 - 3)  ALBUTerol    90 MICROgram(s) HFA Inhaler 2 Puff(s) Inhalation every 6 hours PRN Shortness of Breath and/or Wheezing  melatonin 3 milliGRAM(s) Oral at bedtime PRN Insomnia  oxyCODONE    IR 5 milliGRAM(s) Oral every 4 hours PRN Severe Pain (7 - 10)            REVIEW OF SYSTEMS:  Constitutional: [ ] fever, [ ]weight loss,  [ ]fatigue  Eyes: [ ] visual changes  Respiratory: [ ]shortness of breath;  [ ] cough, [ ]wheezing, [ ]chills, [ ]hemoptysis  Cardiovascular: [ ] chest pain, [ ]palpitations, [ ]dizziness,  [ ]leg swelling[ ]orthopnea[ ]PND  Gastrointestinal: [ ] abdominal pain, [ ]nausea, [ ]vomiting,  [ ]diarrhea [ ]Constipation [ ]Melena  Genitourinary: [ ] dysuria, [ ] hematuria [ ]Mathews  Neurologic: [ ] headaches [ ] tremors[ ]weakness [ ]Paralysis Right[ ] Left[ ]  Skin: [ ] itching, [ ]burning, [ ] rashes  Endocrine: [ ] heat or cold intolerance  Musculoskeletal: [ ] joint pain or swelling; [ ] muscle, back, or extremity pain  Psychiatric: [ ] depression, [ ]anxiety, [ ]mood swings, or [ ]difficulty sleeping  Hematologic: [ ] easy bruising, [ ] bleeding gums    [ ] All remaining systems negative except as per above.   [ ]Unable to obtain.  [x] No change in ROS since admission      Vital Signs Last 24 Hrs  T(C): 37.3 (25 May 2022 12:00), Max: 37.3 (25 May 2022 12:00)  T(F): 99.1 (25 May 2022 12:00), Max: 99.1 (25 May 2022 12:00)  HR: 68 (25 May 2022 17:50) (60 - 68)  BP: 144/73 (25 May 2022 17:50) (127/58 - 152/66)  BP(mean): --  RR: 18 (25 May 2022 17:50) (18 - 18)  SpO2: 100% (25 May 2022 17:50) (100% - 100%)  I&O's Summary    24 May 2022 07:01  -  25 May 2022 07:00  --------------------------------------------------------  IN: 240 mL / OUT: 400 mL / NET: -160 mL        PHYSICAL EXAM:  General: No acute distress BMI-  HEENT: EOMI, PERRL  Neck: Supple, [ ] JVD  Lungs: Equal air entry bilaterally; [ ] rales [ ] wheezing [ ] rhonchi  Heart: Regular rate and rhythm; [x ] murmur   2/6 [ x] systolic [ ] diastolic [ ] radiation[ ] rubs [ ]  gallops  Abdomen: Nontender, bowel sounds present  Extremities: No clubbing, cyanosis, [ ] edema [ ]Pulses  equal and intact  Nervous system:  Alert & Oriented X3, no focal deficits  Psychiatric: Normal affect  Skin: No rashes or lesions    LABS:  05-25    139  |  104  |  14  ----------------------------<  91  3.9   |  25  |  0.70    Ca    9.1      25 May 2022 05:36  Phos  3.4     05-25  Mg     1.80     05-25    TPro  6.5  /  Alb  3.6  /  TBili  0.5  /  DBili  x   /  AST  17  /  ALT  16  /  AlkPhos  74  05-25    Creatinine Trend: 0.70<--, 0.67<--, 0.68<--, 0.71<--, 0.66<--, 0.68<--                        13.4   7.46  )-----------( 268      ( 23 May 2022 22:10 )             41.8

## 2022-05-25 NOTE — CHART NOTE - NSCHARTNOTEFT_GEN_A_CORE
spoke with patient at length at bedside . She feels overwhelmed and tearful due to  medical problems . Denies suicidal or homicidal ideations.  Agreeable to speak with psychiatry - consult called     Pain management consulted for chronic back pain   Above discussed with Glendy Humphreys .

## 2022-05-25 NOTE — PHYSICAL THERAPY INITIAL EVALUATION ADULT - DIAGNOSIS, PT EVAL
Upon evaluation, pt presents with impairments in functional mobility, strength, balance, gait, proprioception, and coordination. Pt would benefit from skilled PT services in the acute care setting to address impairments to facilitate return to prior level of function.

## 2022-05-25 NOTE — PHYSICAL THERAPY INITIAL EVALUATION ADULT - GENERAL OBSERVATIONS, REHAB EVAL
Upon entry, pt semi-supine in bed in NAD. Pt left as received with all tubes/lines intact, bed alarm on, call bell in reach and in NAD.

## 2022-05-25 NOTE — PHYSICAL THERAPY INITIAL EVALUATION ADULT - IMPAIRMENTS FOUND, PT EVAL
aerobic capacity/endurance/decreased midline orientation/ergonomics and body mechanics/fine motor/gait, locomotion, and balance/gross motor/muscle strength/neuromotor development and sensory integration/posture/sensory integrity

## 2022-05-25 NOTE — PHYSICAL THERAPY INITIAL EVALUATION ADULT - REFERRAL TO ANOTHER SERVICE NEEDED, PT EVAL
pt requesting neurology consultation to discuss MRI findings from previous admission/neurology/occupational therapy

## 2022-05-26 PROCEDURE — 90792 PSYCH DIAG EVAL W/MED SRVCS: CPT

## 2022-05-26 RX ORDER — LIDOCAINE 4 G/100G
1 CREAM TOPICAL ONCE
Refills: 0 | Status: COMPLETED | OUTPATIENT
Start: 2022-05-26 | End: 2022-05-26

## 2022-05-26 RX ORDER — OXYCODONE HYDROCHLORIDE 5 MG/1
5 TABLET ORAL EVERY 4 HOURS
Refills: 0 | Status: DISCONTINUED | OUTPATIENT
Start: 2022-05-26 | End: 2022-05-31

## 2022-05-26 RX ORDER — KETOROLAC TROMETHAMINE 30 MG/ML
15 SYRINGE (ML) INJECTION EVERY 6 HOURS
Refills: 0 | Status: DISCONTINUED | OUTPATIENT
Start: 2022-05-26 | End: 2022-05-28

## 2022-05-26 RX ORDER — SENNA PLUS 8.6 MG/1
2 TABLET ORAL AT BEDTIME
Refills: 0 | Status: DISCONTINUED | OUTPATIENT
Start: 2022-05-26 | End: 2022-06-07

## 2022-05-26 RX ORDER — LIDOCAINE 4 G/100G
1 CREAM TOPICAL DAILY
Refills: 0 | Status: COMPLETED | OUTPATIENT
Start: 2022-05-26 | End: 2022-05-30

## 2022-05-26 RX ORDER — ACETAMINOPHEN 500 MG
650 TABLET ORAL EVERY 6 HOURS
Refills: 0 | Status: COMPLETED | OUTPATIENT
Start: 2022-05-26 | End: 2022-05-28

## 2022-05-26 RX ADMIN — ENOXAPARIN SODIUM 40 MILLIGRAM(S): 100 INJECTION SUBCUTANEOUS at 18:25

## 2022-05-26 RX ADMIN — OXYCODONE HYDROCHLORIDE 5 MILLIGRAM(S): 5 TABLET ORAL at 06:18

## 2022-05-26 RX ADMIN — Medication 25 MILLIGRAM(S): at 05:23

## 2022-05-26 RX ADMIN — SENNA PLUS 2 TABLET(S): 8.6 TABLET ORAL at 23:23

## 2022-05-26 RX ADMIN — OXYCODONE HYDROCHLORIDE 5 MILLIGRAM(S): 5 TABLET ORAL at 10:51

## 2022-05-26 RX ADMIN — Medication 50 MILLIGRAM(S): at 18:25

## 2022-05-26 RX ADMIN — Medication 15 MILLIGRAM(S): at 18:25

## 2022-05-26 RX ADMIN — PANTOPRAZOLE SODIUM 40 MILLIGRAM(S): 20 TABLET, DELAYED RELEASE ORAL at 05:22

## 2022-05-26 RX ADMIN — LIDOCAINE 1 PATCH: 4 CREAM TOPICAL at 19:03

## 2022-05-26 RX ADMIN — ATORVASTATIN CALCIUM 80 MILLIGRAM(S): 80 TABLET, FILM COATED ORAL at 23:22

## 2022-05-26 RX ADMIN — Medication 325 MILLIGRAM(S): at 13:45

## 2022-05-26 RX ADMIN — Medication 15 MILLIGRAM(S): at 19:03

## 2022-05-26 RX ADMIN — OXYCODONE HYDROCHLORIDE 5 MILLIGRAM(S): 5 TABLET ORAL at 05:18

## 2022-05-26 RX ADMIN — LIDOCAINE 1 PATCH: 4 CREAM TOPICAL at 13:45

## 2022-05-26 RX ADMIN — Medication 50 MILLIGRAM(S): at 05:22

## 2022-05-26 RX ADMIN — Medication 650 MILLIGRAM(S): at 18:25

## 2022-05-26 RX ADMIN — GABAPENTIN 300 MILLIGRAM(S): 400 CAPSULE ORAL at 05:23

## 2022-05-26 RX ADMIN — ISOSORBIDE MONONITRATE 30 MILLIGRAM(S): 60 TABLET, EXTENDED RELEASE ORAL at 13:45

## 2022-05-26 RX ADMIN — Medication 650 MILLIGRAM(S): at 19:03

## 2022-05-26 RX ADMIN — GABAPENTIN 300 MILLIGRAM(S): 400 CAPSULE ORAL at 23:22

## 2022-05-26 RX ADMIN — OXYCODONE HYDROCHLORIDE 5 MILLIGRAM(S): 5 TABLET ORAL at 10:50

## 2022-05-26 NOTE — BH CONSULTATION LIAISON ASSESSMENT NOTE - MSE UNSTRUCTURED FT
Mental Status Exam:  Kiley: well groomed, fair hygiene     Behavior: calm, cooperative, no psychomotor retardation/agitation  Motor: no tremors, EPS, or rigidity  Gait: did not assess, pt in bed  Speech: normal rate, rhythm, prosody and volume   Mood: "sad"  Affect: dysthymic but good range, congruent  Thought process: clear, goal directed   Thought Content: denies paranoia, delusions, talks about pain and losing independence  Perception: denies AH/VH  SI: denies  HI: denies  Insight: fair  Judgment: fair    Cognitive Exam:  Orientation: AOx3  Recall: intact  Attention: intact  Abstraction: intact

## 2022-05-26 NOTE — BH CONSULTATION LIAISON ASSESSMENT NOTE - SUMMARY
71 y/o female with PMHx of HTN, chronic back pain, surgical hx of back, RA, and herniated disc, presenting to ED with acute worsening of back pain. She has tentative neurosurgery scheduled. Pain management consulted for worsening pain.  Psych CL consulted to assist with depressive symptoms.    Patient has an adjustment disorder in setting of worsening back pain and decreased independence in life. Does not fully meet criteria for MDD. Believes that once pain is improved her mood will be too.  [] appreciate pain management involvement  [] consider holistic nurse  [] hold off on starting scheduled medication for depression  Will sign off

## 2022-05-26 NOTE — BH CONSULTATION LIAISON ASSESSMENT NOTE - NSBHCHARTREVIEWVS_PSY_A_CORE FT
Vital Signs Last 24 Hrs  T(C): 36.8 (26 May 2022 10:48), Max: 36.8 (26 May 2022 10:48)  T(F): 98.2 (26 May 2022 10:48), Max: 98.2 (26 May 2022 10:48)  HR: 65 (26 May 2022 10:48) (62 - 69)  BP: 132/67 (26 May 2022 10:48) (132/67 - 144/73)  BP(mean): --  RR: 18 (26 May 2022 10:48) (18 - 18)  SpO2: 99% (26 May 2022 10:48) (99% - 100%)

## 2022-05-26 NOTE — PROGRESS NOTE ADULT - SUBJECTIVE AND OBJECTIVE BOX
PATIENT SEEN AND EXAMINED ON :- 5/26/22  DATE OF SERVICE:   5/26/22          Interim events noted,Labs ,Radiological studies and Cardiology tests reviewed .     HOSPITAL COURSE: HPI:  Pt is 71 y/o female with PMHx of HTN, chronic back pain, surgical hx of back, RA, and herniated disc, presenting to ED with acute worsening of back pain. Per patient, patient was in rehab facility until 2 days ago, signed AMA to go home but immediately found difficult for self-management; Of note, difficulty with ambulation is not new, Pt has pending surgery with neurosurgery tentative. Denies new Sx or changes to Sx, as well as falls and neurological deficits.   (24 May 2022 12:55)      INTERIM EVENTS:Patient seen at bedside ,interim events noted.      PMH -reviewed admission note, no change since admission  HEART FAILURE: Acute[ ]Chronic[ ] Systolic[ ] Diastolic[ ] Combined Systolic and Diastolic[ ]  CAD[ ] CABG[ ] PCI[ ]  DEVICES[ ] PPM[ ] ICD[ ] ILR[ ]  ATRIAL FIBRILLATION[ ] Paroxysmal[ ] Permanent[ ]  SAAD[ ] CKD1[ ] CKD2[ ] CKD3[ ] CKD4[ ] ESRD[ ]  COPD[ ] HTN[ ]   DM[ ] Type1[ ] Type 2[ ]   CVA[ ] Paresis[ ]    AMBULATION: Assisted[ ] Cane/walker[ ] Independent[ ]    MEDICATIONS  (STANDING):  acetaminophen     Tablet .. 650 milliGRAM(s) Oral every 6 hours  aspirin enteric coated 325 milliGRAM(s) Oral daily  atorvastatin 80 milliGRAM(s) Oral at bedtime  enoxaparin Injectable 40 milliGRAM(s) SubCutaneous every 24 hours  gabapentin 300 milliGRAM(s) Oral three times a day  hydrochlorothiazide 25 milliGRAM(s) Oral daily  isosorbide   mononitrate ER Tablet (IMDUR) 30 milliGRAM(s) Oral daily  ketorolac   Injectable 15 milliGRAM(s) IV Push every 6 hours  lidocaine   4% Patch 1 Patch Transdermal daily  metoprolol tartrate 50 milliGRAM(s) Oral two times a day  pantoprazole    Tablet 40 milliGRAM(s) Oral before breakfast  senna 2 Tablet(s) Oral at bedtime    MEDICATIONS  (PRN):  ALBUTerol    90 MICROgram(s) HFA Inhaler 2 Puff(s) Inhalation every 6 hours PRN Shortness of Breath and/or Wheezing  melatonin 3 milliGRAM(s) Oral at bedtime PRN Insomnia  oxyCODONE    IR 5 milliGRAM(s) Oral every 4 hours PRN Severe Pain (7 - 10)            REVIEW OF SYSTEMS:  Constitutional: [ ] fever, [ ]weight loss,  [ ]fatigue  Eyes: [ ] visual changes  Respiratory: [ ]shortness of breath;  [ ] cough, [ ]wheezing, [ ]chills, [ ]hemoptysis  Cardiovascular: [ ] chest pain, [ ]palpitations, [ ]dizziness,  [ ]leg swelling[ ]orthopnea[ ]PND  Gastrointestinal: [ ] abdominal pain, [ ]nausea, [ ]vomiting,  [ ]diarrhea [ ]Constipation [ ]Melena  Genitourinary: [ ] dysuria, [ ] hematuria [ ]Mathews  Neurologic: [ ] headaches [ ] tremors[ ]weakness [ ]Paralysis Right[ ] Left[ ]  Skin: [ ] itching, [ ]burning, [ ] rashes  Endocrine: [ ] heat or cold intolerance  Musculoskeletal: [ ] joint pain or swelling; [ ] muscle, back, or extremity pain  Psychiatric: [ ] depression, [ ]anxiety, [ ]mood swings, or [ ]difficulty sleeping  Hematologic: [ ] easy bruising, [ ] bleeding gums    [ ] All remaining systems negative except as per above.   [ ]Unable to obtain.  [x] No change in ROS since admission      Vital Signs Last 24 Hrs  T(C): 36.9 (26 May 2022 19:02), Max: 36.9 (26 May 2022 19:02)  T(F): 98.5 (26 May 2022 19:02), Max: 98.5 (26 May 2022 19:02)  HR: 68 (26 May 2022 19:02) (62 - 69)  BP: 140/71 (26 May 2022 19:02) (122/68 - 144/72)  BP(mean): --  RR: 18 (26 May 2022 19:02) (17 - 18)  SpO2: 100% (26 May 2022 19:02) (99% - 100%)  I&O's Summary      PHYSICAL EXAM:  General: No acute distress BMI-  HEENT: EOMI, PERRL  Neck: Supple, [ ] JVD  Lungs: Equal air entry bilaterally; [ ] rales [ ] wheezing [ ] rhonchi  Heart: Regular rate and rhythm; [x ] murmur   2/6 [ x] systolic [ ] diastolic [ ] radiation[ ] rubs [ ]  gallops  Abdomen: Nontender, bowel sounds present  Extremities: No clubbing, cyanosis, [ ] edema [ ]Pulses  equal and intact  Nervous system:  Alert & Oriented X3, no focal deficits  Psychiatric: Normal affect  Skin: No rashes or lesions    LABS:  05-25    139  |  104  |  14  ----------------------------<  91  3.9   |  25  |  0.70    Ca    9.1      25 May 2022 05:36  Phos  3.4     05-25  Mg     1.80     05-25    TPro  6.5  /  Alb  3.6  /  TBili  0.5  /  DBili  x   /  AST  17  /  ALT  16  /  AlkPhos  74  05-25    Creatinine Trend: 0.70<--, 0.67<--, 0.68<--, 0.71<--, 0.66<--, 0.68<--

## 2022-05-26 NOTE — BH CONSULTATION LIAISON ASSESSMENT NOTE - NSSUICPROTFACT_PSY_ALL_CORE
Responsibility to children, family, or others/Identifies reasons for living/Supportive social network of family or friends/Fear of death or the actual act of killing self/Cultural, spiritual and/or moral attitudes against suicide/[employer] beliefs

## 2022-05-26 NOTE — BH CONSULTATION LIAISON ASSESSMENT NOTE - CURRENT MEDICATION
MEDICATIONS  (STANDING):  aspirin enteric coated 325 milliGRAM(s) Oral daily  atorvastatin 80 milliGRAM(s) Oral at bedtime  enoxaparin Injectable 40 milliGRAM(s) SubCutaneous every 24 hours  gabapentin 300 milliGRAM(s) Oral three times a day  hydrochlorothiazide 25 milliGRAM(s) Oral daily  isosorbide   mononitrate ER Tablet (IMDUR) 30 milliGRAM(s) Oral daily  lidocaine   4% Patch 1 Patch Transdermal daily  metoprolol tartrate 50 milliGRAM(s) Oral two times a day  pantoprazole    Tablet 40 milliGRAM(s) Oral before breakfast  senna 2 Tablet(s) Oral at bedtime    MEDICATIONS  (PRN):  acetaminophen     Tablet .. 650 milliGRAM(s) Oral every 6 hours PRN Temp greater or equal to 38C (100.4F), Mild Pain (1 - 3)  ALBUTerol    90 MICROgram(s) HFA Inhaler 2 Puff(s) Inhalation every 6 hours PRN Shortness of Breath and/or Wheezing  melatonin 3 milliGRAM(s) Oral at bedtime PRN Insomnia  oxyCODONE    IR 5 milliGRAM(s) Oral every 4 hours PRN Severe Pain (7 - 10)

## 2022-05-26 NOTE — BH CONSULTATION LIAISON ASSESSMENT NOTE - NSBHCONSULTFOLLOWAFTERCARE_PSY_A_CORE FT
[facility]  [address] [place] [id number] [phone]

## 2022-05-26 NOTE — BH CONSULTATION LIAISON ASSESSMENT NOTE - RISK ASSESSMENT
Patient has no formal psychiatric history, is future oriented, denies SI, is Confucianist, is sober. Does have acute pain and change in independent functioning. With that said, imminent risk is low.

## 2022-05-26 NOTE — BH CONSULTATION LIAISON ASSESSMENT NOTE - HPI (INCLUDE ILLNESS QUALITY, SEVERITY, DURATION, TIMING, CONTEXT, MODIFYING FACTORS, ASSOCIATED SIGNS AND SYMPTOMS)
71 y/o female with PMHx of HTN, chronic back pain, surgical hx of back, RA, and herniated disc, presenting to ED with acute worsening of back pain. She has tentative neurosurgery scheduled. Pain management consulted for worsening pain.  Psych CL consulted to assist with depressive symptoms.  Patient seen this AM. She is calm, polite and pleasant. She admits that she has been feeling more sad in the setting of severe pain and that her pain has impacted her ability to live independently. She reports some poor sleep in the setting of increased pain. Also has some decrease in appetite. She adamantly denies SI.  She denies history of substance use. States that she is a Mu-ism person- Jehovahs witness. She states that she has tremendous nate from her son and grandchildren.  She believes that once her pain is improved her mood will be much improved.  Offered support and validation with effect.

## 2022-05-26 NOTE — BH CONSULTATION LIAISON ASSESSMENT NOTE - NSBHCHARTREVIEWLAB_PSY_A_CORE FT
05-25    139  |  104  |  14  ----------------------------<  91  3.9   |  25  |  0.70    Ca    9.1      25 May 2022 05:36  Phos  3.4     05-25  Mg     1.80     05-25    TPro  6.5  /  Alb  3.6  /  TBili  0.5  /  DBili  x   /  AST  17  /  ALT  16  /  AlkPhos  74  05-25

## 2022-05-27 LAB
ANION GAP SERPL CALC-SCNC: 11 MMOL/L — SIGNIFICANT CHANGE UP (ref 7–14)
BUN SERPL-MCNC: 22 MG/DL — SIGNIFICANT CHANGE UP (ref 7–23)
CALCIUM SERPL-MCNC: 9.7 MG/DL — SIGNIFICANT CHANGE UP (ref 8.4–10.5)
CHLORIDE SERPL-SCNC: 102 MMOL/L — SIGNIFICANT CHANGE UP (ref 98–107)
CO2 SERPL-SCNC: 24 MMOL/L — SIGNIFICANT CHANGE UP (ref 22–31)
CREAT SERPL-MCNC: 0.75 MG/DL — SIGNIFICANT CHANGE UP (ref 0.5–1.3)
EGFR: 85 ML/MIN/1.73M2 — SIGNIFICANT CHANGE UP
GLUCOSE SERPL-MCNC: 100 MG/DL — HIGH (ref 70–99)
HCT VFR BLD CALC: 39.5 % — SIGNIFICANT CHANGE UP (ref 34.5–45)
HGB BLD-MCNC: 12.6 G/DL — SIGNIFICANT CHANGE UP (ref 11.5–15.5)
MAGNESIUM SERPL-MCNC: 1.8 MG/DL — SIGNIFICANT CHANGE UP (ref 1.6–2.6)
MCHC RBC-ENTMCNC: 28.3 PG — SIGNIFICANT CHANGE UP (ref 27–34)
MCHC RBC-ENTMCNC: 31.9 GM/DL — LOW (ref 32–36)
MCV RBC AUTO: 88.8 FL — SIGNIFICANT CHANGE UP (ref 80–100)
NRBC # BLD: 0 /100 WBCS — SIGNIFICANT CHANGE UP
NRBC # FLD: 0 K/UL — SIGNIFICANT CHANGE UP
PHOSPHATE SERPL-MCNC: 3.9 MG/DL — SIGNIFICANT CHANGE UP (ref 2.5–4.5)
PLATELET # BLD AUTO: 253 K/UL — SIGNIFICANT CHANGE UP (ref 150–400)
POTASSIUM SERPL-MCNC: 3.9 MMOL/L — SIGNIFICANT CHANGE UP (ref 3.5–5.3)
POTASSIUM SERPL-SCNC: 3.9 MMOL/L — SIGNIFICANT CHANGE UP (ref 3.5–5.3)
RBC # BLD: 4.45 M/UL — SIGNIFICANT CHANGE UP (ref 3.8–5.2)
RBC # FLD: 13.8 % — SIGNIFICANT CHANGE UP (ref 10.3–14.5)
SODIUM SERPL-SCNC: 137 MMOL/L — SIGNIFICANT CHANGE UP (ref 135–145)
WBC # BLD: 5.04 K/UL — SIGNIFICANT CHANGE UP (ref 3.8–10.5)
WBC # FLD AUTO: 5.04 K/UL — SIGNIFICANT CHANGE UP (ref 3.8–10.5)

## 2022-05-27 RX ADMIN — PANTOPRAZOLE SODIUM 40 MILLIGRAM(S): 20 TABLET, DELAYED RELEASE ORAL at 06:34

## 2022-05-27 RX ADMIN — Medication 15 MILLIGRAM(S): at 14:05

## 2022-05-27 RX ADMIN — GABAPENTIN 300 MILLIGRAM(S): 400 CAPSULE ORAL at 21:20

## 2022-05-27 RX ADMIN — OXYCODONE HYDROCHLORIDE 5 MILLIGRAM(S): 5 TABLET ORAL at 18:08

## 2022-05-27 RX ADMIN — ISOSORBIDE MONONITRATE 30 MILLIGRAM(S): 60 TABLET, EXTENDED RELEASE ORAL at 13:00

## 2022-05-27 RX ADMIN — Medication 650 MILLIGRAM(S): at 01:12

## 2022-05-27 RX ADMIN — GABAPENTIN 300 MILLIGRAM(S): 400 CAPSULE ORAL at 14:05

## 2022-05-27 RX ADMIN — LIDOCAINE 1 PATCH: 4 CREAM TOPICAL at 18:53

## 2022-05-27 RX ADMIN — SENNA PLUS 2 TABLET(S): 8.6 TABLET ORAL at 21:19

## 2022-05-27 RX ADMIN — Medication 15 MILLIGRAM(S): at 01:12

## 2022-05-27 RX ADMIN — OXYCODONE HYDROCHLORIDE 5 MILLIGRAM(S): 5 TABLET ORAL at 06:37

## 2022-05-27 RX ADMIN — ENOXAPARIN SODIUM 40 MILLIGRAM(S): 100 INJECTION SUBCUTANEOUS at 17:35

## 2022-05-27 RX ADMIN — Medication 15 MILLIGRAM(S): at 17:35

## 2022-05-27 RX ADMIN — Medication 15 MILLIGRAM(S): at 13:01

## 2022-05-27 RX ADMIN — Medication 325 MILLIGRAM(S): at 13:00

## 2022-05-27 RX ADMIN — Medication 15 MILLIGRAM(S): at 07:09

## 2022-05-27 RX ADMIN — OXYCODONE HYDROCHLORIDE 5 MILLIGRAM(S): 5 TABLET ORAL at 13:00

## 2022-05-27 RX ADMIN — Medication 650 MILLIGRAM(S): at 00:42

## 2022-05-27 RX ADMIN — ATORVASTATIN CALCIUM 80 MILLIGRAM(S): 80 TABLET, FILM COATED ORAL at 21:19

## 2022-05-27 RX ADMIN — OXYCODONE HYDROCHLORIDE 5 MILLIGRAM(S): 5 TABLET ORAL at 17:38

## 2022-05-27 RX ADMIN — OXYCODONE HYDROCHLORIDE 5 MILLIGRAM(S): 5 TABLET ORAL at 07:07

## 2022-05-27 RX ADMIN — OXYCODONE HYDROCHLORIDE 5 MILLIGRAM(S): 5 TABLET ORAL at 22:25

## 2022-05-27 RX ADMIN — LIDOCAINE 1 PATCH: 4 CREAM TOPICAL at 13:00

## 2022-05-27 RX ADMIN — Medication 15 MILLIGRAM(S): at 00:42

## 2022-05-27 RX ADMIN — OXYCODONE HYDROCHLORIDE 5 MILLIGRAM(S): 5 TABLET ORAL at 21:51

## 2022-05-27 RX ADMIN — Medication 50 MILLIGRAM(S): at 06:33

## 2022-05-27 RX ADMIN — GABAPENTIN 300 MILLIGRAM(S): 400 CAPSULE ORAL at 06:33

## 2022-05-27 RX ADMIN — OXYCODONE HYDROCHLORIDE 5 MILLIGRAM(S): 5 TABLET ORAL at 14:05

## 2022-05-27 NOTE — PROVIDER CONTACT NOTE (OTHER) - ASSESSMENT
Pt new admit to floor, admission vs BP high 161/78  Pt reports she feels okay and this is a result of pain but had pain meds before arriving to floor

## 2022-05-27 NOTE — PROGRESS NOTE ADULT - SUBJECTIVE AND OBJECTIVE BOX
PATIENT SEEN AND EXAMINED ON :- 5/27/22  DATE OF SERVICE:   5/27/22          Interim events noted,Labs ,Radiological studies and Cardiology tests reviewed .     HOSPITAL COURSE: HPI:  Pt is 71 y/o female with PMHx of HTN, chronic back pain, surgical hx of back, RA, and herniated disc, presenting to ED with acute worsening of back pain. Per patient, patient was in rehab facility until 2 days ago, signed AMA to go home but immediately found difficult for self-management; Of note, difficulty with ambulation is not new, Pt has pending surgery with neurosurgery tentative. Denies new Sx or changes to Sx, as well as falls and neurological deficits.   (24 May 2022 12:55)      INTERIM EVENTS:Patient seen at bedside ,interim events noted.      PMH -reviewed admission note, no change since admission  HEART FAILURE: Acute[ ]Chronic[ ] Systolic[ ] Diastolic[ ] Combined Systolic and Diastolic[ ]  CAD[ ] CABG[ ] PCI[ ]  DEVICES[ ] PPM[ ] ICD[ ] ILR[ ]  ATRIAL FIBRILLATION[ ] Paroxysmal[ ] Permanent[ ]  SAAD[ ] CKD1[ ] CKD2[ ] CKD3[ ] CKD4[ ] ESRD[ ]  COPD[ ] HTN[ ]   DM[ ] Type1[ ] Type 2[ ]   CVA[ ] Paresis[ ]    AMBULATION: Assisted[ ] Cane/walker[ ] Independent[ ]    MEDICATIONS  (STANDING):  acetaminophen     Tablet .. 650 milliGRAM(s) Oral every 6 hours  aspirin enteric coated 325 milliGRAM(s) Oral daily  atorvastatin 80 milliGRAM(s) Oral at bedtime  enoxaparin Injectable 40 milliGRAM(s) SubCutaneous every 24 hours  gabapentin 300 milliGRAM(s) Oral three times a day  hydrochlorothiazide 25 milliGRAM(s) Oral daily  isosorbide   mononitrate ER Tablet (IMDUR) 30 milliGRAM(s) Oral daily  ketorolac   Injectable 15 milliGRAM(s) IV Push every 6 hours  lidocaine   4% Patch 1 Patch Transdermal daily  metoprolol tartrate 50 milliGRAM(s) Oral two times a day  pantoprazole    Tablet 40 milliGRAM(s) Oral before breakfast  senna 2 Tablet(s) Oral at bedtime    MEDICATIONS  (PRN):  ALBUTerol    90 MICROgram(s) HFA Inhaler 2 Puff(s) Inhalation every 6 hours PRN Shortness of Breath and/or Wheezing  melatonin 3 milliGRAM(s) Oral at bedtime PRN Insomnia  oxyCODONE    IR 5 milliGRAM(s) Oral every 4 hours PRN Severe Pain (7 - 10)            REVIEW OF SYSTEMS:  Constitutional: [ ] fever, [ ]weight loss,  [ ]fatigue  Eyes: [ ] visual changes  Respiratory: [ ]shortness of breath;  [ ] cough, [ ]wheezing, [ ]chills, [ ]hemoptysis  Cardiovascular: [ ] chest pain, [ ]palpitations, [ ]dizziness,  [ ]leg swelling[ ]orthopnea[ ]PND  Gastrointestinal: [ ] abdominal pain, [ ]nausea, [ ]vomiting,  [ ]diarrhea [ ]Constipation [ ]Melena  Genitourinary: [ ] dysuria, [ ] hematuria [ ]Mathews  Neurologic: [ ] headaches [ ] tremors[ ]weakness [ ]Paralysis Right[ ] Left[ ]  Skin: [ ] itching, [ ]burning, [ ] rashes  Endocrine: [ ] heat or cold intolerance  Musculoskeletal: [ ] joint pain or swelling; [ ] muscle, back, or extremity pain  Psychiatric: [ ] depression, [ ]anxiety, [ ]mood swings, or [ ]difficulty sleeping  Hematologic: [ ] easy bruising, [ ] bleeding gums    [ ] All remaining systems negative except as per above.   [ ]Unable to obtain.  [x] No change in ROS since admission      Vital Signs Last 24 Hrs  T(C): 37.1 (27 May 2022 18:23), Max: 37.1 (26 May 2022 23:18)  T(F): 98.8 (27 May 2022 18:23), Max: 98.8 (27 May 2022 18:23)  HR: 61 (27 May 2022 18:23) (58 - 70)  BP: 161/78 (27 May 2022 18:23) (135/69 - 168/78)  BP(mean): --  RR: 18 (27 May 2022 18:23) (18 - 18)  SpO2: 99% (27 May 2022 18:23) (99% - 100%)  I&O's Summary    26 May 2022 07:01  -  27 May 2022 07:00  --------------------------------------------------------  IN: 0 mL / OUT: 950 mL / NET: -950 mL        PHYSICAL EXAM:  General: No acute distress BMI-  HEENT: EOMI, PERRL  Neck: Supple, [ ] JVD  Lungs: Equal air entry bilaterally; [ ] rales [ ] wheezing [ ] rhonchi  Heart: Regular rate and rhythm; [x ] murmur   2/6 [ x] systolic [ ] diastolic [ ] radiation[ ] rubs [ ]  gallops  Abdomen: Nontender, bowel sounds present  Extremities: No clubbing, cyanosis, [ ] edema [ ]Pulses  equal and intact  Nervous system:  Alert & Oriented X3, no focal deficits  Psychiatric: Normal affect  Skin: No rashes or lesions    LABS:  05-27    137  |  102  |  22  ----------------------------<  100<H>  3.9   |  24  |  0.75    Ca    9.7      27 May 2022 06:12  Phos  3.9     05-27  Mg     1.80     05-27      Creatinine Trend: 0.75<--, 0.70<--, 0.67<--, 0.68<--, 0.71<--, 0.66<--                        12.6   5.04  )-----------( 253      ( 27 May 2022 06:12 )             39.5

## 2022-05-28 LAB
ANION GAP SERPL CALC-SCNC: 10 MMOL/L — SIGNIFICANT CHANGE UP (ref 7–14)
BUN SERPL-MCNC: 21 MG/DL — SIGNIFICANT CHANGE UP (ref 7–23)
CALCIUM SERPL-MCNC: 9.4 MG/DL — SIGNIFICANT CHANGE UP (ref 8.4–10.5)
CHLORIDE SERPL-SCNC: 102 MMOL/L — SIGNIFICANT CHANGE UP (ref 98–107)
CO2 SERPL-SCNC: 25 MMOL/L — SIGNIFICANT CHANGE UP (ref 22–31)
CREAT SERPL-MCNC: 0.76 MG/DL — SIGNIFICANT CHANGE UP (ref 0.5–1.3)
EGFR: 84 ML/MIN/1.73M2 — SIGNIFICANT CHANGE UP
GLUCOSE SERPL-MCNC: 87 MG/DL — SIGNIFICANT CHANGE UP (ref 70–99)
HCT VFR BLD CALC: 42.1 % — SIGNIFICANT CHANGE UP (ref 34.5–45)
HGB BLD-MCNC: 13.3 G/DL — SIGNIFICANT CHANGE UP (ref 11.5–15.5)
MAGNESIUM SERPL-MCNC: 1.9 MG/DL — SIGNIFICANT CHANGE UP (ref 1.6–2.6)
MCHC RBC-ENTMCNC: 28.6 PG — SIGNIFICANT CHANGE UP (ref 27–34)
MCHC RBC-ENTMCNC: 31.6 GM/DL — LOW (ref 32–36)
MCV RBC AUTO: 90.5 FL — SIGNIFICANT CHANGE UP (ref 80–100)
NRBC # BLD: 0 /100 WBCS — SIGNIFICANT CHANGE UP
NRBC # FLD: 0 K/UL — SIGNIFICANT CHANGE UP
PHOSPHATE SERPL-MCNC: 3.9 MG/DL — SIGNIFICANT CHANGE UP (ref 2.5–4.5)
PLATELET # BLD AUTO: 255 K/UL — SIGNIFICANT CHANGE UP (ref 150–400)
POTASSIUM SERPL-MCNC: 4 MMOL/L — SIGNIFICANT CHANGE UP (ref 3.5–5.3)
POTASSIUM SERPL-SCNC: 4 MMOL/L — SIGNIFICANT CHANGE UP (ref 3.5–5.3)
RBC # BLD: 4.65 M/UL — SIGNIFICANT CHANGE UP (ref 3.8–5.2)
RBC # FLD: 13.8 % — SIGNIFICANT CHANGE UP (ref 10.3–14.5)
SODIUM SERPL-SCNC: 137 MMOL/L — SIGNIFICANT CHANGE UP (ref 135–145)
WBC # BLD: 5.31 K/UL — SIGNIFICANT CHANGE UP (ref 3.8–10.5)
WBC # FLD AUTO: 5.31 K/UL — SIGNIFICANT CHANGE UP (ref 3.8–10.5)

## 2022-05-28 RX ORDER — LOSARTAN POTASSIUM 100 MG/1
50 TABLET, FILM COATED ORAL DAILY
Refills: 0 | Status: DISCONTINUED | OUTPATIENT
Start: 2022-05-28 | End: 2022-06-07

## 2022-05-28 RX ADMIN — ATORVASTATIN CALCIUM 80 MILLIGRAM(S): 80 TABLET, FILM COATED ORAL at 21:07

## 2022-05-28 RX ADMIN — OXYCODONE HYDROCHLORIDE 5 MILLIGRAM(S): 5 TABLET ORAL at 07:40

## 2022-05-28 RX ADMIN — Medication 15 MILLIGRAM(S): at 06:32

## 2022-05-28 RX ADMIN — LIDOCAINE 1 PATCH: 4 CREAM TOPICAL at 23:40

## 2022-05-28 RX ADMIN — LIDOCAINE 1 PATCH: 4 CREAM TOPICAL at 11:36

## 2022-05-28 RX ADMIN — Medication 15 MILLIGRAM(S): at 11:56

## 2022-05-28 RX ADMIN — OXYCODONE HYDROCHLORIDE 5 MILLIGRAM(S): 5 TABLET ORAL at 21:06

## 2022-05-28 RX ADMIN — ISOSORBIDE MONONITRATE 30 MILLIGRAM(S): 60 TABLET, EXTENDED RELEASE ORAL at 12:33

## 2022-05-28 RX ADMIN — OXYCODONE HYDROCHLORIDE 5 MILLIGRAM(S): 5 TABLET ORAL at 06:42

## 2022-05-28 RX ADMIN — Medication 50 MILLIGRAM(S): at 17:58

## 2022-05-28 RX ADMIN — Medication 15 MILLIGRAM(S): at 00:08

## 2022-05-28 RX ADMIN — LIDOCAINE 1 PATCH: 4 CREAM TOPICAL at 18:06

## 2022-05-28 RX ADMIN — Medication 15 MILLIGRAM(S): at 08:01

## 2022-05-28 RX ADMIN — SENNA PLUS 2 TABLET(S): 8.6 TABLET ORAL at 21:07

## 2022-05-28 RX ADMIN — ENOXAPARIN SODIUM 40 MILLIGRAM(S): 100 INJECTION SUBCUTANEOUS at 17:58

## 2022-05-28 RX ADMIN — Medication 25 MILLIGRAM(S): at 06:32

## 2022-05-28 RX ADMIN — GABAPENTIN 300 MILLIGRAM(S): 400 CAPSULE ORAL at 06:33

## 2022-05-28 RX ADMIN — Medication 15 MILLIGRAM(S): at 16:53

## 2022-05-28 RX ADMIN — OXYCODONE HYDROCHLORIDE 5 MILLIGRAM(S): 5 TABLET ORAL at 23:02

## 2022-05-28 RX ADMIN — GABAPENTIN 300 MILLIGRAM(S): 400 CAPSULE ORAL at 21:07

## 2022-05-28 RX ADMIN — PANTOPRAZOLE SODIUM 40 MILLIGRAM(S): 20 TABLET, DELAYED RELEASE ORAL at 06:32

## 2022-05-28 RX ADMIN — LOSARTAN POTASSIUM 50 MILLIGRAM(S): 100 TABLET, FILM COATED ORAL at 11:57

## 2022-05-28 RX ADMIN — LIDOCAINE 1 PATCH: 4 CREAM TOPICAL at 00:13

## 2022-05-28 RX ADMIN — Medication 325 MILLIGRAM(S): at 11:35

## 2022-05-28 RX ADMIN — GABAPENTIN 300 MILLIGRAM(S): 400 CAPSULE ORAL at 14:32

## 2022-05-28 RX ADMIN — Medication 50 MILLIGRAM(S): at 06:33

## 2022-05-28 NOTE — PROGRESS NOTE ADULT - SUBJECTIVE AND OBJECTIVE BOX
PATIENT SEEN AND EXAMINED ON :- 5/28/22  DATE OF SERVICE:  5/28/22           Interim events noted,Labs ,Radiological studies and Cardiology tests reviewed .     HOSPITAL COURSE: HPI:  Pt is 73 y/o female with PMHx of HTN, chronic back pain, surgical hx of back, RA, and herniated disc, presenting to ED with acute worsening of back pain. Per patient, patient was in rehab facility until 2 days ago, signed AMA to go home but immediately found difficult for self-management; Of note, difficulty with ambulation is not new, Pt has pending surgery with neurosurgery tentative. Denies new Sx or changes to Sx, as well as falls and neurological deficits.   (24 May 2022 12:55)      INTERIM EVENTS:Patient seen at bedside ,interim events noted.      PMH -reviewed admission note, no change since admission  HEART FAILURE: Acute[ ]Chronic[ ] Systolic[ ] Diastolic[ ] Combined Systolic and Diastolic[ ]  CAD[ ] CABG[ ] PCI[ ]  DEVICES[ ] PPM[ ] ICD[ ] ILR[ ]  ATRIAL FIBRILLATION[ ] Paroxysmal[ ] Permanent[ ]  SAAD[ ] CKD1[ ] CKD2[ ] CKD3[ ] CKD4[ ] ESRD[ ]  COPD[ ] HTN[ ]   DM[ ] Type1[ ] Type 2[ ]   CVA[ ] Paresis[ ]    AMBULATION: Assisted[ ] Cane/walker[ ] Independent[ ]    MEDICATIONS  (STANDING):  aspirin enteric coated 325 milliGRAM(s) Oral daily  atorvastatin 80 milliGRAM(s) Oral at bedtime  enoxaparin Injectable 40 milliGRAM(s) SubCutaneous every 24 hours  gabapentin 300 milliGRAM(s) Oral three times a day  hydrochlorothiazide 25 milliGRAM(s) Oral daily  isosorbide   mononitrate ER Tablet (IMDUR) 30 milliGRAM(s) Oral daily  lidocaine   4% Patch 1 Patch Transdermal daily  losartan 50 milliGRAM(s) Oral daily  metoprolol tartrate 50 milliGRAM(s) Oral two times a day  pantoprazole    Tablet 40 milliGRAM(s) Oral before breakfast  senna 2 Tablet(s) Oral at bedtime    MEDICATIONS  (PRN):  ALBUTerol    90 MICROgram(s) HFA Inhaler 2 Puff(s) Inhalation every 6 hours PRN Shortness of Breath and/or Wheezing  melatonin 3 milliGRAM(s) Oral at bedtime PRN Insomnia  oxyCODONE    IR 5 milliGRAM(s) Oral every 4 hours PRN Severe Pain (7 - 10)            REVIEW OF SYSTEMS:  Constitutional: [ ] fever, [ ]weight loss,  [ ]fatigue  Eyes: [ ] visual changes  Respiratory: [ ]shortness of breath;  [ ] cough, [ ]wheezing, [ ]chills, [ ]hemoptysis  Cardiovascular: [ ] chest pain, [ ]palpitations, [ ]dizziness,  [ ]leg swelling[ ]orthopnea[ ]PND  Gastrointestinal: [ ] abdominal pain, [ ]nausea, [ ]vomiting,  [ ]diarrhea [ ]Constipation [ ]Melena  Genitourinary: [ ] dysuria, [ ] hematuria [ ]Mathews  Neurologic: [ ] headaches [ ] tremors[ ]weakness [ ]Paralysis Right[ ] Left[ ]  Skin: [ ] itching, [ ]burning, [ ] rashes  Endocrine: [ ] heat or cold intolerance  Musculoskeletal: [ ] joint pain or swelling; [ ] muscle, back, or extremity pain  Psychiatric: [ ] depression, [ ]anxiety, [ ]mood swings, or [ ]difficulty sleeping  Hematologic: [ ] easy bruising, [ ] bleeding gums    [ ] All remaining systems negative except as per above.   [ ]Unable to obtain.  [x] No change in ROS since admission      Vital Signs Last 24 Hrs  T(C): 36.6 (28 May 2022 06:10), Max: 37.1 (27 May 2022 18:23)  T(F): 97.9 (28 May 2022 06:10), Max: 98.8 (27 May 2022 18:23)  HR: 64 (28 May 2022 06:10) (56 - 67)  BP: 149/73 (28 May 2022 06:10) (147/66 - 168/78)  BP(mean): --  RR: 18 (28 May 2022 06:10) (18 - 18)  SpO2: 100% (28 May 2022 06:10) (99% - 100%)  I&O's Summary      PHYSICAL EXAM:  General: No acute distress BMI-  HEENT: EOMI, PERRL  Neck: Supple, [ ] JVD  Lungs: Equal air entry bilaterally; [ ] rales [ ] wheezing [ ] rhonchi  Heart: Regular rate and rhythm; [x ] murmur   2/6 [ x] systolic [ ] diastolic [ ] radiation[ ] rubs [ ]  gallops  Abdomen: Nontender, bowel sounds present  Extremities: No clubbing, cyanosis, [ ] edema [ ]Pulses  equal and intact  Nervous system:  Alert & Oriented X3, no focal deficits  Psychiatric: Normal affect  Skin: No rashes or lesions    LABS:  05-28    137  |  102  |  21  ----------------------------<  87  4.0   |  25  |  0.76    Ca    9.4      28 May 2022 06:26  Phos  3.9     05-28  Mg     1.90     05-28      Creatinine Trend: 0.76<--, 0.75<--, 0.70<--, 0.67<--, 0.68<--, 0.71<--                        13.3   5.31  )-----------( 255      ( 28 May 2022 06:26 )             42.1

## 2022-05-29 LAB
ANION GAP SERPL CALC-SCNC: 13 MMOL/L — SIGNIFICANT CHANGE UP (ref 7–14)
BUN SERPL-MCNC: 17 MG/DL — SIGNIFICANT CHANGE UP (ref 7–23)
CALCIUM SERPL-MCNC: 9.6 MG/DL — SIGNIFICANT CHANGE UP (ref 8.4–10.5)
CHLORIDE SERPL-SCNC: 103 MMOL/L — SIGNIFICANT CHANGE UP (ref 98–107)
CO2 SERPL-SCNC: 22 MMOL/L — SIGNIFICANT CHANGE UP (ref 22–31)
CREAT SERPL-MCNC: 0.71 MG/DL — SIGNIFICANT CHANGE UP (ref 0.5–1.3)
EGFR: 91 ML/MIN/1.73M2 — SIGNIFICANT CHANGE UP
GLUCOSE SERPL-MCNC: 74 MG/DL — SIGNIFICANT CHANGE UP (ref 70–99)
HCT VFR BLD CALC: 42.3 % — SIGNIFICANT CHANGE UP (ref 34.5–45)
HGB BLD-MCNC: 13.4 G/DL — SIGNIFICANT CHANGE UP (ref 11.5–15.5)
MAGNESIUM SERPL-MCNC: 2 MG/DL — SIGNIFICANT CHANGE UP (ref 1.6–2.6)
MCHC RBC-ENTMCNC: 27.6 PG — SIGNIFICANT CHANGE UP (ref 27–34)
MCHC RBC-ENTMCNC: 31.7 GM/DL — LOW (ref 32–36)
MCV RBC AUTO: 87 FL — SIGNIFICANT CHANGE UP (ref 80–100)
NRBC # BLD: 0 /100 WBCS — SIGNIFICANT CHANGE UP
NRBC # FLD: 0 K/UL — SIGNIFICANT CHANGE UP
PHOSPHATE SERPL-MCNC: 3.7 MG/DL — SIGNIFICANT CHANGE UP (ref 2.5–4.5)
PLATELET # BLD AUTO: 254 K/UL — SIGNIFICANT CHANGE UP (ref 150–400)
POTASSIUM SERPL-MCNC: 4 MMOL/L — SIGNIFICANT CHANGE UP (ref 3.5–5.3)
POTASSIUM SERPL-SCNC: 4 MMOL/L — SIGNIFICANT CHANGE UP (ref 3.5–5.3)
RBC # BLD: 4.86 M/UL — SIGNIFICANT CHANGE UP (ref 3.8–5.2)
RBC # FLD: 14 % — SIGNIFICANT CHANGE UP (ref 10.3–14.5)
SODIUM SERPL-SCNC: 138 MMOL/L — SIGNIFICANT CHANGE UP (ref 135–145)
WBC # BLD: 5.35 K/UL — SIGNIFICANT CHANGE UP (ref 3.8–10.5)
WBC # FLD AUTO: 5.35 K/UL — SIGNIFICANT CHANGE UP (ref 3.8–10.5)

## 2022-05-29 RX ORDER — CHLORHEXIDINE GLUCONATE 213 G/1000ML
1 SOLUTION TOPICAL DAILY
Refills: 0 | Status: DISCONTINUED | OUTPATIENT
Start: 2022-05-29 | End: 2022-06-07

## 2022-05-29 RX ADMIN — SENNA PLUS 2 TABLET(S): 8.6 TABLET ORAL at 22:09

## 2022-05-29 RX ADMIN — GABAPENTIN 300 MILLIGRAM(S): 400 CAPSULE ORAL at 06:09

## 2022-05-29 RX ADMIN — OXYCODONE HYDROCHLORIDE 5 MILLIGRAM(S): 5 TABLET ORAL at 12:54

## 2022-05-29 RX ADMIN — LIDOCAINE 1 PATCH: 4 CREAM TOPICAL at 12:48

## 2022-05-29 RX ADMIN — OXYCODONE HYDROCHLORIDE 5 MILLIGRAM(S): 5 TABLET ORAL at 20:00

## 2022-05-29 RX ADMIN — PANTOPRAZOLE SODIUM 40 MILLIGRAM(S): 20 TABLET, DELAYED RELEASE ORAL at 06:09

## 2022-05-29 RX ADMIN — LIDOCAINE 1 PATCH: 4 CREAM TOPICAL at 19:13

## 2022-05-29 RX ADMIN — OXYCODONE HYDROCHLORIDE 5 MILLIGRAM(S): 5 TABLET ORAL at 06:15

## 2022-05-29 RX ADMIN — OXYCODONE HYDROCHLORIDE 5 MILLIGRAM(S): 5 TABLET ORAL at 01:40

## 2022-05-29 RX ADMIN — ATORVASTATIN CALCIUM 80 MILLIGRAM(S): 80 TABLET, FILM COATED ORAL at 22:08

## 2022-05-29 RX ADMIN — Medication 25 MILLIGRAM(S): at 06:09

## 2022-05-29 RX ADMIN — GABAPENTIN 300 MILLIGRAM(S): 400 CAPSULE ORAL at 22:08

## 2022-05-29 RX ADMIN — OXYCODONE HYDROCHLORIDE 5 MILLIGRAM(S): 5 TABLET ORAL at 01:10

## 2022-05-29 RX ADMIN — ENOXAPARIN SODIUM 40 MILLIGRAM(S): 100 INJECTION SUBCUTANEOUS at 19:05

## 2022-05-29 RX ADMIN — Medication 325 MILLIGRAM(S): at 12:48

## 2022-05-29 RX ADMIN — OXYCODONE HYDROCHLORIDE 5 MILLIGRAM(S): 5 TABLET ORAL at 13:50

## 2022-05-29 RX ADMIN — OXYCODONE HYDROCHLORIDE 5 MILLIGRAM(S): 5 TABLET ORAL at 19:05

## 2022-05-29 RX ADMIN — GABAPENTIN 300 MILLIGRAM(S): 400 CAPSULE ORAL at 14:48

## 2022-05-29 RX ADMIN — Medication 50 MILLIGRAM(S): at 19:05

## 2022-05-29 RX ADMIN — CHLORHEXIDINE GLUCONATE 1 APPLICATION(S): 213 SOLUTION TOPICAL at 19:06

## 2022-05-29 RX ADMIN — Medication 50 MILLIGRAM(S): at 06:09

## 2022-05-29 NOTE — PROGRESS NOTE ADULT - SUBJECTIVE AND OBJECTIVE BOX
PATIENT SEEN AND EXAMINED ON :- 5/29/22  DATE OF SERVICE:   5/29/22          Interim events noted,Labs ,Radiological studies and Cardiology tests reviewed .   HOSPITAL COURSE: HPI:  Pt is 71 y/o female with PMHx of HTN, chronic back pain, surgical hx of back, RA, and herniated disc, presenting to ED with acute worsening of back pain. Per patient, patient was in rehab facility until 2 days ago, signed AMA to go home but immediately found difficult for self-management; Of note, difficulty with ambulation is not new, Pt has pending surgery with neurosurgery tentative. Denies new Sx or changes to Sx, as well as falls and neurological deficits.   (24 May 2022 12:55)      INTERIM EVENTS:Patient seen at bedside ,interim events noted.      PMH -reviewed admission note, no change since admission  HEART FAILURE: Acute[ ]Chronic[ ] Systolic[ ] Diastolic[ ] Combined Systolic and Diastolic[ ]  CAD[ ] CABG[ ] PCI[ ]  DEVICES[ ] PPM[ ] ICD[ ] ILR[ ]  ATRIAL FIBRILLATION[ ] Paroxysmal[ ] Permanent[ ]  SAAD[ ] CKD1[ ] CKD2[ ] CKD3[ ] CKD4[ ] ESRD[ ]  COPD[ ] HTN[ ]   DM[ ] Type1[ ] Type 2[ ]   CVA[ ] Paresis[ ]    AMBULATION: Assisted[ ] Cane/walker[ ] Independent[ ]    MEDICATIONS  (STANDING):  aspirin enteric coated 325 milliGRAM(s) Oral daily  atorvastatin 80 milliGRAM(s) Oral at bedtime  chlorhexidine 2% Cloths 1 Application(s) Topical daily  enoxaparin Injectable 40 milliGRAM(s) SubCutaneous every 24 hours  gabapentin 300 milliGRAM(s) Oral three times a day  hydrochlorothiazide 25 milliGRAM(s) Oral daily  isosorbide   mononitrate ER Tablet (IMDUR) 30 milliGRAM(s) Oral daily  lidocaine   4% Patch 1 Patch Transdermal daily  losartan 50 milliGRAM(s) Oral daily  metoprolol tartrate 50 milliGRAM(s) Oral two times a day  pantoprazole    Tablet 40 milliGRAM(s) Oral before breakfast  senna 2 Tablet(s) Oral at bedtime    MEDICATIONS  (PRN):  ALBUTerol    90 MICROgram(s) HFA Inhaler 2 Puff(s) Inhalation every 6 hours PRN Shortness of Breath and/or Wheezing  melatonin 3 milliGRAM(s) Oral at bedtime PRN Insomnia  oxyCODONE    IR 5 milliGRAM(s) Oral every 4 hours PRN Severe Pain (7 - 10)            REVIEW OF SYSTEMS:  Constitutional: [ ] fever, [ ]weight loss,  [ ]fatigue  Eyes: [ ] visual changes  Respiratory: [ ]shortness of breath;  [ ] cough, [ ]wheezing, [ ]chills, [ ]hemoptysis  Cardiovascular: [ ] chest pain, [ ]palpitations, [ ]dizziness,  [ ]leg swelling[ ]orthopnea[ ]PND  Gastrointestinal: [ ] abdominal pain, [ ]nausea, [ ]vomiting,  [ ]diarrhea [ ]Constipation [ ]Melena  Genitourinary: [ ] dysuria, [ ] hematuria [ ]Mathews  Neurologic: [ ] headaches [ ] tremors[ ]weakness [ ]Paralysis Right[ ] Left[ ]  Skin: [ ] itching, [ ]burning, [ ] rashes  Endocrine: [ ] heat or cold intolerance  Musculoskeletal: [ ] joint pain or swelling; [ ] muscle, back, or extremity pain  Psychiatric: [ ] depression, [ ]anxiety, [ ]mood swings, or [ ]difficulty sleeping  Hematologic: [ ] easy bruising, [ ] bleeding gums    [ ] All remaining systems negative except as per above.   [ ]Unable to obtain.  [x] No change in ROS since admission      Vital Signs Last 24 Hrs  T(C): 36.9 (29 May 2022 12:30), Max: 37.1 (29 May 2022 06:08)  T(F): 98.4 (29 May 2022 12:30), Max: 98.7 (29 May 2022 06:08)  HR: 66 (29 May 2022 18:50) (64 - 84)  BP: 151/68 (29 May 2022 18:50) (100/79 - 155/64)  BP(mean): --  RR: 18 (29 May 2022 18:50) (18 - 18)  SpO2: 100% (29 May 2022 18:50) (99% - 100%)  I&O's Summary      PHYSICAL EXAM:  General: No acute distress BMI-  HEENT: EOMI, PERRL  Neck: Supple, [ ] JVD  Lungs: Equal air entry bilaterally; [ ] rales [ ] wheezing [ ] rhonchi  Heart: Regular rate and rhythm; [x ] murmur   2/6 [ x] systolic [ ] diastolic [ ] radiation[ ] rubs [ ]  gallops  Abdomen: Nontender, bowel sounds present  Extremities: No clubbing, cyanosis, [ ] edema [ ]Pulses  equal and intact  Nervous system:  Alert & Oriented X3, no focal deficits  Psychiatric: Normal affect  Skin: No rashes or lesions    LABS:  05-29    138  |  103  |  17  ----------------------------<  74  4.0   |  22  |  0.71    Ca    9.6      29 May 2022 06:42  Phos  3.7     05-29  Mg     2.00     05-29      Creatinine Trend: 0.71<--, 0.76<--, 0.75<--, 0.70<--, 0.67<--, 0.68<--                        13.4   5.35  )-----------( 254      ( 29 May 2022 06:42 )             42.3

## 2022-05-30 LAB
ANION GAP SERPL CALC-SCNC: 9 MMOL/L — SIGNIFICANT CHANGE UP (ref 7–14)
BUN SERPL-MCNC: 18 MG/DL — SIGNIFICANT CHANGE UP (ref 7–23)
CALCIUM SERPL-MCNC: 9.6 MG/DL — SIGNIFICANT CHANGE UP (ref 8.4–10.5)
CHLORIDE SERPL-SCNC: 102 MMOL/L — SIGNIFICANT CHANGE UP (ref 98–107)
CO2 SERPL-SCNC: 26 MMOL/L — SIGNIFICANT CHANGE UP (ref 22–31)
CREAT SERPL-MCNC: 0.72 MG/DL — SIGNIFICANT CHANGE UP (ref 0.5–1.3)
EGFR: 89 ML/MIN/1.73M2 — SIGNIFICANT CHANGE UP
GLUCOSE SERPL-MCNC: 92 MG/DL — SIGNIFICANT CHANGE UP (ref 70–99)
HCT VFR BLD CALC: 41.8 % — SIGNIFICANT CHANGE UP (ref 34.5–45)
HGB BLD-MCNC: 12.9 G/DL — SIGNIFICANT CHANGE UP (ref 11.5–15.5)
MAGNESIUM SERPL-MCNC: 1.7 MG/DL — SIGNIFICANT CHANGE UP (ref 1.6–2.6)
MCHC RBC-ENTMCNC: 27.7 PG — SIGNIFICANT CHANGE UP (ref 27–34)
MCHC RBC-ENTMCNC: 30.9 GM/DL — LOW (ref 32–36)
MCV RBC AUTO: 89.9 FL — SIGNIFICANT CHANGE UP (ref 80–100)
MRSA PCR RESULT.: SIGNIFICANT CHANGE UP
NRBC # BLD: 0 /100 WBCS — SIGNIFICANT CHANGE UP
NRBC # FLD: 0 K/UL — SIGNIFICANT CHANGE UP
PHOSPHATE SERPL-MCNC: 3.4 MG/DL — SIGNIFICANT CHANGE UP (ref 2.5–4.5)
PLATELET # BLD AUTO: 264 K/UL — SIGNIFICANT CHANGE UP (ref 150–400)
POTASSIUM SERPL-MCNC: 4 MMOL/L — SIGNIFICANT CHANGE UP (ref 3.5–5.3)
POTASSIUM SERPL-SCNC: 4 MMOL/L — SIGNIFICANT CHANGE UP (ref 3.5–5.3)
RBC # BLD: 4.65 M/UL — SIGNIFICANT CHANGE UP (ref 3.8–5.2)
RBC # FLD: 13.8 % — SIGNIFICANT CHANGE UP (ref 10.3–14.5)
S AUREUS DNA NOSE QL NAA+PROBE: SIGNIFICANT CHANGE UP
SARS-COV-2 RNA SPEC QL NAA+PROBE: SIGNIFICANT CHANGE UP
SODIUM SERPL-SCNC: 137 MMOL/L — SIGNIFICANT CHANGE UP (ref 135–145)
WBC # BLD: 6.09 K/UL — SIGNIFICANT CHANGE UP (ref 3.8–10.5)
WBC # FLD AUTO: 6.09 K/UL — SIGNIFICANT CHANGE UP (ref 3.8–10.5)

## 2022-05-30 RX ORDER — NYSTATIN CREAM 100000 [USP'U]/G
1 CREAM TOPICAL
Refills: 0 | Status: DISCONTINUED | OUTPATIENT
Start: 2022-05-30 | End: 2022-06-07

## 2022-05-30 RX ADMIN — OXYCODONE HYDROCHLORIDE 5 MILLIGRAM(S): 5 TABLET ORAL at 18:04

## 2022-05-30 RX ADMIN — NYSTATIN CREAM 1 APPLICATION(S): 100000 CREAM TOPICAL at 17:02

## 2022-05-30 RX ADMIN — SENNA PLUS 2 TABLET(S): 8.6 TABLET ORAL at 22:49

## 2022-05-30 RX ADMIN — OXYCODONE HYDROCHLORIDE 5 MILLIGRAM(S): 5 TABLET ORAL at 23:55

## 2022-05-30 RX ADMIN — CHLORHEXIDINE GLUCONATE 1 APPLICATION(S): 213 SOLUTION TOPICAL at 11:46

## 2022-05-30 RX ADMIN — GABAPENTIN 300 MILLIGRAM(S): 400 CAPSULE ORAL at 14:59

## 2022-05-30 RX ADMIN — LOSARTAN POTASSIUM 50 MILLIGRAM(S): 100 TABLET, FILM COATED ORAL at 05:45

## 2022-05-30 RX ADMIN — GABAPENTIN 300 MILLIGRAM(S): 400 CAPSULE ORAL at 05:45

## 2022-05-30 RX ADMIN — LIDOCAINE 1 PATCH: 4 CREAM TOPICAL at 18:13

## 2022-05-30 RX ADMIN — OXYCODONE HYDROCHLORIDE 5 MILLIGRAM(S): 5 TABLET ORAL at 12:45

## 2022-05-30 RX ADMIN — OXYCODONE HYDROCHLORIDE 5 MILLIGRAM(S): 5 TABLET ORAL at 11:45

## 2022-05-30 RX ADMIN — LIDOCAINE 1 PATCH: 4 CREAM TOPICAL at 11:47

## 2022-05-30 RX ADMIN — Medication 325 MILLIGRAM(S): at 11:46

## 2022-05-30 RX ADMIN — ATORVASTATIN CALCIUM 80 MILLIGRAM(S): 80 TABLET, FILM COATED ORAL at 22:49

## 2022-05-30 RX ADMIN — OXYCODONE HYDROCHLORIDE 5 MILLIGRAM(S): 5 TABLET ORAL at 05:45

## 2022-05-30 RX ADMIN — OXYCODONE HYDROCHLORIDE 5 MILLIGRAM(S): 5 TABLET ORAL at 17:04

## 2022-05-30 RX ADMIN — Medication 25 MILLIGRAM(S): at 05:45

## 2022-05-30 RX ADMIN — OXYCODONE HYDROCHLORIDE 5 MILLIGRAM(S): 5 TABLET ORAL at 22:49

## 2022-05-30 RX ADMIN — PANTOPRAZOLE SODIUM 40 MILLIGRAM(S): 20 TABLET, DELAYED RELEASE ORAL at 05:45

## 2022-05-30 RX ADMIN — LIDOCAINE 1 PATCH: 4 CREAM TOPICAL at 00:15

## 2022-05-30 RX ADMIN — GABAPENTIN 300 MILLIGRAM(S): 400 CAPSULE ORAL at 22:49

## 2022-05-30 RX ADMIN — OXYCODONE HYDROCHLORIDE 5 MILLIGRAM(S): 5 TABLET ORAL at 06:45

## 2022-05-30 RX ADMIN — ENOXAPARIN SODIUM 40 MILLIGRAM(S): 100 INJECTION SUBCUTANEOUS at 17:02

## 2022-05-30 RX ADMIN — Medication 50 MILLIGRAM(S): at 17:02

## 2022-05-30 RX ADMIN — ISOSORBIDE MONONITRATE 30 MILLIGRAM(S): 60 TABLET, EXTENDED RELEASE ORAL at 14:59

## 2022-05-30 NOTE — PROGRESS NOTE ADULT - SUBJECTIVE AND OBJECTIVE BOX
PATIENT SEEN AND EXAMINED ON :- 5/30/22  DATE OF SERVICE:   5/30/22          Interim events noted,Labs ,Radiological studies and Cardiology tests reviewed .   HOSPITAL COURSE: HPI:  Pt is 73 y/o female with PMHx of HTN, chronic back pain, surgical hx of back, RA, and herniated disc, presenting to ED with acute worsening of back pain. Per patient, patient was in rehab facility until 2 days ago, signed AMA to go home but immediately found difficult for self-management; Of note, difficulty with ambulation is not new, Pt has pending surgery with neurosurgery tentative. Denies new Sx or changes to Sx, as well as falls and neurological deficits.   (24 May 2022 12:55)      INTERIM EVENTS:Patient seen at bedside ,interim events noted.      PMH -reviewed admission note, no change since admission  HEART FAILURE: Acute[ ]Chronic[ ] Systolic[ ] Diastolic[ ] Combined Systolic and Diastolic[ ]  CAD[ ] CABG[ ] PCI[ ]  DEVICES[ ] PPM[ ] ICD[ ] ILR[ ]  ATRIAL FIBRILLATION[ ] Paroxysmal[ ] Permanent[ ]  SAAD[ ] CKD1[ ] CKD2[ ] CKD3[ ] CKD4[ ] ESRD[ ]  COPD[ ] HTN[ ]   DM[ ] Type1[ ] Type 2[ ]   CVA[ ] Paresis[ ]    AMBULATION: Assisted[ ] Cane/walker[ ] Independent[ ]    MEDICATIONS  (STANDING):  aspirin enteric coated 325 milliGRAM(s) Oral daily  atorvastatin 80 milliGRAM(s) Oral at bedtime  chlorhexidine 2% Cloths 1 Application(s) Topical daily  enoxaparin Injectable 40 milliGRAM(s) SubCutaneous every 24 hours  gabapentin 300 milliGRAM(s) Oral three times a day  hydrochlorothiazide 25 milliGRAM(s) Oral daily  isosorbide   mononitrate ER Tablet (IMDUR) 30 milliGRAM(s) Oral daily  lidocaine   4% Patch 1 Patch Transdermal daily  losartan 50 milliGRAM(s) Oral daily  metoprolol tartrate 50 milliGRAM(s) Oral two times a day  nystatin Powder 1 Application(s) Topical two times a day  pantoprazole    Tablet 40 milliGRAM(s) Oral before breakfast  senna 2 Tablet(s) Oral at bedtime    MEDICATIONS  (PRN):  ALBUTerol    90 MICROgram(s) HFA Inhaler 2 Puff(s) Inhalation every 6 hours PRN Shortness of Breath and/or Wheezing  melatonin 3 milliGRAM(s) Oral at bedtime PRN Insomnia  oxyCODONE    IR 5 milliGRAM(s) Oral every 4 hours PRN Severe Pain (7 - 10)            REVIEW OF SYSTEMS:  Constitutional: [ ] fever, [ ]weight loss,  [ ]fatigue  Eyes: [ ] visual changes  Respiratory: [ ]shortness of breath;  [ ] cough, [ ]wheezing, [ ]chills, [ ]hemoptysis  Cardiovascular: [ ] chest pain, [ ]palpitations, [ ]dizziness,  [ ]leg swelling[ ]orthopnea[ ]PND  Gastrointestinal: [ ] abdominal pain, [ ]nausea, [ ]vomiting,  [ ]diarrhea [ ]Constipation [ ]Melena  Genitourinary: [ ] dysuria, [ ] hematuria [ ]Mathews  Neurologic: [ ] headaches [ ] tremors[ ]weakness [ ]Paralysis Right[ ] Left[ ]  Skin: [ ] itching, [ ]burning, [ ] rashes  Endocrine: [ ] heat or cold intolerance  Musculoskeletal: [ ] joint pain or swelling; [ ] muscle, back, or extremity pain  Psychiatric: [ ] depression, [ ]anxiety, [ ]mood swings, or [ ]difficulty sleeping  Hematologic: [ ] easy bruising, [ ] bleeding gums    [ ] All remaining systems negative except as per above.   [ ]Unable to obtain.  [x] No change in ROS since admission      Vital Signs Last 24 Hrs  T(C): 36.9 (30 May 2022 04:59), Max: 36.9 (29 May 2022 12:30)  T(F): 98.4 (30 May 2022 04:59), Max: 98.4 (29 May 2022 12:30)  HR: 55 (30 May 2022 04:59) (55 - 84)  BP: 129/59 (30 May 2022 04:59) (100/79 - 151/68)  BP(mean): --  RR: 18 (30 May 2022 04:59) (18 - 18)  SpO2: 100% (30 May 2022 04:59) (99% - 100%)  I&O's Summary      PHYSICAL EXAM:  General: No acute distress BMI-  HEENT: EOMI, PERRL  Neck: Supple, [ ] JVD  Lungs: Equal air entry bilaterally; [ ] rales [ ] wheezing [ ] rhonchi  Heart: Regular rate and rhythm; [x ] murmur   2/6 [ x] systolic [ ] diastolic [ ] radiation[ ] rubs [ ]  gallops  Abdomen: Nontender, bowel sounds present  Extremities: No clubbing, cyanosis, [ ] edema [ ]Pulses  equal and intact  Nervous system:  Alert & Oriented X3, no focal deficits  Psychiatric: Normal affect  Skin: No rashes or lesions    LABS:  05-30    137  |  102  |  18  ----------------------------<  92  4.0   |  26  |  0.72    Ca    9.6      30 May 2022 06:20  Phos  3.4     05-30  Mg     1.70     05-30      Creatinine Trend: 0.72<--, 0.71<--, 0.76<--, 0.75<--, 0.70<--, 0.67<--                        12.9   6.09  )-----------( 264      ( 30 May 2022 06:20 )             41.8

## 2022-05-31 ENCOUNTER — TRANSCRIPTION ENCOUNTER (OUTPATIENT)
Age: 72
End: 2022-05-31

## 2022-05-31 PROCEDURE — 99222 1ST HOSP IP/OBS MODERATE 55: CPT

## 2022-05-31 RX ORDER — OXYCODONE HYDROCHLORIDE 5 MG/1
10 TABLET ORAL ONCE
Refills: 0 | Status: DISCONTINUED | OUTPATIENT
Start: 2022-05-31 | End: 2022-05-31

## 2022-05-31 RX ORDER — LIDOCAINE 4 G/100G
1 CREAM TOPICAL DAILY
Refills: 0 | Status: DISCONTINUED | OUTPATIENT
Start: 2022-05-31 | End: 2022-06-07

## 2022-05-31 RX ORDER — OXYCODONE HYDROCHLORIDE 5 MG/1
10 TABLET ORAL EVERY 12 HOURS
Refills: 0 | Status: DISCONTINUED | OUTPATIENT
Start: 2022-05-31 | End: 2022-06-02

## 2022-05-31 RX ORDER — OXYCODONE HYDROCHLORIDE 5 MG/1
2.5 TABLET ORAL EVERY 6 HOURS
Refills: 0 | Status: DISCONTINUED | OUTPATIENT
Start: 2022-05-31 | End: 2022-06-02

## 2022-05-31 RX ORDER — ASPIRIN/CALCIUM CARB/MAGNESIUM 324 MG
81 TABLET ORAL DAILY
Refills: 0 | Status: DISCONTINUED | OUTPATIENT
Start: 2022-06-01 | End: 2022-06-07

## 2022-05-31 RX ORDER — GABAPENTIN 400 MG/1
600 CAPSULE ORAL THREE TIMES A DAY
Refills: 0 | Status: DISCONTINUED | OUTPATIENT
Start: 2022-05-31 | End: 2022-06-07

## 2022-05-31 RX ADMIN — OXYCODONE HYDROCHLORIDE 10 MILLIGRAM(S): 5 TABLET ORAL at 19:17

## 2022-05-31 RX ADMIN — NYSTATIN CREAM 1 APPLICATION(S): 100000 CREAM TOPICAL at 06:16

## 2022-05-31 RX ADMIN — OXYCODONE HYDROCHLORIDE 10 MILLIGRAM(S): 5 TABLET ORAL at 12:19

## 2022-05-31 RX ADMIN — GABAPENTIN 300 MILLIGRAM(S): 400 CAPSULE ORAL at 05:20

## 2022-05-31 RX ADMIN — ISOSORBIDE MONONITRATE 30 MILLIGRAM(S): 60 TABLET, EXTENDED RELEASE ORAL at 11:59

## 2022-05-31 RX ADMIN — SENNA PLUS 2 TABLET(S): 8.6 TABLET ORAL at 21:46

## 2022-05-31 RX ADMIN — OXYCODONE HYDROCHLORIDE 5 MILLIGRAM(S): 5 TABLET ORAL at 06:43

## 2022-05-31 RX ADMIN — OXYCODONE HYDROCHLORIDE 2.5 MILLIGRAM(S): 5 TABLET ORAL at 13:20

## 2022-05-31 RX ADMIN — OXYCODONE HYDROCHLORIDE 5 MILLIGRAM(S): 5 TABLET ORAL at 05:21

## 2022-05-31 RX ADMIN — GABAPENTIN 600 MILLIGRAM(S): 400 CAPSULE ORAL at 12:23

## 2022-05-31 RX ADMIN — Medication 25 MILLIGRAM(S): at 05:20

## 2022-05-31 RX ADMIN — ATORVASTATIN CALCIUM 80 MILLIGRAM(S): 80 TABLET, FILM COATED ORAL at 21:46

## 2022-05-31 RX ADMIN — NYSTATIN CREAM 1 APPLICATION(S): 100000 CREAM TOPICAL at 17:07

## 2022-05-31 RX ADMIN — Medication 50 MILLIGRAM(S): at 17:07

## 2022-05-31 RX ADMIN — ENOXAPARIN SODIUM 40 MILLIGRAM(S): 100 INJECTION SUBCUTANEOUS at 17:06

## 2022-05-31 RX ADMIN — PANTOPRAZOLE SODIUM 40 MILLIGRAM(S): 20 TABLET, DELAYED RELEASE ORAL at 05:20

## 2022-05-31 RX ADMIN — LOSARTAN POTASSIUM 50 MILLIGRAM(S): 100 TABLET, FILM COATED ORAL at 05:20

## 2022-05-31 RX ADMIN — CHLORHEXIDINE GLUCONATE 1 APPLICATION(S): 213 SOLUTION TOPICAL at 12:04

## 2022-05-31 RX ADMIN — OXYCODONE HYDROCHLORIDE 2.5 MILLIGRAM(S): 5 TABLET ORAL at 12:19

## 2022-05-31 RX ADMIN — Medication 325 MILLIGRAM(S): at 11:56

## 2022-05-31 RX ADMIN — GABAPENTIN 600 MILLIGRAM(S): 400 CAPSULE ORAL at 21:47

## 2022-05-31 RX ADMIN — Medication 50 MILLIGRAM(S): at 05:20

## 2022-05-31 NOTE — DIETITIAN INITIAL EVALUATION ADULT - ORAL INTAKE PTA/DIET HISTORY
Decreased appetite before admit 2/2 pain.   Reports recent unplanned weight gain over the past ~6 months.  Decreased appetite before admit 2/2 pain.  Reports unplanned weight gain over the past ~6 months.

## 2022-05-31 NOTE — DIETITIAN INITIAL EVALUATION ADULT - OTHER INFO
Per chart---Pt is 71 y/o female with PMHx of HTN, chronic back pain, surgical hx of back, RA, and herniated disc, presenting to ED with acute worsening of back pain. Per patient, patient was in rehab facility until 2 days ago, signed AMA to go home but immediately found difficult for self-management; Of note, difficulty with ambulation is not new, Pt has pending surgery with neurosurgery tentative. Denies new Sx or changes to Sx, as well as falls and neurological deficits.    Reports good appetite and po intake and denies nausea/vomiting/diarrhea/constipation or any issues with chewing/swallowing. Last bowel movement 5/29. Remains with elevated HgA1C, Chol and LDL. No hx of Diabetes. Pt with pain, decreased ambulation, ~50# weight gain within ~6 months- likely contributing factors. Pt appears very concerned, wants to know what changes can be implemented. Discussed at length the importance of curbing further weight gain. Encouraged nutrient dense, portion controlled, at home prepared meals as able. Advised on incorporating more fiber rich foods and decreasing unsweetened beverages.  Per chart---Pt is 71 y/o female with PMHx of HTN, chronic back pain, surgical hx of back, RA, and herniated disc, presenting to ED with acute worsening of back pain. Per patient, patient was in rehab facility until 2 days ago, signed AMA to go home but immediately found difficult for self-management; Of note, difficulty with ambulation is not new, Pt has pending surgery with neurosurgery tentative. Denies new Sx or changes to Sx, as well as falls and neurological deficits.    Reports good appetite and po intake and denies nausea/vomiting/diarrhea/constipation or any issues with chewing/swallowing. Last bowel movement 5/29. Remains with elevated HgA1C, Chol and LDL. No hx of Diabetes. Pt with ~50# weight gain within ~6 months. Pain with decreased ambulation, likely contributing factors. Pt appears very concerned, wants to know what changes can be implemented. Discussed at length the importance of curbing further weight gain. Encouraged nutrient dense, portion controlled, at home prepared meals as able. Advised on incorporating more fiber rich foods and decreasing unsweetened beverages.

## 2022-05-31 NOTE — DISCHARGE NOTE PROVIDER - PROVIDER TOKENS
PROVIDER:[TOKEN:[[id number]],SCHEDULEDAPPT:[06/14/2022],SCHEDULEDAPPTTIME:[03:00 PM],ESTABLISHEDPATIENT:[T]],FREE:[LAST:[Your primary care provider],PHONE:[(   )    -],FAX:[(   )    -],FOLLOWUP:[2 weeks]]

## 2022-05-31 NOTE — DIETITIAN INITIAL EVALUATION ADULT - FACTORS AFF FOOD INTAKE
none Split-Thickness Skin Graft Text: The defect edges were debeveled with a #15 scalpel blade.  Given the location of the defect, shape of the defect and the proximity to free margins a split thickness skin graft was deemed most appropriate.  Using a sterile surgical marker, the primary defect shape was transferred to the donor site. The split thickness graft was then harvested.  The skin graft was then placed in the primary defect and oriented appropriately.

## 2022-05-31 NOTE — DISCHARGE NOTE PROVIDER - NSDCMRMEDTOKEN_GEN_ALL_CORE_FT
acetaminophen 325 mg oral tablet: 2 tab(s) orally every 6 hours, As needed, Temp greater or equal to 38C (100.4F), Mild Pain (1 - 3)  aspirin 81 mg oral delayed release tablet: 1 tab(s) orally once a day  gabapentin 300 mg oral capsule: 1 cap(s) orally 3 times a day  hydrochlorothiazide 25 mg oral tablet: 1 tab(s) orally once a day  isosorbide mononitrate 30 mg oral tablet, extended release: 1 tab(s) orally once a day  melatonin 3 mg oral tablet: 1 tab(s) orally once a day (at bedtime), As needed, Insomnia  Metoprolol Tartrate 50 mg oral tablet: 1 tab(s) orally 2 times a day  (Pharmacy filled for 50mg ER tablets BID)  oxyCODONE 5 mg oral tablet: 1 tab(s) orally every 4 hours, As needed, Moderate - Severe Pain (4 - 10)  pantoprazole 40 mg oral delayed release tablet: 1 tab(s) orally once a day (before a meal)  Proventil HFA CFC free 90 mcg/inh inhalation aerosol: 2 puff(s) inhaled 2 times a day, As Needed  rosuvastatin 5 mg oral tablet: 1 tab(s) orally once a day   albuterol 90 mcg/inh inhalation aerosol: 2 puff(s) inhaled every 6 hours, As needed, Shortness of Breath and/or Wheezing  amoxicillin-clavulanate 875 mg-125 mg oral tablet: 1 tab(s) orally 2 times a day for 5 days. Last dose on 6/11/22  aspirin 81 mg oral delayed release tablet: 1 tab(s) orally once a day  atorvastatin 80 mg oral tablet: 1 tab(s) orally once a day (at bedtime)  enoxaparin: 40 milligram(s) subcutaneously once a day  gabapentin 300 mg oral capsule: 2 cap(s) orally 3 times a day  hydrochlorothiazide 25 mg oral tablet: 1 tab(s) orally once a day  isosorbide mononitrate 30 mg oral tablet, extended release: 1 tab(s) orally once a day  lidocaine 4% topical film: 1 patch transdermally once a day, As Needed for mid - low back pain  losartan 50 mg oral tablet: 1 tab(s) orally once a day  melatonin 3 mg oral tablet: 1 tab(s) orally once a day (at bedtime), As needed, Insomnia  metoprolol tartrate 50 mg oral tablet: 1 tab(s) orally 2 times a day  nystatin 100,000 units/g topical powder: 1 application topically 2 times a day  oxyCODONE 10 mg oral tablet, extended release: 1 tab(s) orally every 12 hours  oxyCODONE 5 mg oral tablet: 0.5 tab(s) orally every 6 hours, As Needed for moderate to severe pain  pantoprazole 40 mg oral delayed release tablet: 1 tab(s) orally once a day (before a meal)  senna oral tablet: 2 tab(s) orally once a day (at bedtime)

## 2022-05-31 NOTE — DISCHARGE NOTE PROVIDER - NSDCCPCAREPLAN_GEN_ALL_CORE_FT
PRINCIPAL DISCHARGE DIAGNOSIS  Diagnosis: Intractable back pain  Assessment and Plan of Treatment: You presented with worsening back pain, worsening to the point where you were unable to ambulate. You were started on long-acting oxycodone and your gabapentin was increased during your admission. Continue physical therapy while at rehab. Follow up with the doctor at rehab for further adjustment of your pain medications. Follow up with your primary care provider for further pain management recommendations and for referral to a pain managment specialist. Follow up with your primary neurosurgeon outpatient.  ** Oxycontin (long-acting oxycodone) 10mg twice per day & Gabapentin 600mg three times per day.      SECONDARY DISCHARGE DIAGNOSES  Diagnosis: HTN (hypertension)  Assessment and Plan of Treatment: Continue blood pressure medication regimen as directed. Monitor for any visual changes, headaches or dizziness.  Monitor blood pressure regularly.  Follow up with your primary care provider or cardiologist for further management for high blood pressure.    Diagnosis: Angina pectoris  Assessment and Plan of Treatment: Continue taking your aspirin as prescibed. Monitor for any chest pain, tingling or numbness in the face and arms, and increased fatigue with exertion.  Follow up with your PCP or primary cardiologist for further management and monitoring routinely.    Diagnosis: RA (rheumatoid arthritis)  Assessment and Plan of Treatment: Currently in remission. Continue to take medications at home.     PRINCIPAL DISCHARGE DIAGNOSIS  Diagnosis: Intractable back pain  Assessment and Plan of Treatment: You presented with worsening back pain, worsening to the point where you were unable to ambulate. You were started on long-acting oxycodone and your gabapentin was increased during your admission. Continue physical therapy while at rehab. Follow up with the doctor at rehab for further adjustment of your pain medications. Follow up with your primary care provider for further pain management recommendations and for referral to a pain managment specialist. Follow up with your primary neurosurgeon outpatient.  ** Oxycontin (long-acting oxycodone) 10mg twice per day & Gabapentin 600mg three times per day.      SECONDARY DISCHARGE DIAGNOSES  Diagnosis: RA (rheumatoid arthritis)  Assessment and Plan of Treatment: Currently in remission. Continue to take medications at home.    Diagnosis: HTN (hypertension)  Assessment and Plan of Treatment: Continue blood pressure medication regimen as directed. Monitor for any visual changes, headaches or dizziness.  Monitor blood pressure regularly.  Follow up with your primary care provider or cardiologist for further management for high blood pressure.    Diagnosis: Angina pectoris  Assessment and Plan of Treatment: Continue taking your aspirin and imdur as prescibed. Monitor for any chest pain, tingling or numbness in the face and arms, and increased fatigue with exertion.  Follow up with your PCP or primary cardiologist for further management and monitoring routinely.    Diagnosis: Sialadenitis  Assessment and Plan of Treatment: Inflammation of salivary gland. You were evaluated by ENT and started on Augmentin. Continue with sour candies.    Diagnosis: Pelvic pain  Assessment and Plan of Treatment: You reported pelvic pain. CT scan was obtained which showed bladder thickening, diverticulosis (outpouchings of colon, but no infection) and stool in colon. You are already on antibiotics for sialadenitis, this would cover possible UTI also. Continue with bowel regimen. Continue with pain medications as prescribed,

## 2022-05-31 NOTE — DISCHARGE NOTE PROVIDER - HOSPITAL COURSE
Pt is 71 y/o female with PMHx of HTN, chronic back pain, surgical hx of back, RA, and herniated disc, presented to ED with acute worsening of back pain. Admitted to medicine for unable to ambulate  2/2 acute on chronic back pain. Pain management consulted.      Chronic back pain.   p/w worsening back pain, unable to ambulate  - C/w pain management   -pain management consult  - pt has OP plan for future surgery.    RA (rheumatoid arthritis).   Not in flare up  Continue with  home regimens.    HTN (hypertension).   Well controlled  c/w BB and HCTZ.    Angina pectoris.   C/w asa and imdur.    Sciatica.   c/w pain management.    DVT prophylaxis.   DVT PPX lovenox    Pt seen by pain management and will be discharged to subacute inpatient rehab facility. Pt is 73 y/o female with PMHx of HTN, chronic back pain, surgical hx of back, RA, and herniated disc, who presented to ED with acute worsening of back pain. Admitted to medicine for inability to ambulate 2/2 acute on chronic back pain. Pain management consulted.      Acute on chronic back pain.   - Hx of chronic back pain 2/2 spinal stenosis and referred for outpatient surgery, scheduled for later this year (2022). Now with acute on chronic pain & decreased ability to ambulate.  - Pain management consulted for acute management, appreciated recommendations  - s/p Trial of Tylenol/Toradol & Oxycodone PRN with lidocaine patches to back  - Patient started on long-acting oxycodone 10mg BID & increased gabapentin from 300mg TID to 600mg TID w/ plan for Oxycodone 2.5mg q6h PRN for moderate - severe pain  - Outpatient referral for pain management evaluation  - PT consulted ---> Rehab recommended    RA (rheumatoid arthritis) --> Does not appear to be in an acute flare; Outpatient follow up routinely.    HTN (hypertension) --> c/w BB & HCTZ    Angina pectoris --> c/w ASA 81mg & Imdur    Hx of Sciatica  --> c/w pain management as above    Dispo: Rehab    On ___ this case was reviewed with Dr. Pike, the patient is medically stable and optimized for discharge. All medications were reviewed and prescriptions were adjusted accordingly. 73 y/o female with PMHx of HTN, chronic back pain, surgical hx of back, RA, and herniated disc, presenting to ED with acute worsening of back pain.     Acute on chronic back pain.   - Hx of chronic back pain 2/2 spinal stenosis and referred for outpatient surgery, scheduled for later this year (2022). Now with acute on chronic pain & decreased ability to ambulate.  - Pain management consulted for acute management, appreciated recommendations  - s/p Trial of Tylenol/Toradol & Oxycodone PRN with lidocaine patches to back  - Patient started on long-acting oxycodone 10mg BID & increased gabapentin from 300mg TID to 600mg TID w/ plan for Oxycodone 2.5mg q6h PRN for moderate - severe pain  - Outpatient referral for pain management evaluation  - PT consulted Rehab recommended    RA   - Does not appear to be in an acute flare; Outpatient follow up routinely.    HTN c/w BB & HCTZ    Angina pectoris c/w ASA 81mg & Imdur    Dispo: Rehab    On ___ this case was reviewed with Dr. Pike, the patient is medically stable and optimized for discharge. All medications were reviewed and prescriptions were adjusted accordingly. 71 y/o female with PMHx of HTN, chronic back pain, surgical hx of back, RA, and herniated disc, presenting to ED with acute worsening of back pain.     Acute on chronic back pain.   - Hx of chronic back pain 2/2 spinal stenosis and referred for outpatient surgery, scheduled for later this year (2022). Now with acute on chronic pain & decreased ability to ambulate.  - Pain management consulted for acute management, appreciated recommendations  - s/p Trial of Tylenol/Toradol & Oxycodone PRN with lidocaine patches to back  - Patient started on long-acting oxycodone 10mg BID & increased gabapentin from 300mg TID to 600mg TID w/ plan for Oxycodone 2.5mg q6h PRN for moderate - severe pain  - Outpatient referral for pain management evaluation  - PT consulted Rehab recommended    RA   - Does not appear to be in an acute flare; Outpatient follow up routinely.    HTN c/w BB & HCTZ    Angina pectoris c/w ASA 81mg & Imdur    Dispo: Rehab    On 6/6/2022 this case was reviewed with Dr. Pike, the patient is medically stable and optimized for discharge. All medications were reviewed and prescriptions were adjusted accordingly. 73 y/o female with PMHx of HTN, chronic back pain, surgical hx of back, RA, and herniated disc, presenting to ED with acute worsening of back pain.     Acute on chronic back pain.   - Hx of chronic back pain 2/2 spinal stenosis and referred for outpatient surgery, scheduled for later this year (2022). Now with acute on chronic pain & decreased ability to ambulate.  - Pain management consulted for acute management: oxycontin bid, oxycodone prn, gabapentin   - Outpatient referral for pain management evaluation  - PT consulted Rehab recommended    RA   - Does not appear to be in an acute flare; Outpatient follow up routinely.    Htn  - continued BB & HCTZ    Angina pectoris  -Cards consulted  -Continued with ASA 81mg & Imdur    Left ear pain  - Contuled ENT: she is having pain over the tragus but there are no signs of an otitis externa.  - augmentin for 1 week for sialadenitis. Continue to use the lemon drops, and needs constant hydration  - follow up with ENT as an outpatient for her sialadenitis and for her hearing loss/tinnitus, which is longstanding, but should have audiogram. she should follow up sooner if her ear pain persists.    Pelvis pain  -CT abd/pelvis: Bladder wall thickening may be secondary to underdistention versus cystitis. Correlate with urinalysis. Colonic diverticulosis without evidence of acute diverticulitis. Stool-filled colon.  -UA with moderate leuk, no wbc --> pt already on augmentin for above sialadenitis   -Continue with stool regimen    Dispo: Rehab  On 6/7/2022 this case was reviewed with Dr. LITZY Chilel (covering for Dr. Pike) and Dr. Chan, the patient is medically stable and optimized for discharge.

## 2022-05-31 NOTE — DIETITIAN INITIAL EVALUATION ADULT - WEIGHT IN KG
MD Notification    Notified Person: MD    Notified Person Name:    Notification Date/Time: 05/30/2019 at 18:52    Notification Interaction:    Purpose of Notification: Notify about result echo and MRI results    Orders Received: Received call back jean-claude Dooley page Dr. Robins again    Comments:    Paged Dr. Robins 05/30/2019 at 18:56     101.6

## 2022-05-31 NOTE — DISCHARGE NOTE PROVIDER - NSFOLLOWUPCLINICS_GEN_ALL_ED_FT
[facility] [place] General Internal Medicine  General Internal Medicine  [address] [place] [address]  Phone: [phone]  Fax:      [facility] General Internal Medicine  General Internal Medicine  [address] [place] [address]  Phone: [phone]  Fax:     [facility] ENT  ENT  [address] [facility], [address]  [place] [address]  Phone: [phone]  Fax:

## 2022-05-31 NOTE — DIETITIAN INITIAL EVALUATION ADULT - PERTINENT LABORATORY DATA
05-30    137  |  102  |  18  ----------------------------<  92  4.0   |  26  |  0.72    Ca    9.6      30 May 2022 06:20  Phos  3.4     05-30  Mg     1.70     05-30    A1C with Estimated Average Glucose Result: 5.8 % (04-23-22 @ 11:40)  A1C with Estimated Average Glucose Result: 5.8 % (02-25-22 @ 07:44)

## 2022-05-31 NOTE — PROGRESS NOTE ADULT - SUBJECTIVE AND OBJECTIVE BOX
PATIENT SEEN AND EXAMINED ON :- 5/31/22  DATE OF SERVICE:    5/31/22         Interim events noted,Labs ,Radiological studies and Cardiology tests reviewed .   HOSPITAL COURSE: HPI:  Pt is 71 y/o female with PMHx of HTN, chronic back pain, surgical hx of back, RA, and herniated disc, presenting to ED with acute worsening of back pain. Per patient, patient was in rehab facility until 2 days ago, signed AMA to go home but immediately found difficult for self-management; Of note, difficulty with ambulation is not new, Pt has pending surgery with neurosurgery tentative. Denies new Sx or changes to Sx, as well as falls and neurological deficits.   (24 May 2022 12:55)      INTERIM EVENTS:Patient seen at bedside ,interim events noted.      PMH -reviewed admission note, no change since admission  HEART FAILURE: Acute[ ]Chronic[ ] Systolic[ ] Diastolic[ ] Combined Systolic and Diastolic[ ]  CAD[ ] CABG[ ] PCI[ ]  DEVICES[ ] PPM[ ] ICD[ ] ILR[ ]  ATRIAL FIBRILLATION[ ] Paroxysmal[ ] Permanent[ ]  SAAD[ ] CKD1[ ] CKD2[ ] CKD3[ ] CKD4[ ] ESRD[ ]  COPD[ ] HTN[ ]   DM[ ] Type1[ ] Type 2[ ]   CVA[ ] Paresis[ ]    AMBULATION: Assisted[ ] Cane/walker[ ] Independent[ ]    MEDICATIONS  (STANDING):  atorvastatin 80 milliGRAM(s) Oral at bedtime  chlorhexidine 2% Cloths 1 Application(s) Topical daily  enoxaparin Injectable 40 milliGRAM(s) SubCutaneous every 24 hours  gabapentin 600 milliGRAM(s) Oral three times a day  hydrochlorothiazide 25 milliGRAM(s) Oral daily  isosorbide   mononitrate ER Tablet (IMDUR) 30 milliGRAM(s) Oral daily  losartan 50 milliGRAM(s) Oral daily  metoprolol tartrate 50 milliGRAM(s) Oral two times a day  nystatin Powder 1 Application(s) Topical two times a day  oxyCODONE    IR 10 milliGRAM(s) Oral every 12 hours  pantoprazole    Tablet 40 milliGRAM(s) Oral before breakfast  senna 2 Tablet(s) Oral at bedtime    MEDICATIONS  (PRN):  ALBUTerol    90 MICROgram(s) HFA Inhaler 2 Puff(s) Inhalation every 6 hours PRN Shortness of Breath and/or Wheezing  lidocaine   4% Patch 1 Patch Transdermal daily PRN Mid - Low back pain  melatonin 3 milliGRAM(s) Oral at bedtime PRN Insomnia  oxyCODONE    IR 2.5 milliGRAM(s) Oral every 6 hours PRN Moderate - Severe Pain (4 - 10)            REVIEW OF SYSTEMS:  Constitutional: [ ] fever, [ ]weight loss,  [ ]fatigue  Eyes: [ ] visual changes  Respiratory: [ ]shortness of breath;  [ ] cough, [ ]wheezing, [ ]chills, [ ]hemoptysis  Cardiovascular: [ ] chest pain, [ ]palpitations, [ ]dizziness,  [ ]leg swelling[ ]orthopnea[ ]PND  Gastrointestinal: [ ] abdominal pain, [ ]nausea, [ ]vomiting,  [ ]diarrhea [ ]Constipation [ ]Melena  Genitourinary: [ ] dysuria, [ ] hematuria [ ]Mathews  Neurologic: [ ] headaches [ ] tremors[ ]weakness [ ]Paralysis Right[ ] Left[ ]  Skin: [ ] itching, [ ]burning, [ ] rashes  Endocrine: [ ] heat or cold intolerance  Musculoskeletal: [ ] joint pain or swelling; [ ] muscle, back, or extremity pain  Psychiatric: [ ] depression, [ ]anxiety, [ ]mood swings, or [ ]difficulty sleeping  Hematologic: [ ] easy bruising, [ ] bleeding gums    [ ] All remaining systems negative except as per above.   [ ]Unable to obtain.  [x] No change in ROS since admission      Vital Signs Last 24 Hrs  T(C): 36.5 (31 May 2022 16:59), Max: 37.1 (31 May 2022 13:28)  T(F): 97.7 (31 May 2022 16:59), Max: 98.7 (31 May 2022 13:28)  HR: 89 (31 May 2022 16:59) (67 - 89)  BP: 134/74 (31 May 2022 16:59) (130/70 - 136/74)  BP(mean): --  RR: 17 (31 May 2022 16:59) (16 - 18)  SpO2: 98% (31 May 2022 16:59) (98% - 100%)  I&O's Summary      PHYSICAL EXAM:  General: No acute distress BMI-  HEENT: EOMI, PERRL  Neck: Supple, [ ] JVD  Lungs: Equal air entry bilaterally; [ ] rales [ ] wheezing [ ] rhonchi  Heart: Regular rate and rhythm; [x ] murmur   2/6 [ x] systolic [ ] diastolic [ ] radiation[ ] rubs [ ]  gallops  Abdomen: Nontender, bowel sounds present  Extremities: No clubbing, cyanosis, [ ] edema [ ]Pulses  equal and intact  Nervous system:  Alert & Oriented X3, no focal deficits  Psychiatric: Normal affect  Skin: No rashes or lesions    LABS:  05-30    137  |  102  |  18  ----------------------------<  92  4.0   |  26  |  0.72    Ca    9.6      30 May 2022 06:20  Phos  3.4     05-30  Mg     1.70     05-30      Creatinine Trend: 0.72<--, 0.71<--, 0.76<--, 0.75<--, 0.70<--, 0.67<--                        12.9   6.09  )-----------( 264      ( 30 May 2022 06:20 )             41.8

## 2022-05-31 NOTE — DISCHARGE NOTE PROVIDER - CARE PROVIDER_API CALL
Pierce Magdaleno)  Plastic Surgery  [address] [place] [address] [address]  [place] [address]  Phone: [phone]  Fax: [phone]  Established Patient  Scheduled Appointment: 06/14/2022 03:00 PM    Your primary care provider,   Phone: (   )    -  Fax: (   )    -  Follow Up Time: 2 weeks

## 2022-05-31 NOTE — DISCHARGE NOTE PROVIDER - NSDCFUADDAPPT_GEN_ALL_CORE_FT
- Follow up with chronic pain doctor, you can call your insurance company to determine who is in network.  -  Recommend follow up with Thyroid ultrasound  -  Recommend follow up with Neurosurgery for repeat back surgery  - Follow up with ENT as an outpatient for her sialadenitis and for her hearing loss/tinnitus, which is longstanding, but should have audiogram.

## 2022-05-31 NOTE — DIETITIAN INITIAL EVALUATION ADULT - PERTINENT MEDS FT
MEDICATIONS  (STANDING):  atorvastatin 80 milliGRAM(s) Oral at bedtime  chlorhexidine 2% Cloths 1 Application(s) Topical daily  enoxaparin Injectable 40 milliGRAM(s) SubCutaneous every 24 hours  gabapentin 600 milliGRAM(s) Oral three times a day  hydrochlorothiazide 25 milliGRAM(s) Oral daily  isosorbide   mononitrate ER Tablet (IMDUR) 30 milliGRAM(s) Oral daily  losartan 50 milliGRAM(s) Oral daily  metoprolol tartrate 50 milliGRAM(s) Oral two times a day  nystatin Powder 1 Application(s) Topical two times a day  oxyCODONE    IR 10 milliGRAM(s) Oral every 12 hours  pantoprazole    Tablet 40 milliGRAM(s) Oral before breakfast  senna 2 Tablet(s) Oral at bedtime    MEDICATIONS  (PRN):  ALBUTerol    90 MICROgram(s) HFA Inhaler 2 Puff(s) Inhalation every 6 hours PRN Shortness of Breath and/or Wheezing  melatonin 3 milliGRAM(s) Oral at bedtime PRN Insomnia  oxyCODONE    IR 2.5 milliGRAM(s) Oral every 6 hours PRN Moderate - Severe Pain (4 - 10)

## 2022-06-01 RX ADMIN — ENOXAPARIN SODIUM 40 MILLIGRAM(S): 100 INJECTION SUBCUTANEOUS at 18:15

## 2022-06-01 RX ADMIN — OXYCODONE HYDROCHLORIDE 2.5 MILLIGRAM(S): 5 TABLET ORAL at 13:00

## 2022-06-01 RX ADMIN — GABAPENTIN 600 MILLIGRAM(S): 400 CAPSULE ORAL at 13:10

## 2022-06-01 RX ADMIN — OXYCODONE HYDROCHLORIDE 10 MILLIGRAM(S): 5 TABLET ORAL at 07:26

## 2022-06-01 RX ADMIN — SENNA PLUS 2 TABLET(S): 8.6 TABLET ORAL at 22:11

## 2022-06-01 RX ADMIN — PANTOPRAZOLE SODIUM 40 MILLIGRAM(S): 20 TABLET, DELAYED RELEASE ORAL at 07:27

## 2022-06-01 RX ADMIN — ISOSORBIDE MONONITRATE 30 MILLIGRAM(S): 60 TABLET, EXTENDED RELEASE ORAL at 13:10

## 2022-06-01 RX ADMIN — LOSARTAN POTASSIUM 50 MILLIGRAM(S): 100 TABLET, FILM COATED ORAL at 07:27

## 2022-06-01 RX ADMIN — GABAPENTIN 600 MILLIGRAM(S): 400 CAPSULE ORAL at 07:27

## 2022-06-01 RX ADMIN — GABAPENTIN 600 MILLIGRAM(S): 400 CAPSULE ORAL at 22:11

## 2022-06-01 RX ADMIN — Medication 50 MILLIGRAM(S): at 18:15

## 2022-06-01 RX ADMIN — NYSTATIN CREAM 1 APPLICATION(S): 100000 CREAM TOPICAL at 07:26

## 2022-06-01 RX ADMIN — NYSTATIN CREAM 1 APPLICATION(S): 100000 CREAM TOPICAL at 18:16

## 2022-06-01 RX ADMIN — OXYCODONE HYDROCHLORIDE 10 MILLIGRAM(S): 5 TABLET ORAL at 19:07

## 2022-06-01 RX ADMIN — Medication 25 MILLIGRAM(S): at 07:27

## 2022-06-01 RX ADMIN — Medication 50 MILLIGRAM(S): at 07:27

## 2022-06-01 RX ADMIN — ATORVASTATIN CALCIUM 80 MILLIGRAM(S): 80 TABLET, FILM COATED ORAL at 22:11

## 2022-06-01 NOTE — PROGRESS NOTE ADULT - SUBJECTIVE AND OBJECTIVE BOX
PATIENT SEEN AND EXAMINED ON :- 6/1/22  DATE OF SERVICE:  6/1/22           Interim events noted,Labs ,Radiological studies and Cardiology tests reviewed .     HOSPITAL COURSE: HPI:  Pt is 71 y/o female with PMHx of HTN, chronic back pain, surgical hx of back, RA, and herniated disc, presenting to ED with acute worsening of back pain. Per patient, patient was in rehab facility until 2 days ago, signed AMA to go home but immediately found difficult for self-management; Of note, difficulty with ambulation is not new, Pt has pending surgery with neurosurgery tentative. Denies new Sx or changes to Sx, as well as falls and neurological deficits.   (24 May 2022 12:55)      INTERIM EVENTS:Patient seen at bedside ,interim events noted.      PMH -reviewed admission note, no change since admission  HEART FAILURE: Acute[ ]Chronic[ ] Systolic[ ] Diastolic[ ] Combined Systolic and Diastolic[ ]  CAD[ ] CABG[ ] PCI[ ]  DEVICES[ ] PPM[ ] ICD[ ] ILR[ ]  ATRIAL FIBRILLATION[ ] Paroxysmal[ ] Permanent[ ]  SAAD[ ] CKD1[ ] CKD2[ ] CKD3[ ] CKD4[ ] ESRD[ ]  COPD[ ] HTN[ ]   DM[ ] Type1[ ] Type 2[ ]   CVA[ ] Paresis[ ]    AMBULATION: Assisted[ ] Cane/walker[ ] Independent[ ]    MEDICATIONS  (STANDING):  aspirin enteric coated 81 milliGRAM(s) Oral daily  atorvastatin 80 milliGRAM(s) Oral at bedtime  chlorhexidine 2% Cloths 1 Application(s) Topical daily  enoxaparin Injectable 40 milliGRAM(s) SubCutaneous every 24 hours  gabapentin 600 milliGRAM(s) Oral three times a day  hydrochlorothiazide 25 milliGRAM(s) Oral daily  isosorbide   mononitrate ER Tablet (IMDUR) 30 milliGRAM(s) Oral daily  losartan 50 milliGRAM(s) Oral daily  metoprolol tartrate 50 milliGRAM(s) Oral two times a day  nystatin Powder 1 Application(s) Topical two times a day  oxyCODONE    IR 10 milliGRAM(s) Oral every 12 hours  pantoprazole    Tablet 40 milliGRAM(s) Oral before breakfast  senna 2 Tablet(s) Oral at bedtime    MEDICATIONS  (PRN):  ALBUTerol    90 MICROgram(s) HFA Inhaler 2 Puff(s) Inhalation every 6 hours PRN Shortness of Breath and/or Wheezing  lidocaine   4% Patch 1 Patch Transdermal daily PRN Mid - Low back pain  melatonin 3 milliGRAM(s) Oral at bedtime PRN Insomnia  oxyCODONE    IR 2.5 milliGRAM(s) Oral every 6 hours PRN Moderate - Severe Pain (4 - 10)            REVIEW OF SYSTEMS:  Constitutional: [ ] fever, [ ]weight loss,  [ ]fatigue  Eyes: [ ] visual changes  Respiratory: [ ]shortness of breath;  [ ] cough, [ ]wheezing, [ ]chills, [ ]hemoptysis  Cardiovascular: [ ] chest pain, [ ]palpitations, [ ]dizziness,  [ ]leg swelling[ ]orthopnea[ ]PND  Gastrointestinal: [ ] abdominal pain, [ ]nausea, [ ]vomiting,  [ ]diarrhea [ ]Constipation [ ]Melena  Genitourinary: [ ] dysuria, [ ] hematuria [ ]Mathews  Neurologic: [ ] headaches [ ] tremors[ ]weakness [ ]Paralysis Right[ ] Left[ ]  Skin: [ ] itching, [ ]burning, [ ] rashes  Endocrine: [ ] heat or cold intolerance  Musculoskeletal: [ ] joint pain or swelling; [ ] muscle, back, or extremity pain  Psychiatric: [ ] depression, [ ]anxiety, [ ]mood swings, or [ ]difficulty sleeping  Hematologic: [ ] easy bruising, [ ] bleeding gums    [ ] All remaining systems negative except as per above.   [ ]Unable to obtain.  [x] No change in ROS since admission      Vital Signs Last 24 Hrs  T(C): 37.3 (01 Jun 2022 13:13), Max: 37.3 (01 Jun 2022 13:13)  T(F): 99.2 (01 Jun 2022 13:13), Max: 99.2 (01 Jun 2022 13:13)  HR: 73 (01 Jun 2022 18:15) (59 - 73)  BP: 149/73 (01 Jun 2022 18:15) (126/72 - 149/73)  BP(mean): --  RR: 16 (01 Jun 2022 13:13) (16 - 18)  SpO2: 97% (01 Jun 2022 13:13) (97% - 98%)  I&O's Summary      PHYSICAL EXAM:  General: No acute distress BMI-  HEENT: EOMI, PERRL  Neck: Supple, [ ] JVD  Lungs: Equal air entry bilaterally; [ ] rales [ ] wheezing [ ] rhonchi  Heart: Regular rate and rhythm; [x ] murmur   2/6 [ x] systolic [ ] diastolic [ ] radiation[ ] rubs [ ]  gallops  Abdomen: Nontender, bowel sounds present  Extremities: No clubbing, cyanosis, [ ] edema [ ]Pulses  equal and intact  Nervous system:  Alert & Oriented X3, no focal deficits  Psychiatric: Normal affect  Skin: No rashes or lesions    LABS:        Creatinine Trend: 0.72<--, 0.71<--, 0.76<--, 0.75<--, 0.70<--, 0.67<--

## 2022-06-02 RX ORDER — OXYCODONE HYDROCHLORIDE 5 MG/1
2.5 TABLET ORAL EVERY 6 HOURS
Refills: 0 | Status: DISCONTINUED | OUTPATIENT
Start: 2022-06-02 | End: 2022-06-07

## 2022-06-02 RX ORDER — OXYCODONE HYDROCHLORIDE 5 MG/1
10 TABLET ORAL EVERY 12 HOURS
Refills: 0 | Status: DISCONTINUED | OUTPATIENT
Start: 2022-06-02 | End: 2022-06-04

## 2022-06-02 RX ADMIN — CHLORHEXIDINE GLUCONATE 1 APPLICATION(S): 213 SOLUTION TOPICAL at 13:33

## 2022-06-02 RX ADMIN — GABAPENTIN 600 MILLIGRAM(S): 400 CAPSULE ORAL at 06:43

## 2022-06-02 RX ADMIN — GABAPENTIN 600 MILLIGRAM(S): 400 CAPSULE ORAL at 21:52

## 2022-06-02 RX ADMIN — SENNA PLUS 2 TABLET(S): 8.6 TABLET ORAL at 21:52

## 2022-06-02 RX ADMIN — NYSTATIN CREAM 1 APPLICATION(S): 100000 CREAM TOPICAL at 06:45

## 2022-06-02 RX ADMIN — ATORVASTATIN CALCIUM 80 MILLIGRAM(S): 80 TABLET, FILM COATED ORAL at 21:52

## 2022-06-02 RX ADMIN — OXYCODONE HYDROCHLORIDE 10 MILLIGRAM(S): 5 TABLET ORAL at 07:34

## 2022-06-02 RX ADMIN — NYSTATIN CREAM 1 APPLICATION(S): 100000 CREAM TOPICAL at 17:43

## 2022-06-02 RX ADMIN — ISOSORBIDE MONONITRATE 30 MILLIGRAM(S): 60 TABLET, EXTENDED RELEASE ORAL at 13:31

## 2022-06-02 RX ADMIN — PANTOPRAZOLE SODIUM 40 MILLIGRAM(S): 20 TABLET, DELAYED RELEASE ORAL at 06:44

## 2022-06-02 RX ADMIN — ENOXAPARIN SODIUM 40 MILLIGRAM(S): 100 INJECTION SUBCUTANEOUS at 17:31

## 2022-06-02 RX ADMIN — Medication 81 MILLIGRAM(S): at 13:30

## 2022-06-02 RX ADMIN — LOSARTAN POTASSIUM 50 MILLIGRAM(S): 100 TABLET, FILM COATED ORAL at 06:44

## 2022-06-02 RX ADMIN — Medication 50 MILLIGRAM(S): at 17:32

## 2022-06-02 RX ADMIN — OXYCODONE HYDROCHLORIDE 10 MILLIGRAM(S): 5 TABLET ORAL at 17:42

## 2022-06-02 RX ADMIN — Medication 25 MILLIGRAM(S): at 06:44

## 2022-06-02 NOTE — PROGRESS NOTE ADULT - SUBJECTIVE AND OBJECTIVE BOX
PATIENT SEEN AND EXAMINED ON :- 6/2/22  DATE OF SERVICE: 6/2/22            Interim events noted,Labs ,Radiological studies and Cardiology tests reviewed .     HOSPITAL COURSE: HPI:  Pt is 71 y/o female with PMHx of HTN, chronic back pain, surgical hx of back, RA, and herniated disc, presenting to ED with acute worsening of back pain. Per patient, patient was in rehab facility until 2 days ago, signed AMA to go home but immediately found difficult for self-management; Of note, difficulty with ambulation is not new, Pt has pending surgery with neurosurgery tentative. Denies new Sx or changes to Sx, as well as falls and neurological deficits.   (24 May 2022 12:55)      INTERIM EVENTS:Patient seen at bedside ,interim events noted.      PMH -reviewed admission note, no change since admission  HEART FAILURE: Acute[ ]Chronic[ ] Systolic[ ] Diastolic[ ] Combined Systolic and Diastolic[ ]  CAD[ ] CABG[ ] PCI[ ]  DEVICES[ ] PPM[ ] ICD[ ] ILR[ ]  ATRIAL FIBRILLATION[ ] Paroxysmal[ ] Permanent[ ]  SAAD[ ] CKD1[ ] CKD2[ ] CKD3[ ] CKD4[ ] ESRD[ ]  COPD[ ] HTN[ ]   DM[ ] Type1[ ] Type 2[ ]   CVA[ ] Paresis[ ]    AMBULATION: Assisted[ ] Cane/walker[ ] Independent[ ]    MEDICATIONS  (STANDING):  aspirin enteric coated 81 milliGRAM(s) Oral daily  atorvastatin 80 milliGRAM(s) Oral at bedtime  chlorhexidine 2% Cloths 1 Application(s) Topical daily  enoxaparin Injectable 40 milliGRAM(s) SubCutaneous every 24 hours  gabapentin 600 milliGRAM(s) Oral three times a day  hydrochlorothiazide 25 milliGRAM(s) Oral daily  isosorbide   mononitrate ER Tablet (IMDUR) 30 milliGRAM(s) Oral daily  losartan 50 milliGRAM(s) Oral daily  metoprolol tartrate 50 milliGRAM(s) Oral two times a day  nystatin Powder 1 Application(s) Topical two times a day  oxyCODONE    IR 10 milliGRAM(s) Oral every 12 hours  pantoprazole    Tablet 40 milliGRAM(s) Oral before breakfast  senna 2 Tablet(s) Oral at bedtime    MEDICATIONS  (PRN):  ALBUTerol    90 MICROgram(s) HFA Inhaler 2 Puff(s) Inhalation every 6 hours PRN Shortness of Breath and/or Wheezing  lidocaine   4% Patch 1 Patch Transdermal daily PRN Mid - Low back pain  melatonin 3 milliGRAM(s) Oral at bedtime PRN Insomnia  oxyCODONE    IR 2.5 milliGRAM(s) Oral every 6 hours PRN Moderate - Severe Pain (4 - 10)            REVIEW OF SYSTEMS:  Constitutional: [ ] fever, [ ]weight loss,  [ ]fatigue  Eyes: [ ] visual changes  Respiratory: [ ]shortness of breath;  [ ] cough, [ ]wheezing, [ ]chills, [ ]hemoptysis  Cardiovascular: [ ] chest pain, [ ]palpitations, [ ]dizziness,  [ ]leg swelling[ ]orthopnea[ ]PND  Gastrointestinal: [ ] abdominal pain, [ ]nausea, [ ]vomiting,  [ ]diarrhea [ ]Constipation [ ]Melena  Genitourinary: [ ] dysuria, [ ] hematuria [ ]Mathews  Neurologic: [ ] headaches [ ] tremors[ ]weakness [ ]Paralysis Right[ ] Left[ ]  Skin: [ ] itching, [ ]burning, [ ] rashes  Endocrine: [ ] heat or cold intolerance  Musculoskeletal: [ ] joint pain or swelling; [ ] muscle, back, or extremity pain  Psychiatric: [ ] depression, [ ]anxiety, [ ]mood swings, or [ ]difficulty sleeping  Hematologic: [ ] easy bruising, [ ] bleeding gums    [ ] All remaining systems negative except as per above.   [ ]Unable to obtain.  [x] No change in ROS since admission      Vital Signs Last 24 Hrs  T(C): 37.2 (02 Jun 2022 12:49), Max: 37.2 (02 Jun 2022 12:49)  T(F): 99 (02 Jun 2022 12:49), Max: 99 (02 Jun 2022 12:49)  HR: 77 (02 Jun 2022 12:49) (56 - 77)  BP: 139/67 (02 Jun 2022 13:25) (121/60 - 149/73)  BP(mean): --  RR: 16 (02 Jun 2022 12:49) (16 - 17)  SpO2: 99% (02 Jun 2022 12:49) (99% - 100%)  I&O's Summary      PHYSICAL EXAM:  General: No acute distress BMI-  HEENT: EOMI, PERRL  Neck: Supple, [ ] JVD  Lungs: Equal air entry bilaterally; [ ] rales [ ] wheezing [ ] rhonchi  Heart: Regular rate and rhythm; [x ] murmur   2/6 [ x] systolic [ ] diastolic [ ] radiation[ ] rubs [ ]  gallops  Abdomen: Nontender, bowel sounds present  Extremities: No clubbing, cyanosis, [ ] edema [ ]Pulses  equal and intact  Nervous system:  Alert & Oriented X3, no focal deficits  Psychiatric: Normal affect  Skin: No rashes or lesions    LABS:        Creatinine Trend: 0.72<--, 0.71<--, 0.76<--, 0.75<--, 0.70<--, 0.67<--

## 2022-06-03 RX ADMIN — PANTOPRAZOLE SODIUM 40 MILLIGRAM(S): 20 TABLET, DELAYED RELEASE ORAL at 06:34

## 2022-06-03 RX ADMIN — GABAPENTIN 600 MILLIGRAM(S): 400 CAPSULE ORAL at 21:33

## 2022-06-03 RX ADMIN — ENOXAPARIN SODIUM 40 MILLIGRAM(S): 100 INJECTION SUBCUTANEOUS at 17:47

## 2022-06-03 RX ADMIN — GABAPENTIN 600 MILLIGRAM(S): 400 CAPSULE ORAL at 13:50

## 2022-06-03 RX ADMIN — NYSTATIN CREAM 1 APPLICATION(S): 100000 CREAM TOPICAL at 19:01

## 2022-06-03 RX ADMIN — ISOSORBIDE MONONITRATE 30 MILLIGRAM(S): 60 TABLET, EXTENDED RELEASE ORAL at 13:50

## 2022-06-03 RX ADMIN — LOSARTAN POTASSIUM 50 MILLIGRAM(S): 100 TABLET, FILM COATED ORAL at 06:35

## 2022-06-03 RX ADMIN — OXYCODONE HYDROCHLORIDE 10 MILLIGRAM(S): 5 TABLET ORAL at 06:20

## 2022-06-03 RX ADMIN — OXYCODONE HYDROCHLORIDE 2.5 MILLIGRAM(S): 5 TABLET ORAL at 21:42

## 2022-06-03 RX ADMIN — Medication 25 MILLIGRAM(S): at 06:36

## 2022-06-03 RX ADMIN — CHLORHEXIDINE GLUCONATE 1 APPLICATION(S): 213 SOLUTION TOPICAL at 13:52

## 2022-06-03 RX ADMIN — Medication 50 MILLIGRAM(S): at 06:35

## 2022-06-03 RX ADMIN — Medication 50 MILLIGRAM(S): at 17:47

## 2022-06-03 RX ADMIN — OXYCODONE HYDROCHLORIDE 10 MILLIGRAM(S): 5 TABLET ORAL at 17:46

## 2022-06-03 RX ADMIN — Medication 81 MILLIGRAM(S): at 13:50

## 2022-06-03 RX ADMIN — OXYCODONE HYDROCHLORIDE 2.5 MILLIGRAM(S): 5 TABLET ORAL at 22:42

## 2022-06-03 RX ADMIN — SENNA PLUS 2 TABLET(S): 8.6 TABLET ORAL at 21:33

## 2022-06-03 RX ADMIN — OXYCODONE HYDROCHLORIDE 2.5 MILLIGRAM(S): 5 TABLET ORAL at 14:54

## 2022-06-03 RX ADMIN — ATORVASTATIN CALCIUM 80 MILLIGRAM(S): 80 TABLET, FILM COATED ORAL at 21:33

## 2022-06-03 RX ADMIN — OXYCODONE HYDROCHLORIDE 2.5 MILLIGRAM(S): 5 TABLET ORAL at 13:54

## 2022-06-03 RX ADMIN — NYSTATIN CREAM 1 APPLICATION(S): 100000 CREAM TOPICAL at 06:36

## 2022-06-03 RX ADMIN — GABAPENTIN 600 MILLIGRAM(S): 400 CAPSULE ORAL at 06:34

## 2022-06-03 NOTE — PROGRESS NOTE ADULT - SUBJECTIVE AND OBJECTIVE BOX
PATIENT SEEN AND EXAMINED ON :- 6/3/22  DATE OF SERVICE:  6/3/22           Interim events noted,Labs ,Radiological studies and Cardiology tests reviewed .     HOSPITAL COURSE: HPI:  Pt is 71 y/o female with PMHx of HTN, chronic back pain, surgical hx of back, RA, and herniated disc, presenting to ED with acute worsening of back pain. Per patient, patient was in rehab facility until 2 days ago, signed AMA to go home but immediately found difficult for self-management; Of note, difficulty with ambulation is not new, Pt has pending surgery with neurosurgery tentative. Denies new Sx or changes to Sx, as well as falls and neurological deficits.   (24 May 2022 12:55)      INTERIM EVENTS:Patient seen at bedside ,interim events noted.      PMH -reviewed admission note, no change since admission  HEART FAILURE: Acute[ ]Chronic[ ] Systolic[ ] Diastolic[ ] Combined Systolic and Diastolic[ ]  CAD[ ] CABG[ ] PCI[ ]  DEVICES[ ] PPM[ ] ICD[ ] ILR[ ]  ATRIAL FIBRILLATION[ ] Paroxysmal[ ] Permanent[ ]  SAAD[ ] CKD1[ ] CKD2[ ] CKD3[ ] CKD4[ ] ESRD[ ]  COPD[ ] HTN[ ]   DM[ ] Type1[ ] Type 2[ ]   CVA[ ] Paresis[ ]    AMBULATION: Assisted[ ] Cane/walker[ ] Independent[ ]    MEDICATIONS  (STANDING):  aspirin enteric coated 81 milliGRAM(s) Oral daily  atorvastatin 80 milliGRAM(s) Oral at bedtime  chlorhexidine 2% Cloths 1 Application(s) Topical daily  enoxaparin Injectable 40 milliGRAM(s) SubCutaneous every 24 hours  gabapentin 600 milliGRAM(s) Oral three times a day  hydrochlorothiazide 25 milliGRAM(s) Oral daily  isosorbide   mononitrate ER Tablet (IMDUR) 30 milliGRAM(s) Oral daily  losartan 50 milliGRAM(s) Oral daily  metoprolol tartrate 50 milliGRAM(s) Oral two times a day  nystatin Powder 1 Application(s) Topical two times a day  oxyCODONE    IR 10 milliGRAM(s) Oral every 12 hours  pantoprazole    Tablet 40 milliGRAM(s) Oral before breakfast  senna 2 Tablet(s) Oral at bedtime    MEDICATIONS  (PRN):  ALBUTerol    90 MICROgram(s) HFA Inhaler 2 Puff(s) Inhalation every 6 hours PRN Shortness of Breath and/or Wheezing  lidocaine   4% Patch 1 Patch Transdermal daily PRN Mid - Low back pain  melatonin 3 milliGRAM(s) Oral at bedtime PRN Insomnia  oxyCODONE    IR 2.5 milliGRAM(s) Oral every 6 hours PRN Moderate - Severe Pain (4 - 10)            REVIEW OF SYSTEMS:  Constitutional: [ ] fever, [ ]weight loss,  [ ]fatigue  Eyes: [ ] visual changes  Respiratory: [ ]shortness of breath;  [ ] cough, [ ]wheezing, [ ]chills, [ ]hemoptysis  Cardiovascular: [ ] chest pain, [ ]palpitations, [ ]dizziness,  [ ]leg swelling[ ]orthopnea[ ]PND  Gastrointestinal: [ ] abdominal pain, [ ]nausea, [ ]vomiting,  [ ]diarrhea [ ]Constipation [ ]Melena  Genitourinary: [ ] dysuria, [ ] hematuria [ ]Mathews  Neurologic: [ ] headaches [ ] tremors[ ]weakness [ ]Paralysis Right[ ] Left[ ]  Skin: [ ] itching, [ ]burning, [ ] rashes  Endocrine: [ ] heat or cold intolerance  Musculoskeletal: [ ] joint pain or swelling; [ ] muscle, back, or extremity pain  Psychiatric: [ ] depression, [ ]anxiety, [ ]mood swings, or [ ]difficulty sleeping  Hematologic: [ ] easy bruising, [ ] bleeding gums    [ ] All remaining systems negative except as per above.   [ ]Unable to obtain.  [x] No change in ROS since admission      Vital Signs Last 24 Hrs  T(C): 37.4 (03 Jun 2022 13:21), Max: 37.4 (03 Jun 2022 13:21)  T(F): 99.3 (03 Jun 2022 13:21), Max: 99.3 (03 Jun 2022 13:21)  HR: 65 (03 Jun 2022 13:21) (65 - 66)  BP: 124/68 (03 Jun 2022 13:21) (124/68 - 160/72)  BP(mean): --  RR: 16 (03 Jun 2022 13:21) (16 - 17)  SpO2: 100% (03 Jun 2022 13:21) (98% - 100%)  I&O's Summary    02 Jun 2022 07:01  -  03 Jun 2022 07:00  --------------------------------------------------------  IN: 1300 mL / OUT: 1000 mL / NET: 300 mL    03 Jun 2022 07:01  -  03 Jun 2022 19:37  --------------------------------------------------------  IN: 1025 mL / OUT: 0 mL / NET: 1025 mL        PHYSICAL EXAM:  General: No acute distress BMI-  HEENT: EOMI, PERRL  Neck: Supple, [ ] JVD  Lungs: Equal air entry bilaterally; [ ] rales [ ] wheezing [ ] rhonchi  Heart: Regular rate and rhythm; [x ] murmur   2/6 [ x] systolic [ ] diastolic [ ] radiation[ ] rubs [ ]  gallops  Abdomen: Nontender, bowel sounds present  Extremities: No clubbing, cyanosis, [ ] edema [ ]Pulses  equal and intact  Nervous system:  Alert & Oriented X3, no focal deficits  Psychiatric: Normal affect  Skin: No rashes or lesions    LABS:        Creatinine Trend: 0.72<--, 0.71<--, 0.76<--, 0.75<--, 0.70<--, 0.67<--

## 2022-06-03 NOTE — PROGRESS NOTE ADULT - TIME BILLING
- Review of records, telemetry, vital signs and daily labs.   - General and cardiovascular physical examination.  - Generation of cardiovascular treatment plan.  - Coordination of care.      Patient was seen and examined by me on 5/26/22,interim events noted,labs and radiology studies reviewed.  Shola Pike MD,FACC.  [address] [place], [place] [id number].  [phone]
- Review of records, telemetry, vital signs and daily labs.   - General and cardiovascular physical examination.  - Generation of cardiovascular treatment plan.  - Coordination of care.      Patient was seen and examined by me on 5/29/22,interim events noted,labs and radiology studies reviewed.  Shola Pike MD,FACC.  [address] [place], [place] [id number].  [phone]
- Review of records, telemetry, vital signs and daily labs.   - General and cardiovascular physical examination.  - Generation of cardiovascular treatment plan.  - Coordination of care.      Patient was seen and examined by me on 5/31/22,interim events noted,labs and radiology studies reviewed.  Shola Pike MD,FACC.  [address] [place], [place] [id number].  [phone]
- Review of records, telemetry, vital signs and daily labs.   - General and cardiovascular physical examination.  - Generation of cardiovascular treatment plan.  - Coordination of care.      Patient was seen and examined by me on 6/1 /22,interim events noted,labs and radiology studies reviewed.  Shola Pike MD,FACC.  [address] [place], [place] [id number].  [phone]
- Review of records, telemetry, vital signs and daily labs.   - General and cardiovascular physical examination.  - Generation of cardiovascular treatment plan.  - Coordination of care.      Patient was seen and examined by me on 6/2//22,interim events noted,labs and radiology studies reviewed.  Shola Pike MD,FACC.  [address] [place], [place] [id number].  [phone]
- Review of records, telemetry, vital signs and daily labs.   - General and cardiovascular physical examination.  - Generation of cardiovascular treatment plan.  - Coordination of care.      Patient was seen and examined by me on 5/25/22,interim events noted,labs and radiology studies reviewed.  Shola Pike MD,FACC.  [address] [place], [place] [id number].  [phone]
- Review of records, telemetry, vital signs and daily labs.   - General and cardiovascular physical examination.  - Generation of cardiovascular treatment plan.  - Coordination of care.      Patient was seen and examined by me on 5/27/22,interim events noted,labs and radiology studies reviewed.  Shola Pike MD,FACC.  [address] [place], [place] [id number].  [phone]
- Review of records, telemetry, vital signs and daily labs.   - General and cardiovascular physical examination.  - Generation of cardiovascular treatment plan.  - Coordination of care.      Patient was seen and examined by me on 5/28/22,interim events noted,labs and radiology studies reviewed.  Shola Pike MD,FACC.  [address] [place], [place] [id number].  [phone]
- Review of records, telemetry, vital signs and daily labs.   - General and cardiovascular physical examination.  - Generation of cardiovascular treatment plan.  - Coordination of care.      Patient was seen and examined by me on 6/3//22,interim events noted,labs and radiology studies reviewed.  Shola Pike MD,FACC.  [address] [place], [place] [id number].  [phone]
- Review of records, telemetry, vital signs and daily labs.   - General and cardiovascular physical examination.  - Generation of cardiovascular treatment plan.  - Coordination of care.      Patient was seen and examined by me on 5/30/22,interim events noted,labs and radiology studies reviewed.  Shola Pike MD,FACC.  [address] [place], [place] [id number].  [phone]

## 2022-06-04 LAB — SARS-COV-2 RNA SPEC QL NAA+PROBE: SIGNIFICANT CHANGE UP

## 2022-06-04 RX ORDER — OXYCODONE HYDROCHLORIDE 5 MG/1
10 TABLET ORAL EVERY 12 HOURS
Refills: 0 | Status: DISCONTINUED | OUTPATIENT
Start: 2022-06-04 | End: 2022-06-07

## 2022-06-04 RX ADMIN — OXYCODONE HYDROCHLORIDE 2.5 MILLIGRAM(S): 5 TABLET ORAL at 11:23

## 2022-06-04 RX ADMIN — LOSARTAN POTASSIUM 50 MILLIGRAM(S): 100 TABLET, FILM COATED ORAL at 05:32

## 2022-06-04 RX ADMIN — CHLORHEXIDINE GLUCONATE 1 APPLICATION(S): 213 SOLUTION TOPICAL at 11:12

## 2022-06-04 RX ADMIN — LIDOCAINE 1 PATCH: 4 CREAM TOPICAL at 18:02

## 2022-06-04 RX ADMIN — ATORVASTATIN CALCIUM 80 MILLIGRAM(S): 80 TABLET, FILM COATED ORAL at 22:21

## 2022-06-04 RX ADMIN — Medication 3 MILLIGRAM(S): at 22:20

## 2022-06-04 RX ADMIN — GABAPENTIN 600 MILLIGRAM(S): 400 CAPSULE ORAL at 13:36

## 2022-06-04 RX ADMIN — OXYCODONE HYDROCHLORIDE 10 MILLIGRAM(S): 5 TABLET ORAL at 05:30

## 2022-06-04 RX ADMIN — Medication 50 MILLIGRAM(S): at 05:32

## 2022-06-04 RX ADMIN — OXYCODONE HYDROCHLORIDE 2.5 MILLIGRAM(S): 5 TABLET ORAL at 12:15

## 2022-06-04 RX ADMIN — Medication 50 MILLIGRAM(S): at 17:41

## 2022-06-04 RX ADMIN — SENNA PLUS 2 TABLET(S): 8.6 TABLET ORAL at 22:20

## 2022-06-04 RX ADMIN — LIDOCAINE 1 PATCH: 4 CREAM TOPICAL at 22:36

## 2022-06-04 RX ADMIN — GABAPENTIN 600 MILLIGRAM(S): 400 CAPSULE ORAL at 05:31

## 2022-06-04 RX ADMIN — NYSTATIN CREAM 1 APPLICATION(S): 100000 CREAM TOPICAL at 17:42

## 2022-06-04 RX ADMIN — LIDOCAINE 1 PATCH: 4 CREAM TOPICAL at 11:13

## 2022-06-04 RX ADMIN — Medication 81 MILLIGRAM(S): at 11:11

## 2022-06-04 RX ADMIN — Medication 25 MILLIGRAM(S): at 05:32

## 2022-06-04 RX ADMIN — OXYCODONE HYDROCHLORIDE 2.5 MILLIGRAM(S): 5 TABLET ORAL at 22:20

## 2022-06-04 RX ADMIN — OXYCODONE HYDROCHLORIDE 2.5 MILLIGRAM(S): 5 TABLET ORAL at 23:20

## 2022-06-04 RX ADMIN — ISOSORBIDE MONONITRATE 30 MILLIGRAM(S): 60 TABLET, EXTENDED RELEASE ORAL at 11:12

## 2022-06-04 RX ADMIN — OXYCODONE HYDROCHLORIDE 10 MILLIGRAM(S): 5 TABLET ORAL at 06:30

## 2022-06-04 RX ADMIN — ENOXAPARIN SODIUM 40 MILLIGRAM(S): 100 INJECTION SUBCUTANEOUS at 17:41

## 2022-06-04 RX ADMIN — PANTOPRAZOLE SODIUM 40 MILLIGRAM(S): 20 TABLET, DELAYED RELEASE ORAL at 05:32

## 2022-06-04 RX ADMIN — OXYCODONE HYDROCHLORIDE 10 MILLIGRAM(S): 5 TABLET ORAL at 17:41

## 2022-06-04 RX ADMIN — NYSTATIN CREAM 1 APPLICATION(S): 100000 CREAM TOPICAL at 05:33

## 2022-06-04 RX ADMIN — GABAPENTIN 600 MILLIGRAM(S): 400 CAPSULE ORAL at 22:20

## 2022-06-04 NOTE — PROGRESS NOTE ADULT - SUBJECTIVE AND OBJECTIVE BOX
PATIENT SEEN AND EXAMINED ON :- 6/3/22        SUBJECTIVE / OVERNIGHT EVENTS:pt seen and examined saying her back pain is better   06-04-22    MEDICATIONS  (STANDING):  aspirin enteric coated 81 milliGRAM(s) Oral daily  atorvastatin 80 milliGRAM(s) Oral at bedtime  chlorhexidine 2% Cloths 1 Application(s) Topical daily  enoxaparin Injectable 40 milliGRAM(s) SubCutaneous every 24 hours  gabapentin 600 milliGRAM(s) Oral three times a day  hydrochlorothiazide 25 milliGRAM(s) Oral daily  isosorbide   mononitrate ER Tablet (IMDUR) 30 milliGRAM(s) Oral daily  losartan 50 milliGRAM(s) Oral daily  metoprolol tartrate 50 milliGRAM(s) Oral two times a day  nystatin Powder 1 Application(s) Topical two times a day  oxyCODONE    IR 10 milliGRAM(s) Oral every 12 hours  pantoprazole    Tablet 40 milliGRAM(s) Oral before breakfast  senna 2 Tablet(s) Oral at bedtime    MEDICATIONS  (PRN):  ALBUTerol    90 MICROgram(s) HFA Inhaler 2 Puff(s) Inhalation every 6 hours PRN Shortness of Breath and/or Wheezing  lidocaine   4% Patch 1 Patch Transdermal daily PRN Mid - Low back pain  melatonin 3 milliGRAM(s) Oral at bedtime PRN Insomnia  oxyCODONE    IR 2.5 milliGRAM(s) Oral every 6 hours PRN Moderate - Severe Pain (4 - 10)    T(C): 36.9 (06-04-22 @ 05:30), Max: 37.4 (06-03-22 @ 13:21)  HR: 72 (06-04-22 @ 11:05) (64 - 72)  BP: 139/68 (06-04-22 @ 11:05) (124/68 - 139/68)  RR: 18 (06-04-22 @ 05:30) (16 - 18)  SpO2: 99% (06-04-22 @ 05:30) (96% - 100%)    CAPILLARY BLOOD GLUCOSE        I&O's Summary    03 Jun 2022 07:01  -  04 Jun 2022 07:00  --------------------------------------------------------  IN: 1025 mL / OUT: 0 mL / NET: 1025 mL        Constitutional: No fever, fatigue  Skin: No rash.  Eyes: No recent vision problems or eye pain.  ENT: No congestion, ear pain, or sore throat.  Cardiovascular: No chest pain or palpation.  Respiratory: No cough, shortness of breath, congestion, or wheezing.  Gastrointestinal: No abdominal pain, nausea, vomiting, or diarrhea.  Genitourinary: No dysuria.  Musculoskeletal: No joint swelling.  Neurologic: No headache.    PHYSICAL EXAM:  GENERAL: NAD  EYES: EOMI, PERRLA  NECK: Supple, No JVD  CHEST/LUNG: dec breath sounds at bases  HEART:  S1 , S2 +  ABDOMEN: soft , bs+  EXTREMITIES:trace edema+    NEUROLOGY:alert awake follows commands      LABS:                    RADIOLOGY & ADDITIONAL TESTS:    Imaging Personally Reviewed:    Consultant(s) Notes Reviewed:      Care Discussed with Consultants/Other Providers:     PATIENT SEEN AND EXAMINED ON :- 6/4/22        SUBJECTIVE / OVERNIGHT EVENTS:pt seen and examined saying her back pain is better   06-04-22    MEDICATIONS  (STANDING):  aspirin enteric coated 81 milliGRAM(s) Oral daily  atorvastatin 80 milliGRAM(s) Oral at bedtime  chlorhexidine 2% Cloths 1 Application(s) Topical daily  enoxaparin Injectable 40 milliGRAM(s) SubCutaneous every 24 hours  gabapentin 600 milliGRAM(s) Oral three times a day  hydrochlorothiazide 25 milliGRAM(s) Oral daily  isosorbide   mononitrate ER Tablet (IMDUR) 30 milliGRAM(s) Oral daily  losartan 50 milliGRAM(s) Oral daily  metoprolol tartrate 50 milliGRAM(s) Oral two times a day  nystatin Powder 1 Application(s) Topical two times a day  oxyCODONE    IR 10 milliGRAM(s) Oral every 12 hours  pantoprazole    Tablet 40 milliGRAM(s) Oral before breakfast  senna 2 Tablet(s) Oral at bedtime    MEDICATIONS  (PRN):  ALBUTerol    90 MICROgram(s) HFA Inhaler 2 Puff(s) Inhalation every 6 hours PRN Shortness of Breath and/or Wheezing  lidocaine   4% Patch 1 Patch Transdermal daily PRN Mid - Low back pain  melatonin 3 milliGRAM(s) Oral at bedtime PRN Insomnia  oxyCODONE    IR 2.5 milliGRAM(s) Oral every 6 hours PRN Moderate - Severe Pain (4 - 10)    T(C): 36.9 (06-04-22 @ 05:30), Max: 37.4 (06-03-22 @ 13:21)  HR: 72 (06-04-22 @ 11:05) (64 - 72)  BP: 139/68 (06-04-22 @ 11:05) (124/68 - 139/68)  RR: 18 (06-04-22 @ 05:30) (16 - 18)  SpO2: 99% (06-04-22 @ 05:30) (96% - 100%)    CAPILLARY BLOOD GLUCOSE        I&O's Summary    03 Jun 2022 07:01  -  04 Jun 2022 07:00  --------------------------------------------------------  IN: 1025 mL / OUT: 0 mL / NET: 1025 mL        Constitutional: No fever, fatigue  Skin: No rash.  Eyes: No recent vision problems or eye pain.  ENT: No congestion, ear pain, or sore throat.  Cardiovascular: No chest pain or palpation.  Respiratory: No cough, shortness of breath, congestion, or wheezing.  Gastrointestinal: No abdominal pain, nausea, vomiting, or diarrhea.  Genitourinary: No dysuria.  Musculoskeletal: No joint swelling.  Neurologic: No headache.    PHYSICAL EXAM:  GENERAL: NAD  EYES: EOMI, PERRLA  NECK: Supple, No JVD  CHEST/LUNG: dec breath sounds at bases  HEART:  S1 , S2 +  ABDOMEN: soft , bs+  EXTREMITIES:trace edema+    NEUROLOGY:alert awake follows commands      LABS:                    RADIOLOGY & ADDITIONAL TESTS:    Imaging Personally Reviewed:    Consultant(s) Notes Reviewed:      Care Discussed with Consultants/Other Providers:

## 2022-06-04 NOTE — CHART NOTE - NSCHARTNOTEFT_GEN_A_CORE
Notified by RN that pt has been complaining left ear pain.   Pt was examined at bedside. Pt complained of left ear pain and stated that clear fluid came out from her ear. Pt denied loss of hearing, nasal congestion, or visual changes.   Left ear was examined, no redness, discharge or inflammation was noted. Pt denied pain noted on tugging of the ear but did report pain upon palpation of the tragus. Pt also complained of right throat pain that doesn't go away x 2 days. No LAD was noted on exam. Pt denies consistent coughing, chest pain, or shortness of breath.   Case was discussed with Dr. Chan and ENT was consulted.     Plan: f/u with ENT recommendation   Continue to monitor pt's symptoms

## 2022-06-05 RX ADMIN — LOSARTAN POTASSIUM 50 MILLIGRAM(S): 100 TABLET, FILM COATED ORAL at 06:23

## 2022-06-05 RX ADMIN — Medication 1 TABLET(S): at 07:53

## 2022-06-05 RX ADMIN — ISOSORBIDE MONONITRATE 30 MILLIGRAM(S): 60 TABLET, EXTENDED RELEASE ORAL at 11:46

## 2022-06-05 RX ADMIN — ENOXAPARIN SODIUM 40 MILLIGRAM(S): 100 INJECTION SUBCUTANEOUS at 17:48

## 2022-06-05 RX ADMIN — OXYCODONE HYDROCHLORIDE 10 MILLIGRAM(S): 5 TABLET ORAL at 06:27

## 2022-06-05 RX ADMIN — OXYCODONE HYDROCHLORIDE 10 MILLIGRAM(S): 5 TABLET ORAL at 17:55

## 2022-06-05 RX ADMIN — OXYCODONE HYDROCHLORIDE 2.5 MILLIGRAM(S): 5 TABLET ORAL at 12:30

## 2022-06-05 RX ADMIN — Medication 81 MILLIGRAM(S): at 11:40

## 2022-06-05 RX ADMIN — NYSTATIN CREAM 1 APPLICATION(S): 100000 CREAM TOPICAL at 17:49

## 2022-06-05 RX ADMIN — GABAPENTIN 600 MILLIGRAM(S): 400 CAPSULE ORAL at 21:29

## 2022-06-05 RX ADMIN — Medication 25 MILLIGRAM(S): at 06:22

## 2022-06-05 RX ADMIN — OXYCODONE HYDROCHLORIDE 2.5 MILLIGRAM(S): 5 TABLET ORAL at 11:39

## 2022-06-05 RX ADMIN — CHLORHEXIDINE GLUCONATE 1 APPLICATION(S): 213 SOLUTION TOPICAL at 11:45

## 2022-06-05 RX ADMIN — PANTOPRAZOLE SODIUM 40 MILLIGRAM(S): 20 TABLET, DELAYED RELEASE ORAL at 06:21

## 2022-06-05 RX ADMIN — GABAPENTIN 600 MILLIGRAM(S): 400 CAPSULE ORAL at 14:03

## 2022-06-05 RX ADMIN — Medication 50 MILLIGRAM(S): at 17:49

## 2022-06-05 RX ADMIN — Medication 1 TABLET(S): at 17:49

## 2022-06-05 RX ADMIN — Medication 1 TABLET(S): at 00:11

## 2022-06-05 RX ADMIN — NYSTATIN CREAM 1 APPLICATION(S): 100000 CREAM TOPICAL at 06:22

## 2022-06-05 RX ADMIN — SENNA PLUS 2 TABLET(S): 8.6 TABLET ORAL at 21:29

## 2022-06-05 RX ADMIN — ATORVASTATIN CALCIUM 80 MILLIGRAM(S): 80 TABLET, FILM COATED ORAL at 21:29

## 2022-06-05 RX ADMIN — OXYCODONE HYDROCHLORIDE 2.5 MILLIGRAM(S): 5 TABLET ORAL at 22:29

## 2022-06-05 RX ADMIN — OXYCODONE HYDROCHLORIDE 10 MILLIGRAM(S): 5 TABLET ORAL at 07:27

## 2022-06-05 RX ADMIN — OXYCODONE HYDROCHLORIDE 2.5 MILLIGRAM(S): 5 TABLET ORAL at 21:29

## 2022-06-05 RX ADMIN — GABAPENTIN 600 MILLIGRAM(S): 400 CAPSULE ORAL at 06:22

## 2022-06-05 NOTE — PROGRESS NOTE ADULT - SUBJECTIVE AND OBJECTIVE BOX
Covering for Dr. Glendy Tyler MD  Interventional Cardiology / Endovascular Specialist  [place] Office : [phone] [place] [address] [place]. [address]  Tel:   [place] Office : [address] [facility] [place] [address]  Tel: [phone]    Pt lying in bed in NAD , c/o left ear pain and back pain   	  MEDICATIONS:  aspirin enteric coated 81 milliGRAM(s) Oral daily  enoxaparin Injectable 40 milliGRAM(s) SubCutaneous every 24 hours  hydrochlorothiazide 25 milliGRAM(s) Oral daily  isosorbide   mononitrate ER Tablet (IMDUR) 30 milliGRAM(s) Oral daily  losartan 50 milliGRAM(s) Oral daily  metoprolol tartrate 50 milliGRAM(s) Oral two times a day    amoxicillin  875 milliGRAM(s)/clavulanate 1 Tablet(s) Oral two times a day    ALBUTerol    90 MICROgram(s) HFA Inhaler 2 Puff(s) Inhalation every 6 hours PRN    gabapentin 600 milliGRAM(s) Oral three times a day  melatonin 3 milliGRAM(s) Oral at bedtime PRN  oxyCODONE    IR 2.5 milliGRAM(s) Oral every 6 hours PRN  oxyCODONE  ER Tablet 10 milliGRAM(s) Oral every 12 hours    pantoprazole    Tablet 40 milliGRAM(s) Oral before breakfast  senna 2 Tablet(s) Oral at bedtime    atorvastatin 80 milliGRAM(s) Oral at bedtime    chlorhexidine 2% Cloths 1 Application(s) Topical daily  lidocaine   4% Patch 1 Patch Transdermal daily PRN  nystatin Powder 1 Application(s) Topical two times a day      PAST MEDICAL/SURGICAL HISTORY  PAST MEDICAL & SURGICAL HISTORY:  Angina      Asthma      H/O sciatica  (left leg)      RA (rheumatoid arthritis)      HTN (hypertension)      Fibroid, uterine      Brain cyst      Dizziness  secondary to brain cyst      Chronic back pain  (with hx/o back surgery in 7/2015 and 1/2017)      History of back surgery  (7/2015 and 1/2017)          SOCIAL HISTORY: Substance Use (street drugs): ( x ) never used  (  ) other:    FAMILY HISTORY:  Family history of lung cancer    Family history of heart failure    Family history of renal disease    Family history of early CAD (Sibling)        REVIEW OF SYSTEMS:  CONSTITUTIONAL: No fever, weight loss, or fatigue  EYES: No eye pain, visual disturbances, or discharge  ENMT:  No difficulty hearing, tinnitus, vertigo; No sinus or throat pain  BREASTS: No pain, masses, or nipple discharge  GASTROINTESTINAL: No abdominal or epigastric pain. No nausea, vomiting, or hematemesis; No diarrhea or constipation. No melena or hematochezia.  GENITOURINARY: No dysuria, frequency, hematuria, or incontinence  NEUROLOGICAL: No headaches, memory loss, loss of strength, numbness, or tremors  ENDOCRINE: No heat or cold intolerance; No hair loss  MUSCULOSKELETAL: No joint pain or swelling; No muscle, back, or extremity pain  PSYCHIATRIC: No depression, anxiety, mood swings, or difficulty sleeping  HEME/LYMPH: No easy bruising, or bleeding gums  All others negative    PHYSICAL EXAM:  T(C): 36.7 (06-05-22 @ 14:41), Max: 36.7 (06-05-22 @ 06:22)  HR: 75 (06-05-22 @ 14:41) (57 - 75)  BP: 145/65 (06-05-22 @ 14:41) (106/70 - 160/63)  RR: 17 (06-05-22 @ 14:41) (17 - 17)  SpO2: 100% (06-05-22 @ 14:41) (100% - 100%)  Wt(kg): --  I&O's Summary      PHYSICAL EXAM:  General: No acute distress BMI-  HEENT: EOMI, PERRL  Neck: Supple, [ ] JVD  Lungs: Equal air entry bilaterally; [ ] rales [ ] wheezing [ ] rhonchi  Heart: Regular rate and rhythm; [x ] murmur   2/6 [ x] systolic [ ] diastolic [ ] radiation[ ] rubs [ ]  gallops  Abdomen: Nontender, bowel sounds present  Extremities: No clubbing, cyanosis, [ ] edema [ ]Pulses  equal and intact  Nervous system:  Alert & Oriented X3, no focal deficits  Psychiatric: Normal affect  Skin: No rashes or lesions                proBNP:   Lipid Profile:   HgA1c:   TSH:     Consultant(s) Notes Reviewed:  [x ] YES  [ ] NO    Care Discussed with Consultants/Other Providers [ x] YES  [ ] NO    Imaging Personally Reviewed independently:  [x] YES  [ ] NO    All labs, radiologic studies, vitals, orders and medications list reviewed. Patient is seen and examined at bedside. Case discussed with medical team.

## 2022-06-05 NOTE — PROGRESS NOTE ADULT - SUBJECTIVE AND OBJECTIVE BOX
PATIENT SEEN AND EXAMINED ON :- 6/5/22        SUBJECTIVE / OVERNIGHT EVENTS:pt seen and examined saying her back pain is better , c/o earache last night , ent evaluated pt , diagnosed with mastoiditis started on abx  06-05-22    MEDICATIONS  (STANDING):  amoxicillin  875 milliGRAM(s)/clavulanate 1 Tablet(s) Oral two times a day  aspirin enteric coated 81 milliGRAM(s) Oral daily  atorvastatin 80 milliGRAM(s) Oral at bedtime  chlorhexidine 2% Cloths 1 Application(s) Topical daily  enoxaparin Injectable 40 milliGRAM(s) SubCutaneous every 24 hours  gabapentin 600 milliGRAM(s) Oral three times a day  hydrochlorothiazide 25 milliGRAM(s) Oral daily  isosorbide   mononitrate ER Tablet (IMDUR) 30 milliGRAM(s) Oral daily  losartan 50 milliGRAM(s) Oral daily  metoprolol tartrate 50 milliGRAM(s) Oral two times a day  nystatin Powder 1 Application(s) Topical two times a day  oxyCODONE  ER Tablet 10 milliGRAM(s) Oral every 12 hours  pantoprazole    Tablet 40 milliGRAM(s) Oral before breakfast  senna 2 Tablet(s) Oral at bedtime    MEDICATIONS  (PRN):  ALBUTerol    90 MICROgram(s) HFA Inhaler 2 Puff(s) Inhalation every 6 hours PRN Shortness of Breath and/or Wheezing  lidocaine   4% Patch 1 Patch Transdermal daily PRN Mid - Low back pain  melatonin 3 milliGRAM(s) Oral at bedtime PRN Insomnia  oxyCODONE    IR 2.5 milliGRAM(s) Oral every 6 hours PRN Moderate - Severe Pain (4 - 10)    Vital Signs Last 24 Hrs  T(C): 36.7 (06-05-22 @ 14:41), Max: 36.7 (06-05-22 @ 06:22)  T(F): 98.1 (06-05-22 @ 14:41), Max: 98.1 (06-05-22 @ 06:22)  HR: 75 (06-05-22 @ 14:41) (57 - 75)  BP: 136/72 (06-05-22 @ 17:45) (106/70 - 145/65)  BP(mean): --  RR: 17 (06-05-22 @ 14:41) (17 - 17)  SpO2: 100% (06-05-22 @ 14:41) (100% - 100%)      Constitutional: No fever, fatigue  Skin: No rash.  Eyes: No recent vision problems or eye pain.  ENT: No congestion, ear pain, or sore throat.  Cardiovascular: No chest pain or palpation.  Respiratory: No cough, shortness of breath, congestion, or wheezing.  Gastrointestinal: No abdominal pain, nausea, vomiting, or diarrhea.  Genitourinary: No dysuria.  Musculoskeletal: No joint swelling.  Neurologic: No headache.    PHYSICAL EXAM:  GENERAL: NAD  EYES: EOMI, PERRLA  NECK: Supple, No JVD  CHEST/LUNG: dec breath sounds at bases  HEART:  S1 , S2 +  ABDOMEN: soft , bs+  EXTREMITIES:trace edema+    NEUROLOGY:alert awake follows commands      LABS:                    RADIOLOGY & ADDITIONAL TESTS:    Imaging Personally Reviewed:    Consultant(s) Notes Reviewed:      Care Discussed with Consultants/Other Providers:

## 2022-06-06 LAB — SARS-COV-2 RNA SPEC QL NAA+PROBE: SIGNIFICANT CHANGE UP

## 2022-06-06 PROCEDURE — 73030 X-RAY EXAM OF SHOULDER: CPT | Mod: 26,50

## 2022-06-06 RX ORDER — ENOXAPARIN SODIUM 100 MG/ML
40 INJECTION SUBCUTANEOUS
Qty: 0 | Refills: 0 | DISCHARGE
Start: 2022-06-06

## 2022-06-06 RX ORDER — ASPIRIN/CALCIUM CARB/MAGNESIUM 324 MG
1 TABLET ORAL
Qty: 0 | Refills: 0 | DISCHARGE
Start: 2022-06-06

## 2022-06-06 RX ORDER — NYSTATIN CREAM 100000 [USP'U]/G
1 CREAM TOPICAL
Qty: 0 | Refills: 0 | DISCHARGE
Start: 2022-06-06

## 2022-06-06 RX ORDER — METOPROLOL TARTRATE 50 MG
1 TABLET ORAL
Qty: 0 | Refills: 0 | DISCHARGE
Start: 2022-06-06

## 2022-06-06 RX ORDER — OXYCODONE HYDROCHLORIDE 5 MG/1
0.5 TABLET ORAL
Qty: 0 | Refills: 0 | DISCHARGE

## 2022-06-06 RX ORDER — LANOLIN ALCOHOL/MO/W.PET/CERES
1 CREAM (GRAM) TOPICAL
Qty: 0 | Refills: 0 | DISCHARGE
Start: 2022-06-06

## 2022-06-06 RX ORDER — GABAPENTIN 400 MG/1
2 CAPSULE ORAL
Qty: 0 | Refills: 0 | DISCHARGE
Start: 2022-06-06

## 2022-06-06 RX ORDER — LIDOCAINE 4 G/100G
1 CREAM TOPICAL
Qty: 0 | Refills: 0 | DISCHARGE
Start: 2022-06-06

## 2022-06-06 RX ORDER — OXYCODONE HYDROCHLORIDE 5 MG/1
1 TABLET ORAL
Qty: 0 | Refills: 0 | DISCHARGE
Start: 2022-06-06

## 2022-06-06 RX ORDER — ALBUTEROL 90 UG/1
2 AEROSOL, METERED ORAL
Qty: 0 | Refills: 0 | DISCHARGE
Start: 2022-06-06

## 2022-06-06 RX ORDER — ISOSORBIDE MONONITRATE 60 MG/1
1 TABLET, EXTENDED RELEASE ORAL
Qty: 0 | Refills: 0 | DISCHARGE
Start: 2022-06-06

## 2022-06-06 RX ORDER — ATORVASTATIN CALCIUM 80 MG/1
1 TABLET, FILM COATED ORAL
Qty: 0 | Refills: 0 | DISCHARGE
Start: 2022-06-06

## 2022-06-06 RX ORDER — LOSARTAN POTASSIUM 100 MG/1
1 TABLET, FILM COATED ORAL
Qty: 0 | Refills: 0 | DISCHARGE
Start: 2022-06-06

## 2022-06-06 RX ORDER — SENNA PLUS 8.6 MG/1
2 TABLET ORAL
Qty: 0 | Refills: 0 | DISCHARGE
Start: 2022-06-06

## 2022-06-06 RX ORDER — ROSUVASTATIN CALCIUM 5 MG/1
1 TABLET ORAL
Qty: 0 | Refills: 0 | DISCHARGE

## 2022-06-06 RX ORDER — GABAPENTIN 400 MG/1
1 CAPSULE ORAL
Qty: 0 | Refills: 0 | DISCHARGE

## 2022-06-06 RX ADMIN — OXYCODONE HYDROCHLORIDE 2.5 MILLIGRAM(S): 5 TABLET ORAL at 21:34

## 2022-06-06 RX ADMIN — PANTOPRAZOLE SODIUM 40 MILLIGRAM(S): 20 TABLET, DELAYED RELEASE ORAL at 05:43

## 2022-06-06 RX ADMIN — Medication 1 TABLET(S): at 17:17

## 2022-06-06 RX ADMIN — NYSTATIN CREAM 1 APPLICATION(S): 100000 CREAM TOPICAL at 05:43

## 2022-06-06 RX ADMIN — Medication 50 MILLIGRAM(S): at 05:42

## 2022-06-06 RX ADMIN — OXYCODONE HYDROCHLORIDE 10 MILLIGRAM(S): 5 TABLET ORAL at 17:45

## 2022-06-06 RX ADMIN — OXYCODONE HYDROCHLORIDE 2.5 MILLIGRAM(S): 5 TABLET ORAL at 10:00

## 2022-06-06 RX ADMIN — SENNA PLUS 2 TABLET(S): 8.6 TABLET ORAL at 21:34

## 2022-06-06 RX ADMIN — OXYCODONE HYDROCHLORIDE 2.5 MILLIGRAM(S): 5 TABLET ORAL at 22:34

## 2022-06-06 RX ADMIN — Medication 50 MILLIGRAM(S): at 17:17

## 2022-06-06 RX ADMIN — ATORVASTATIN CALCIUM 80 MILLIGRAM(S): 80 TABLET, FILM COATED ORAL at 21:34

## 2022-06-06 RX ADMIN — CHLORHEXIDINE GLUCONATE 1 APPLICATION(S): 213 SOLUTION TOPICAL at 12:33

## 2022-06-06 RX ADMIN — OXYCODONE HYDROCHLORIDE 2.5 MILLIGRAM(S): 5 TABLET ORAL at 09:27

## 2022-06-06 RX ADMIN — Medication 25 MILLIGRAM(S): at 05:42

## 2022-06-06 RX ADMIN — GABAPENTIN 600 MILLIGRAM(S): 400 CAPSULE ORAL at 05:42

## 2022-06-06 RX ADMIN — ISOSORBIDE MONONITRATE 30 MILLIGRAM(S): 60 TABLET, EXTENDED RELEASE ORAL at 12:32

## 2022-06-06 RX ADMIN — Medication 81 MILLIGRAM(S): at 12:24

## 2022-06-06 RX ADMIN — Medication 1 TABLET(S): at 05:43

## 2022-06-06 RX ADMIN — OXYCODONE HYDROCHLORIDE 10 MILLIGRAM(S): 5 TABLET ORAL at 05:43

## 2022-06-06 RX ADMIN — LOSARTAN POTASSIUM 50 MILLIGRAM(S): 100 TABLET, FILM COATED ORAL at 05:42

## 2022-06-06 RX ADMIN — OXYCODONE HYDROCHLORIDE 10 MILLIGRAM(S): 5 TABLET ORAL at 07:13

## 2022-06-06 RX ADMIN — NYSTATIN CREAM 1 APPLICATION(S): 100000 CREAM TOPICAL at 18:27

## 2022-06-06 RX ADMIN — OXYCODONE HYDROCHLORIDE 10 MILLIGRAM(S): 5 TABLET ORAL at 17:15

## 2022-06-06 RX ADMIN — ENOXAPARIN SODIUM 40 MILLIGRAM(S): 100 INJECTION SUBCUTANEOUS at 17:16

## 2022-06-06 RX ADMIN — GABAPENTIN 600 MILLIGRAM(S): 400 CAPSULE ORAL at 21:34

## 2022-06-06 RX ADMIN — GABAPENTIN 600 MILLIGRAM(S): 400 CAPSULE ORAL at 15:58

## 2022-06-06 NOTE — PROGRESS NOTE ADULT - PROBLEM SELECTOR PROBLEM 1
Chronic back pain

## 2022-06-06 NOTE — PROGRESS NOTE ADULT - SUBJECTIVE AND OBJECTIVE BOX
Kavon Tyler MD  Interventional Cardiology / Advance Heart Failure and Cardiac Transplant Specialist  [place] Office : [phone] [place] [address] [place]. [address]  Tel:   [place] Office : [address] [facility] [place] [address]  Tel: [phone]    COVERING DR. SYED  Subjective/Overnight events: Pt is lying in bed, no chest pains no SOB no palpitations. c/o of left groin and pelvic pain  	  MEDICATIONS:  aspirin enteric coated 81 milliGRAM(s) Oral daily  enoxaparin Injectable 40 milliGRAM(s) SubCutaneous every 24 hours  hydrochlorothiazide 25 milliGRAM(s) Oral daily  isosorbide   mononitrate ER Tablet (IMDUR) 30 milliGRAM(s) Oral daily  losartan 50 milliGRAM(s) Oral daily  metoprolol tartrate 50 milliGRAM(s) Oral two times a day    amoxicillin  875 milliGRAM(s)/clavulanate 1 Tablet(s) Oral two times a day    ALBUTerol    90 MICROgram(s) HFA Inhaler 2 Puff(s) Inhalation every 6 hours PRN    gabapentin 600 milliGRAM(s) Oral three times a day  melatonin 3 milliGRAM(s) Oral at bedtime PRN  oxyCODONE    IR 2.5 milliGRAM(s) Oral every 6 hours PRN  oxyCODONE  ER Tablet 10 milliGRAM(s) Oral every 12 hours    pantoprazole    Tablet 40 milliGRAM(s) Oral before breakfast  senna 2 Tablet(s) Oral at bedtime    atorvastatin 80 milliGRAM(s) Oral at bedtime    chlorhexidine 2% Cloths 1 Application(s) Topical daily  lidocaine   4% Patch 1 Patch Transdermal daily PRN  nystatin Powder 1 Application(s) Topical two times a day      PAST MEDICAL/SURGICAL HISTORY  PAST MEDICAL & SURGICAL HISTORY:  Angina      Asthma      H/O sciatica  (left leg)      RA (rheumatoid arthritis)      HTN (hypertension)      Fibroid, uterine      Brain cyst      Dizziness  secondary to brain cyst      Chronic back pain  (with hx/o back surgery in 7/2015 and 1/2017)      History of back surgery  (7/2015 and 1/2017)          SOCIAL HISTORY: Substance Use (street drugs): ( x ) never used  (  ) other:    FAMILY HISTORY:  Family history of lung cancer    Family history of heart failure    Family history of renal disease    Family history of early CAD (Sibling)        PHYSICAL EXAM:  T(C): 36.6 (06-06-22 @ 05:35), Max: 37.4 (06-05-22 @ 22:08)  HR: 62 (06-06-22 @ 05:35) (62 - 75)  BP: 124/60 (06-06-22 @ 05:35) (124/60 - 145/65)  RR: 16 (06-06-22 @ 05:35) (16 - 17)  SpO2: 100% (06-06-22 @ 05:35) (100% - 100%)  Wt(kg): --  I&O's Summary      PHYSICAL EXAM:  General: No acute distress BMI-  HEENT: EOMI, PERRL  Neck: Supple, [ ] JVD  Lungs: Equal air entry bilaterally; [ ] rales [ ] wheezing [ ] rhonchi  Heart: Regular rate and rhythm; [x ] murmur   2/6 [ x] systolic [ ] diastolic [ ] radiation[ ] rubs [ ]  gallops  Abdomen: Nontender, bowel sounds present  Extremities: No clubbing, cyanosis, [ ] edema [ ]Pulses  equal and intact  Nervous system:  Alert & Oriented X3, no focal deficits  Psychiatric: Normal affect  Skin: No rashes or lesions            proBNP:   Lipid Profile:   HgA1c:   TSH:     Consultant(s) Notes Reviewed:  [x ] YES  [ ] NO    Care Discussed with Consultants/Other Providers [ x] YES  [ ] NO    Imaging Personally Reviewed independently:  [x] YES  [ ] NO    All labs, radiologic studies, vitals, orders and medications list reviewed. Patient is seen and examined at bedside. Case discussed with medical team.

## 2022-06-06 NOTE — PROGRESS NOTE ADULT - PROBLEM SELECTOR PLAN 2
- not in flare up   - Continue with  home regimens

## 2022-06-06 NOTE — PROGRESS NOTE ADULT - SUBJECTIVE AND OBJECTIVE BOX
PATIENT SEEN AND EXAMINED ON :- 6/6/22        SUBJECTIVE / OVERNIGHT EVENTS:pt seen and examined saying her back pain is better , c/o pelvic pain  06-06-22    MEDICATIONS  (STANDING):  amoxicillin  875 milliGRAM(s)/clavulanate 1 Tablet(s) Oral two times a day  aspirin enteric coated 81 milliGRAM(s) Oral daily  atorvastatin 80 milliGRAM(s) Oral at bedtime  chlorhexidine 2% Cloths 1 Application(s) Topical daily  enoxaparin Injectable 40 milliGRAM(s) SubCutaneous every 24 hours  gabapentin 600 milliGRAM(s) Oral three times a day  hydrochlorothiazide 25 milliGRAM(s) Oral daily  isosorbide   mononitrate ER Tablet (IMDUR) 30 milliGRAM(s) Oral daily  losartan 50 milliGRAM(s) Oral daily  metoprolol tartrate 50 milliGRAM(s) Oral two times a day  nystatin Powder 1 Application(s) Topical two times a day  oxyCODONE  ER Tablet 10 milliGRAM(s) Oral every 12 hours  pantoprazole    Tablet 40 milliGRAM(s) Oral before breakfast  senna 2 Tablet(s) Oral at bedtime    MEDICATIONS  (PRN):  ALBUTerol    90 MICROgram(s) HFA Inhaler 2 Puff(s) Inhalation every 6 hours PRN Shortness of Breath and/or Wheezing  lidocaine   4% Patch 1 Patch Transdermal daily PRN Mid - Low back pain  melatonin 3 milliGRAM(s) Oral at bedtime PRN Insomnia  oxyCODONE    IR 2.5 milliGRAM(s) Oral every 6 hours PRN Moderate - Severe Pain (4 - 10)    Vital Signs Last 24 Hrs  T(C): 36.7 (06-06-22 @ 17:17), Max: 37.4 (06-05-22 @ 22:08)  T(F): 98.1 (06-06-22 @ 17:17), Max: 99.4 (06-05-22 @ 22:08)  HR: 65 (06-06-22 @ 17:17) (62 - 67)  BP: 133/72 (06-06-22 @ 17:17) (124/60 - 133/72)  BP(mean): --  RR: 17 (06-06-22 @ 17:17) (16 - 17)  SpO2: 100% (06-06-22 @ 17:17) (99% - 100%)          Constitutional: No fever, fatigue  Skin: No rash.  Eyes: No recent vision problems or eye pain.  ENT: No congestion, ear pain, or sore throat.  Cardiovascular: No chest pain or palpation.  Respiratory: No cough, shortness of breath, congestion, or wheezing.  Gastrointestinal: No abdominal pain, nausea, vomiting, or diarrhea.  Genitourinary: No dysuria.  Musculoskeletal: No joint swelling.  Neurologic: No headache.    PHYSICAL EXAM:  GENERAL: NAD  EYES: EOMI, PERRLA  NECK: Supple, No JVD  CHEST/LUNG: dec breath sounds at bases  HEART:  S1 , S2 +  ABDOMEN: soft , bs+  EXTREMITIES:trace edema+    NEUROLOGY:alert awake follows commands      LABS:                    RADIOLOGY & ADDITIONAL TESTS:    Imaging Personally Reviewed:    Consultant(s) Notes Reviewed:      Care Discussed with Consultants/Other Providers:

## 2022-06-06 NOTE — PROGRESS NOTE ADULT - PROBLEM SELECTOR PLAN 4
- C/w asa and imdur

## 2022-06-06 NOTE — PROGRESS NOTE ADULT - PROBLEM SELECTOR PROBLEM 4
Angina pectoris

## 2022-06-06 NOTE — PROGRESS NOTE ADULT - PROBLEM SELECTOR PLAN 3
- controlled  - c/w BB and HCTZ

## 2022-06-06 NOTE — PROGRESS NOTE ADULT - PROBLEM SELECTOR PROBLEM 5
Sciatica

## 2022-06-06 NOTE — PROGRESS NOTE ADULT - PROBLEM SELECTOR PLAN 1
p/w worsening back pain, unable to ambulate  - C/w pain management   - Pt consult  -pain management consult,- follow up res  - pt has OP plan for future surgery
p/w worsening back pain, unable to ambulate  - C/w pain management   - Pt consult  -pain management consult,- follow up res  - pt has OP plan for future surgery
p/w worsening back pain, unable to ambulate  - C/w pain management   C/w ASA 81 mg given chronic signs of white matter ischemia a sper neurology   PT
p/w worsening back pain, unable to ambulate  - C/w pain management   - Pt consult  -pain management consult,- follow up res  - pt has OP plan for future surgery
p/w worsening back pain, unable to ambulate  - C/w pain management   C/w ASA 81 mg given chronic signs of white matter ischemia a sper neurology   PT
p/w worsening back pain, unable to ambulate  - C/w pain management   - Pt consult  -pain management consult,- follow up res  - pt has OP plan for future surgery
p/w worsening back pain, unable to ambulate  - C/w pain management   - Pt consult  -pain management consult,- follow up res  - pt has OP plan for future surgery
p/w worsening back pain, unable to ambulate  - C/w pain management   C/w ASA 81 mg given chronic signs of white matter ischemia a sper neurology   PT
p/w worsening back pain, unable to ambulate  - C/w pain management   - Pt consult  -pain management consult,- follow up res  - pt has OP plan for future surgery

## 2022-06-06 NOTE — PROGRESS NOTE ADULT - PROBLEM SELECTOR PROBLEM 6
DVT prophylaxis

## 2022-06-06 NOTE — PROGRESS NOTE ADULT - PROBLEM SELECTOR PLAN 6
DVT PPX lovenox      Dispo: acute rehab. D/c when pain optimized
DVT PPX lovenox      Dispo: acute rehab. D/c planning
DVT PPX lovenox      Dispo: acute rehab. D/c when pain optimized
DVT PPX lovenox      Dispo: acute rehab. D/c when pain optimized
DVT PPX lovenox      Dispo: acute rehab. D/c planning
DVT PPX lovenox      Dispo: acute rehab. D/c when pain optimized
DVT PPX lovenox      Dispo: acute rehab. D/c when pain optimized
DVT PPX lovenox      Dispo: acute rehab. D/c planning
DVT PPX lovenox      Dispo: acute rehab. D/c when pain optimized

## 2022-06-06 NOTE — PROGRESS NOTE ADULT - PROBLEM SELECTOR PLAN 5
- c/w pain management

## 2022-06-07 ENCOUNTER — TRANSCRIPTION ENCOUNTER (OUTPATIENT)
Age: 72
End: 2022-06-07

## 2022-06-07 VITALS
DIASTOLIC BLOOD PRESSURE: 71 MMHG | RESPIRATION RATE: 18 BRPM | TEMPERATURE: 98 F | SYSTOLIC BLOOD PRESSURE: 176 MMHG | HEART RATE: 64 BPM | OXYGEN SATURATION: 100 %

## 2022-06-07 LAB
APPEARANCE UR: CLEAR — SIGNIFICANT CHANGE UP
BILIRUB UR-MCNC: NEGATIVE — SIGNIFICANT CHANGE UP
COLOR SPEC: SIGNIFICANT CHANGE UP
DIFF PNL FLD: NEGATIVE — SIGNIFICANT CHANGE UP
GLUCOSE UR QL: NEGATIVE — SIGNIFICANT CHANGE UP
KETONES UR-MCNC: NEGATIVE — SIGNIFICANT CHANGE UP
LEUKOCYTE ESTERASE UR-ACNC: ABNORMAL
NITRITE UR-MCNC: NEGATIVE — SIGNIFICANT CHANGE UP
PH UR: 6.5 — SIGNIFICANT CHANGE UP (ref 5–8)
PROT UR-MCNC: ABNORMAL
SP GR SPEC: 1.01 — SIGNIFICANT CHANGE UP (ref 1–1.05)
UROBILINOGEN FLD QL: SIGNIFICANT CHANGE UP

## 2022-06-07 PROCEDURE — 93010 ELECTROCARDIOGRAM REPORT: CPT

## 2022-06-07 PROCEDURE — 74176 CT ABD & PELVIS W/O CONTRAST: CPT | Mod: 26

## 2022-06-07 RX ADMIN — OXYCODONE HYDROCHLORIDE 10 MILLIGRAM(S): 5 TABLET ORAL at 17:14

## 2022-06-07 RX ADMIN — Medication 50 MILLIGRAM(S): at 05:54

## 2022-06-07 RX ADMIN — Medication 25 MILLIGRAM(S): at 05:53

## 2022-06-07 RX ADMIN — NYSTATIN CREAM 1 APPLICATION(S): 100000 CREAM TOPICAL at 05:55

## 2022-06-07 RX ADMIN — LOSARTAN POTASSIUM 50 MILLIGRAM(S): 100 TABLET, FILM COATED ORAL at 05:54

## 2022-06-07 RX ADMIN — OXYCODONE HYDROCHLORIDE 2.5 MILLIGRAM(S): 5 TABLET ORAL at 14:40

## 2022-06-07 RX ADMIN — Medication 81 MILLIGRAM(S): at 11:37

## 2022-06-07 RX ADMIN — OXYCODONE HYDROCHLORIDE 2.5 MILLIGRAM(S): 5 TABLET ORAL at 13:46

## 2022-06-07 RX ADMIN — PANTOPRAZOLE SODIUM 40 MILLIGRAM(S): 20 TABLET, DELAYED RELEASE ORAL at 05:54

## 2022-06-07 RX ADMIN — ENOXAPARIN SODIUM 40 MILLIGRAM(S): 100 INJECTION SUBCUTANEOUS at 17:14

## 2022-06-07 RX ADMIN — CHLORHEXIDINE GLUCONATE 1 APPLICATION(S): 213 SOLUTION TOPICAL at 11:38

## 2022-06-07 RX ADMIN — Medication 1 TABLET(S): at 05:54

## 2022-06-07 RX ADMIN — OXYCODONE HYDROCHLORIDE 10 MILLIGRAM(S): 5 TABLET ORAL at 05:54

## 2022-06-07 RX ADMIN — ISOSORBIDE MONONITRATE 30 MILLIGRAM(S): 60 TABLET, EXTENDED RELEASE ORAL at 11:37

## 2022-06-07 RX ADMIN — GABAPENTIN 600 MILLIGRAM(S): 400 CAPSULE ORAL at 05:54

## 2022-06-07 RX ADMIN — GABAPENTIN 600 MILLIGRAM(S): 400 CAPSULE ORAL at 13:43

## 2022-06-07 RX ADMIN — Medication 50 MILLIGRAM(S): at 17:15

## 2022-06-07 RX ADMIN — Medication 1 TABLET(S): at 17:15

## 2022-06-07 NOTE — CONSULT NOTE ADULT - ASSESSMENT
71 y/o female with PMHx of HTN, chronic back pain, surgical hx of back, RA, and herniated disc, presenting to ED with acute worsening of back painNeurology called for MRi findings of moderate white matter ischemic changes Neuro exam non-focal    Impression, white mater changes in setting of HTN    Patient on Aspirin and statin  pain control  BP control  A1c, LDL reviewed  Can follow up with Neurology, Dr. Jose Pretty at [phone]  -No further inpatient Neurologic workup at this time

## 2022-06-07 NOTE — PROGRESS NOTE ADULT - ASSESSMENT
Pt is 71 y/o female with PMHx of HTN, chronic back pain, surgical hx of back, RA, and herniated disc, presenting to ED with acute worsening of back pain. Admitted to medicine for unable to ambulate  2/2 acute on chronic back pain. Pain management consult.
Pt is 71 y/o female with PMHx of HTN, chronic back pain, surgical hx of back, RA, and herniated disc, presenting to ED with acute worsening of back pain. Admitted to medicine for unable to ambulate  2/2 acute on chronic back pain. Pain management consult.    Problem/Plan - 1:  ·  Problem: Chronic back pain.   ·  Plan: p/w worsening back pain, unable to ambulate  - C/w pain management   - Pt consult  -pain management consult,- follow up res  - pt has OP plan for future surgery.    Problem/Plan - 2:  ·  Problem: RA (rheumatoid arthritis).   ·  Plan: - not in flare up   - Continue with  home regimens.    Problem/Plan - 3:  ·  Problem: HTN (hypertension).   ·  Plan: - controlled  - c/w BB and HCTZ.    Problem/Plan - 4:  ·  Problem: Angina pectoris.   ·  Plan: - C/w asa and imdur.    Problem/Plan - 5:  ·  Problem: Sciatica.   ·  Plan: - c/w pain management.    Problem/Plan - 6:  ·  Problem: DVT prophylaxis.   ·  Plan: DVT PPX lovenox      Dispo: acute rehab. D/c planning.
Pt is 71 y/o female with PMHx of HTN, chronic back pain, surgical hx of back, RA, and herniated disc, presenting to ED with acute worsening of back pain. Admitted to medicine for unable to ambulate  2/2 acute on chronic back pain. Pain management consult.    Problem/Plan - 1:  ·  Problem: Chronic back pain.   ·  Plan: p/w worsening back pain, unable to ambulate  - C/w pain management   - Pt consult  -pain management consult,- follow up res  - pt has OP plan for future surgery.  -c/o of left groin and pelvic pain will get CT abd/pelvis noncon    Problem/Plan - 2:  ·  Problem: RA (rheumatoid arthritis).   ·  Plan: - not in flare up   - Continue with  home regimens.    Problem/Plan - 3:  ·  Problem: HTN (hypertension).   ·  Plan: - controlled  - c/w BB and HCTZ.    Problem/Plan - 4:  ·  Problem: Angina pectoris.   ·  Plan: - C/w asa and imdur.    Problem/Plan - 5:  ·  Problem: Sciatica.   ·  Plan: - c/w pain management.    Problem/Plan - 6:  ·  Problem: DVT prophylaxis.   ·  Plan: DVT PPX lovenox  
Pt is 73 y/o female with PMHx of HTN, chronic back pain, surgical hx of back, RA, and herniated disc, presenting to ED with acute worsening of back pain. Admitted to medicine for unable to ambulate  2/2 acute on chronic back pain. Pain management consult.
Pt is 73 y/o female with PMHx of HTN, chronic back pain, surgical hx of back, RA, and herniated disc, presenting to ED with acute worsening of back pain. Admitted to medicine for unable to ambulate  2/2 acute on chronic back pain. Pain management consult.
  COVERING   Pt is 71 y/o female with PMHx of HTN, chronic back pain, surgical hx of back, RA, and herniated disc, presenting to ED with acute worsening of back pain. Admitted to medicine for unable to ambulate  2/2 acute on chronic back pain. Pain management consult.    Problem/Plan - 1:  ·  Problem: Chronic back pain.   ·  Plan: p/w worsening back pain, unable to ambulate  - C/w pain management   - Pt consult  -pain management consult,- follow up res  - pt has OP plan for future surgery.  -c/o of left groin and pelvic pain F/U CT abd/pelvis noncon    Problem/Plan - 2:  ·  Problem: RA (rheumatoid arthritis).   ·  Plan: - not in flare up   - Continue with  home regimens.    Problem/Plan - 3:  ·  Problem: HTN (hypertension).   ·  Plan: - controlled  - c/w BB and HCTZ.    Problem/Plan - 4:  ·  Problem: Angina pectoris.   ·  Plan: - C/w asa and imdur.    Problem/Plan - 5:  ·  Problem: Sciatica.   ·  Plan: - c/w pain management.    Problem/Plan - 6:  ·  Problem: DVT prophylaxis.   ·  Plan: DVT PPX lovenox
Pt is 71 y/o female with PMHx of HTN, chronic back pain, surgical hx of back, RA, and herniated disc, presenting to ED with acute worsening of back pain. Admitted to medicine for unable to ambulate  2/2 acute on chronic back pain. Pain management consult.
Pt is 71 y/o female with PMHx of HTN, chronic back pain, surgical hx of back, RA, and herniated disc, presenting to ED with acute worsening of back pain. Admitted to medicine for unable to ambulate  2/2 acute on chronic back pain. 
Pt is 71 y/o female with PMHx of HTN, chronic back pain, surgical hx of back, RA, and herniated disc, presenting to ED with acute worsening of back pain. Admitted to medicine for unable to ambulate  2/2 acute on chronic back pain. Pain management consult.
Pt is 71 y/o female with PMHx of HTN, chronic back pain, surgical hx of back, RA, and herniated disc, presenting to ED with acute worsening of back pain. Admitted to medicine for unable to ambulate  2/2 acute on chronic back pain. 
Pt is 71 y/o female with PMHx of HTN, chronic back pain, surgical hx of back, RA, and herniated disc, presenting to ED with acute worsening of back pain. Admitted to medicine for unable to ambulate  2/2 acute on chronic back pain. Pain management consult.
Pt is 71 y/o female with PMHx of HTN, chronic back pain, surgical hx of back, RA, and herniated disc, presenting to ED with acute worsening of back pain. Admitted to medicine for unable to ambulate  2/2 acute on chronic back pain. Pain management consult.
Pt is 73 y/o female with PMHx of HTN, chronic back pain, surgical hx of back, RA, and herniated disc, presenting to ED with acute worsening of back pain. Admitted to medicine for unable to ambulate  2/2 acute on chronic back pain. 
Pt is 73 y/o female with PMHx of HTN, chronic back pain, surgical hx of back, RA, and herniated disc, presenting to ED with acute worsening of back pain. Admitted to medicine for unable to ambulate  2/2 acute on chronic back pain. Pain management consult.

## 2022-06-07 NOTE — PROGRESS NOTE ADULT - PROVIDER SPECIALTY LIST ADULT
Cardiology
Internal Medicine
Cardiology
Internal Medicine
Internal Medicine
Cardiology

## 2022-06-07 NOTE — PROGRESS NOTE ADULT - REASON FOR ADMISSION
back pain

## 2022-06-07 NOTE — CONSULT NOTE ADULT - SUBJECTIVE AND OBJECTIVE BOX
Chief Complaint:  unrelieved back pain     HPI:  Pt is 73 y/o female with PMHx of HTN, chronic back pain, surgical hx of back, RA, and herniated disc, presenting to ED with acute worsening of back pain. Per patient, patient was in rehab facility until 2 days ago, signed AMA to go home but immediately found difficult for self-management; Of note, difficulty with ambulation is not new, Pt has pending surgery with neurosurgery tentative. Denies new Sx or changes to Sx, as well as falls and neurological deficits.   (24 May 2022 12:55)      PAST MEDICAL & SURGICAL HISTORY:  Angina  Asthma  H/O sciatica  (left leg)  RA (rheumatoid arthritis)  HTN (hypertension)  Fibroid, uterine  Brain cyst  Dizziness secondary to brain cyst  Chronic back pain  (with hx/o back surgery in 7/2015 and 1/2017)  History of back surgery  (7/2015 and 1/2017)    FAMILY HISTORY:  Family history of lung cancer  Family history of heart failure  Family history of renal disease  Family history of early CAD (Sibling)      SOCIAL HISTORY:  [x ] Denies Smoking, Alcohol, or Drug Use    Allergies  Biaxin (Hives; Rash)      PAIN MEDICATIONS:  acetaminophen     Tablet .. 650 milliGRAM(s) Oral every 6 hours PRN  gabapentin 300 milliGRAM(s) Oral three times a day  melatonin 3 milliGRAM(s) Oral at bedtime PRN  oxyCODONE    IR 5 milliGRAM(s) Oral every 4 hours PRN    Heme:  aspirin enteric coated 325 milliGRAM(s) Oral daily  enoxaparin Injectable 40 milliGRAM(s) SubCutaneous every 24 hours    Cardiovascular:  hydrochlorothiazide 25 milliGRAM(s) Oral daily  isosorbide   mononitrate ER Tablet (IMDUR) 30 milliGRAM(s) Oral daily  metoprolol tartrate 50 milliGRAM(s) Oral two times a day    GI:  pantoprazole    Tablet 40 milliGRAM(s) Oral before breakfast    Endocrine:  atorvastatin 80 milliGRAM(s) Oral at bedtime    All Other Medications:      Vital Signs Last 24 Hrs  T(C): 37.3 (25 May 2022 12:00), Max: 37.3 (25 May 2022 12:00)  T(F): 99.1 (25 May 2022 12:00), Max: 99.1 (25 May 2022 12:00)  HR: 62 (25 May 2022 12:00) (60 - 95)  BP: 134/72 (25 May 2022 12:00) (127/58 - 152/66)  BP(mean): --  RR: 18 (25 May 2022 12:00) (18 - 18)  SpO2: 100% (25 May 2022 12:00) (100% - 100%)    PAIN SCORE:  11/10 back pain       SCALE USED: (1-10 VNRS)           LABS:                          13.4   7.46  )-----------( 268      ( 23 May 2022 22:10 )             41.8     05-25    139  |  104  |  14  ----------------------------<  91  3.9   |  25  |  0.70    Ca    9.1      25 May 2022 05:36  Phos  3.4     05-25  Mg     1.80     05-25    TPro  6.5  /  Alb  3.6  /  TBili  0.5  /  DBili  x   /  AST  17  /  ALT  16  /  AlkPhos  74  05-25    [x ]  [place]  Reviewed     Summary:  Pt is 73 y/o female, Jehovah’s witness with a history of HTN, chronic back pain, surgical hx of back, RA, brain cyst, angina, and herniated disc, presenting to ED with acute worsening of back pain.  Patient was in rehab facility until 2 days ago, signed AMA to go home but found that she still had pain.  Patient was seen at the bedside changing herself and ambulating independently with difficulty from the commode to the bed.  The patient states all these back problems started in 2012, when she kept falling in the street and she didn’t know why.  The patient later found out that she had spinal stenosis and herniated discs, however nothing was addressed for a year because she was caring for her  who had cancer.  In 2013, she had her first back surgery and in 2018 she had her second back surgery.  As per patient she was supposed to get another back surgery soon, however, her orthopedic surgeon did not feel comfortable performing the surgery, if patient was unable to receive blood products due to [employer] concerns.  The patient is complaining of 11/10 entire back pain, that radiates down her buttocks to her bilateral legs.  The patient describes her pain as a knife jabbing her in the back.  Associated signs and symptoms include, right sided arm and leg numbness (MRI head done 4/30/22 showed the brain demonstrates moderate periventricular, deep and subcortical white matter ischemia.  No acute cerebral cortical infarct and no intracranial hemorrhage was recognized.);   left and right hand fingertip numbness and tingling, and muscle spasms in her arms and legs.  MRI Lumbar spine done showed limited exam due to multilevel posterior instrumented fusion and metallic artifact, multilevel cervical spondylosis, lumbar extradural collection dorsal to the laminectomy site unchanged or slightly smaller.  Left thyroid nodule for which thyroid sonography is advised for further evaluation.       PHYSICAL EXAM:  GENERAL: Alert & Oriented x 3 in NAD, well-groomed, well-developed     Impression/Plan: Requested by ACP team pager [phone] help manage pain.   Recommendations:  -  Continue Gabapentin 300mg TID  -  Consider changing Oxycodone to:  Oxycodone 5mg q 4 hours PRN for moderate pain. Hold for oversedation. Not to be given within 1 hour of any other immediate acting opioid.   Oxycodone 10mg q 4 hours PRN for severe pain. Hold for oversedation. Not to be given within 1 hour of any other immediate acting opioid.   -  Consider ordering Tylenol 650 mg q 6 hours standing x2 days, then PRN for pain.  Do not give within 6 hours after last dose of Acetaminophen.  Please alternate with Toradol or Motrin.   -  Consider ordering Toradol 15mg or Motrin 400 mg q 6 hours standing x 2days, then PRN for pain.  ONLY IF OK WITH PRIMARY ATTENDING SINCE PATIENT IS ON LOVENOX,and  if ok by Neurosurgery since patient states she was supposed to have surgery.   -  Consider ordering 2 Lidocaine patches:  #1 Apply to lower back. 12 hours on and 12 hours off x 5 days.   #1 Apply to mid back. 12 hours on and 12 hours off x 5 days.   -  Consider ordering Flexeril 5 mg q 8 hours PRN for muscle spasm.  Hold for oversedation.   -  Recommend maintaining continuous pulse oximetry.  -  Recommend follow up with Thyroid ultrasound, results and proper referral  -  Recommend follow up with Neurosurgery for repeat back surgery.  -  Recommend follow up with Chronic Pain doctor when discharged, if needed. If patient does not have a Chronic Pain doctor, may acquire one through patient's personal insurance carrier.  Discussed patient with Chronic Pain Attending on call, Dr. Parra, who agrees with the above recommendations.  No further recommendations at this time, Chronic pain service to sign off. May call Chronic Pain Service if needed.   Thank you.        
HPI:  Pt is 71 y/o female with PMHx of HTN, chronic back pain, surgical hx of back, RA, and herniated disc, presenting to ED with acute worsening of back pain. Per patient, patient was in rehab facility/[facility] until 2 days ago, signed AMA to go home but immediately found difficult for self-management; Of note, difficulty with ambulation is not new, Pt has pending surgery with neurosurgery tentative. Denies new Sx or changes to Sx, as well as falls and neurological deficits.   (24 May 2022 12:55). The patient is complaining of 11/10 entire back pain, that radiates down her buttocks to her bilateral legs.  The patient describes her pain as a knife jabbing her in the back.  Associated signs and symptoms include, right sided arm and leg numbness (MRI head done 4/30/22 showed the brain demonstrates moderate periventricular, deep and subcortical white matter ischemia.  No acute cerebral cortical infarct and no intracranial hemorrhage was recognized.);   left and right hand fingertip numbness and tingling, and muscle spasms in her arms and legs.  MRI Lumbar spine done showed limited exam due to multilevel posterior instrumented fusion and metallic artifact, multilevel cervical spondylosis, lumbar extradural collection dorsal to the laminectomy site unchanged or slightly smaller.        REVIEW OF SYSTEMS/Subjective: Patient in bed in NAD, no SOB, no n/v, (+)back pain  Constitutional - No fever, No fatigue  HEENT - No visual disturbances  Respiratory - No cough, No shortness of breath  Cardiovascular - No chest pain  Gastrointestinal - No abdominal pain, No nausea, No vomiting  Genitourinary - No dysuria, No incontinence  Neurological - No headaches, No loss of strength, No numbness  Skin - No itching, No rashes  Musculoskeletal - No joint swelling  Psychiatric - No depression, No anxiety  All other review of systems negative    PAST MEDICAL & SURGICAL HISTORY  Dorsalgia    Family history of CHF (congestive heart failure)    Family history of renal failure    No pertinent family history in first degree relatives    Family history of lung cancer    Family history of heart failure    Family history of renal disease    Family history of early CAD (Sibling)    Handoff    MEWS Score    HTN - Hypertension    Angina    Asthma    Arthritis    Palpitations    Fibroid tumor    H/O sciatica    RA (rheumatoid arthritis)    HTN (hypertension)    Fibroid, uterine    Brain cyst    Dizziness    Chronic back pain    Adjustment disorder    Intractable back pain    Chronic back pain    RA (rheumatoid arthritis)    HTN (hypertension)    Angina pectoris    Sciatica    DVT prophylaxis    NO SIGNIFICANT PAST SURGICAL HISTORY    History of back surgery    No significant past surgical history    History of back surgery    BACK PAIN    19    [email or web address]        SOCIAL HISTORY    Smoking - Denied  EtOH - Denied   Drugs - Denied    FUNCTIONAL HISTORY  Lives alone in a pvt house with 1 flight steps to negotiate to the bedroom. (-)HHA.   Independent in ambulation with rollator walker in community, quad cane in the home, ADL's, transfers prior to hospitalization      FAMILY HISTORY   Reviewed and non-contributory    ALLERGIES  Biaxin (Hives; Rash)    VITALS  T(C): 36.7 (05-31-22 @ 05:00)  T(F): 98.1 (05-31-22 @ 05:00), Max: 98.1 (05-30-22 @ 21:18)  HR: 89 (05-31-22 @ 05:00) (60 - 89)  BP: 131/57 (05-31-22 @ 05:00) (121/76 - 137/89)  RR:  (18 - 18)  SpO2:  (99% - 100%)  Wt(kg): --    PHYSICAL EXAM  Constitutional - NAD, Comfortable  HEENT - NCAT, MMM  Neck - Supple  Chest - CTA bilaterally  Cardiovascular - RRR, S1S2  Abdomen - BS+, Soft, NTND  Extremities - No C/C/E, No calf tenderness   Neurologic Exam -                    Cognitive - Awake, Alert, Oriented to self, place, date, year, situation     Communication - Fluent, No dysarthria     Cranial Nerves - EOMI, tongue midline, no facial asymmetry     Motor -                     LEFT    UE - ShAB 4/5, EF 5/5, EE 5/5, WE 5/5,  5/5                    RIGHT UE - ShAB 4/5, EF 5/5, EE 5/5, WE 5/5,  5/5                    LEFT    LE - HF 4/5, KE 5/5, DF 5/5, PF 5/5                    RIGHT LE - HF 4/5, KE 5/5, DF 5/5, PF 5/5        Sensory - Intact to light touch diffusely     Reflexes - DTR intact and symmetrical, Negative Babinski's bilaterally      Coordination - Finger-to-nose intact bilaterally   Psychiatric - Affect WNL    CURRENT FUNCTIONAL STATUS  Transfers - sit to stand with mod (A)  Gait - 2-3 steps with (A)      RECENT LABS/IMAGING                        12.9   6.09  )-----------( 264      ( 30 May 2022 06:20 )             41.8     05-30    137  |  102  |  18  ----------------------------<  92  4.0   |  26  |  0.72    Ca    9.6      30 May 2022 06:20  Phos  3.4     05-30  Mg     1.70     05-30        MEDICATIONS   MEDICATIONS  (STANDING):  aspirin enteric coated 325 milliGRAM(s) Oral daily  atorvastatin 80 milliGRAM(s) Oral at bedtime  chlorhexidine 2% Cloths 1 Application(s) Topical daily  enoxaparin Injectable 40 milliGRAM(s) SubCutaneous every 24 hours  gabapentin 300 milliGRAM(s) Oral three times a day  hydrochlorothiazide 25 milliGRAM(s) Oral daily  isosorbide   mononitrate ER Tablet (IMDUR) 30 milliGRAM(s) Oral daily  losartan 50 milliGRAM(s) Oral daily  metoprolol tartrate 50 milliGRAM(s) Oral two times a day  nystatin Powder 1 Application(s) Topical two times a day  pantoprazole    Tablet 40 milliGRAM(s) Oral before breakfast  senna 2 Tablet(s) Oral at bedtime    MEDICATIONS  (PRN):  ALBUTerol    90 MICROgram(s) HFA Inhaler 2 Puff(s) Inhalation every 6 hours PRN Shortness of Breath and/or Wheezing  melatonin 3 milliGRAM(s) Oral at bedtime PRN Insomnia  oxyCODONE    IR 5 milliGRAM(s) Oral every 4 hours PRN Severe Pain (7 - 10)      ASSESSMENT/PLAN  The patient is a 71 y/o female PMHx HTN, DM, CAD/angina, RA, chronic LBP/sciatica, hx lumbar lami with acute on chronic back pain with functional, gait, ADL impairments.  Pain management recommendations appreciated.    Disposition - recommend subacute inpatient rehab.   PT - ROM, Bed mobility, Transfers, Ambulation with assistive device. Stair management as tolerated    Precautions - Falls, Cardiac
Neurology consult    NINA [id number]     Patient is a 72y old  Female who presents with a chief complaint of back pain (06 Jun 2022 16:19)      HPI:  Pt is 73 y/o female with PMHx of HTN, chronic back pain, surgical hx of back, RA, and herniated disc, presenting to ED with acute worsening of back pain. Per patient, patient was in rehab facility until 2 days ago, signed AMA to go home but immediately found difficult for self-management; Of note, difficulty with ambulation is not new, Pt has pending surgery with neurosurgery tentative. Denies new Sx or changes to Sx, as well as falls and neurological deficits.   (24 May 2022 12:55)    Neurology called for MR findings    no difficulty with language.  No vision loss or double vision.  No dizziness, vertigo or new hearing loss.  . No difficulty with swallowing.  No focal weakness.  No focal sensory changes.  No numbness or tingling in the bilateral lower extremities.  No difficulty with balance.  No difficulty with ambulation.    REVIEW OF SYSTEMS:    Constitutional: No fever, chills, fatigue, weakness  Eyes: no eye pain, visual disturbances, or discharge  ENT:  No difficulty hearing, tinnitus, vertigo; No sinus or throat pain  Neck: No pain or stiffness  Respiratory: No cough, dyspnea, wheezing   Cardiovascular: No chest pain, palpitations,   Gastrointestinal: No abdominal or epigastric pain. No nausea, vomiting  No diarrhea or constipation.   Genitourinary: No dysuria, frequency, hematuria or incontinence  Neurological: No headaches, lightheadedness, vertigo, numbness or tremors  Psychiatric: No depression, anxiety, mood swings or difficulty sleeping  Musculoskeletal: No joint pain or swelling; No muscle, back or extremity pain  Skin: No itching, burning, rashes or lesions   Lymph Nodes: No enlarged glands  Endocrine: No heat or cold intolerance; No hair loss, No h/o diabetes or thyroid dysfunction  Allergy and Immunologic: No hives or eczema    MEDICATIONS    ALBUTerol    90 MICROgram(s) HFA Inhaler 2 Puff(s) Inhalation every 6 hours PRN  amoxicillin  875 milliGRAM(s)/clavulanate 1 Tablet(s) Oral two times a day  aspirin enteric coated 81 milliGRAM(s) Oral daily  atorvastatin 80 milliGRAM(s) Oral at bedtime  chlorhexidine 2% Cloths 1 Application(s) Topical daily  enoxaparin Injectable 40 milliGRAM(s) SubCutaneous every 24 hours  gabapentin 600 milliGRAM(s) Oral three times a day  hydrochlorothiazide 25 milliGRAM(s) Oral daily  isosorbide   mononitrate ER Tablet (IMDUR) 30 milliGRAM(s) Oral daily  lidocaine   4% Patch 1 Patch Transdermal daily PRN  losartan 50 milliGRAM(s) Oral daily  melatonin 3 milliGRAM(s) Oral at bedtime PRN  metoprolol tartrate 50 milliGRAM(s) Oral two times a day  nystatin Powder 1 Application(s) Topical two times a day  oxyCODONE    IR 2.5 milliGRAM(s) Oral every 6 hours PRN  oxyCODONE  ER Tablet 10 milliGRAM(s) Oral every 12 hours  pantoprazole    Tablet 40 milliGRAM(s) Oral before breakfast  senna 2 Tablet(s) Oral at bedtime      PMH: HTN - Hypertension    Angina    Asthma    Arthritis    Palpitations    Fibroid tumor    H/O sciatica    RA (rheumatoid arthritis)    HTN (hypertension)    Fibroid, uterine    Brain cyst    Dizziness    Chronic back pain         PSH: NO SIGNIFICANT PAST SURGICAL HISTORY    History of back surgery        Family history: No history of dementia, strokes, or seizures   FAMILY HISTORY:  Family history of lung cancer    Family history of heart failure    Family history of renal disease    Family history of early CAD (Sibling)        SOCIAL HISTORY:  No history of tobacco or alcohol use     Allergies    Biaxin (Hives; Rash)    Intolerances            Vital Signs Last 24 Hrs  T(C): 36.8 (07 Jun 2022 06:05), Max: 37.2 (06 Jun 2022 21:39)  T(F): 98.3 (07 Jun 2022 06:05), Max: 99 (06 Jun 2022 21:39)  HR: 62 (07 Jun 2022 06:05) (62 - 67)  BP: 131/58 (07 Jun 2022 06:05) (125/64 - 133/72)  BP(mean): --  RR: 18 (07 Jun 2022 06:05) (16 - 18)  SpO2: 98% (07 Jun 2022 06:05) (97% - 100%)      On Neurological Examination:    Mental Status - Patient is alert, awake, oriented X3. fluent, names, no dysarthria no aphasia Follows commands well and able to answer questions appropriately. Mood and affect  normal    Cranial Nerves - PERRL, EOMI, VFF, V1-V3 intact, no gross facial asymmetry, tongue/uvula midline    Motor Exam -   5/5    nml bulk/tone    Sensory    Intact to light touch and pinprick bilaterally    Coord: FTN intact bilaterally     Gait -  normal          LABS:              Hemoglobin A1C:             RADIOLOGY  < from: MR Head w/wo IV Cont (04.30.22 @ 13:06) >    IMPRESSION:  Moderate periventricular, deep and subcortical white matter   ischemia. Mild pontine white matter ischemia also noted.  No abnormal   enhancement is seen which    --- End of Report ---      < end of copied text >                  
HPI:  Patient is a 72y Female admitted for back pain having left sided ear pain and right sided neck pain. Patient reports she has had the right sided neck pain for 1 month, and started sucking on sour lemon drops to help. she has had the left ear pain for 5 days. She feels there is clear discharge. She has bilateral tinnitus for the last several years and hearing loss, but has not had a hearing test. She reports she had vertigo 2 years ago diagnosed as BPPV, and that her doctor told her 1 month ago that she had it again because she was having lightheadedness. No fevers.   Previous CT/MRI in system show R sialoliths of the SMG. Patient also reporting she had hemoptysis yesterday. She is a never smoker and does not use EtOH.      Physical Exam  T(C): 36.4 (06-04-22 @ 15:34), Max: 37.1 (06-03-22 @ 21:26)  HR: 64 (06-04-22 @ 17:40) (64 - 72)  BP: 160/63 (06-04-22 @ 17:40) (120/60 - 160/63)  RR: 17 (06-04-22 @ 15:34) (17 - 18)  SpO2: 99% (06-04-22 @ 15:34) (96% - 99%)  General: NAD, A+Ox3  No respiratory distress, stridor, or stertor  Voice quality: normal  Face:  Symmetric without masses or lesions  OU: PERRL, EOMI  AD: Pinna wnl, EAC clear, TM intact, no effusion  AS: Pinna wnl, EAC clear, TM intact, no effusion  Nose: nasal cavity clear bilaterally, inferior turbinates normal, mucosa normal without crusting or bleeding  OC/OP: tongue normal, floor of mouth wnl, no masses or lesions, OP clear  Neck: soft/flat, no LAD. R submandibular gland is tender to palpation. no overlying skin changes. no firmness or fluctuance.   CNII-XII intact    Procedure: Flexible laryngoscopy    Pre-procedure diagnosis/Indication for procedure: To evaluate larynx    Anesthesia: None    Description:    A flexible endoscope was used to examine the left and right nasal cavities, posterior oropharynx, hypopharynx, and larynx. The nasal valve areas were examined for abnormalities or collapse. The inferior and middle turbinates were evaluated. The middle and superior meatuses, the sphenoethmoid recesses, and the nasopharynx were examined and inspected for mucopurulence, polyps, and nasal masses. The oropharynx, tongue base/vallecula, epiglottis, aryepiglottic folds, arytenoids, vocal cords, hypopharynx were also inspected for swelling, inflammation, or dysfunction. The patient tolerated the procedure without complications.    Findings:  nasal cavity clear but dry b/l  NP wnl  BOT/vallecula normal  Epiglottis sharp  AE folds nonedematous  Arytenoids mobile  Vocal folds mobile bilaterally  No masses or lesions visualized in post cricoid space or pyriform sinuses bilaterally      A/P: 72y F with R SMG pain and Left ear pain. R SMG pain is c/w sialadenitis. There is no source of a left ear infection. she is having pain over the tragus but there are no signs of an otitis externa.  - augmentin for 1 week for sialadenitis. she should continue to use the lemon drops, and needs constant hydration  -she should follow up with ENT as an outpatient for her sialadenitis and for her hearing loss/tinnitus, which is longstanding, but should have audiogram. she should follow up sooner if her ear pain persists.

## 2022-06-07 NOTE — PROGRESS NOTE ADULT - SUBJECTIVE AND OBJECTIVE BOX
Kavon Tyler MD  Interventional Cardiology / Advance Heart Failure and Cardiac Transplant Specialist  [place] Office : [phone] [place] [address] [facility] [address]  Tel:   [place] Office : [phone] [facility] [place] [address]  Tel: [phone]      Subjective/Overnight events: Pt is lying in bed comfortable not in distress  	  MEDICATIONS:  aspirin enteric coated 81 milliGRAM(s) Oral daily  enoxaparin Injectable 40 milliGRAM(s) SubCutaneous every 24 hours  hydrochlorothiazide 25 milliGRAM(s) Oral daily  isosorbide   mononitrate ER Tablet (IMDUR) 30 milliGRAM(s) Oral daily  losartan 50 milliGRAM(s) Oral daily  metoprolol tartrate 50 milliGRAM(s) Oral two times a day    amoxicillin  875 milliGRAM(s)/clavulanate 1 Tablet(s) Oral two times a day    ALBUTerol    90 MICROgram(s) HFA Inhaler 2 Puff(s) Inhalation every 6 hours PRN    gabapentin 600 milliGRAM(s) Oral three times a day  melatonin 3 milliGRAM(s) Oral at bedtime PRN  oxyCODONE    IR 2.5 milliGRAM(s) Oral every 6 hours PRN  oxyCODONE  ER Tablet 10 milliGRAM(s) Oral every 12 hours    pantoprazole    Tablet 40 milliGRAM(s) Oral before breakfast  senna 2 Tablet(s) Oral at bedtime    atorvastatin 80 milliGRAM(s) Oral at bedtime    chlorhexidine 2% Cloths 1 Application(s) Topical daily  lidocaine   4% Patch 1 Patch Transdermal daily PRN  nystatin Powder 1 Application(s) Topical two times a day      PAST MEDICAL/SURGICAL HISTORY  PAST MEDICAL & SURGICAL HISTORY:  Angina      Asthma      H/O sciatica  (left leg)      RA (rheumatoid arthritis)      HTN (hypertension)      Fibroid, uterine      Brain cyst      Dizziness  secondary to brain cyst      Chronic back pain  (with hx/o back surgery in 7/2015 and 1/2017)      History of back surgery  (7/2015 and 1/2017)          SOCIAL HISTORY: Substance Use (street drugs): ( x ) never used  (  ) other:    FAMILY HISTORY:  Family history of lung cancer    Family history of heart failure    Family history of renal disease    Family history of early CAD (Sibling)        PHYSICAL EXAM:  T(C): 36.9 (06-07-22 @ 11:00), Max: 37.2 (06-06-22 @ 21:39)  HR: 67 (06-07-22 @ 11:00) (62 - 67)  BP: 118/69 (06-07-22 @ 11:00) (118/69 - 133/72)  RR: 18 (06-07-22 @ 11:00) (16 - 18)  SpO2: 96% (06-07-22 @ 11:00) (96% - 100%)  Wt(kg): --  I&O's Summary      PHYSICAL EXAM:  General: No acute distress BMI-  HEENT: EOMI, PERRL  Neck: Supple, [ ] JVD  Lungs: Equal air entry bilaterally; [ ] rales [ ] wheezing [ ] rhonchi  Heart: Regular rate and rhythm; [x ] murmur   2/6 [ x] systolic [ ] diastolic [ ] radiation[ ] rubs [ ]  gallops  Abdomen: Nontender, bowel sounds present  Extremities: No clubbing, cyanosis, [ ] edema [ ]Pulses  equal and intact  Nervous system:  Alert & Oriented X3, no focal deficits  Psychiatric: Normal affect  Skin: No rashes or lesions                proBNP:   Lipid Profile:   HgA1c:   TSH:     Consultant(s) Notes Reviewed:  [x ] YES  [ ] NO    Care Discussed with Consultants/Other Providers [ x] YES  [ ] NO    Imaging Personally Reviewed independently:  [x] YES  [ ] NO    All labs, radiologic studies, vitals, orders and medications list reviewed. Patient is seen and examined at bedside. Case discussed with medical team.

## 2022-06-07 NOTE — CHART NOTE - NSCHARTNOTEFT_GEN_A_CORE
Notified by RN pt with chest pain. Pt seen and evaluated at bedside. Pt resting comfortably playing on her phone. Pt reports substernal chest pain, non radiating consistent with her angina. Pt states she recently received her imdur. EKG obtained: no acute findings. Offered maalox, pt declined. Discussed with govind Harp to continue with discharge to rehab today at 5pm.

## 2022-06-07 NOTE — DISCHARGE NOTE NURSING/CASE MANAGEMENT/SOCIAL WORK - PATIENT PORTAL LINK FT
You can access the FollowMyHealth Patient Portal offered by [facility] by registering at the following website: [email or web address]. By joining [employer]s FollowMyHealth portal, you will also be able to view your health information using other applications (apps) compatible with our system.

## 2022-08-09 ENCOUNTER — APPOINTMENT (OUTPATIENT)
Dept: SURGERY | Facility: CLINIC | Age: 72
End: 2022-08-09

## 2022-08-26 ENCOUNTER — NON-APPOINTMENT (OUTPATIENT)
Age: 72
End: 2022-08-26

## 2022-09-23 NOTE — PATIENT PROFILE ADULT - FLU SEASON?
Benzoyl Peroxide Pregnancy And Lactation Text: This medication is Pregnancy Category C. It is unknown if benzoyl peroxide is excreted in breast milk. Tetracycline Counseling: Patient counseled regarding possible photosensitivity and increased risk for sunburn.  Patient instructed to avoid sunlight, if possible.  When exposed to sunlight, patients should wear protective clothing, sunglasses, and sunscreen.  The patient was instructed to call the office immediately if the following severe adverse effects occur:  hearing changes, easy bruising/bleeding, severe headache, or vision changes.  The patient verbalized understanding of the proper use and possible adverse effects of tetracycline.  All of the patient's questions and concerns were addressed. Patient understands to avoid pregnancy while on therapy due to potential birth defects. Azithromycin Pregnancy And Lactation Text: This medication is considered safe during pregnancy and is also secreted in breast milk. Topical Sulfur Applications Counseling: Topical Sulfur Counseling: Patient counseled that this medication may cause skin irritation or allergic reactions.  In the event of skin irritation, the patient was advised to reduce the amount of the drug applied or use it less frequently.   The patient verbalized understanding of the proper use and possible adverse effects of topical sulfur application.  All of the patient's questions and concerns were addressed. High Dose Vitamin A Pregnancy And Lactation Text: High dose vitamin A therapy is contraindicated during pregnancy and breast feeding. Doxycycline Counseling:  Patient counseled regarding possible photosensitivity and increased risk for sunburn.  Patient instructed to avoid sunlight, if possible.  When exposed to sunlight, patients should wear protective clothing, sunglasses, and sunscreen.  The patient was instructed to call the office immediately if the following severe adverse effects occur:  hearing changes, easy bruising/bleeding, severe headache, or vision changes.  The patient verbalized understanding of the proper use and possible adverse effects of doxycycline.  All of the patient's questions and concerns were addressed. Isotretinoin Pregnancy And Lactation Text: This medication is Pregnancy Category X and is considered extremely dangerous during pregnancy. It is unknown if it is excreted in breast milk. Spironolactone Counseling: Patient advised regarding risks of diarrhea, abdominal pain, hyperkalemia, birth defects (for female patients), liver toxicity and renal toxicity. The patient may need blood work to monitor liver and kidney function and potassium levels while on therapy. The patient verbalized understanding of the proper use and possible adverse effects of spironolactone.  All of the patient's questions and concerns were addressed. Yes... Use Enhanced Medication Counseling?: No Dapsone Counseling: I discussed with the patient the risks of dapsone including but not limited to hemolytic anemia, agranulocytosis, rashes, methemoglobinemia, kidney failure, peripheral neuropathy, headaches, GI upset, and liver toxicity.  Patients who start dapsone require monitoring including baseline LFTs and weekly CBCs for the first month, then every month thereafter.  The patient verbalized understanding of the proper use and possible adverse effects of dapsone.  All of the patient's questions and concerns were addressed. Azelaic Acid Pregnancy And Lactation Text: This medication is considered safe during pregnancy and breast feeding. Topical Clindamycin Counseling: Patient counseled that this medication may cause skin irritation or allergic reactions.  In the event of skin irritation, the patient was advised to reduce the amount of the drug applied or use it less frequently.   The patient verbalized understanding of the proper use and possible adverse effects of clindamycin.  All of the patient's questions and concerns were addressed. Birth Control Pills Counseling: Birth Control Pill Counseling: I discussed with the patient the potential side effects of OCPs including but not limited to increased risk of stroke, heart attack, thrombophlebitis, deep venous thrombosis, hepatic adenomas, breast changes, GI upset, headaches, and depression.  The patient verbalized understanding of the proper use and possible adverse effects of OCPs. All of the patient's questions and concerns were addressed. Erythromycin Pregnancy And Lactation Text: This medication is Pregnancy Category B and is considered safe during pregnancy. It is also excreted in breast milk. Sarecycline Counseling: Patient advised regarding possible photosensitivity and discoloration of the teeth, skin, lips, tongue and gums.  Patient instructed to avoid sunlight, if possible.  When exposed to sunlight, patients should wear protective clothing, sunglasses, and sunscreen.  The patient was instructed to call the office immediately if the following severe adverse effects occur:  hearing changes, easy bruising/bleeding, severe headache, or vision changes.  The patient verbalized understanding of the proper use and possible adverse effects of sarecycline.  All of the patient's questions and concerns were addressed. Minocycline Counseling: Patient advised regarding possible photosensitivity and discoloration of the teeth, skin, lips, tongue and gums.  Patient instructed to avoid sunlight, if possible.  When exposed to sunlight, patients should wear protective clothing, sunglasses, and sunscreen.  The patient was instructed to call the office immediately if the following severe adverse effects occur:  hearing changes, easy bruising/bleeding, severe headache, or vision changes.  The patient verbalized understanding of the proper use and possible adverse effects of minocycline.  All of the patient's questions and concerns were addressed. Doxycycline Pregnancy And Lactation Text: This medication is Pregnancy Category D and not consider safe during pregnancy. It is also excreted in breast milk but is considered safe for shorter treatment courses. Bactrim Counseling:  I discussed with the patient the risks of sulfa antibiotics including but not limited to GI upset, allergic reaction, drug rash, diarrhea, dizziness, photosensitivity, and yeast infections.  Rarely, more serious reactions can occur including but not limited to aplastic anemia, agranulocytosis, methemoglobinemia, blood dyscrasias, liver or kidney failure, lung infiltrates or desquamative/blistering drug rashes. Aklief Pregnancy And Lactation Text: It is unknown if this medication is safe to use during pregnancy.  It is unknown if this medication is excreted in breast milk.  Breastfeeding women should use the topical cream on the smallest area of the skin for the shortest time needed while breastfeeding.  Do not apply to nipple and areola. Tazorac Counseling:  Patient advised that medication is irritating and drying.  Patient may need to apply sparingly and wash off after an hour before eventually leaving it on overnight.  The patient verbalized understanding of the proper use and possible adverse effects of tazorac.  All of the patient's questions and concerns were addressed. Winlevi Pregnancy And Lactation Text: This medication is considered safe during pregnancy and breastfeeding. Topical Retinoid counseling:  Patient advised to apply a pea-sized amount only at bedtime and wait 30 minutes after washing their face before applying.  If too drying, patient may add a non-comedogenic moisturizer. The patient verbalized understanding of the proper use and possible adverse effects of retinoids.  All of the patient's questions and concerns were addressed. Tetracycline Pregnancy And Lactation Text: This medication is Pregnancy Category D and not consider safe during pregnancy. It is also excreted in breast milk. Topical Sulfur Applications Pregnancy And Lactation Text: This medication is Pregnancy Category C and has an unknown safety profile during pregnancy. It is unknown if this topical medication is excreted in breast milk. High Dose Vitamin A Counseling: Side effects reviewed, pt to contact office should one occur. Dapsone Pregnancy And Lactation Text: This medication is Pregnancy Category C and is not considered safe during pregnancy or breast feeding. Topical Clindamycin Pregnancy And Lactation Text: This medication is Pregnancy Category B and is considered safe during pregnancy. It is unknown if it is excreted in breast milk. Azithromycin Counseling:  I discussed with the patient the risks of azithromycin including but not limited to GI upset, allergic reaction, drug rash, diarrhea, and yeast infections. Spironolactone Pregnancy And Lactation Text: This medication can cause feminization of the male fetus and should be avoided during pregnancy. The active metabolite is also found in breast milk. Benzoyl Peroxide Counseling: Patient counseled that medicine may cause skin irritation and bleach clothing.  In the event of skin irritation, the patient was advised to reduce the amount of the drug applied or use it less frequently.   The patient verbalized understanding of the proper use and possible adverse effects of benzoyl peroxide.  All of the patient's questions and concerns were addressed. Tazorac Pregnancy And Lactation Text: This medication is not safe during pregnancy. It is unknown if this medication is excreted in breast milk. Azelaic Acid Counseling: Patient counseled that medicine may cause skin irritation and to avoid applying near the eyes.  In the event of skin irritation, the patient was advised to reduce the amount of the drug applied or use it less frequently.   The patient verbalized understanding of the proper use and possible adverse effects of azelaic acid.  All of the patient's questions and concerns were addressed. Isotretinoin Counseling: Patient should get monthly blood tests, not donate blood, not drive at night if vision affected, not share medication, and not undergo elective surgery for 6 months after tx completed. Side effects reviewed, pt to contact office should one occur. Birth Control Pills Pregnancy And Lactation Text: This medication should be avoided if pregnant and for the first 30 days post-partum. Erythromycin Counseling:  I discussed with the patient the risks of erythromycin including but not limited to GI upset, allergic reaction, drug rash, diarrhea, increase in liver enzymes, and yeast infections. Bactrim Pregnancy And Lactation Text: This medication is Pregnancy Category D and is known to cause fetal risk.  It is also excreted in breast milk. Detail Level: Zone Aklief counseling:  Patient advised to apply a pea-sized amount only at bedtime and wait 30 minutes after washing their face before applying.  If too drying, patient may add a non-comedogenic moisturizer.  The most commonly reported side effects including irritation, redness, scaling, dryness, stinging, burning, itching, and increased risk of sunburn.  The patient verbalized understanding of the proper use and possible adverse effects of retinoids.  All of the patient's questions and concerns were addressed. Topical Retinoid Pregnancy And Lactation Text: This medication is Pregnancy Category C. It is unknown if this medication is excreted in breast milk. Winlevi Counseling:  I discussed with the patient the risks of topical clascoterone including but not limited to erythema, scaling, itching, and stinging. Patient voiced their understanding. Detail Level: Detailed

## 2022-10-06 ENCOUNTER — APPOINTMENT (OUTPATIENT)
Dept: PULMONOLOGY | Facility: CLINIC | Age: 72
End: 2022-10-06

## 2022-10-24 ENCOUNTER — APPOINTMENT (OUTPATIENT)
Dept: ORTHOPEDIC SURGERY | Facility: CLINIC | Age: 72
End: 2022-10-24

## 2022-11-21 NOTE — ED ADULT TRIAGE NOTE - AS TEMP SITE
venlafaxine XR (EFFEXOR XR) 150 MG 24 hr capsule 30 capsule 1 9/19/2022     Sig - Route: Take 1 capsule by mouth daily. - Oral         oral

## 2022-11-30 NOTE — PATIENT PROFILE ADULT - FUNCTIONAL SCREEN CURRENT LEVEL: SWALLOWING (IF SCORE 2 OR MORE FOR ANY ITEM, CONSULT REHAB SERVICES), MLM)
0 = swallows foods/liquids without difficulty Doxepin Pregnancy And Lactation Text: This medication is Pregnancy Category C and it isn't known if it is safe during pregnancy. It is also excreted in breast milk and breast feeding isn't recommended.

## 2023-01-01 ENCOUNTER — APPOINTMENT (OUTPATIENT)
Dept: ORTHOPEDIC SURGERY | Facility: CLINIC | Age: 73
End: 2023-01-01

## 2023-02-02 NOTE — ED PROVIDER NOTE - NSFOLLOWUPINSTRUCTIONS_ED_ALL_ED_FT
09-Feb-2023
Rest and stay well hydrated.    Follow up with your cardiologist Dr. Montemayor in 1-3 days.    Take over the counter acetaminophen (Tylenol) 650-1000 mg every 4-6 hours as needed for pain. Do not take more than 3000 mg in a 24 hour period. Be aware many over the counter and prescription medications also contain acetaminophen (Tylenol).     Return immediately with any new or worsening symptoms including severe chest pain, shortness of breath, lightheadedness, fainting, vomiting, severe abdominal pain, numbness, weakness, or any additional concerns.

## 2023-03-13 NOTE — PROVIDER CONTACT NOTE (CHANGE IN STATUS NOTIFICATION) - BACKGROUND
BLLE weakness
pt admitted w/ BLLE weakness
[FreeTextEntry1] : Mr. Ortiz presents for follow-up evaluation.\par \par 1.  Comprehensive blood profile was reviewed with patient.\par \par 2.  Hemoglobin A1c is elevated at 6.2% with a mean hemoglobin of 131 mg/dL.  Patient will have an endocrinology evaluation by Dr. CARO.\par \par 3.  Patient still has a mildly elevated AST of 45 with an ALT of 127.  And abdominal ultrasound did show hepatic steatosis.\par \par 4.  The patient is complaining of episodes of nocturia.  He will have a urology evaluation by Dr. Cardozo.\par \par 5.  Patient is overweight.  I have again encouraged him to lose weight through lifestyle modification with diet and exercise.\par \par 6.  Follow-up in 6 months.

## 2024-03-28 ENCOUNTER — APPOINTMENT (OUTPATIENT)
Dept: SURGERY | Facility: CLINIC | Age: 74
End: 2024-03-28

## 2024-06-03 NOTE — ED ADULT TRIAGE NOTE - IDEAL BODY WEIGHT(KG)
53
BIBA left sided chest pain and difficulty breathing started yesterday afternoon, better today. Arrived on 3L NC-o2 at 100%. PMH HLD, spinal stenosis, MI

## 2024-08-07 NOTE — ED PROVIDER NOTE - NS ED ATTENDING STATEMENT MOD
Detail Level: Detailed Consent: The patient's consent was obtained including but not limited to risks of crusting, scabbing, blistering, scarring, darker or lighter pigmentary change, recurrence, incomplete removal and infection. Show Aperture Variable?: Yes Post-Care Instructions: I reviewed with the patient in detail post-care instructions. Patient is to wear sunprotection, and avoid picking at any of the treated lesions. Pt may apply Vaseline to crusted or scabbing areas if desired. Duration Of Freeze Thaw-Cycle (Seconds): 3 Number Of Freeze-Thaw Cycles: 1 freeze-thaw cycle Render Post-Care Instructions In Note?: no Attending Only

## 2025-05-26 NOTE — ED PROVIDER NOTE - LATERALITY
Follow up with OBGYN for further evaluation or return to clinic if you have any further concerns  
right